# Patient Record
Sex: FEMALE | Race: WHITE | Employment: OTHER | URBAN - METROPOLITAN AREA
[De-identification: names, ages, dates, MRNs, and addresses within clinical notes are randomized per-mention and may not be internally consistent; named-entity substitution may affect disease eponyms.]

---

## 2017-01-06 ENCOUNTER — HOSPITAL ENCOUNTER (OUTPATIENT)
Dept: CT IMAGING | Age: 67
Discharge: HOME | End: 2017-01-06
Attending: SURGERY
Payer: MEDICARE

## 2017-01-06 VITALS — HEIGHT: 65 IN | BODY MASS INDEX: 32.15 KG/M2 | WEIGHT: 193 LBS

## 2017-01-06 DIAGNOSIS — R10.84 GENERALIZED ABDOMINAL PAIN: ICD-10-CM

## 2017-01-06 PROCEDURE — 74011000258 HC RX REV CODE- 258: Performed by: SURGERY

## 2017-01-06 PROCEDURE — 74011636320 HC RX REV CODE- 636/320: Performed by: SURGERY

## 2017-01-06 PROCEDURE — 74177 CT ABD & PELVIS W/CONTRAST: CPT

## 2017-01-06 RX ORDER — SODIUM CHLORIDE 0.9 % (FLUSH) 0.9 %
10 SYRINGE (ML) INJECTION
Status: COMPLETED | OUTPATIENT
Start: 2017-01-06 | End: 2017-01-06

## 2017-01-06 RX ADMIN — DIATRIZOATE MEGLUMINE AND DIATRIZOATE SODIUM 15 ML: 660; 100 LIQUID ORAL; RECTAL at 15:19

## 2017-01-06 RX ADMIN — Medication 10 ML: at 15:19

## 2017-01-06 RX ADMIN — IOPAMIDOL 100 ML: 755 INJECTION, SOLUTION INTRAVENOUS at 15:19

## 2017-01-06 RX ADMIN — SODIUM CHLORIDE 100 ML: 900 INJECTION, SOLUTION INTRAVENOUS at 15:19

## 2017-01-25 ENCOUNTER — HOSPITAL ENCOUNTER (OUTPATIENT)
Dept: SURGERY | Age: 67
Discharge: HOME | End: 2017-01-25
Attending: SURGERY
Payer: MEDICARE

## 2017-01-25 VITALS
RESPIRATION RATE: 16 BRPM | SYSTOLIC BLOOD PRESSURE: 133 MMHG | OXYGEN SATURATION: 98 % | BODY MASS INDEX: 32.62 KG/M2 | WEIGHT: 195.8 LBS | HEART RATE: 65 BPM | TEMPERATURE: 97.1 F | HEIGHT: 65 IN | DIASTOLIC BLOOD PRESSURE: 72 MMHG

## 2017-01-25 LAB
CREAT SERPL-MCNC: 0.94 MG/DL (ref 0.6–1)
HGB BLD-MCNC: 13.7 G/DL (ref 11.7–15.4)
POTASSIUM SERPL-SCNC: 3.7 MMOL/L (ref 3.5–5.1)

## 2017-01-25 PROCEDURE — 82565 ASSAY OF CREATININE: CPT | Performed by: ANESTHESIOLOGY

## 2017-01-25 PROCEDURE — 85018 HEMOGLOBIN: CPT | Performed by: ANESTHESIOLOGY

## 2017-01-25 PROCEDURE — 84132 ASSAY OF SERUM POTASSIUM: CPT | Performed by: ANESTHESIOLOGY

## 2017-01-25 NOTE — PERIOP NOTES
Patient verified name, , and surgery as listed in [facility]. Type 2 surgery, PAT assessment complete. Labs per surgeon: none. Labs per anesthesia protocol: hgb,potassium,creatinine; results pending. EKG: dated 17 -within limits-in EMR. Cardiologist note dated 17 in EMR, states \"fine for surgery,avg risk, stay on aspirin\". Hibiclens and instructions given per hospital policy. Patient provided with handouts including Guide to Surgery, Pain Management, Hand Hygiene, Blood Transfusion Education, and [place] [facility] Brochure. Patient answered medical/surgical history questions at their best of ability. All prior to admission medications documented in [facility]. Original medication prescription bottle not visualized during patient appointment. Patient instructed to hold all vitamins 7 days prior to surgery and NSAIDS 5 days prior to surgery, patient verbalized understanding. Medications to be held : meloxicam-5 days. Patient instructed to continue previous medications as prescribed prior to surgery and to take the following medications the day of surgery according to anesthesia guidelines with a small sip of water: ativan and norco if needed. Patient taught back and verbalized understanding.

## 2017-01-29 ENCOUNTER — ANESTHESIA EVENT (OUTPATIENT)
Dept: SURGERY | Age: 67
End: 2017-01-29
Payer: MEDICARE

## 2017-01-29 RX ORDER — MIDAZOLAM HYDROCHLORIDE 1 MG/ML
2 INJECTION, SOLUTION INTRAMUSCULAR; INTRAVENOUS ONCE
Status: CANCELLED | OUTPATIENT
Start: 2017-01-29 | End: 2017-01-29

## 2017-01-30 ENCOUNTER — SURGERY (OUTPATIENT)
Age: 67
End: 2017-01-30

## 2017-01-30 ENCOUNTER — ANESTHESIA (OUTPATIENT)
Dept: SURGERY | Age: 67
End: 2017-01-30
Payer: MEDICARE

## 2017-01-30 ENCOUNTER — HOSPITAL ENCOUNTER (OUTPATIENT)
Age: 67
Setting detail: OUTPATIENT SURGERY
Discharge: HOME | End: 2017-01-30
Attending: SURGERY | Admitting: SURGERY
Payer: MEDICARE

## 2017-01-30 VITALS
RESPIRATION RATE: 14 BRPM | TEMPERATURE: 100.2 F | BODY MASS INDEX: 32.28 KG/M2 | WEIGHT: 194 LBS | HEART RATE: 73 BPM | OXYGEN SATURATION: 93 % | DIASTOLIC BLOOD PRESSURE: 59 MMHG | SYSTOLIC BLOOD PRESSURE: 126 MMHG

## 2017-01-30 PROCEDURE — 77030010507 HC ADH SKN DERMBND J&J -B: Performed by: SURGERY

## 2017-01-30 PROCEDURE — 74011000250 HC RX REV CODE- 250

## 2017-01-30 PROCEDURE — 77030008703 HC TU ET UNCUF COVD -A: Performed by: ANESTHESIOLOGY

## 2017-01-30 PROCEDURE — 74011000250 HC RX REV CODE- 250: Performed by: SURGERY

## 2017-01-30 PROCEDURE — 74011250637 HC RX REV CODE- 250/637: Performed by: ANESTHESIOLOGY

## 2017-01-30 PROCEDURE — 76210000016 HC OR PH I REC 1 TO 1.5 HR: Performed by: SURGERY

## 2017-01-30 PROCEDURE — 74011250636 HC RX REV CODE- 250/636

## 2017-01-30 PROCEDURE — 77030002933 HC SUT MCRYL J&J -A: Performed by: SURGERY

## 2017-01-30 PROCEDURE — 77030020782 HC GWN BAIR PAWS FLX 3M -B: Performed by: ANESTHESIOLOGY

## 2017-01-30 PROCEDURE — 77030011640 HC PAD GRND REM COVD -A: Performed by: SURGERY

## 2017-01-30 PROCEDURE — 74011250636 HC RX REV CODE- 250/636: Performed by: SURGERY

## 2017-01-30 PROCEDURE — 74011250636 HC RX REV CODE- 250/636: Performed by: ANESTHESIOLOGY

## 2017-01-30 PROCEDURE — 77030002916 HC SUT ETHLN J&J -A: Performed by: SURGERY

## 2017-01-30 PROCEDURE — 77030035051: Performed by: SURGERY

## 2017-01-30 PROCEDURE — 77030034154 HC SHR COAG HARM ACE J&J -F: Performed by: SURGERY

## 2017-01-30 PROCEDURE — 76210000020 HC REC RM PH II FIRST 0.5 HR: Performed by: SURGERY

## 2017-01-30 PROCEDURE — 77030035048 HC TRCR ENDOSC OPTCL COVD -B: Performed by: SURGERY

## 2017-01-30 PROCEDURE — 74011000250 HC RX REV CODE- 250: Performed by: ANESTHESIOLOGY

## 2017-01-30 PROCEDURE — 77030008518 HC TBNG INSUF ENDO STRY -B: Performed by: SURGERY

## 2017-01-30 PROCEDURE — 76060000032 HC ANESTHESIA 0.5 TO 1 HR: Performed by: SURGERY

## 2017-01-30 PROCEDURE — 77030032490 HC SLV COMPR SCD KNE COVD -B: Performed by: SURGERY

## 2017-01-30 PROCEDURE — 76010000160 HC OR TIME 0.5 TO 1 HR INTENSV-TIER 1: Performed by: SURGERY

## 2017-01-30 PROCEDURE — 77030035045 HC TRCR ENDOSC VRSPRT BLDLSS COVD -B: Performed by: SURGERY

## 2017-01-30 PROCEDURE — 77030008477 HC STYL SATN SLP COVD -A: Performed by: ANESTHESIOLOGY

## 2017-01-30 RX ORDER — DEXAMETHASONE SODIUM PHOSPHATE 100 MG/10ML
INJECTION INTRAMUSCULAR; INTRAVENOUS AS NEEDED
Status: DISCONTINUED | OUTPATIENT
Start: 2017-01-30 | End: 2017-01-30 | Stop reason: HOSPADM

## 2017-01-30 RX ORDER — LIDOCAINE HYDROCHLORIDE 20 MG/ML
INJECTION, SOLUTION EPIDURAL; INFILTRATION; INTRACAUDAL; PERINEURAL AS NEEDED
Status: DISCONTINUED | OUTPATIENT
Start: 2017-01-30 | End: 2017-01-30 | Stop reason: HOSPADM

## 2017-01-30 RX ORDER — KETOROLAC TROMETHAMINE 30 MG/ML
INJECTION, SOLUTION INTRAMUSCULAR; INTRAVENOUS AS NEEDED
Status: DISCONTINUED | OUTPATIENT
Start: 2017-01-30 | End: 2017-01-30 | Stop reason: HOSPADM

## 2017-01-30 RX ORDER — LIDOCAINE HYDROCHLORIDE 10 MG/ML
0.1 INJECTION INFILTRATION; PERINEURAL AS NEEDED
Status: DISCONTINUED | OUTPATIENT
Start: 2017-01-30 | End: 2017-01-30 | Stop reason: HOSPADM

## 2017-01-30 RX ORDER — ROCURONIUM BROMIDE 10 MG/ML
INJECTION, SOLUTION INTRAVENOUS AS NEEDED
Status: DISCONTINUED | OUTPATIENT
Start: 2017-01-30 | End: 2017-01-30 | Stop reason: HOSPADM

## 2017-01-30 RX ORDER — PROPOFOL 10 MG/ML
INJECTION, EMULSION INTRAVENOUS AS NEEDED
Status: DISCONTINUED | OUTPATIENT
Start: 2017-01-30 | End: 2017-01-30 | Stop reason: HOSPADM

## 2017-01-30 RX ORDER — GLYCOPYRROLATE 0.2 MG/ML
INJECTION INTRAMUSCULAR; INTRAVENOUS AS NEEDED
Status: DISCONTINUED | OUTPATIENT
Start: 2017-01-30 | End: 2017-01-30 | Stop reason: HOSPADM

## 2017-01-30 RX ORDER — NALOXONE HYDROCHLORIDE 0.4 MG/ML
0.2 INJECTION, SOLUTION INTRAMUSCULAR; INTRAVENOUS; SUBCUTANEOUS AS NEEDED
Status: DISCONTINUED | OUTPATIENT
Start: 2017-01-30 | End: 2017-01-30 | Stop reason: HOSPADM

## 2017-01-30 RX ORDER — ONDANSETRON 2 MG/ML
INJECTION INTRAMUSCULAR; INTRAVENOUS AS NEEDED
Status: DISCONTINUED | OUTPATIENT
Start: 2017-01-30 | End: 2017-01-30 | Stop reason: HOSPADM

## 2017-01-30 RX ORDER — SUCCINYLCHOLINE CHLORIDE 20 MG/ML
INJECTION INTRAMUSCULAR; INTRAVENOUS AS NEEDED
Status: DISCONTINUED | OUTPATIENT
Start: 2017-01-30 | End: 2017-01-30 | Stop reason: HOSPADM

## 2017-01-30 RX ORDER — OXYCODONE HYDROCHLORIDE 5 MG/1
5 TABLET ORAL
Status: DISCONTINUED | OUTPATIENT
Start: 2017-01-30 | End: 2017-01-30 | Stop reason: HOSPADM

## 2017-01-30 RX ORDER — FENTANYL CITRATE 50 UG/ML
100 INJECTION, SOLUTION INTRAMUSCULAR; INTRAVENOUS ONCE
Status: DISCONTINUED | OUTPATIENT
Start: 2017-01-30 | End: 2017-01-30 | Stop reason: HOSPADM

## 2017-01-30 RX ORDER — MIDAZOLAM HYDROCHLORIDE 1 MG/ML
2 INJECTION, SOLUTION INTRAMUSCULAR; INTRAVENOUS
Status: DISCONTINUED | OUTPATIENT
Start: 2017-01-30 | End: 2017-01-30 | Stop reason: HOSPADM

## 2017-01-30 RX ORDER — CEFAZOLIN SODIUM IN 0.9 % NACL 2 G/50 ML
2 INTRAVENOUS SOLUTION, PIGGYBACK (ML) INTRAVENOUS ONCE
Status: COMPLETED | OUTPATIENT
Start: 2017-01-30 | End: 2017-01-30

## 2017-01-30 RX ORDER — SODIUM CHLORIDE, SODIUM LACTATE, POTASSIUM CHLORIDE, CALCIUM CHLORIDE 600; 310; 30; 20 MG/100ML; MG/100ML; MG/100ML; MG/100ML
75 INJECTION, SOLUTION INTRAVENOUS CONTINUOUS
Status: DISCONTINUED | OUTPATIENT
Start: 2017-01-30 | End: 2017-01-30 | Stop reason: HOSPADM

## 2017-01-30 RX ORDER — BUPIVACAINE HYDROCHLORIDE 5 MG/ML
INJECTION, SOLUTION EPIDURAL; INTRACAUDAL AS NEEDED
Status: DISCONTINUED | OUTPATIENT
Start: 2017-01-30 | End: 2017-01-30 | Stop reason: HOSPADM

## 2017-01-30 RX ORDER — FENTANYL CITRATE 50 UG/ML
INJECTION, SOLUTION INTRAMUSCULAR; INTRAVENOUS AS NEEDED
Status: DISCONTINUED | OUTPATIENT
Start: 2017-01-30 | End: 2017-01-30 | Stop reason: HOSPADM

## 2017-01-30 RX ORDER — OXYCODONE AND ACETAMINOPHEN 5; 325 MG/1; MG/1
1-2 TABLET ORAL
Qty: 40 TAB | Refills: 0 | Status: SHIPPED | OUTPATIENT
Start: 2017-01-30 | End: 2017-03-20

## 2017-01-30 RX ORDER — HYDROMORPHONE HYDROCHLORIDE 2 MG/ML
0.5 INJECTION, SOLUTION INTRAMUSCULAR; INTRAVENOUS; SUBCUTANEOUS
Status: DISCONTINUED | OUTPATIENT
Start: 2017-01-30 | End: 2017-01-30 | Stop reason: HOSPADM

## 2017-01-30 RX ORDER — NEOSTIGMINE METHYLSULFATE 1 MG/ML
INJECTION INTRAVENOUS AS NEEDED
Status: DISCONTINUED | OUTPATIENT
Start: 2017-01-30 | End: 2017-01-30 | Stop reason: HOSPADM

## 2017-01-30 RX ADMIN — LIDOCAINE HYDROCHLORIDE 80 MG: 20 INJECTION, SOLUTION EPIDURAL; INFILTRATION; INTRACAUDAL; PERINEURAL at 10:21

## 2017-01-30 RX ADMIN — NEOSTIGMINE METHYLSULFATE 3 MG: 1 INJECTION INTRAVENOUS at 11:04

## 2017-01-30 RX ADMIN — LIDOCAINE HYDROCHLORIDE 0.1 ML: 10 INJECTION, SOLUTION INFILTRATION; PERINEURAL at 10:14

## 2017-01-30 RX ADMIN — FENTANYL CITRATE 50 MCG: 50 INJECTION, SOLUTION INTRAMUSCULAR; INTRAVENOUS at 11:09

## 2017-01-30 RX ADMIN — SODIUM CHLORIDE, SODIUM LACTATE, POTASSIUM CHLORIDE, AND CALCIUM CHLORIDE 75 ML/HR: 600; 310; 30; 20 INJECTION, SOLUTION INTRAVENOUS at 10:15

## 2017-01-30 RX ADMIN — OXYCODONE HYDROCHLORIDE 5 MG: 5 TABLET ORAL at 12:06

## 2017-01-30 RX ADMIN — FENTANYL CITRATE 50 MCG: 50 INJECTION, SOLUTION INTRAMUSCULAR; INTRAVENOUS at 10:39

## 2017-01-30 RX ADMIN — SUCCINYLCHOLINE CHLORIDE 140 MG: 20 INJECTION INTRAMUSCULAR; INTRAVENOUS at 10:21

## 2017-01-30 RX ADMIN — HYDROMORPHONE HYDROCHLORIDE 0.5 MG: 2 INJECTION, SOLUTION INTRAMUSCULAR; INTRAVENOUS; SUBCUTANEOUS at 11:33

## 2017-01-30 RX ADMIN — FENTANYL CITRATE 100 MCG: 50 INJECTION, SOLUTION INTRAMUSCULAR; INTRAVENOUS at 10:21

## 2017-01-30 RX ADMIN — HYDROMORPHONE HYDROCHLORIDE 0.5 MG: 2 INJECTION, SOLUTION INTRAMUSCULAR; INTRAVENOUS; SUBCUTANEOUS at 11:25

## 2017-01-30 RX ADMIN — CEFAZOLIN 2 G: 1 INJECTION, POWDER, FOR SOLUTION INTRAMUSCULAR; INTRAVENOUS; PARENTERAL at 10:17

## 2017-01-30 RX ADMIN — BUPIVACAINE HYDROCHLORIDE 30 ML: 5 INJECTION, SOLUTION EPIDURAL; INTRACAUDAL; PERINEURAL at 10:41

## 2017-01-30 RX ADMIN — PROPOFOL 170 MG: 10 INJECTION, EMULSION INTRAVENOUS at 10:21

## 2017-01-30 RX ADMIN — DEXAMETHASONE SODIUM PHOSPHATE 10 MG: 100 INJECTION INTRAMUSCULAR; INTRAVENOUS at 10:35

## 2017-01-30 RX ADMIN — ROCURONIUM BROMIDE 30 MG: 10 INJECTION, SOLUTION INTRAVENOUS at 10:27

## 2017-01-30 RX ADMIN — ROCURONIUM BROMIDE 5 MG: 10 INJECTION, SOLUTION INTRAVENOUS at 10:21

## 2017-01-30 RX ADMIN — GLYCOPYRROLATE 0.4 MG: 0.2 INJECTION INTRAMUSCULAR; INTRAVENOUS at 11:04

## 2017-01-30 RX ADMIN — ONDANSETRON 4 MG: 2 INJECTION INTRAMUSCULAR; INTRAVENOUS at 10:57

## 2017-01-30 RX ADMIN — KETOROLAC TROMETHAMINE 30 MG: 30 INJECTION, SOLUTION INTRAMUSCULAR; INTRAVENOUS at 10:57

## 2017-01-30 RX ADMIN — MIDAZOLAM HYDROCHLORIDE 2 MG: 1 INJECTION, SOLUTION INTRAMUSCULAR; INTRAVENOUS at 10:14

## 2017-01-30 NOTE — ANESTHESIA POSTPROCEDURE EVALUATION
Post-Anesthesia Evaluation and Assessment    Patient: Justin Cast MRN: [id number]  SSN: [id number]    YOB: 1950  Age: 74 y.o. Sex: female       Cardiovascular Function/Vital Signs  Visit Vitals    /59    Pulse 73    Temp 37.9 °C (100.2 °F)    Resp 14    Wt 88 kg (194 lb)    SpO2 93%    BMI 32.28 kg/m2       Patient is status post general anesthesia for Procedure(s):  GENERAL DIAGNOSTIC LAPAROSCOPY, LYSIS OF ADHESIONS. Nausea/Vomiting: None    Postoperative hydration reviewed and adequate. Pain:  Pain Scale 1: Visual (01/30/17 1213)  Pain Intensity 1: 0 (01/30/17 1213)   Managed    Neurological Status:   Neuro (WDL): Exceptions to WDL (01/30/17 1115)  Neuro  Neurologic State: Alert (01/30/17 1200)  Orientation Level: Oriented X4 (01/30/17 1200)  Cognition: Follows commands (01/30/17 1200)  Speech: Clear (01/30/17 1200)  LUE Motor Response: Purposeful (01/30/17 1200)  LLE Motor Response: Purposeful (01/30/17 1200)  RUE Motor Response: Purposeful (01/30/17 1200)  RLE Motor Response: Purposeful (01/30/17 1200)   At baseline    Mental Status and Level of Consciousness: Arousable    Pulmonary Status:   O2 Device: Room air (01/30/17 1213)   Adequate oxygenation and airway patent    Complications related to anesthesia: None    Post-anesthesia assessment completed.  No concerns    Signed By: Jose Valentin MD     January 30, 2017

## 2017-01-30 NOTE — IP AVS SNAPSHOT
Glory Almendarez 
 
 
 [address] 
[phone] Patient: Dejah Espino MRN: [id number] WND:[birth year] You are allergic to the following Allergen Reactions Adhesive Tape Rash  
    
 Sulfa (Sulfonamide Antibiotics) Nausea and Vomiting Recent Documentation Weight BMI OB Status Smoking Status 88 kg 32.28 kg/m2 Hysterectomy Never Smoker Emergency Contacts Name Discharge Info Relation Home Work Mobile Meggan Lerner DISCHARGE CAREGIVER [3] Daughter [21] [phone] [facility] [3] Spouse [3] [phone] About your hospitalization You were admitted on:  January 30, 2017 You last received care in the:  [facility] PACU You were discharged on:  January 30, 2017 Unit phone number:  [phone] Why you were hospitalized Your primary diagnosis was:  Not on File Providers Seen During Your Hospitalizations Provider Role Specialty Primary office phone Isabel Velasquez MD Attending Provider General Surgery [phone] Your Primary Care Physician (PCP) Primary Care Physician Office Phone Office Fax Tanesha Mckee [phone] Follow-up Information Follow up With Details Comments Contact Info Jennifer Clark MD   [address] [place] [id number] [phone] Isabel Velasquez MD Schedule an appointment as soon as possible for a visit in 10 day(s)  [address] [address] 
[phone] Your Appointments Monday February 06, 2017  1:45 PM EST Global Post Op with Isabel Velasquze MD  
[facility] ([address] [place] [address]) [address] [place] [id number] [phone] Thursday February 09, 2017  2:45 PM EST ANNUAL with Napoleon Ceja MD  
 Formerly [facility] ([facility]) [address] [place] [id number] [phone] Current Discharge Medication List  
  
START taking these medications Dose & Instructions Dispensing Information Comments Morning Noon Evening Bedtime  
 oxyCODONE-acetaminophen 5-325 mg per tablet Commonly known as:  PERCOCET Your next dose is: Today, Tomorrow Other:  _________ Dose:  1-2 Tab Take 1-2 Tabs by mouth every four (4) hours as needed for Pain. Max Daily Amount: 12 Tabs. Quantity:  40 Tab Refills:  0 CONTINUE these medications which have CHANGED Dose & Instructions Dispensing Information Comments Morning Noon Evening Bedtime  
 amLODIPine 5 mg tablet Commonly known as:  Mirna Dutton What changed:   
- when to take this 
- additional instructions Your next dose is: Today, Tomorrow Other:  _________  
   
   
 1 every day for BP  Indications: Hypertension Quantity:  90 Tab Refills:  12  
     
   
   
   
  
 atorvastatin 40 mg tablet Commonly known as:  LIPITOR What changed:   
- when to take this 
- additional instructions Your next dose is: Today, Tomorrow Other:  _________  
   
   
 1 qd  Indications: mixed hyperlipidemia Quantity:  90 Tab Refills:  12  
     
   
   
   
  
 levothyroxine 75 mcg tablet Commonly known as:  SYNTHROID What changed:   
- when to take this 
- additional instructions Your next dose is: Today, Tomorrow Other:  _________  
   
   
 1 every day for thyroid Quantity:  90 Tab Refills:  12  
     
   
   
   
  
 losartan-hydroCHLOROthiazide 100-25 mg per tablet Commonly known as:  HYZAAR What changed:   
- when to take this 
- additional instructions Your next dose is: Today, Tomorrow Other:  _________  
   
   
 1 every day for BP  Indications: Hypertension Quantity:  30 Tab Refills:  12 CONTINUE these medications which have NOT CHANGED Dose & Instructions Dispensing Information Comments Morning Noon Evening Bedtime  
 alendronate 70 mg tablet Commonly known as:  FOSAMAX Your next dose is: Today, Tomorrow Other:  _________  
   
   
 1 q week for osteoporosis Quantity:  12 Tab Refills:  12  
     
   
   
   
  
 aspirin 81 mg chewable tablet Your next dose is: Today, Tomorrow Other:  _________ Dose:  81 mg Take 81 mg by mouth nightly. Refills:  0 HYDROcodone-acetaminophen 5-325 mg per tablet Commonly known as:  [place] Minor Your next dose is: Today, Tomorrow Other:  _________  
   
   
 1 q6h prn severe pain   Fill after 30 days  Indications: PAIN Quantity:  120 Tab Refills:  0 LORazepam 0.5 mg tablet Commonly known as:  ATIVAN Your next dose is: Today, Tomorrow Other:  _________ Dose:  0.5 mg Take 1 Tab by mouth every eight (8) hours as needed for Anxiety. Max Daily Amount: 1.5 mg.  
 Quantity:  30 Tab Refills:  5  
     
   
   
   
  
 meloxicam 15 mg tablet Commonly known as:  MOBIC Your next dose is: Today, Tomorrow Other:  _________  
   
   
 1 every day for arthritis  Indications: OSTEOARTHRITIS Quantity:  90 Tab Refills:  12 sertraline 100 mg tablet Commonly known as:  ZOLOFT Your next dose is: Today, Tomorrow Other:  _________ Dose:  100 mg Take 1 Tab by mouth nightly. Indications: major depressive disorder Quantity:  90 Tab Refills:  5  
     
   
   
   
  
 temazepam 30 mg capsule Commonly known as:  RESTORIL Your next dose is: Today, Tomorrow Other:  _________ Dose:  30 mg Take 1 Cap by mouth nightly as needed. Max Daily Amount: 30 mg.  
 Quantity:  30 Cap Refills:  5 traZODone 150 mg tablet Commonly known as:  Derek Madrigal Your next dose is: Today, Tomorrow Other:  _________  
   
   
 1 to 2 qhs for sleep Quantity:  180 Tab Refills:  12 Where to Get Your Medications Information on where to get these meds will be given to you by the nurse or doctor. ! Ask your nurse or doctor about these medications  
  oxyCODONE-acetaminophen 5-325 mg per tablet Discharge Instructions 1. Diet as tolerated except for a  low fat diet after laparoscopic cholecystectomy. 2. Showering is allowed, but no tub baths, hot tubs or swimming. 3. Drainage is common from the wounds. Change the dressings as needed. Call our office if the wounds become reddened, tender, feel warm to the touch or pus starts to drain from the wound. 4. Take prescribed pain medication as directed, usually Percocet, Norco, Ultram or Dilaudid. Take over the counter medication for minor pain. 5. Ice may be applied intermittently to the surgical site or sites. 6. Call or office, [phone], if problems arise. 7. Follow up in the office at the assigned time. 8. Resume all medications as taken per surgery, unless specifically instructed not to take certain ones. 9. No lifting more than 25 pounds until told otherwise. 10. Driving is allowed 3 days after surgery as long as you feel comfortable enough to drive and have not taken any prescription pain medication prior to driving. Discharge Orders None ACO Transitions of Care Introducing [facility] [employer] offers a voluntary care coordination program to provide high quality service and care to [facility] fee-for-service beneficiaries. Dominga Kirk was designed to help you enhance your health and well-being through the following services: ? Transitions of Care  support for individuals who are transitioning from one care setting to another (example: Hospital to home). ? Chronic and Complex Care Coordination  support for individuals and caregivers of those with serious or chronic illnesses or with more than one chronic (ongoing) condition and those who take a number of different medications. If you meet specific medical criteria, a [facility] may call you directly to coordinate your care with your primary care physician and your other care providers. For questions about the [facility] programs, please, contact your physicians office. For general questions or additional information about Accountable Care Organizations: 
Please visit www.medicare.gov/acos. html or call 1-800-MEDICARE (4-665.122.8412) TTY users should call 8-422.760.3052. Introducing [facility] SERVICES! Natalie Bergman introduces [employer] patient portal. Now you can access parts of your medical record, email your doctor's office, and request medication refills online. 1. In your internet browser, go to [email or web address] 2. Click on the First Time User? Click Here link in the Sign In box. You will see the New Member Sign Up page. 3. Enter your [employer] Access Code exactly as it appears below. You will not need to use this code after youve completed the sign-up process. If you do not sign up before the expiration date, you must request a new code. · [employer] Access Code: [email or web address] Expires: 3/15/2017 11:56 AM 
 
4. Enter the last four digits of your Social Security Number (xxxx) and Date of Birth (mm/dd/yyyy) as indicated and click Submit. You will be taken to the next sign-up page. 5. Create a Easy [employer]ric[employer] ID. This will be your [employer] login ID and cannot be changed, so think of one that is secure and easy to remember. 6. Create a [employer] password. You can change your password at any time. 7. Enter your Password Reset Question and Answer. This can be used at a later time if you forget your password. 8. Enter your e-mail address. You will receive e-mail notification when new information is available in [address] 9. Click Sign Up. You can now view and download portions of your medical record. 10. Click the Download Summary menu link to download a portable copy of your medical information. If you have questions, please visit the Frequently Asked Questions section of the [employer] website. Remember, [employer] is NOT to be used for urgent needs. For medical emergencies, dial 911. Now available from your iPhone and Android! General Information Please provide this summary of care documentation to your next provider. Patient Signature:  ____________________________________________________________ Date:  ____________________________________________________________  
  
[facility] Provider Signature:  ____________________________________________________________ Date:  ____________________________________________________________

## 2017-01-30 NOTE — H&P (VIEW-ONLY)
aDate: 2017      Name: Elvie Mazariegos      MRN: [id number]       : 1950       Age: 74 y.o. Sex: female        Adalberto Galeana MD       CC:    Chief Complaint   Patient presents with    Follow-up       HPI:     Elvie Mazariegos is a 74 y.o. female who presents for follow up after a CT scan was done. The CT scan showed: FINDINGS:  Abdomen CT: The patient is post gastric bypass surgery. There are also  cholecystectomy changes. Surgical changes in the anterior abdominal wall  suggest prior ventral hernia repair. There is no significant recurrent hernia. There are no inflammatory changes or fluid collections in the abdomen. There is  no bowel distention.     The lung bases are clear. There are no lesions in the liver or spleen. The  adrenal glands and pancreas appear normal. There is normal enhancement of the  kidneys. There is no hydronephrosis. There is no adenopathy.      Pelvis CT: The patient is post hysterectomy. There are no inflammatory changes  or fluid collections in the pelvis. There is no significant adenopathy or mass. There are postsurgical changes in the lower lumbar spine. There are no bony  lesions.     IMPRESSION  IMPRESSION: Post operative changes as described. No acute findings in the  abdomen or pelvis    PMH:    Past Medical History   Diagnosis Date    Anxiety     Arthritis      general-back, neck, hips,knees/ managed with medication     Autoimmune disease ([address]      lichens sclerosis    CAD (coronary artery disease) 2010     stent x 1, no MI    Chest pain      right    Chronic pain      back and neck    Depression      managed with medication     Family history of ischemic heart disease     H/O acute pancreatitis 2013     states gallbladder stone lodged creating a blockage.      H/O gastric bypass     High cholesterol      managed w/ meds    Hypertension      managed with medication     Hypothyroidism      managed with medication     Lichen sclerosus     Obesity (BMI 30-39. 9)      BMI 32.4 3/16    Osteopenia     Systolic murmur      ECHO: +/-PI, TR    Ventral hernia        PSH:    Past Surgical History   Procedure Laterality Date    Hx bladder suspension      Hx carpal tunnel release Right     Hx lysis of adhesions       abdominal    Hx heart catheterization  2010     stenting of LAD    Hx lap cholecystectomy  7/2013    Hx hysterectomy  1978     cervix removed    Hx oophorectomy  1984    Hx lumbar fusion  1996    Hx gastric bypass  2009    Hx hernia repair  8/14/14     ventral    Hx urological       bladder suspension    Hx shoulder arthroscopy Right 2011    Hx orthopaedic  1997     carpal tunnel release    Hx appendectomy  1954    Hx colonoscopy      Hx lipectomy         MEDS:    Current Outpatient Prescriptions   Medication Sig    ibandronate (BONIVA) 150 mg tablet Take 150 mg by mouth every thirty (30) days.  temazepam (RESTORIL) 30 mg capsule Take 1 Cap by mouth nightly as needed. Max Daily Amount: 30 mg.    alendronate (FOSAMAX) 70 mg tablet 1 q week for osteoporosis    HYDROcodone-acetaminophen (NORCO) 5-325 mg per tablet 1 q6h prn severe pain   Fill after 30 days  Indications: PAIN    levothyroxine (SYNTHROID) 75 mcg tablet 1 every day for thyroid    LORazepam (ATIVAN) 0.5 mg tablet Take 1 Tab by mouth every eight (8) hours as needed for Anxiety. Max Daily Amount: 1.5 mg.    losartan-hydroCHLOROthiazide (HYZAAR) 100-25 mg per tablet 1 every day for BP  Indications: Hypertension    meloxicam (MOBIC) 15 mg tablet 1 every day for arthritis  Indications: OSTEOARTHRITIS    amLODIPine (NORVASC) 5 mg tablet 1 every day for BP  Indications: Hypertension    traZODone (DESYREL) 150 mg tablet 1 to 2 qhs for sleep    atorvastatin (LIPITOR) 40 mg tablet 1 qd  Indications: mixed hyperlipidemia    sertraline (ZOLOFT) 100 mg tablet Take 1 Tab by mouth nightly.  Indications: major depressive disorder    aspirin 81 mg chewable tablet Take 81 mg by mouth nightly. No current facility-administered medications for this visit. ALLERGIES:      Allergies   Allergen Reactions    Adhesive Tape Rash    Sulfa (Sulfonamide Antibiotics) Nausea and Vomiting       SH:    Social History   Substance Use Topics    Smoking status: Never Smoker    Smokeless tobacco: Never Used    Alcohol use No       FH:    Family History   Problem Relation Age of Onset    Heart Attack Mother     Heart Disease Mother     Hypertension Mother     Cancer Mother      melanoma    Cancer Father      leukemia    Coronary Artery Disease Father     Cancer Sister      breast    Heart Attack Brother     Heart Disease Brother     Cancer Brother      lung    Lung Disease Brother        ROS: The patient has no difficulty with chest pain or shortness of breath. No fever or chills. Comprehensive 13 point review of systems was otherwise unremarkable except as noted above. Physical Exam:     Visit Vitals    /84    Pulse 60    Ht 5' 5\" (1.651 m)    Wt 196 lb (88.9 kg)    BMI 32.62 kg/m2       General: Alert, oriented, cooperative white female in no acute distress. Eyes: Sclera are clear. Conjunctiva and lids within normal limits. No icterus. Ears and Nose: no gross deformities to visual inspection, gross hearing intact  Neck: Supple, trachea midline, no appreciable thyromegaly  Resp: Breathing is  non-labored. Lungs clear to auscultation without wheezing or rhonchi   CV: RRR. No murmurs, rubs or gallops appreciated. Abd: soft, mild RUQ and epigastric tenderness to palpation, active BS'S. Psych:  Mood and affect appropriate. Short-term memory and understanding intact      Assessment/Plan:  Irish Lee is a 74 y.o. female who has signs and symptoms consistent with upper abdominal pain of unknown etiology. 1. Diagnostic laparoscopy with lysis of adhesions, possible open exploratory laparotomy.      I went through the risks of bleeding, infection and anesthesia. I went through other risks of injury to the liver, biliary tree structures, stomach, small bowel, large bowel , pancreas and the potential need to convert to an open procedure.     Merlin Mcalpine, MD      FACS   1/19/2017  7:28 PM

## 2017-01-30 NOTE — INTERVAL H&P NOTE
H&P Update:  Jakob Méndez was seen and examined. History and physical has been reviewed. The patient has been examined.  There have been no significant clinical changes since the completion of the originally dated History and Physical.    Signed By: Aniceto uBsch MD     January 30, 2017 9:57 AM

## 2017-01-30 NOTE — OP NOTES
[address]   OPERATIVE REPORT       Name:  Adriana Dennis   MR#:  [id number]   :  1950   Account #:  [de-identified]   Date of Adm:  2017       DATE OF PROCEDURE: 2017     PREOPERATIVE DIAGNOSIS: Upper abdominal pain. POSTOPERATIVE DIAGNOSES: Upper abdominal pain secondary to   intraabdominal adhesions. PROCEDURE: Diagnostic laparoscopy, laparoscopic lysis of   adhesions. SURGEON: Tj Catalan MD    ESTIMATED BLOOD LOSS: 5 mL. SPECIMENS: None. HISTORY: This is a 59-year-old female who has a long past   surgical history. She had a gastric bypass done at [facility]. She has   had a cholecystectomy. I did a very large ventral hernia on   2014. The patient had dense intraabdominal adhesions when   I did that prior procedure. She came back to my office reporting   upper abdominal pain which was getting progressively more severe   and more frequent. I did a CT scan of her abdomen and pelvis, it   was negative. I recommended a diagnostic laparoscopy in light of   her previous history of adhesions. The patient was agreeable,   signed a consent form, and was scheduled for today. I did go   through the risks of bleeding, infection, anesthesia, injury to   the small bowel, large bowel, any of the intra-abdominal organs,   potential need to convert to an open procedure. The patient was   agreeable, signed a consent form. DESCRIPTION OF PROCEDURE: The patient was brought to OR room #7   at [address] [place] [address] where general endotracheal   anesthesia was administered without complications. The abdomen   was prepped and draped in the usual sterile manner. I did a   timeout identifying the patient, surgeon, procedure, and her   birthday of 1950. Once everyone in the room agreed with   the time-out, the procedure. I made an incision at her   panniculectomy incision site in the left lower quadrant area.    Through this, an Optiview trocar was placed in the peritoneal   cavity. After going through all the appropriate layers of the   anterior abdominal wall, the abdomen was insufflated to 15 mmHg   using carbon dioxide gas after which a 10 mm 30 degree scope was   inserted. She had adhesions all along the edge of the mesh,   particularly in the upper abdomen, tenting up her loops of small   bowel, as well as her transverse colon. I placed two 5 mm   trocars in the left side of the abdomen, one at the level of the   umbilicus, one in the left lower quadrant area. Using a   combination of the Harmonic scalpel and blunt dissection, I was   able to take down all the adhesions to the ventral hernia mesh,   which I had placed in 08/2014. There were no other   abnormalities. We took down adhesions all along this, took about   20 minutes to take down all the adhesions with the 5 mm Harmonic   scalpel and blunt dissection. No other abnormalities were noted,   the liver had some steatosis, but otherwise appeared normal.   There was no evidence of cirrhosis. At this point, I could not   see anything else that was necessary. We removed all the trocars   under direct vision without evidence of bleeding from the trocar   sites. The incisions were closed with simple sutures of 3-0   nylon, the reason being is that when I attempted to close them   with the 4-0 Monocryl, there was such tension on from her   previous abdominoplasty that the 4-0 Monocryl would not hold   adequately, so I had to use 3-0 nylon interrupted sutures. The   patient received 30 mL of 0.5% Marcaine in divided doses at the   incision sites. She tolerated the procedure well, was extubated,   brought to recovery room in stable condition.         MD ODESSA De La Cruz / Jace Avila   D:  01/30/2017   11:13   T:  01/30/2017   14:14   Job #:  [id number]

## 2017-01-30 NOTE — ANESTHESIA PREPROCEDURE EVALUATION
Anesthetic History   No history of anesthetic complications            Review of Systems / Medical History  Patient summary reviewed and pertinent labs reviewed    Pulmonary  Within defined limits                 Neuro/Psych         Psychiatric history (Anxiety/Depression)     Cardiovascular    Hypertension: well controlled          CAD and cardiac stents (STEFF 2010 to LAD)    Exercise tolerance: >4 METS  Comments: Normal stress test 2014  Denies recent CP, SOB or changes in functional status  Appears optimized from a CV standpoint. GI/Hepatic/Renal           Liver disease (h/o of pancreatitis with increased lfts about a year ago, better now)    Comments: Gastric bypass Endo/Other      Hypothyroidism: well controlled  Obesity and arthritis     Other Findings   Comments: Lichen sclerosis         Physical Exam    Airway  Mallampati: II  TM Distance: 4 - 6 cm    Mouth opening: Normal     Cardiovascular    Rhythm: regular  Rate: normal         Dental  No notable dental hx       Pulmonary  Breath sounds clear to auscultation               Abdominal  GI exam deferred       Other Findings            Anesthetic Plan    ASA: 3  Anesthesia type: general          Induction: Intravenous  Anesthetic plan and risks discussed with: Patient and Spouse      ASA last night.

## 2017-01-30 NOTE — PROGRESS NOTES
's visit with Ms. Bhakta to offer prayer in recovery. Prior to this visit I conveyed care and concern to patient's family in waiting room.       Paul Calhoun 66  Board Certified

## 2017-01-30 NOTE — DISCHARGE INSTRUCTIONS
1. Diet as tolerated except for a  low fat diet after laparoscopic cholecystectomy. 2. Showering is allowed, but no tub baths, hot tubs or swimming. 3. Drainage is common from the wounds. Change the dressings as needed. Call our office if the wounds become reddened, tender, feel warm to the touch or pus starts to drain from the wound. 4. Take prescribed pain medication as directed, usually Percocet, Norco, Ultram or Dilaudid. Take over the counter medication for minor pain. 5. Ice may be applied intermittently to the surgical site or sites. 6. Call or office, [phone], if problems arise. 7. Follow up in the office at the assigned time. 8. Resume all medications as taken per surgery, unless specifically instructed not to take certain ones. 9. No lifting more than 25 pounds until told otherwise. 10. Driving is allowed 3 days after surgery as long as you feel comfortable enough to drive and have not taken any prescription pain medication prior to driving.

## 2017-01-30 NOTE — BRIEF OP NOTE
BRIEF OPERATIVE NOTE    Date of Procedure: 1/30/2017   Preoperative Diagnosis: UPPER ABDOMINAL PAIN  Postoperative Diagnosis: SAME WITH INTRAABDOMINAL ADHESIONS FOUND  Procedure(s):  GENERAL DIAGNOSTIC LAPAROSCOPY WITH LAPAROSCOPIC LYSIS OF ADHESIONS  Surgeon(s) and Role:     * Aleksandr Thompson MD - Primary            Surgical Staff:  Circ-1: Laurie Cheng RN  Scrub Tech-1: Marilyn Jurado Cauisaac  Scrub Tech-2: Castilloius Expose  Event Time In   Incision Start 1038   Incision Close 1103     Anesthesia: General plus localc  Estimated Blood Loss: 5 cc's  Specimens: * No specimens in log *   Findings: See dictated note   Complications: None.   Implants: * No implants in log *

## 2017-08-07 ENCOUNTER — HOSPITAL ENCOUNTER (OUTPATIENT)
Dept: SURGERY | Age: 67
Discharge: HOME | End: 2017-08-07
Payer: MEDICARE

## 2017-08-07 ENCOUNTER — HOSPITAL ENCOUNTER (OUTPATIENT)
Dept: PHYSICAL THERAPY | Age: 67
Discharge: HOME | End: 2017-08-07
Payer: MEDICARE

## 2017-08-07 VITALS
HEART RATE: 59 BPM | DIASTOLIC BLOOD PRESSURE: 84 MMHG | HEIGHT: 65 IN | SYSTOLIC BLOOD PRESSURE: 153 MMHG | OXYGEN SATURATION: 100 % | WEIGHT: 197 LBS | TEMPERATURE: 98.2 F | BODY MASS INDEX: 32.82 KG/M2 | RESPIRATION RATE: 16 BRPM

## 2017-08-07 LAB
ANION GAP BLD CALC-SCNC: 6 MMOL/L (ref 7–16)
APPEARANCE UR: CLEAR
APTT PPP: 25.4 SEC (ref 23.5–31.7)
BACTERIA SPEC CULT: ABNORMAL
BACTERIA URNS QL MICRO: ABNORMAL /HPF
BASOPHILS # BLD AUTO: 0 K/UL (ref 0–0.2)
BASOPHILS # BLD: 1 % (ref 0–2)
BILIRUB UR QL: NEGATIVE
BUN SERPL-MCNC: 18 MG/DL (ref 8–23)
CALCIUM SERPL-MCNC: 9.1 MG/DL (ref 8.3–10.4)
CASTS URNS QL MICRO: 0 /LPF
CHLORIDE SERPL-SCNC: 104 MMOL/L (ref 98–107)
CO2 SERPL-SCNC: 31 MMOL/L (ref 21–32)
COLOR UR: YELLOW
CREAT SERPL-MCNC: 0.85 MG/DL (ref 0.6–1)
DIFFERENTIAL METHOD BLD: NORMAL
EOSINOPHIL # BLD: 0.4 K/UL (ref 0–0.8)
EOSINOPHIL NFR BLD: 6 % (ref 0.5–7.8)
EPI CELLS #/AREA URNS HPF: ABNORMAL /HPF
ERYTHROCYTE [DISTWIDTH] IN BLOOD BY AUTOMATED COUNT: 14.5 % (ref 11.9–14.6)
GLUCOSE SERPL-MCNC: 84 MG/DL (ref 65–100)
GLUCOSE UR STRIP.AUTO-MCNC: NEGATIVE MG/DL
HCT VFR BLD AUTO: 42.1 % (ref 35.8–46.3)
HGB BLD-MCNC: 13.5 G/DL (ref 11.7–15.4)
HGB UR QL STRIP: NEGATIVE
IMM GRANULOCYTES # BLD: 0 K/UL (ref 0–0.5)
IMM GRANULOCYTES NFR BLD AUTO: 0.2 % (ref 0–5)
INR PPP: 1 (ref 0.9–1.2)
KETONES UR QL STRIP.AUTO: NEGATIVE MG/DL
LEUKOCYTE ESTERASE UR QL STRIP.AUTO: ABNORMAL
LYMPHOCYTES # BLD AUTO: 26 % (ref 13–44)
LYMPHOCYTES # BLD: 1.5 K/UL (ref 0.5–4.6)
MCH RBC QN AUTO: 28.2 PG (ref 26.1–32.9)
MCHC RBC AUTO-ENTMCNC: 32.1 G/DL (ref 31.4–35)
MCV RBC AUTO: 87.9 FL (ref 79.6–97.8)
MONOCYTES # BLD: 0.4 K/UL (ref 0.1–1.3)
MONOCYTES NFR BLD AUTO: 7 % (ref 4–12)
NEUTS SEG # BLD: 3.6 K/UL (ref 1.7–8.2)
NEUTS SEG NFR BLD AUTO: 60 % (ref 43–78)
NITRITE UR QL STRIP.AUTO: NEGATIVE
PH UR STRIP: 6 [PH] (ref 5–9)
PLATELET # BLD AUTO: 190 K/UL (ref 150–450)
PMV BLD AUTO: 11 FL (ref 10.8–14.1)
POTASSIUM SERPL-SCNC: 4 MMOL/L (ref 3.5–5.1)
PROT UR STRIP-MCNC: NEGATIVE MG/DL
PROTHROMBIN TIME: 10.4 SEC (ref 9.6–12)
RBC # BLD AUTO: 4.79 M/UL (ref 4.05–5.25)
RBC #/AREA URNS HPF: 0 /HPF
SERVICE CMNT-IMP: ABNORMAL
SODIUM SERPL-SCNC: 141 MMOL/L (ref 136–145)
SP GR UR REFRACTOMETRY: 1.02 (ref 1–1.02)
UROBILINOGEN UR QL STRIP.AUTO: 0.2 EU/DL (ref 0.2–1)
WBC # BLD AUTO: 6 K/UL (ref 4.3–11.1)
WBC URNS QL MICRO: ABNORMAL /HPF

## 2017-08-07 PROCEDURE — 87641 MR-STAPH DNA AMP PROBE: CPT | Performed by: ORTHOPAEDIC SURGERY

## 2017-08-07 PROCEDURE — G8978 MOBILITY CURRENT STATUS: HCPCS

## 2017-08-07 PROCEDURE — 80048 BASIC METABOLIC PNL TOTAL CA: CPT | Performed by: ORTHOPAEDIC SURGERY

## 2017-08-07 PROCEDURE — 77030027138 HC INCENT SPIROMETER -A

## 2017-08-07 PROCEDURE — 85025 COMPLETE CBC W/AUTO DIFF WBC: CPT | Performed by: ORTHOPAEDIC SURGERY

## 2017-08-07 PROCEDURE — G8979 MOBILITY GOAL STATUS: HCPCS

## 2017-08-07 PROCEDURE — 81001 URINALYSIS AUTO W/SCOPE: CPT | Performed by: ORTHOPAEDIC SURGERY

## 2017-08-07 PROCEDURE — 36415 COLL VENOUS BLD VENIPUNCTURE: CPT | Performed by: ORTHOPAEDIC SURGERY

## 2017-08-07 PROCEDURE — 97161 PT EVAL LOW COMPLEX 20 MIN: CPT

## 2017-08-07 PROCEDURE — 85730 THROMBOPLASTIN TIME PARTIAL: CPT | Performed by: ORTHOPAEDIC SURGERY

## 2017-08-07 PROCEDURE — 85610 PROTHROMBIN TIME: CPT | Performed by: ORTHOPAEDIC SURGERY

## 2017-08-07 PROCEDURE — G8980 MOBILITY D/C STATUS: HCPCS

## 2017-08-07 RX ORDER — ASPIRIN 81 MG/1
81 TABLET ORAL DAILY
COMMUNITY
End: 2017-08-26

## 2017-08-07 RX ORDER — HYDROCODONE BITARTRATE AND ACETAMINOPHEN 5; 325 MG/1; MG/1
TABLET ORAL
COMMUNITY
Start: 2017-06-09 | End: 2017-08-07

## 2017-08-07 RX ORDER — GLUCOSAMINE/CHONDR SU A SOD 750-600 MG
TABLET ORAL DAILY
COMMUNITY
End: 2017-08-28

## 2017-08-07 NOTE — PERIOP NOTES
Recent Results (from the past 8 hour(s))   CBC WITH AUTOMATED DIFF    Collection Time: 08/07/17 12:59 PM   Result Value Ref Range    WBC 6.0 4.3 - 11.1 K/uL    RBC 4.79 4.05 - 5.25 M/uL    HGB 13.5 11.7 - 15.4 g/dL    HCT 42.1 35.8 - 46.3 %    MCV 87.9 79.6 - 97.8 FL    MCH 28.2 26.1 - 32.9 PG    MCHC 32.1 31.4 - 35.0 g/dL    RDW 14.5 11.9 - 14.6 %    PLATELET 195 831 - 971 K/uL    MPV 11.0 10.8 - 14.1 FL    DF AUTOMATED      NEUTROPHILS 60 43 - 78 %    LYMPHOCYTES 26 13 - 44 %    MONOCYTES 7 4.0 - 12.0 %    EOSINOPHILS 6 0.5 - 7.8 %    BASOPHILS 1 0.0 - 2.0 %    IMMATURE GRANULOCYTES 0.2 0.0 - 5.0 %    ABS. NEUTROPHILS 3.6 1.7 - 8.2 K/UL    ABS. LYMPHOCYTES 1.5 0.5 - 4.6 K/UL    ABS. MONOCYTES 0.4 0.1 - 1.3 K/UL    ABS. EOSINOPHILS 0.4 0.0 - 0.8 K/UL    ABS. BASOPHILS 0.0 0.0 - 0.2 K/UL    ABS. IMM.  GRANS. 0.0 0.0 - 0.5 K/UL   METABOLIC PANEL, BASIC    Collection Time: 08/07/17 12:59 PM   Result Value Ref Range    Sodium 141 136 - 145 mmol/L    Potassium 4.0 3.5 - 5.1 mmol/L    Chloride 104 98 - 107 mmol/L    CO2 31 21 - 32 mmol/L    Anion gap 6 (L) 7 - 16 mmol/L    Glucose 84 65 - 100 mg/dL    BUN 18 8 - 23 MG/DL    Creatinine 0.85 0.6 - 1.0 MG/DL    GFR est AA >60 >60 ml/min/1.73m2    GFR est non-AA >60 >60 ml/min/1.73m2    Calcium 9.1 8.3 - 10.4 MG/DL   PROTHROMBIN TIME + INR    Collection Time: 08/07/17 12:59 PM   Result Value Ref Range    Prothrombin time 10.4 9.6 - 12.0 sec    INR 1.0 0.9 - 1.2     PTT    Collection Time: 08/07/17 12:59 PM   Result Value Ref Range    aPTT 25.4 23.5 - 31.7 SEC   URINALYSIS W/ RFLX MICROSCOPIC    Collection Time: 08/07/17 12:59 PM   Result Value Ref Range    Color YELLOW      Appearance CLEAR      Specific gravity 1.017 1.001 - 1.023      pH (UA) 6.0 5.0 - 9.0      Protein NEGATIVE  NEG mg/dL    Glucose NEGATIVE  mg/dL    Ketone NEGATIVE  NEG mg/dL    Bilirubin NEGATIVE  NEG      Blood NEGATIVE  NEG      Urobilinogen 0.2 0.2 - 1.0 EU/dL    Nitrites NEGATIVE  NEG      Leukocyte Esterase TRACE (A) NEG      WBC 0-3 0 /hpf    RBC 0 0 /hpf    Epithelial cells 0-3 0 /hpf    Bacteria TRACE 0 /hpf    Casts 0 0 /lpf

## 2017-08-07 NOTE — PERIOP NOTES
Patient verified name, , and surgery as listed in [facility]. Type 3 surgery, joint camp assessment complete. Labs per surgeon: cbc, bmp,pt, ptt,ua ; results within anesthesia guidelines; placed on chart for reference; routed to PCP, Dr. Henrique Reilly and to surgeon, Dr. Owen Solis for further review. Labs per anesthesia protocol: none  EKG:on chart from 17; most recent cardiologist note~Dr. Eve Mayers 17~placed on chart for anesthesia reference. Hibiclens and instructions to return bottle on DOS given per hospital policy. Nasal Swab collected per MD order and instructions for Mupirocin nasal ointment if required. Patient provided with handouts including Guide to Surgery, Pain Management, Hand Hygiene, Blood Transfusion Education, and [facility] Brochure. Patient answered medical/surgical history questions at their best of ability. All prior to admission medications documented in [facility]. Original medication prescription bottle NOT visualized during patient appointment. Patient instructed to hold all vitamins 7 days prior to surgery and NSAIDS 5 days prior to surgery. Medications to be held on the day of surgery Meloxicam hold for 5 days prior to surgery. Patient instructed to continue previous medications as prescribed prior to surgery and to take the following medications the day of surgery according to anesthesia guidelines with a small sip of water: Ativan and Norco, both if needed. .      Patient teach back successful and patient demonstrates knowledge of instruction.

## 2017-08-07 NOTE — PROGRESS NOTES
08/07/17 1200   Oxygen Therapy   O2 Sat (%) 97 %   Pulse via Oximetry 66 beats per minute   O2 Device Room air   Pre-Treatment   Breath Sounds Bilateral Clear   Pre FEV1 (liters) 2.3 liters   % Predicted 94   Incentive Spirometry Treatment   Actual Volume (ml) 2250 ml     Initial respiratory Assessment completed with pt. Pt was interviewed and evaluated in Joint camp prior to surgery. Patient ID:  Rios Pedraza  [id number]  89 y.o.  1950  Surgeon: Dr. Jermaine Davis  Date of Surgery: 8/24/2017  Procedure: Total Left Hip Arthroplasty  Primary Care Physician: Daina Taylor [phone]  Specialists:                                  Pt instructed in the use of Incentive Spirometry. Pt instructed to bring Incentive Spirometer back on date of surgery & to start using Is upon return to pt room. Pt taught proper cough technique      History of smoking:   NONE      Quit date:    Secondhand smoke:      Past procedures with Oxygen desaturation:NONE    Past Medical History:   Diagnosis Date    Anxiety     Arthritis     general-back, neck, hips,knees/ managed with medication     Autoimmune disease ([address]     lichens sclerosis; pt states is dormant now    CAD (coronary artery disease) 2010    stent x 1, no MI    Chest pain     right    Chronic pain     back and neck    Depression     managed with medication     Family history of ischemic heart disease     H/O acute pancreatitis 08/2013    states gallbladder stone lodged creating a blockage.  H/O gastric bypass 2009    High cholesterol     managed w/ meds    Hypertension     managed with medication     Hypothyroidism     managed with medication     Lichen sclerosus     Obesity (BMI 30-39. 9)     BMI 32.4 3/16    Osteopenia     Systolic murmur     ECHO: +/-PI, TR ; Per cardiologist note 1-5-17 no murmur ; pt states told by cardiologist that he was removeing murmur from her record    Ventral hernia Respiratory history:                                   DENIES SOB                                  Respiratory meds:                                                         FAMILY PRESENT:                 NO                                        PAST SLEEP STUDY:              NO  HX OF SYLVIA:                              NO                                     SYLVIA assessment:                                               SLEEPS ON SIDE                                                  PHYSICAL EXAM   Body mass index is 32.78 kg/(m^2).    Visit Vitals    /84 (BP 1 Location: Right arm, BP Patient Position: Sitting)    Pulse (!) 59    Temp 98.2 °F (36.8 °C)    Resp 16    Ht 5' 5\" (1.651 m)    Wt 89.4 kg (197 lb)    SpO2 100%    BMI 32.78 kg/m2     Neck circumference:  36    cm    Loud snoring; MINIMAL SNORING SOMETIMES  Witnessed apnea or wakening gasping or choking:,DENIES   Awakens with headaches:DENIES    Morning or daytime tiredness/ sleepiness:SOME  TIREDNESS     Dry mouth or sore throat in morning: YES    Freidman stage:4    SACS score:6    STOP/BAN                              CPAP:NA                  Referrals:    Pt. Phone Number:

## 2017-08-07 NOTE — PROGRESS NOTES
Pickett Blair  : (85 y.o.) [facility] at [place] [address].  [phone]       Physical Therapy Prehab Plan of Treatment and Evaluation Summary:2017    ICD-10: Treatment Diagnosis:   · Pain in left hip (M25.552)  · Stiffness of Left Hip, Not elsewhere classified (M25.652)  · Difficulty in walking, Not elsewhere classified (R26.2)  Precautions/Allergies:   Adhesive tape and Sulfa (sulfonamide antibiotics)  MEDICAL/REFERRING DIAGNOSIS:  Unilateral primary osteoarthritis, left hip [M16.12]  REFERRING PHYSICIAN: Cayla Yoder MD  DATE OF SURGERY: 17   Assessment:   Comments:  Pt. Plans to go home with . Pt. Used w/c to get to the nurse. PROBLEM LIST (Impacting functional limitations):  Ms. Michelle Graff presents with the following left lower extremity(s) problems:  1. Strength  2. Pain  3. rom   INTERVENTIONS PLANNED:  1. Home Exercise Program  2. Educational Discussion     TREATMENT PLAN: Effective Dates: 2017 TO 2017. Frequency/Duration: Patient to continue to perform home exercise program at least twice per day up until her surgery. GOALS: (Goals have been discussed and agreed upon with patient.)  Discharge Goals: Time Frame: 1 Day  1. Patient will demonstrate independence with a home exercise program designed to increase strength, range of motion and pain control to minimize functional deficits and optimize patient for total joint replacement. Rehabilitation Potential For Stated Goals: Good  Regarding Nieves Juniorrah therapy, I certify that the treatment plan above will be carried out by a therapist or under their direction.   Thank you for this referral,  Nick Meyer, PT               HISTORY:   Present Symptoms:  Pain Intensity 1:  (10 at worst)  Pain Location 1: Hip   History of Present Injury/Illness (Reason for Referral):  Medical/Referring Diagnosis: Unilateral primary osteoarthritis, left hip [M16.12]   Past Medical History/Comorbidities:   Ms. Jaxon Yepez  has a past medical history of Anxiety; Arthritis; Autoimmune disease ([address] CAD (coronary artery disease) (2010); Chest pain; Chronic pain; Depression; Family history of ischemic heart disease; H/O acute pancreatitis (08/2013); H/O gastric bypass (2009); High cholesterol; Hypertension; Hypothyroidism; Lichen sclerosus; Obesity (BMI 30-39.9); Osteopenia; Systolic murmur; and Ventral hernia. She also has no past medical history of Adverse effect of anesthesia; Aneurysm ([address] Arrhythmia; Asthma; Cancer ([address] Chronic kidney disease; Chronic obstructive pulmonary disease ([address] Coagulation disorder ([address] Diabetes ([address] Difficult intubation; Endocarditis; GERD (gastroesophageal reflux disease); Heart failure ([address] Liver disease; Malignant hyperthermia due to anesthesia; Nausea & vomiting; Nicotine vapor product user; Non-nicotine vapor product user; Pseudocholinesterase deficiency; PUD (peptic ulcer disease); Rheumatic fever; Seizures ([address] Sleep apnea; Stroke [facility]); Thromboembolus ([address] or Unspecified adverse effect of anesthesia. Ms. Jaxon Yepez  has a past surgical history that includes bladder suspension; carpal tunnel release (Right); lysis of adhesions; heart catheterization (2010); hysterectomy (1978); oophorectomy (1984); lumbar fusion (1996); urological; shoulder arthroscopy (Right, 2011); orthopaedic (Right, 1997); appendectomy (3481); colonoscopy (2017); lap cholecystectomy (7/2013); gastric bypass (2009); hernia repair (8/14/14); and cataract removal (Bilateral, 2015).   Social History/Living Environment:   Home Environment: Private residence  # Steps to Enter: 8 (not consecutive)  Rails to Pit My Pet: No  One/Two Story Residence: Two story  # of Interior Steps: 9  Interior Rails: Left  Lift Chair Available: No  Living Alone: No  Support Systems: Spouse/Significant Other/Partner  Patient Expects to be Discharged to[de-identified] Private residence  Current DME Used/Available at Home: Cane, straight, Shower chair  Tub or Shower Type: Shower  Work/Activity:  retired  Dominant Side:  RIGHT  Current Medications:  See Pre-assessment nursing note   Number of Personal Factors/Comorbidities that affect the Plan of Care: 3+: HIGH COMPLEXITY   EXAMINATION:   ADLs (Current Functional Status):   Ambulation:  [x] Independent  [] Walk Indoors Only  [] Walk Outdoors  [] Use Assistive Device  [] Use Wheelchair Only Dressing:  [x] [address] from Someone for:  [] Sock/Shoes  [] Pants  [] Everything   Bathing/Showering:   [x] Independent  [] Requires Assistance from Someone  [] [address] Only Household Activities:  [] Routine house and yard work  [x] Light Housework Only  [] None   Observation/Orthostatic Postural Assessment:   Exceptions to WDLTrunk flexion, Rounded shoulders  ROM/Flexibility:   Gross Assessment: Yes  AROM: Generally decreased, functional (right LE, right hip little limited)                LLE Assessment  LLE Assessment (WDL): Exception to WDL (left hip <3/5)  LLE AROM  L Hip Flexion: 90  L Hip ABduction: 5  L Knee Extension: 0          Strength:   Gross Assessment: Yes  Strength: Generally decreased, functional (right LE)                  Functional Mobility:    Gross Assessment: Yes    Gait Description (WDL): Exceptions to WDL  Stand to Sit: Modified independent, Additional time  Sit to Stand: Modified independent, Additional time  Distance (ft): 50 Feet (ft)  Ambulation - Level of Assistance: Modified independent  Assistive Device: Other (comment) (used umbrella as cane)  Speed/Princess: Delayed  Step Length: Left shortened;Right shortened  Stance: Left decreased  Gait Abnormalities: Antalgic;Trunk sway increased          Balance:    Sitting: Intact  Standing: With support   Body Structures Involved:  1. Bones  2. Joints  3. Muscles  4. Ligaments Body Functions Affected:  1.  Movement Related Activities and Participation Affected:  1. Mobility   Number of elements that affect the Plan of Care: 3: MODERATE COMPLEXITY   CLINICAL PRESENTATION:   Presentation: Stable and uncomplicated: LOW COMPLEXITY   CLINICAL DECISION MAKING:   Outcome Measure: Tool Used: Lower Extremity Functional Scale (LEFS)  Score:  Initial: 16/80 Most Recent: X/80 (Date: -- )   Interpretation of Score: 20 questions each scored on a 5 point scale with 0 representing \"extreme difficulty or unable to perform\" and 4 representing \"no difficulty\". The lower the score, the greater the functional disability. 80/80 represents no disability. Minimal detectable change is 9 points. Score 80 79-65 64-49 48-33 32-17 16-1 0   Modifier CH CI CJ CK CL CM CN     ? Mobility - Walking and Moving Around:     - CURRENT STATUS: CM - 80%-99% impaired, limited or restricted    - GOAL STATUS: CM - 80%-99% impaired, limited or restricted    - D/C STATUS:  CM - 80%-99% impaired, limited or restricted    Medical Necessity:   · Ms. Wilkinson is expected to optimize her lower extremity strength and ROM in preparation for joint replacement surgery. Reason for Services/Other Comments:  · Achieve baseline assesment of musculoskeletal system, functional mobility and home environment. , educate in PT HEP in preparation for surgery, educate in hospital plan of care. Use of outcome tool(s) and clinical judgement create a POC that gives a: Clear prediction of patient's progress: LOW COMPLEXITY   TREATMENT:   Treatment/Session Assessment:  Patient was instructed in PT- HEP to increase strength and ROM in LEs. Answered all questions. · Post session pain:  No complaints  · Compliance with Program/Exercises: Will assess as treatment progresses.   Total Treatment Duration:PT Patient Time In/Time Out  Time In: 1560  Time Out: [address], PT

## 2017-08-08 PROBLEM — E66.9 OBESITY (BMI 30.0-34.9): Status: ACTIVE | Noted: 2017-08-08

## 2017-08-08 NOTE — PERIOP NOTES
Prescription for Mupirocin ointment 22g tube, apply intranasally to both nostrils BID x5 days pre-operatively or for a total of 10 doses; called to [facility] at [phone]. Patient notified of positive MRSA nasal swab, verbalizes understanding of usage starting on 8/19/17.    8/7/2017  8:45 PM - [place], Lab In [facility]   Component Results   Component Value Flag Ref Range Units Status   Special Requests: NO SPECIAL REQUESTS     Final   Culture result:  (A)    Final   MRSA target DNA detected, SA target DNA detected.       A positive test result does not necessarily indicate the presence of viable organisms.  It is however, presumptive for the presence of MRSA or SA.

## 2017-08-08 NOTE — ADVANCED PRACTICE NURSE
Total Joint Surgery Preoperative Chart Review      Patient ID:  Ash Shetty  [id number]  53 y.o.  1950  Surgeon: Dr. Abby Melo  Date of Surgery: 8/24/2017  Procedure: Total Left Hip Arthroplasty  Primary Care Physician: Rissa Styles [phone]  Specialty Physician(s):      Subjective: Ash Shetty is a 69 y.o. WHITE OR  female who presents for preoperative evaluation for Total Left Hip arthroplasty. This is a preoperative chart review note based on data collected by the nurse at the surgical Pre-Assessment visit. Past Medical History:   Diagnosis Date    Anxiety     Arthritis     general-back, neck, hips,knees/ managed with medication     Autoimmune disease ([facility] [address]     lichens sclerosis; pt states is dormant now    CAD (coronary artery disease) 2010    stent x 1, no MI    Chest pain     right    Chronic pain     back and neck    Depression     managed with medication     Family history of ischemic heart disease     H/O acute pancreatitis 08/2013    states gallbladder stone lodged creating a blockage.  H/O gastric bypass 2009    High cholesterol     managed w/ meds    Hypertension     managed with medication     Hypothyroidism     managed with medication     Lichen sclerosus     Obesity (BMI 30-39. 9)     BMI 32.4 3/16    Osteopenia     Systolic murmur     ECHO: +/-PI, TR ; Per cardiologist note 1-5-17 no murmur ; pt states told by cardiologist that he was removeing murmur from her record    Ventral hernia       Past Surgical History:   Procedure Laterality Date    HX APPENDECTOMY  1954    HX BLADDER SUSPENSION      HX CARPAL TUNNEL RELEASE Right     HX CATARACT REMOVAL Bilateral 2015    HX COLONOSCOPY  2017    normal, repeat after 2027    [address]  2009    HX HEART CATHETERIZATION  2010    stenting of LAD    HX HERNIA REPAIR  8/14/14    ventral    HX HYSTERECTOMY  1978    cervix removed    HX LAP CHOLECYSTECTOMY  7/2013  HX LUMBAR FUSION  1996    with 1 steel adeline with screws~lower lumbar    HX LYSIS OF ADHESIONS      abdominal    HX OOPHORECTOMY  1984    right    HX ORTHOPAEDIC Right 1997    carpal tunnel release    HX SHOULDER ARTHROSCOPY Right 2011    HX UROLOGICAL      bladder suspension     Family History   Problem Relation Age of Onset    Heart Attack Mother     Heart Disease Mother     Hypertension Mother     Cancer Mother      melanoma    Cancer Father      leukemia    Coronary Artery Disease Father     Cancer Sister      breast    Heart Attack Brother     Heart Disease Brother     Cancer Brother      lung    Lung Disease Brother       Social History   Substance Use Topics    Smoking status: Never Smoker    Smokeless tobacco: Never Used    Alcohol use 0.6 oz/week     1 Glasses of wine per week      Comment: Occasional       Prior to Admission medications    Medication Sig Start Date End Date Taking? Authorizing Provider   aspirin delayed-release 81 mg tablet Take 81 mg by mouth daily. Indications: Myocardial Reinfarction Prevention   Yes Historical Provider   MULTIVITAMIN PO Take  by mouth daily. Takes 1-2 melt-aways daily   Yes Historical Provider   Biotin 2,500 mcg cap Take  by mouth daily. Yes Historical Provider   HYDROcodone-acetaminophen (NORCO) 7.5-325 mg per tablet 1 q6h prn severe pain  Indications: Pain  Patient taking differently: Take 1 Tab by mouth as needed. 1 q6h prn severe pain; Take / use AM day of surgery  per anesthesia protocols. Indications: Pain 7/26/17  Yes Jen Hurt MD   temazepam (RESTORIL) 30 mg capsule Take 1 Cap by mouth nightly as needed. Max Daily Amount: 30 mg. Patient taking differently: Take 30 mg by mouth nightly as needed. Indications: INSOMNIA 4/17/17  Yes Jen Hurt MD   LORazepam (ATIVAN) 0.5 mg tablet Take 1 Tab by mouth every eight (8) hours as needed for Anxiety. Max Daily Amount: 1.5 mg.   Patient taking differently: Take 0.5 mg by mouth every eight (8) hours as needed for Anxiety. Take / use AM day of surgery  per anesthesia protocols. Indications: anxiety 4/17/17  Yes Caroline Loznao MD   alendronate (FOSAMAX) 70 mg tablet 1 q week for osteoporosis  Patient taking differently: Take 70 mg by mouth every seven (7) days. 1 q week for osteoporosis ; takes on Saturdays  Indications: POST-MENOPAUSAL OSTEOPOROSIS 12/30/16  Yes Caroline Lozano MD   levothyroxine (SYNTHROID) 75 mcg tablet 1 every day for thyroid  Patient taking differently: Take 75 mcg by mouth nightly. 1 every day for thyroid  Indications: hypothyroidism 12/30/16  Yes Caroline Lozano MD   losartan-hydroCHLOROthiazide [facility]) 100-25 mg per tablet 1 every day for BP  Indications: Hypertension  Patient taking differently: 1 Tab nightly. 1 every day for BP  Indications: hypertension 12/30/16  Yes Caroline Lozano MD   meloxicam [facility] OLIVA [facility]) 15 mg tablet 1 every day for arthritis  Indications: OSTEOARTHRITIS 12/30/16  Yes Caroline Lozano MD   amLODIPine (NORVASC) 5 mg tablet 1 every day for BP  Indications: Hypertension  Patient taking differently: Take 5 mg by mouth nightly. 1 every day for BP  Indications: hypertension 12/30/16  Yes Caroline Lozano MD   traZODone (DESYREL) 150 mg tablet 1 to 2 qhs for sleep  Patient taking differently: Take 150 mg by mouth nightly. 1 to 2 qhs for sleep  Indications: insomnia associated with depression 12/30/16  Yes Caroline Lozano MD   atorvastatin (LIPITOR) 40 mg tablet 1 qd  Indications: mixed hyperlipidemia  Patient taking differently: Take 40 mg by mouth nightly. 1 qd  Indications: mixed hyperlipidemia 12/30/16  Yes Caroline Lozano MD   sertraline (ZOLOFT) 100 mg tablet Take 1 Tab by mouth nightly.  Indications: major depressive disorder 12/30/16  Yes Caroline Lozano MD     Allergies   Allergen Reactions    Adhesive Tape Rash    Sulfa (Sulfonamide Antibiotics) Nausea and Vomiting Objective:     Physical Exam:   Patient Vitals for the past 24 hrs:   Temp Pulse Resp BP SpO2   08/07/17 1339 98.2 °F (36.8 °C) (!) 59 16 153/84 100 %       ECG:    EKG Results     None          Data Review:   Labs:   Recent Results (from the past 24 hour(s))   CBC WITH AUTOMATED DIFF    Collection Time: 08/07/17 12:59 PM   Result Value Ref Range    WBC 6.0 4.3 - 11.1 K/uL    RBC 4.79 4.05 - 5.25 M/uL    HGB 13.5 11.7 - 15.4 g/dL    HCT 42.1 35.8 - 46.3 %    MCV 87.9 79.6 - 97.8 FL    MCH 28.2 26.1 - 32.9 PG    MCHC 32.1 31.4 - 35.0 g/dL    RDW 14.5 11.9 - 14.6 %    PLATELET 448 952 - 808 K/uL    MPV 11.0 10.8 - 14.1 FL    DF AUTOMATED      NEUTROPHILS 60 43 - 78 %    LYMPHOCYTES 26 13 - 44 %    MONOCYTES 7 4.0 - 12.0 %    EOSINOPHILS 6 0.5 - 7.8 %    BASOPHILS 1 0.0 - 2.0 %    IMMATURE GRANULOCYTES 0.2 0.0 - 5.0 %    ABS. NEUTROPHILS 3.6 1.7 - 8.2 K/UL    ABS. LYMPHOCYTES 1.5 0.5 - 4.6 K/UL    ABS. MONOCYTES 0.4 0.1 - 1.3 K/UL    ABS. EOSINOPHILS 0.4 0.0 - 0.8 K/UL    ABS. BASOPHILS 0.0 0.0 - 0.2 K/UL    ABS. IMM.  GRANS. 0.0 0.0 - 0.5 K/UL   METABOLIC PANEL, BASIC    Collection Time: 08/07/17 12:59 PM   Result Value Ref Range    Sodium 141 136 - 145 mmol/L    Potassium 4.0 3.5 - 5.1 mmol/L    Chloride 104 98 - 107 mmol/L    CO2 31 21 - 32 mmol/L    Anion gap 6 (L) 7 - 16 mmol/L    Glucose 84 65 - 100 mg/dL    BUN 18 8 - 23 MG/DL    Creatinine 0.85 0.6 - 1.0 MG/DL    GFR est AA >60 >60 ml/min/1.73m2    GFR est non-AA >60 >60 ml/min/1.73m2    Calcium 9.1 8.3 - 10.4 MG/DL   PROTHROMBIN TIME + INR    Collection Time: 08/07/17 12:59 PM   Result Value Ref Range    Prothrombin time 10.4 9.6 - 12.0 sec    INR 1.0 0.9 - 1.2     PTT    Collection Time: 08/07/17 12:59 PM   Result Value Ref Range    aPTT 25.4 23.5 - 31.7 SEC   URINALYSIS W/ RFLX MICROSCOPIC    Collection Time: 08/07/17 12:59 PM   Result Value Ref Range    Color YELLOW      Appearance CLEAR      Specific gravity 1.017 1.001 - 1.023      pH (UA) 6.0 5.0 - 9.0      Protein NEGATIVE  NEG mg/dL    Glucose NEGATIVE  mg/dL    Ketone NEGATIVE  NEG mg/dL    Bilirubin NEGATIVE  NEG      Blood NEGATIVE  NEG      Urobilinogen 0.2 0.2 - 1.0 EU/dL    Nitrites NEGATIVE  NEG      Leukocyte Esterase TRACE (A) NEG      WBC 0-3 0 /hpf    RBC 0 0 /hpf    Epithelial cells 0-3 0 /hpf    Bacteria TRACE 0 /hpf    Casts 0 0 /lpf   MSSA/MRSA SC BY PCR, NASAL SWAB    Collection Time: 08/07/17  2:00 PM   Result Value Ref Range    Special Requests: NO SPECIAL REQUESTS      Culture result: (A)       MRSA target DNA detected, SA target DNA detected. A positive test result does not necessarily indicate the presence of viable organisms. It is however, presumptive for the presence of MRSA or SA. Problem List:  )  Patient Active Problem List   Diagnosis Code    Essential hypertension, benign I10    Dyslipidemia E78.5    S/P coronary artery stent placement (2.75 x 18 Cypher to mLAD on 9-) Z95.5    Ventral hernia K43.9    S/P laparoscopic cholecystectomy Z90.49    Depression F32.9    Osteopenia M85.80    Primary hypothyroidism E03.9    Chronic back pain M54.9, G89.29    Cervicalgia M54.2    Coronary atherosclerosis of native coronary vessel I25.10    Arthritis M19.90    Obesity (BMI 30.0-34. 9) E66.9       Total Joint Surgery Pre-Assessment Recommendations:     Recommend continuous saturation monitoring hours of sleep, during hospitalization.               Signed By: SARAH Burkett    August 8, 2017

## 2017-08-23 ENCOUNTER — ANESTHESIA EVENT (OUTPATIENT)
Dept: SURGERY | Age: 67
DRG: 470 | End: 2017-08-23
Payer: MEDICARE

## 2017-08-23 RX ORDER — VANCOMYCIN/0.9 % SOD CHLORIDE 1.5G/250ML
1500 PLASTIC BAG, INJECTION (ML) INTRAVENOUS
Status: COMPLETED | OUTPATIENT
Start: 2017-08-24 | End: 2017-08-24

## 2017-08-23 NOTE — H&P
H&P    Patient ID:  Mamta Perry  [id number]  32 y.o.  1950  Surgeon:  Herman Combs MD  Date of Surgery: * No surgery date entered *  Procedure: Left Hip Total Arthroplasty  Primary Care Physician: Jaki Kowalski MD        Subjective: Mamta Perry is a 69 y.o. WHITE OR  female who presents with Left Hip pain. She has history of Left Hip pain for several months ago. Symptoms worse with walking and relieved with rest. Conservative treatment consisting of  activity modification has not helped. The patient  lives with their spouse. The patients goal after surgery is improved pain and function. Past Medical History:   Diagnosis Date    Anxiety     Arthritis     general-back, neck, hips,knees/ managed with medication     Autoimmune disease ([address]     lichens sclerosis; pt states is dormant now    CAD (coronary artery disease) 2010    stent x 1, no MI    Chest pain     right    Chronic pain     back and neck    Depression     managed with medication     Family history of ischemic heart disease     H/O acute pancreatitis 08/2013    states gallbladder stone lodged creating a blockage.  H/O gastric bypass 2009    High cholesterol     managed w/ meds    Hypertension     managed with medication     Hypothyroidism     managed with medication     Lichen sclerosus     Obesity (BMI 30-39. 9)     BMI 32.4 3/16    Osteopenia     Systolic murmur     ECHO: +/-PI, TR ; Per cardiologist note 1-5-17 no murmur ; pt states told by cardiologist that he was removeing murmur from her record    Ventral hernia       Past Surgical History:   Procedure Laterality Date    HX APPENDECTOMY  1954    HX BLADDER SUSPENSION      HX CARPAL TUNNEL RELEASE Right     HX CATARACT REMOVAL Bilateral 2015    HX COLONOSCOPY  2017    normal, repeat after 2027    HX GASTRIC BYPASS  2009    HX HEART CATHETERIZATION  2010    stenting of LAD    HX HERNIA REPAIR  8/14/14    ventral  HX HYSTERECTOMY  1978    cervix removed    HX LAP CHOLECYSTECTOMY  7/2013    HX LUMBAR FUSION  1996    with 1 steel adeline with screws~lower lumbar    HX LYSIS OF ADHESIONS      abdominal    HX OOPHORECTOMY  1984    right    HX ORTHOPAEDIC Right 1997    carpal tunnel release    HX SHOULDER ARTHROSCOPY Right 2011    HX UROLOGICAL      bladder suspension     Family History   Problem Relation Age of Onset    Heart Attack Mother     Heart Disease Mother     Hypertension Mother     Cancer Mother      melanoma    Cancer Father      leukemia    Coronary Artery Disease Father     Cancer Sister      breast    Heart Attack Brother     Heart Disease Brother     Cancer Brother      lung    Lung Disease Brother       Social History   Substance Use Topics    Smoking status: Never Smoker    Smokeless tobacco: Never Used    Alcohol use 0.6 oz/week     1 Glasses of wine per week      Comment: Occasional       Prior to Admission medications    Medication Sig Start Date End Date Taking? Authorizing Provider   mupirocin calcium (BACTROBAN) 2 % nasal ointment by Both Nostrils route two (2) times a day. Historical Provider   aspirin delayed-release 81 mg tablet Take 81 mg by mouth daily. Indications: Myocardial Reinfarction Prevention    Historical Provider   MULTIVITAMIN PO Take  by mouth daily. Takes 1-2 melt-aways daily    Historical Provider   Biotin 2,500 mcg cap Take  by mouth daily. Historical Provider   HYDROcodone-acetaminophen (NORCO) 7.5-325 mg per tablet 1 q6h prn severe pain  Indications: Pain  Patient taking differently: Take 1 Tab by mouth as needed. 1 q6h prn severe pain; Take / use AM day of surgery  per anesthesia protocols. Indications: Pain 7/26/17   Piper Hernandez MD   temazepam (RESTORIL) 30 mg capsule Take 1 Cap by mouth nightly as needed. Max Daily Amount: 30 mg. Patient taking differently: Take 30 mg by mouth nightly as needed.  Indications: INSOMNIA 4/17/17   Joie Can Priti Pena MD   LORazepam (ATIVAN) 0.5 mg tablet Take 1 Tab by mouth every eight (8) hours as needed for Anxiety. Max Daily Amount: 1.5 mg. Patient taking differently: Take 0.5 mg by mouth every eight (8) hours as needed for Anxiety. Take / use AM day of surgery  per anesthesia protocols. Indications: anxiety 4/17/17   Daina Taylor MD   alendronate (FOSAMAX) 70 mg tablet 1 q week for osteoporosis  Patient taking differently: Take 70 mg by mouth every seven (7) days. 1 q week for osteoporosis ; takes on Saturdays  Indications: POST-MENOPAUSAL OSTEOPOROSIS 12/30/16   Daina Taylor MD   levothyroxine (SYNTHROID) 75 mcg tablet 1 every day for thyroid  Patient taking differently: Take 75 mcg by mouth nightly. 1 every day for thyroid  Indications: hypothyroidism 12/30/16   Daina Taylor MD   losartan-hydroCHLOROthiazide [facility]) 100-25 mg per tablet 1 every day for BP  Indications: Hypertension  Patient taking differently: 1 Tab nightly. 1 every day for BP  Indications: hypertension 12/30/16   Daina Taylor MD   meloxicam [facility]) 15 mg tablet 1 every day for arthritis  Indications: OSTEOARTHRITIS 12/30/16   Daina Taylor MD   amLODIPine (NORVASC) 5 mg tablet 1 every day for BP  Indications: Hypertension  Patient taking differently: Take 5 mg by mouth nightly. 1 every day for BP  Indications: hypertension 12/30/16   Daina Taylor MD   traZODone (DESYREL) 150 mg tablet 1 to 2 qhs for sleep  Patient taking differently: Take 150 mg by mouth nightly. 1 to 2 qhs for sleep  Indications: insomnia associated with depression 12/30/16   Daina Taylor MD   atorvastatin (LIPITOR) 40 mg tablet 1 qd  Indications: mixed hyperlipidemia  Patient taking differently: Take 40 mg by mouth nightly. 1 qd  Indications: mixed hyperlipidemia 12/30/16   Daina Taylor MD   sertraline (ZOLOFT) 100 mg tablet Take 1 Tab by mouth nightly.  Indications: major depressive disorder 12/30/16   Lily Simental MD     Allergies   Allergen Reactions    Adhesive Tape Rash    Sulfa (Sulfonamide Antibiotics) Nausea and Vomiting        REVIEW OF SYSTEMS:  CONSTITUTIONAL: Denies fever, decreased appetite, weight loss/gain, night sweats or fatigue. HEENT: Denies vision or hearing changes. denies glasses. denies hearing aids. CARDIAC: Denies CP, palpitations, rheumatic fever, murmur, peripheral edema, carotid artery disease or syncopal episodes. RESPIRATORY: Denies dyspnea on exertion, asthma, COPD or orthopnea. GI: Denies GERD, history of GI bleed or melena, PUD, hepatitis or cirrhosis. : Denies dysuria, hematuria. denies BPH symptoms. HEMATOLOGIC: Denies anemia or blood disorders. ENDOCRINE: Denies thyroid disease. MUSCULOSKELETAL: See HPI. NEUROLOGIC: Denies seizure, peripheral neuropathy or memory loss. PSYCH: Denies depression, anxiety or insomnia. SKIN: Denies rash or open sores. Objective:    PHYSICAL EXAM  GENERAL: Patient Vitals for the past 8 hrs:   Height Weight   08/23/17 0725 5' 5\" (1.651 m) 89.4 kg (197 lb)    EYES: PERRL. EOM intact. MOUTH:Teeth and Gums normal. NECK: Full ROM. Trachea midline. No thyromegaly or JVD. CARDIOVASCULAR: Regular rate and rhythm. No murmur or gallops. No carotid bruits. Peripheral pulses: radial 2 +, PT 2+, DP 2+ bilaterally. LUNGS: CTA bilaterally. No wheezes, rhonchi or rales. GI: positive BS. Abdomen nontender. NEUROLOGIC: Alert and oriented x 3. Bilateral equal strong had grasp and bilateral equal strong plantar flexion and dorsiflexion. GAIT: abnormal  MUSCULOSKELETAL: ROM: full range of motion. Tenderness: None. Crepitus: not present. SKIN: No rash, bruising, swelling, redness or warmth. Labs:  No results found for this or any previous visit (from the past 24 hour(s)). Xray Left Hip: Joint space narrowing    Assessment:  Advanced Left Hip Osteoarthritis. Total Left Hip Arthroplasty Indicated.   Patient Active Problem List   Diagnosis Code    Essential hypertension, benign I10    Dyslipidemia E78.5    S/P coronary artery stent placement (2.75 x 18 Cypher to mLAD on 9-) Z95.5    Ventral hernia K43.9    S/P laparoscopic cholecystectomy Z90.49    Depression F32.9    Osteopenia M85.80    Primary hypothyroidism E03.9    Chronic back pain M54.9, G89.29    Cervicalgia M54.2    Coronary atherosclerosis of native coronary vessel I25.10    Arthritis M19.90    Obesity (BMI 30.0-34. 9) E66.9       Plan:  I have advised the patient of the risks and consequences, including possible complications of performing total joint replacement, as well as not doing this operation. The patient had the opportunity to ask questions and have them answered to their satisfaction.      Signed:  PANCHITO Esparza 8/23/2017

## 2017-08-24 ENCOUNTER — ANESTHESIA (OUTPATIENT)
Dept: SURGERY | Age: 67
DRG: 470 | End: 2017-08-24
Payer: MEDICARE

## 2017-08-24 ENCOUNTER — APPOINTMENT (OUTPATIENT)
Dept: GENERAL RADIOLOGY | Age: 67
DRG: 470 | End: 2017-08-24
Attending: ORTHOPAEDIC SURGERY
Payer: MEDICARE

## 2017-08-24 ENCOUNTER — HOSPITAL ENCOUNTER (INPATIENT)
Age: 67
LOS: 2 days | Discharge: HOME HEALTH | DRG: 470 | End: 2017-08-26
Attending: ORTHOPAEDIC SURGERY | Admitting: ORTHOPAEDIC SURGERY
Payer: MEDICARE

## 2017-08-24 PROBLEM — M19.90 OSTEOARTHRITIS: Status: ACTIVE | Noted: 2017-08-24

## 2017-08-24 LAB
ABO + RH BLD: NORMAL
APPEARANCE UR: CLEAR
BILIRUB UR QL: NEGATIVE
BLOOD GROUP ANTIBODIES SERPL: NORMAL
COLOR UR: YELLOW
GLUCOSE BLD STRIP.AUTO-MCNC: 89 MG/DL (ref 65–100)
GLUCOSE UR STRIP.AUTO-MCNC: NEGATIVE MG/DL
HGB BLD-MCNC: 10.8 G/DL (ref 11.7–15.4)
HGB UR QL STRIP: NEGATIVE
KETONES UR QL STRIP.AUTO: NEGATIVE MG/DL
LEUKOCYTE ESTERASE UR QL STRIP.AUTO: NEGATIVE
NITRITE UR QL STRIP.AUTO: NEGATIVE
PH UR STRIP: 6 [PH] (ref 5–9)
PROT UR STRIP-MCNC: NEGATIVE MG/DL
SP GR UR REFRACTOMETRY: 1.01 (ref 1–1.02)
SPECIMEN EXP DATE BLD: NORMAL
UROBILINOGEN UR QL STRIP.AUTO: 0.2 EU/DL (ref 0.2–1)

## 2017-08-24 PROCEDURE — 0SRB01A REPLACEMENT OF LEFT HIP JOINT WITH METAL SYNTHETIC SUBSTITUTE, UNCEMENTED, OPEN APPROACH: ICD-10-PCS | Performed by: ORTHOPAEDIC SURGERY

## 2017-08-24 PROCEDURE — 74011250637 HC RX REV CODE- 250/637: Performed by: ORTHOPAEDIC SURGERY

## 2017-08-24 PROCEDURE — 77030018547 HC SUT ETHBND1 J&J -B: Performed by: ORTHOPAEDIC SURGERY

## 2017-08-24 PROCEDURE — 74011000258 HC RX REV CODE- 258: Performed by: ORTHOPAEDIC SURGERY

## 2017-08-24 PROCEDURE — 77030002966 HC SUT PDS J&J -A: Performed by: ORTHOPAEDIC SURGERY

## 2017-08-24 PROCEDURE — 77030011640 HC PAD GRND REM COVD -A: Performed by: ORTHOPAEDIC SURGERY

## 2017-08-24 PROCEDURE — 77030003544 HC NDL EPDRL BD -A: Performed by: ANESTHESIOLOGY

## 2017-08-24 PROCEDURE — 77030031139 HC SUT VCRL2 J&J -A: Performed by: ORTHOPAEDIC SURGERY

## 2017-08-24 PROCEDURE — 77030003666 HC NDL SPINAL BD -A: Performed by: ORTHOPAEDIC SURGERY

## 2017-08-24 PROCEDURE — 77030012935 HC DRSG AQUACEL BMS -B: Performed by: ORTHOPAEDIC SURGERY

## 2017-08-24 PROCEDURE — 82962 GLUCOSE BLOOD TEST: CPT

## 2017-08-24 PROCEDURE — 81003 URINALYSIS AUTO W/O SCOPE: CPT | Performed by: ORTHOPAEDIC SURGERY

## 2017-08-24 PROCEDURE — 77030003665 HC NDL SPN BBMI -A: Performed by: ANESTHESIOLOGY

## 2017-08-24 PROCEDURE — 74011250636 HC RX REV CODE- 250/636

## 2017-08-24 PROCEDURE — 77030003602 HC NDL NRV BLK BBMI -B: Performed by: ANESTHESIOLOGY

## 2017-08-24 PROCEDURE — 74011250637 HC RX REV CODE- 250/637: Performed by: INTERNAL MEDICINE

## 2017-08-24 PROCEDURE — 77030013727 HC IRR FAN PULSVC ZIMM -B: Performed by: ORTHOPAEDIC SURGERY

## 2017-08-24 PROCEDURE — 77030020788: Performed by: ORTHOPAEDIC SURGERY

## 2017-08-24 PROCEDURE — 77030018074 HC RTVR SUT ARTH4 S&N -B: Performed by: ORTHOPAEDIC SURGERY

## 2017-08-24 PROCEDURE — 74011250636 HC RX REV CODE- 250/636: Performed by: ORTHOPAEDIC SURGERY

## 2017-08-24 PROCEDURE — 77030006777 HC BLD SAW OSC CNMD -B: Performed by: ORTHOPAEDIC SURGERY

## 2017-08-24 PROCEDURE — 74011000302 HC RX REV CODE- 302: Performed by: ORTHOPAEDIC SURGERY

## 2017-08-24 PROCEDURE — 74011000250 HC RX REV CODE- 250: Performed by: ANESTHESIOLOGY

## 2017-08-24 PROCEDURE — 36415 COLL VENOUS BLD VENIPUNCTURE: CPT | Performed by: ORTHOPAEDIC SURGERY

## 2017-08-24 PROCEDURE — 77030020782 HC GWN BAIR PAWS FLX 3M -B: Performed by: ANESTHESIOLOGY

## 2017-08-24 PROCEDURE — 74011250636 HC RX REV CODE- 250/636: Performed by: ANESTHESIOLOGY

## 2017-08-24 PROCEDURE — C1776 JOINT DEVICE (IMPLANTABLE): HCPCS | Performed by: ORTHOPAEDIC SURGERY

## 2017-08-24 PROCEDURE — 77030035236 HC SUT PDS STRATFX BARB J&J -B: Performed by: ORTHOPAEDIC SURGERY

## 2017-08-24 PROCEDURE — 87086 URINE CULTURE/COLONY COUNT: CPT | Performed by: ORTHOPAEDIC SURGERY

## 2017-08-24 PROCEDURE — 74011000250 HC RX REV CODE- 250

## 2017-08-24 PROCEDURE — 77030032490 HC SLV COMPR SCD KNE COVD -B

## 2017-08-24 PROCEDURE — 74011250637 HC RX REV CODE- 250/637: Performed by: ANESTHESIOLOGY

## 2017-08-24 PROCEDURE — 77030034849: Performed by: ORTHOPAEDIC SURGERY

## 2017-08-24 PROCEDURE — 77030027138 HC INCENT SPIROMETER -A

## 2017-08-24 PROCEDURE — 65270000029 HC RM PRIVATE

## 2017-08-24 PROCEDURE — 85018 HEMOGLOBIN: CPT | Performed by: ORTHOPAEDIC SURGERY

## 2017-08-24 PROCEDURE — 86900 BLOOD TYPING SEROLOGIC ABO: CPT | Performed by: ORTHOPAEDIC SURGERY

## 2017-08-24 PROCEDURE — 76010000162 HC OR TIME 1.5 TO 2 HR INTENSV-TIER 1: Performed by: ORTHOPAEDIC SURGERY

## 2017-08-24 PROCEDURE — 77010033678 HC OXYGEN DAILY

## 2017-08-24 PROCEDURE — 94760 N-INVAS EAR/PLS OXIMETRY 1: CPT

## 2017-08-24 PROCEDURE — 77030018846 HC SOL IRR STRL H20 ICUM -A: Performed by: ORTHOPAEDIC SURGERY

## 2017-08-24 PROCEDURE — 77030012890

## 2017-08-24 PROCEDURE — 77030008467 HC STPLR SKN COVD -B: Performed by: ORTHOPAEDIC SURGERY

## 2017-08-24 PROCEDURE — 76210000016 HC OR PH I REC 1 TO 1.5 HR: Performed by: ORTHOPAEDIC SURGERY

## 2017-08-24 PROCEDURE — 94762 N-INVAS EAR/PLS OXIMTRY CONT: CPT

## 2017-08-24 PROCEDURE — 72170 X-RAY EXAM OF PELVIS: CPT

## 2017-08-24 PROCEDURE — 77030018836 HC SOL IRR NACL ICUM -A: Performed by: ORTHOPAEDIC SURGERY

## 2017-08-24 PROCEDURE — 76060000034 HC ANESTHESIA 1.5 TO 2 HR: Performed by: ORTHOPAEDIC SURGERY

## 2017-08-24 PROCEDURE — 77030007880 HC KT SPN EPDRL BBMI -B: Performed by: ANESTHESIOLOGY

## 2017-08-24 PROCEDURE — 86580 TB INTRADERMAL TEST: CPT | Performed by: ORTHOPAEDIC SURGERY

## 2017-08-24 PROCEDURE — 74011000250 HC RX REV CODE- 250: Performed by: ORTHOPAEDIC SURGERY

## 2017-08-24 DEVICE — STEM FEM SZ 12 L130MM NK L38.5MM 135DEG 41MM OFFSET STD HIP: Type: IMPLANTABLE DEVICE | Site: HIP | Status: FUNCTIONAL

## 2017-08-24 DEVICE — SCREW BNE L30MM DIA6.5MM CANC HIP S STL GRIPTION FULL THRD: Type: IMPLANTABLE DEVICE | Site: HIP | Status: FUNCTIONAL

## 2017-08-24 DEVICE — LINER ACET OD54MM ID36MM HIP ALTRX PINN: Type: IMPLANTABLE DEVICE | Site: HIP | Status: FUNCTIONAL

## 2017-08-24 DEVICE — SCREW BNE L20MM DIA6.5MM CANC HIP S STL GRIPTION FULL THRD: Type: IMPLANTABLE DEVICE | Site: HIP | Status: FUNCTIONAL

## 2017-08-24 DEVICE — HEAD FEM DIA36MM +5MM OFFSET 12/14 TAPR HIP CERAMIC BIOLOX: Type: IMPLANTABLE DEVICE | Site: HIP | Status: FUNCTIONAL

## 2017-08-24 DEVICE — CUP ACET DIA54MM 12 H HIP TI GRIPTION VIP TAPR DOME REV: Type: IMPLANTABLE DEVICE | Site: HIP | Status: FUNCTIONAL

## 2017-08-24 RX ORDER — CELECOXIB 200 MG/1
200 CAPSULE ORAL EVERY 12 HOURS
Status: DISCONTINUED | OUTPATIENT
Start: 2017-08-24 | End: 2017-08-26 | Stop reason: HOSPADM

## 2017-08-24 RX ORDER — ALBUTEROL SULFATE 0.83 MG/ML
2.5 SOLUTION RESPIRATORY (INHALATION) AS NEEDED
Status: DISCONTINUED | OUTPATIENT
Start: 2017-08-24 | End: 2017-08-24 | Stop reason: HOSPADM

## 2017-08-24 RX ORDER — SERTRALINE HYDROCHLORIDE 100 MG/1
100 TABLET, FILM COATED ORAL
Status: DISCONTINUED | OUTPATIENT
Start: 2017-08-24 | End: 2017-08-26 | Stop reason: HOSPADM

## 2017-08-24 RX ORDER — MIDAZOLAM HYDROCHLORIDE 1 MG/ML
2 INJECTION, SOLUTION INTRAMUSCULAR; INTRAVENOUS ONCE
Status: COMPLETED | OUTPATIENT
Start: 2017-08-24 | End: 2017-08-24

## 2017-08-24 RX ORDER — MIDAZOLAM HYDROCHLORIDE 1 MG/ML
INJECTION, SOLUTION INTRAMUSCULAR; INTRAVENOUS AS NEEDED
Status: DISCONTINUED | OUTPATIENT
Start: 2017-08-24 | End: 2017-08-24 | Stop reason: HOSPADM

## 2017-08-24 RX ORDER — ATORVASTATIN CALCIUM 40 MG/1
40 TABLET, FILM COATED ORAL
Status: DISCONTINUED | OUTPATIENT
Start: 2017-08-24 | End: 2017-08-26 | Stop reason: HOSPADM

## 2017-08-24 RX ORDER — KETOROLAC TROMETHAMINE 30 MG/ML
INJECTION, SOLUTION INTRAMUSCULAR; INTRAVENOUS AS NEEDED
Status: DISCONTINUED | OUTPATIENT
Start: 2017-08-24 | End: 2017-08-24 | Stop reason: HOSPADM

## 2017-08-24 RX ORDER — AMOXICILLIN 250 MG
2 CAPSULE ORAL DAILY
Status: DISCONTINUED | OUTPATIENT
Start: 2017-08-25 | End: 2017-08-26 | Stop reason: HOSPADM

## 2017-08-24 RX ORDER — CEFAZOLIN SODIUM IN 0.9 % NACL 2 G/50 ML
2 INTRAVENOUS SOLUTION, PIGGYBACK (ML) INTRAVENOUS EVERY 8 HOURS
Status: COMPLETED | OUTPATIENT
Start: 2017-08-24 | End: 2017-08-24

## 2017-08-24 RX ORDER — ASPIRIN 81 MG/1
81 TABLET ORAL EVERY 12 HOURS
Status: DISCONTINUED | OUTPATIENT
Start: 2017-08-24 | End: 2017-08-26 | Stop reason: HOSPADM

## 2017-08-24 RX ORDER — ROPIVACAINE HYDROCHLORIDE 2 MG/ML
INJECTION, SOLUTION EPIDURAL; INFILTRATION; PERINEURAL AS NEEDED
Status: DISCONTINUED | OUTPATIENT
Start: 2017-08-24 | End: 2017-08-24 | Stop reason: HOSPADM

## 2017-08-24 RX ORDER — TRAZODONE HYDROCHLORIDE 50 MG/1
150 TABLET ORAL
Status: DISCONTINUED | OUTPATIENT
Start: 2017-08-24 | End: 2017-08-26 | Stop reason: HOSPADM

## 2017-08-24 RX ORDER — ACETAMINOPHEN 500 MG
1000 TABLET ORAL EVERY 6 HOURS
Status: DISCONTINUED | OUTPATIENT
Start: 2017-08-25 | End: 2017-08-26 | Stop reason: HOSPADM

## 2017-08-24 RX ORDER — HYDRALAZINE HYDROCHLORIDE 20 MG/ML
10 INJECTION INTRAMUSCULAR; INTRAVENOUS
Status: DISCONTINUED | OUTPATIENT
Start: 2017-08-24 | End: 2017-08-26 | Stop reason: HOSPADM

## 2017-08-24 RX ORDER — ONDANSETRON 2 MG/ML
INJECTION INTRAMUSCULAR; INTRAVENOUS AS NEEDED
Status: DISCONTINUED | OUTPATIENT
Start: 2017-08-24 | End: 2017-08-24 | Stop reason: HOSPADM

## 2017-08-24 RX ORDER — LIDOCAINE HYDROCHLORIDE 10 MG/ML
0.1 INJECTION INFILTRATION; PERINEURAL AS NEEDED
Status: DISCONTINUED | OUTPATIENT
Start: 2017-08-24 | End: 2017-08-24 | Stop reason: HOSPADM

## 2017-08-24 RX ORDER — FENTANYL CITRATE 50 UG/ML
100 INJECTION, SOLUTION INTRAMUSCULAR; INTRAVENOUS ONCE
Status: DISCONTINUED | OUTPATIENT
Start: 2017-08-24 | End: 2017-08-24 | Stop reason: HOSPADM

## 2017-08-24 RX ORDER — PROPOFOL 10 MG/ML
INJECTION, EMULSION INTRAVENOUS AS NEEDED
Status: DISCONTINUED | OUTPATIENT
Start: 2017-08-24 | End: 2017-08-24 | Stop reason: HOSPADM

## 2017-08-24 RX ORDER — TRISODIUM CITRATE DIHYDRATE AND CITRIC ACID MONOHYDRATE 500; 334 MG/5ML; MG/5ML
30 SOLUTION ORAL
Status: ACTIVE | OUTPATIENT
Start: 2017-08-24 | End: 2017-08-24

## 2017-08-24 RX ORDER — CEFAZOLIN SODIUM IN 0.9 % NACL 2 G/50 ML
2 INTRAVENOUS SOLUTION, PIGGYBACK (ML) INTRAVENOUS ONCE
Status: COMPLETED | OUTPATIENT
Start: 2017-08-24 | End: 2017-08-24

## 2017-08-24 RX ORDER — BUPIVACAINE HYDROCHLORIDE 7.5 MG/ML
INJECTION, SOLUTION INTRASPINAL AS NEEDED
Status: DISCONTINUED | OUTPATIENT
Start: 2017-08-24 | End: 2017-08-24 | Stop reason: HOSPADM

## 2017-08-24 RX ORDER — SODIUM CHLORIDE, SODIUM LACTATE, POTASSIUM CHLORIDE, CALCIUM CHLORIDE 600; 310; 30; 20 MG/100ML; MG/100ML; MG/100ML; MG/100ML
50 INJECTION, SOLUTION INTRAVENOUS CONTINUOUS
Status: DISCONTINUED | OUTPATIENT
Start: 2017-08-24 | End: 2017-08-24 | Stop reason: HOSPADM

## 2017-08-24 RX ORDER — DIPHENHYDRAMINE HCL 25 MG
25 CAPSULE ORAL
Status: DISCONTINUED | OUTPATIENT
Start: 2017-08-24 | End: 2017-08-26 | Stop reason: HOSPADM

## 2017-08-24 RX ORDER — HYDROMORPHONE HYDROCHLORIDE 2 MG/ML
0.5 INJECTION, SOLUTION INTRAMUSCULAR; INTRAVENOUS; SUBCUTANEOUS
Status: DISCONTINUED | OUTPATIENT
Start: 2017-08-24 | End: 2017-08-24 | Stop reason: HOSPADM

## 2017-08-24 RX ORDER — OXYCODONE HYDROCHLORIDE 5 MG/1
5 TABLET ORAL
Status: DISCONTINUED | OUTPATIENT
Start: 2017-08-24 | End: 2017-08-24 | Stop reason: HOSPADM

## 2017-08-24 RX ORDER — SODIUM CHLORIDE, SODIUM LACTATE, POTASSIUM CHLORIDE, CALCIUM CHLORIDE 600; 310; 30; 20 MG/100ML; MG/100ML; MG/100ML; MG/100ML
75 INJECTION, SOLUTION INTRAVENOUS CONTINUOUS
Status: DISCONTINUED | OUTPATIENT
Start: 2017-08-24 | End: 2017-08-24 | Stop reason: HOSPADM

## 2017-08-24 RX ORDER — ONDANSETRON 8 MG/1
8 TABLET, ORALLY DISINTEGRATING ORAL
Status: DISCONTINUED | OUTPATIENT
Start: 2017-08-24 | End: 2017-08-26 | Stop reason: HOSPADM

## 2017-08-24 RX ORDER — PANTOPRAZOLE SODIUM 40 MG/1
40 TABLET, DELAYED RELEASE ORAL
Status: DISCONTINUED | OUTPATIENT
Start: 2017-08-24 | End: 2017-08-26 | Stop reason: HOSPADM

## 2017-08-24 RX ORDER — SODIUM CHLORIDE 0.9 % (FLUSH) 0.9 %
5-10 SYRINGE (ML) INJECTION AS NEEDED
Status: DISCONTINUED | OUTPATIENT
Start: 2017-08-24 | End: 2017-08-24 | Stop reason: HOSPADM

## 2017-08-24 RX ORDER — PROMETHAZINE HYDROCHLORIDE 25 MG/1
25 TABLET ORAL
Status: DISCONTINUED | OUTPATIENT
Start: 2017-08-24 | End: 2017-08-26 | Stop reason: HOSPADM

## 2017-08-24 RX ORDER — LEVOTHYROXINE SODIUM 75 UG/1
75 TABLET ORAL
Status: DISCONTINUED | OUTPATIENT
Start: 2017-08-24 | End: 2017-08-26 | Stop reason: HOSPADM

## 2017-08-24 RX ORDER — MORPHINE SULFATE 10 MG/ML
INJECTION, SOLUTION INTRAMUSCULAR; INTRAVENOUS AS NEEDED
Status: DISCONTINUED | OUTPATIENT
Start: 2017-08-24 | End: 2017-08-24 | Stop reason: HOSPADM

## 2017-08-24 RX ORDER — HYDROMORPHONE HYDROCHLORIDE 1 MG/ML
1 INJECTION, SOLUTION INTRAMUSCULAR; INTRAVENOUS; SUBCUTANEOUS
Status: DISCONTINUED | OUTPATIENT
Start: 2017-08-24 | End: 2017-08-26 | Stop reason: HOSPADM

## 2017-08-24 RX ORDER — OXYCODONE HYDROCHLORIDE 5 MG/1
5 TABLET ORAL
Status: DISCONTINUED | OUTPATIENT
Start: 2017-08-24 | End: 2017-08-26 | Stop reason: HOSPADM

## 2017-08-24 RX ORDER — SODIUM CHLORIDE 9 MG/ML
INJECTION INTRAMUSCULAR; INTRAVENOUS; SUBCUTANEOUS AS NEEDED
Status: DISCONTINUED | OUTPATIENT
Start: 2017-08-24 | End: 2017-08-24 | Stop reason: HOSPADM

## 2017-08-24 RX ORDER — ACETAMINOPHEN 10 MG/ML
1000 INJECTION, SOLUTION INTRAVENOUS ONCE
Status: DISCONTINUED | OUTPATIENT
Start: 2017-08-24 | End: 2017-08-24

## 2017-08-24 RX ORDER — SODIUM CHLORIDE 9 MG/ML
100 INJECTION, SOLUTION INTRAVENOUS CONTINUOUS
Status: DISPENSED | OUTPATIENT
Start: 2017-08-24 | End: 2017-08-25

## 2017-08-24 RX ORDER — ACETAMINOPHEN 10 MG/ML
1000 INJECTION, SOLUTION INTRAVENOUS ONCE
Status: COMPLETED | OUTPATIENT
Start: 2017-08-24 | End: 2017-08-24

## 2017-08-24 RX ORDER — TEMAZEPAM 15 MG/1
30 CAPSULE ORAL
Status: DISCONTINUED | OUTPATIENT
Start: 2017-08-24 | End: 2017-08-26 | Stop reason: HOSPADM

## 2017-08-24 RX ORDER — CELECOXIB 200 MG/1
200 CAPSULE ORAL
Status: COMPLETED | OUTPATIENT
Start: 2017-08-24 | End: 2017-08-24

## 2017-08-24 RX ORDER — PROPOFOL 10 MG/ML
INJECTION, EMULSION INTRAVENOUS
Status: DISCONTINUED | OUTPATIENT
Start: 2017-08-24 | End: 2017-08-24 | Stop reason: HOSPADM

## 2017-08-24 RX ORDER — DEXAMETHASONE SODIUM PHOSPHATE 100 MG/10ML
10 INJECTION INTRAMUSCULAR; INTRAVENOUS ONCE
Status: ACTIVE | OUTPATIENT
Start: 2017-08-25 | End: 2017-08-26

## 2017-08-24 RX ORDER — MIDAZOLAM HYDROCHLORIDE 1 MG/ML
2 INJECTION, SOLUTION INTRAMUSCULAR; INTRAVENOUS
Status: DISCONTINUED | OUTPATIENT
Start: 2017-08-24 | End: 2017-08-24 | Stop reason: HOSPADM

## 2017-08-24 RX ORDER — ZOLPIDEM TARTRATE 5 MG/1
5 TABLET ORAL
Status: DISCONTINUED | OUTPATIENT
Start: 2017-08-24 | End: 2017-08-24 | Stop reason: ALTCHOICE

## 2017-08-24 RX ORDER — OXYCODONE HYDROCHLORIDE 5 MG/1
10 TABLET ORAL
Status: DISCONTINUED | OUTPATIENT
Start: 2017-08-24 | End: 2017-08-26 | Stop reason: HOSPADM

## 2017-08-24 RX ORDER — SODIUM CHLORIDE 0.9 % (FLUSH) 0.9 %
5-10 SYRINGE (ML) INJECTION EVERY 8 HOURS
Status: DISCONTINUED | OUTPATIENT
Start: 2017-08-24 | End: 2017-08-26 | Stop reason: HOSPADM

## 2017-08-24 RX ORDER — NALOXONE HYDROCHLORIDE 0.4 MG/ML
.2-.4 INJECTION, SOLUTION INTRAMUSCULAR; INTRAVENOUS; SUBCUTANEOUS
Status: DISCONTINUED | OUTPATIENT
Start: 2017-08-24 | End: 2017-08-26 | Stop reason: HOSPADM

## 2017-08-24 RX ORDER — DEXAMETHASONE SODIUM PHOSPHATE 4 MG/ML
INJECTION, SOLUTION INTRA-ARTICULAR; INTRALESIONAL; INTRAMUSCULAR; INTRAVENOUS; SOFT TISSUE AS NEEDED
Status: DISCONTINUED | OUTPATIENT
Start: 2017-08-24 | End: 2017-08-24 | Stop reason: HOSPADM

## 2017-08-24 RX ORDER — LORAZEPAM 0.5 MG/1
0.5 TABLET ORAL
Status: DISCONTINUED | OUTPATIENT
Start: 2017-08-24 | End: 2017-08-26 | Stop reason: HOSPADM

## 2017-08-24 RX ORDER — SODIUM CHLORIDE 0.9 % (FLUSH) 0.9 %
5-10 SYRINGE (ML) INJECTION EVERY 8 HOURS
Status: DISCONTINUED | OUTPATIENT
Start: 2017-08-24 | End: 2017-08-24 | Stop reason: HOSPADM

## 2017-08-24 RX ORDER — SODIUM CHLORIDE 0.9 % (FLUSH) 0.9 %
5-10 SYRINGE (ML) INJECTION AS NEEDED
Status: DISCONTINUED | OUTPATIENT
Start: 2017-08-24 | End: 2017-08-26 | Stop reason: HOSPADM

## 2017-08-24 RX ORDER — AMLODIPINE BESYLATE 5 MG/1
5 TABLET ORAL
Status: DISCONTINUED | OUTPATIENT
Start: 2017-08-24 | End: 2017-08-26 | Stop reason: HOSPADM

## 2017-08-24 RX ADMIN — MIDAZOLAM HYDROCHLORIDE 2 MG: 1 INJECTION, SOLUTION INTRAMUSCULAR; INTRAVENOUS at 07:49

## 2017-08-24 RX ADMIN — ONDANSETRON 4 MG: 2 INJECTION INTRAMUSCULAR; INTRAVENOUS at 08:10

## 2017-08-24 RX ADMIN — ACETAMINOPHEN 1000 MG: 500 TABLET, FILM COATED ORAL at 23:52

## 2017-08-24 RX ADMIN — CEFAZOLIN 2 G: 1 INJECTION, POWDER, FOR SOLUTION INTRAMUSCULAR; INTRAVENOUS; PARENTERAL at 08:07

## 2017-08-24 RX ADMIN — SODIUM CHLORIDE 100 ML/HR: 900 INJECTION, SOLUTION INTRAVENOUS at 21:11

## 2017-08-24 RX ADMIN — SERTRALINE HYDROCHLORIDE 100 MG: 100 TABLET, FILM COATED ORAL at 20:51

## 2017-08-24 RX ADMIN — VANCOMYCIN HYDROCHLORIDE 1500 MG: 10 INJECTION, POWDER, LYOPHILIZED, FOR SOLUTION INTRAVENOUS at 07:00

## 2017-08-24 RX ADMIN — OXYCODONE HYDROCHLORIDE 10 MG: 5 TABLET ORAL at 20:53

## 2017-08-24 RX ADMIN — OXYCODONE HYDROCHLORIDE 10 MG: 5 TABLET ORAL at 17:13

## 2017-08-24 RX ADMIN — HYDROMORPHONE HYDROCHLORIDE 0.5 MG: 2 INJECTION, SOLUTION INTRAMUSCULAR; INTRAVENOUS; SUBCUTANEOUS at 10:28

## 2017-08-24 RX ADMIN — CELECOXIB 200 MG: 200 CAPSULE ORAL at 20:51

## 2017-08-24 RX ADMIN — SODIUM CHLORIDE, SODIUM LACTATE, POTASSIUM CHLORIDE, AND CALCIUM CHLORIDE 75 ML/HR: 600; 310; 30; 20 INJECTION, SOLUTION INTRAVENOUS at 06:52

## 2017-08-24 RX ADMIN — MIDAZOLAM HYDROCHLORIDE 1 MG: 1 INJECTION, SOLUTION INTRAMUSCULAR; INTRAVENOUS at 08:12

## 2017-08-24 RX ADMIN — LEVOTHYROXINE SODIUM 75 MCG: 75 TABLET ORAL at 20:51

## 2017-08-24 RX ADMIN — SODIUM CHLORIDE, SODIUM LACTATE, POTASSIUM CHLORIDE, AND CALCIUM CHLORIDE: 600; 310; 30; 20 INJECTION, SOLUTION INTRAVENOUS at 09:15

## 2017-08-24 RX ADMIN — VANCOMYCIN HYDROCHLORIDE 1.5 G: 10 INJECTION, POWDER, LYOPHILIZED, FOR SOLUTION INTRAVENOUS at 08:06

## 2017-08-24 RX ADMIN — LIDOCAINE HYDROCHLORIDE 0.1 ML: 10 INJECTION, SOLUTION INFILTRATION; PERINEURAL at 06:51

## 2017-08-24 RX ADMIN — CEFAZOLIN 2 G: 1 INJECTION, POWDER, FOR SOLUTION INTRAMUSCULAR; INTRAVENOUS; PARENTERAL at 21:00

## 2017-08-24 RX ADMIN — SODIUM CHLORIDE, SODIUM LACTATE, POTASSIUM CHLORIDE, AND CALCIUM CHLORIDE: 600; 310; 30; 20 INJECTION, SOLUTION INTRAVENOUS at 07:58

## 2017-08-24 RX ADMIN — OXYCODONE HYDROCHLORIDE 10 MG: 5 TABLET ORAL at 14:27

## 2017-08-24 RX ADMIN — CEFAZOLIN 2 G: 1 INJECTION, POWDER, FOR SOLUTION INTRAMUSCULAR; INTRAVENOUS; PARENTERAL at 14:27

## 2017-08-24 RX ADMIN — ATORVASTATIN CALCIUM 40 MG: 40 TABLET, FILM COATED ORAL at 20:50

## 2017-08-24 RX ADMIN — ASPIRIN 81 MG: 81 TABLET, COATED ORAL at 20:50

## 2017-08-24 RX ADMIN — TRAZODONE HYDROCHLORIDE 150 MG: 50 TABLET ORAL at 20:52

## 2017-08-24 RX ADMIN — ACETAMINOPHEN 1000 MG: 10 INJECTION, SOLUTION INTRAVENOUS at 17:13

## 2017-08-24 RX ADMIN — HYDROMORPHONE HYDROCHLORIDE 0.5 MG: 2 INJECTION, SOLUTION INTRAMUSCULAR; INTRAVENOUS; SUBCUTANEOUS at 10:18

## 2017-08-24 RX ADMIN — SODIUM CHLORIDE 100 ML/HR: 900 INJECTION, SOLUTION INTRAVENOUS at 12:15

## 2017-08-24 RX ADMIN — PROPOFOL 20 MG: 10 INJECTION, EMULSION INTRAVENOUS at 08:38

## 2017-08-24 RX ADMIN — PANTOPRAZOLE SODIUM 40 MG: 40 TABLET, DELAYED RELEASE ORAL at 12:15

## 2017-08-24 RX ADMIN — CELECOXIB 200 MG: 200 CAPSULE ORAL at 06:51

## 2017-08-24 RX ADMIN — MIDAZOLAM HYDROCHLORIDE 0.5 MG: 1 INJECTION, SOLUTION INTRAMUSCULAR; INTRAVENOUS at 08:44

## 2017-08-24 RX ADMIN — PROPOFOL 100 MCG/KG/MIN: 10 INJECTION, EMULSION INTRAVENOUS at 08:38

## 2017-08-24 RX ADMIN — AMLODIPINE BESYLATE 5 MG: 5 TABLET ORAL at 20:49

## 2017-08-24 RX ADMIN — OXYCODONE HYDROCHLORIDE 10 MG: 5 TABLET ORAL at 23:53

## 2017-08-24 RX ADMIN — BUPIVACAINE HYDROCHLORIDE 2 ML: 7.5 INJECTION, SOLUTION INTRASPINAL at 08:28

## 2017-08-24 RX ADMIN — MIDAZOLAM HYDROCHLORIDE 1 MG: 1 INJECTION, SOLUTION INTRAMUSCULAR; INTRAVENOUS at 08:14

## 2017-08-24 RX ADMIN — TUBERCULIN PURIFIED PROTEIN DERIVATIVE 5 UNITS: 5 INJECTION, SOLUTION INTRADERMAL at 06:51

## 2017-08-24 RX ADMIN — DEXAMETHASONE SODIUM PHOSPHATE 10 MG: 4 INJECTION, SOLUTION INTRA-ARTICULAR; INTRALESIONAL; INTRAMUSCULAR; INTRAVENOUS; SOFT TISSUE at 08:35

## 2017-08-24 NOTE — ANESTHESIA PROCEDURE NOTES
Spinal Block    Start time: 8/24/2017 8:15 AM  End time: 8/24/2017 8:28 AM  Performed by: Phillip Low  Authorized by: Phillip Low     Pre-procedure: Indications: primary anesthetic  Preanesthetic Checklist: patient identified, risks and benefits discussed, anesthesia consent, patient being monitored and timeout performed    Timeout Time: 08:15          Spinal Block:   Patient Position:  Seated  Prep Region:  Lumbar  Prep: chlorhexidine and patient draped      Location:  L3-4  Technique:  Single shot  Local:  Lidocaine 1%  Local Dose (mL):  3    Needle:   Needle Type:  Pencan  Needle Gauge:  25 G  Attempts:  3      Events: CSF confirmed, no blood with aspiration and no paresthesia        Assessment:  Insertion:  Complicated  Patient tolerance:  Patient tolerated the procedure well with no immediate complications  Attempt x 2 at L3-4 once with introducer and once with 17 g Tuohy as introducer, neither with success.   Moved down to L4-5 with standard introducer and regular length 25g Pencan and had success

## 2017-08-24 NOTE — PROGRESS NOTES
08/24/17 1425   Oxygen Therapy   O2 Sat (%) 98 %   Pulse via Oximetry 76 beats per minute   O2 Device Nasal cannula   O2 Flow Rate (L/min) 2 l/min  (Decreased to room air )   FIO2 (%) 28 %   Pt instructed in the use of Incentive Spirometry. Joint camp notes reviewed; C/S # 14 at bedside.  No distress noted

## 2017-08-24 NOTE — PERIOP NOTES
TRANSFER - IN REPORT:    Verbal report received from Con Puente RN on [employer]  being received from [address] [place] [address] for routine progression of care      Report consisted of patients Situation, Background, Assessment and   Recommendations(SBAR). Information from the following report(s) SBAR and MAR was reviewed with the receiving nurse. Opportunity for questions and clarification was provided. Assessment completed upon patients arrival to unit and care assumed.

## 2017-08-24 NOTE — IP AVS SNAPSHOT
[address] 
[phone] Patient: Mamta Perry MRN: [id number] You are allergic to the following Allergen Reactions Adhesive Tape Rash  
    
 Sulfa (Sulfonamide Antibiotics) Nausea and Vomiting Immunizations Administered for This Admission Name Date  
 TB Skin Test (PPD) Intradermal 8/24/2017 Recent Documentation Height Weight BMI OB Status Smoking Status 1.651 m 89.4 kg 32.78 kg/m2 Hysterectomy Never Smoker Unresulted Labs Order Current Status PLEASE READ & DOCUMENT PPD TEST IN 48 HRS Preliminary result Emergency Contacts Name Discharge Info Relation Home Work Mobile Meggan Lerner DISCHARGE CAREGIVER [3] Daughter [21] [phone] [facility] DISCHARGE CAREGIVER [3] Spouse [3] [phone] About your hospitalization You were admitted on:  August 24, 2017 You last received care in the:  [facility] You were discharged on:  August 26, 2017 Unit phone number:  [phone] Why you were hospitalized Your primary diagnosis was:  S/P Total Hip Arthroplasty Your diagnoses also included:  Osteoarthritis, Essential Hypertension, Benign, Depression, Coronary Atherosclerosis Of Native Coronary Vessel, Primary Hypothyroidism, Dyslipidemia Providers Seen During Your Hospitalizations Provider Role Specialty Primary office phone Herman Combs MD Attending Provider Orthopedic Surgery [phone] Your Primary Care Physician (PCP) Primary Care Physician Office Phone Office Fax Emily Linda [phone] Follow-up Information Follow up With Details Comments Contact Info Jaki Kowalski MD   [address] [place] [address] 
[phone] Herman Combs MD  [facility] Appointment [place] [address] [address] [place] [address] 
[phone] [address]  will contact you within 48 hours to schedule home visits. [address] [facility] 230 [facility] [address] 
[phone] Current Discharge Medication List  
  
START taking these medications Dose & Instructions Dispensing Information Comments Morning Noon Evening Bedtime  
 oxyCODONE IR 5 mg immediate release tablet Commonly known as:  Fabian Suzanne Dose:  5 mg Take 1 Tab by mouth every four (4) hours as needed. Max Daily Amount: 30 mg.  
 Quantity:  60 Tab Refills:  0 CONTINUE these medications which have CHANGED Dose & Instructions Dispensing Information Comments Morning Noon Evening Bedtime  
 alendronate 70 mg tablet Commonly known as:  FOSAMAX What changed:   
- how much to take 
- how to take this - when to take this 
- additional instructions 1 q week for osteoporosis Quantity:  12 Tab Refills:  12 amLODIPine 5 mg tablet Commonly known as:  Henrry Rings What changed:   
- how much to take 
- how to take this - when to take this 
- additional instructions 1 every day for BP  Indications: Hypertension Quantity:  90 Tab Refills:  12  
     
  
   
   
   
  
 aspirin delayed-release 81 mg tablet What changed:  when to take this Dose:  81 mg Take 1 Tab by mouth every twelve (12) hours every twelve (12) hours for 30 days. Quantity:  60 Tab Refills:  0  
     
  
   
   
   
  
 atorvastatin 40 mg tablet Commonly known as:  LIPITOR What changed:   
- how much to take 
- how to take this - when to take this 
- additional instructions 1 qd  Indications: mixed hyperlipidemia Quantity:  90 Tab Refills:  12  
     
   
   
   
  
 levothyroxine 75 mcg tablet Commonly known as:  SYNTHROID What changed:   
- how much to take 
- how to take this - when to take this - additional instructions 1 every day for thyroid Quantity:  90 Tab Refills:  12 LORazepam 0.5 mg tablet Commonly known as:  ATIVAN What changed:  additional instructions Dose:  0.5 mg Take 1 Tab by mouth every eight (8) hours as needed for Anxiety. Max Daily Amount: 1.5 mg.  
 Quantity:  30 Tab Refills:  5  
     
   
   
   
  
 losartan-hydroCHLOROthiazide 100-25 mg per tablet Commonly known as:  HYZAAR What changed:   
- how much to take - when to take this 
- additional instructions 1 every day for BP  Indications: Hypertension Quantity:  30 Tab Refills:  12  
     
   
   
   
  
 traZODone 150 mg tablet Commonly known as:  Tito Macadamia What changed:   
- how much to take 
- how to take this - when to take this 
- additional instructions 1 to 2 qhs for sleep Quantity:  180 Tab Refills:  12 CONTINUE these medications which have NOT CHANGED Dose & Instructions Dispensing Information Comments Morning Noon Evening Bedtime Biotin 2,500 mcg Cap Take  by mouth daily. Refills:  0 MULTIVITAMIN PO Take  by mouth daily. Takes 1-2 melt-aways daily Refills:  0  
     
   
   
   
  
 sertraline 100 mg tablet Commonly known as:  ZOLOFT Dose:  100 mg Take 1 Tab by mouth nightly. Indications: major depressive disorder Quantity:  90 Tab Refills:  5  
     
   
   
   
  
 temazepam 30 mg capsule Commonly known as:  RESTORIL Dose:  30 mg Take 1 Cap by mouth nightly as needed. Max Daily Amount: 30 mg.  
 Quantity:  30 Cap Refills:  5 STOP taking these medications HYDROcodone-acetaminophen 7.5-325 mg per tablet Commonly known as:  NORCO  
   
  
 meloxicam 15 mg tablet Commonly known as:  MOBIC  
   
  
 mupirocin calcium 2 % nasal ointment Commonly known as:  [facility]  
   
  
  
  
 Where to Get Your Medications These medications were sent to [address] [place] [address] Phone:  [phone]  
  aspirin delayed-release 81 mg tablet Information on where to get these meds will be given to you by the nurse or doctor. ! Ask your nurse or doctor about these medications  
  oxyCODONE IR 5 mg immediate release tablet Discharge Instructions DISCHARGE SUMMARY from Nurse The following personal items are in your possession at time of discharge: 
 
Dental Appliances: None Visual Aid: None Clothing: Orest Jock Other Valuables: None PATIENT INSTRUCTIONS: 
 
 
F-face looks uneven A-arms unable to move or move unevenly S-speech slurred or non-existent T-time-call 911 as soon as signs and symptoms begin-DO NOT go Back to bed or wait to see if you get better-TIME IS BRAIN. Warning Signs of HEART ATTACK Call 911 if you have these symptoms: 
? Chest discomfort. Most heart attacks involve discomfort in the center of the chest that lasts more than a few minutes, or that goes away and comes back. It can feel like uncomfortable pressure, squeezing, fullness, or pain. ? Discomfort in other areas of the upper body. Symptoms can include pain or discomfort in one or both arms, the back, neck, jaw, or stomach. ? Shortness of breath with or without chest discomfort. ? Other signs may include breaking out in a cold sweat, nausea, or lightheadedness. Don't wait more than five minutes to call [address]! Fast action can save your life.  Calling 911 is almost always the fastest way to get lifesaving treatment. Emergency Medical Services staff can begin treatment when they arrive  up to an hour sooner than if someone gets to the hospital by car. The discharge information has been reviewed with the patient. The patient verbalized understanding. Discharge medications reviewed with the patient and appropriate educational materials and side effects teaching were provided. Hip Replacement Surgery: What to Expect at [facility] Your Recovery Hip replacement surgery replaces the worn parts of your hip joint. When you leave the hospital, you will probably be walking with crutches or a walker. You may be able to climb a few stairs and get in and out of bed and chairs. But you will need someone to help you at home for the next few weeks or until you have more energy and can move around better. If there is no one to help you at home, you may go to a rehabilitation center or long-term care center. You will go home with a bandage and stitches or staples. You can remove the bandage when your doctor tells you to. Your doctor will remove your stitches or staples 10 days to 3 weeks after your surgery. You may still have some mild pain, and the area may be swollen for 3 to 4 months after surgery. Your doctor will give you medicine for the pain. You will continue the rehabilitation program (rehab) you started in the hospital. The better you do with your rehab exercises, the sooner you will get your strength and movement back. Most people are able to return to work 4 weeks to 4 months after surgery. This care sheet gives you a general idea about how long it will take for you to recover. But each person recovers at a different pace. Follow the steps below to get better as quickly as possible. How can you care for yourself at home? Activity · Your doctor may not want your affected leg to cross the center of your body toward the other leg. If so, your therapist may suggest these ideas: ¨ Do not cross your legs. ¨ Be very careful as you get in or out of bed or a car, so your leg does not cross that imaginary line in the middle of your body. · Rest when you feel tired. You may take a nap, but do not stay in bed all day. · Work with your physical therapist to learn the best way to exercise. You may be able to take frequent, short walks using crutches or a walker. You will probably have to use crutches or a walker for at least 4 to 6 weeks. · Your doctor may advise you to stay away from activities that put stress on the joint. This includes sports such as tennis, football, and jogging. · Do not sit for longer than 30 to 45 minutes at a time. When you sit, use chairs with arms, and do not sit in low chairs. · Do not bend over more than 90 degrees (like the angle in a letter \"L\"). · Sleep on your back with your legs slightly apart or on your side with a pillow between your knees for about 6 weeks or as your doctor tells you. Do not sleep on your stomach or affected leg. · You may need to take sponge baths until your stitches or staples have been removed. You will probably be able to shower 24 hours after they are removed. · Ask your doctor when you can drive again. · Most people are able to return to work 4 weeks to 4 months after surgery. · Ask your doctor when it is okay for you to have sex. Diet · By the time you leave the hospital, you will probably be eating your normal diet. If your stomach is upset, try bland, low-fat foods like plain rice, broiled chicken, toast, and yogurt. Your doctor may recommend that you take iron and vitamin supplements. · Drink plenty of fluids (unless your doctor tells you not to). · Eat healthy foods, and watch your portion sizes. Try to stay at your ideal weight. Too much weight puts more stress on your new hip joint.  
· You may notice that your bowel movements are not regular right after your surgery. This is common. Try to avoid constipation and straining with bowel movements. You may want to take a fiber supplement every day. If you have not had a bowel movement after a couple of days, ask your doctor about taking a mild laxative. Medicines · Your doctor will tell you if and when you can restart your medicines. He or she will also give you instructions about taking any new medicines. · If you take blood thinners, such as warfarin (Coumadin), clopidogrel (Plavix), or aspirin, be sure to talk to your doctor. He or she will tell you if and when to start taking those medicines again. Make sure that you understand exactly what your doctor wants you to do. · Your doctor may give you a blood-thinning medicine to prevent blood clots. If you take a blood thinner, be sure you get instructions about how to take your medicine safely. Blood thinners can cause serious bleeding problems. This medicine could be in pill form or as a shot (injection). If a shot is necessary, your doctor will tell you how to do this. · Be safe with medicines. Take pain medicines exactly as directed. ¨ If the doctor gave you a prescription medicine for pain, take it as prescribed. ¨ If you are not taking a prescription pain medicine, ask your doctor if you can take an over-the-counter medicine. · If you think your pain medicine is making you sick to your stomach: 
¨ Take your medicine after meals (unless your doctor has told you not to). ¨ Ask your doctor for a different pain medicine. · If your doctor prescribed antibiotics, take them as directed. Do not stop taking them just because you feel better. You need to take the full course of antibiotics. Incision care · You will have a bandage over the cut (incision) and staples or stitches. Follow your doctor's instructions on when to take the bandage off.  Giving the incision air will help it heal. 
· Your doctor will remove the staples or stitches 10 days to 3 weeks after the surgery and replace them with strips of tape. Leave the strips on for a week or until they fall off. Exercise · Your rehab program will include a number of exercises to do. Always do them as your therapist tells you. Ice and elevation · For pain, put ice or a cold pack on the area for 10 to 20 minutes at a time. Put a thin cloth between the ice and your skin. · Your ankle may swell for about 3 months. Prop up your ankle when you ice it or anytime you sit or lie down. Try to keep it above the level of your heart. This will help reduce swelling. Other instructions · Continue to wear your support stockings as your doctor says. These help to prevent blood clots. The length of time that you will have to wear them depends on your activity level and the amount of swelling you have. Most people wear these stockings for 4 to 6 weeks after surgery. · Wear medical alert jewelry that says you may need antibiotics before any procedure, including dental work. You can buy this at most drugstores. Preventing falls is also very important. To prevent falls: · Arrange furniture so that you will not trip on it. · Get rid of throw rugs, and move electrical cords out of the way. · Walk only in areas with plenty of light. · Put grab bars in showers and bathtubs. · Avoid icy or snowy sidewalks. · Wear shoes with sturdy, flat soles. Follow-up care is a key part of your treatment and safety. Be sure to make and go to all appointments, and call your doctor if you are having problems. It's also a good idea to know your test results and keep a list of the medicines you take. When should you call for help? Call 911 anytime you think you may need emergency care. For example, call if: 
· You passed out (lost consciousness). · You have severe trouble breathing. · You have sudden chest pain and shortness of breath, or you cough up blood. Call your doctor now or seek immediate medical care if: · You have signs that your hip may be dislocated, including: ¨ Severe pain and not being able to stand. ¨ A crooked leg that looks like your hip is out of position. ¨ Not being able to bend or straighten your leg. · Your leg or foot is cool or pale or changes color. · You cannot feel or move your leg. · You have signs of a blood clot, such as: 
¨ Pain in your calf, back of the knee, thigh, or groin. ¨ Redness and swelling in your leg or groin. · Your incision comes open and begins to bleed, or the bleeding increases. · You feel like your heart is racing or beating irregularly. · You have signs of infection, such as: 
¨ Increased pain, swelling, warmth, or redness. ¨ Red streaks leading from the incision. ¨ Pus draining from the incision. ¨ A fever. Watch closely for changes in your health, and be sure to contact your doctor if: 
· You do not have a bowel movement after taking a laxative. · You do not get better as expected. Where can you learn more? Go to http://evelio-noris.info/. Enter P153 in the search box to learn more about \"Hip Replacement Surgery: What to Expect at Home. \" Current as of: March 21, 2017 Content Version: 11.3 © 1275-9297 [employer], [employer]. Care instructions adapted under license by [employer] (which disclaims liability or warranty for this information). If you have questions about a medical condition or this instruction, always ask your healthcare professional. [place] [address] any warranty or liability for your use of this information. Discharge Orders None ACO Transitions of Care Introducing [employer] [address] Tania Salinas offers a voluntary care coordination program to provide high quality service and care to [facility] fee-for-service beneficiaries. Ana Chi was designed to help you enhance your health and well-being through the following services: ? Transitions of Care  support for individuals who are transitioning from one care setting to another (example: Hospital to home). ? Chronic and Complex Care Coordination  support for individuals and caregivers of those with serious or chronic illnesses or with more than one chronic (ongoing) condition and those who take a number of different medications. If you meet specific medical criteria, a [facility] may call you directly to coordinate your care with your primary care physician and your other care providers. For questions about the [facility] programs, please, contact your physicians office. For general questions or additional information about Accountable Care Organizations: 
Please visit www.medicare.gov/acos. html or call 1-800-MEDICARE (7-159.905.6561) TTY users should call 9-421.319.7052. [employer] Announcement We are excited to announce that we are making your provider's discharge notes available to you in [employer]. You will see these notes when they are completed and signed by the physician that discharged you from your recent hospital stay. If you have any questions or concerns about any information you see in [employer], please call the Health Information Department where you were seen or reach out to your Primary Care Provider for more information about your plan of care. Introducing [facility]! Khurram Meyer introduces [employer] patient portal. Now you can access parts of your medical record, email your doctor's office, and request medication refills online. 1. In your internet browser, go to [email or web address] [place] [employer] 2. Click on the First Time User? Click Here link in the Sign In box. You will see the New Member Sign Up page. 3. Enter your [employer] Access Code exactly as it appears below. You will not need to use this code after youve completed the sign-up process.  If you do not sign up before the expiration date, you must request a new code. · [employer] Access Code: [email or web address] Expires: 9/17/2017  4:57 PM 
 
4. Enter the last four digits of your Social Security Number (xxxx) and Date of Birth (mm/dd/yyyy) as indicated and click Submit. You will be taken to the next sign-up page. 5. Create a [employer]scovered ID. This will be your [employer] login ID and cannot be changed, so think of one that is secure and easy to remember. 6. Create a [employer]overed password. You can change your password at any time. 7. Enter your Password Reset Question and Answer. This can be used at a later time if you forget your password. 8. Enter your e-mail address. You will receive e-mail notification when new information is available in [address] 9. Click Sign Up. You can now view and download portions of your medical record. 10. Click the Download Summary menu link to download a portable copy of your medical information. If you have questions, please visit the Frequently Asked Questions section of the [employer]overed website. Remember, [employer]overed is NOT to be used for urgent needs. For medical emergencies, dial 911. Now available from your iPhone and Android! General Information Please provide this summary of care documentation to your next provider. Patient Signature:  ____________________________________________________________ Date:  ____________________________________________________________  
  
Charlee Vargas Provider Signature:  ____________________________________________________________ Date:  ____________________________________________________________

## 2017-08-24 NOTE — PERIOP NOTES
Teach back method used with patient concerning hibiclens wash, TB screening, incentive spirometer, pain management goals, and discharge needs list.

## 2017-08-24 NOTE — PERIOP NOTES
TRANSFER - OUT REPORT:    Verbal report given to [facility] RN on Shayna Jerome  being transferred to Pre for routine progression of care       Report consisted of patients Situation, Background, Assessment and   Recommendations(SBAR). Information from the following report(s) SBAR, Kardex, [facility] and Recent Results was reviewed with the receiving nurse. Lines:   Peripheral IV 03/15/16 Left Hand (Active)       Peripheral IV 03/15/16 Right Hand (Active)       Peripheral IV 08/24/17 Left Hand (Active)   Site Assessment Clean, dry, & intact 8/24/2017  7:03 AM   Phlebitis Assessment 0 8/24/2017  7:03 AM   Infiltration Assessment 0 8/24/2017  7:03 AM   Dressing Status Clean, dry, & intact 8/24/2017  7:03 AM   Dressing Type Tape;Transparent 8/24/2017  7:03 AM   Hub Color/Line Status Infusing 8/24/2017  7:03 AM   Action Taken Blood drawn 8/24/2017  7:03 AM        Opportunity for questions and clarification was provided.       Patient transported with:   [employer]

## 2017-08-24 NOTE — PROGRESS NOTES
TRANSFER - IN REPORT:    Verbal report received from [address] on [employer]  being received from PACU for routine progression of care      Report consisted of patients Situation, Background, Assessment and   Recommendations(SBAR). Information from the following report(s) SBAR, Kardex, OR Summary and MAR was reviewed with the receiving nurse. Opportunity for questions and clarification was provided. Assessment completed upon patients arrival to unit and care assumed.

## 2017-08-24 NOTE — PERIOP NOTES
TRANSFER - OUT REPORT:    Verbal report given to CHRISTOPHER Chiu on Fariha  being transferred to [address] [place] [address] for routine progression of care       Report consisted of patients Situation, Background, Assessment and   Recommendations(SBAR). Information from the following report(s) SBAR, Kardex, OR Summary, Intake/Output and MAR was reviewed with the receiving nurse. Lines:   Peripheral IV 03/15/16 Left Hand (Active)       Peripheral IV 03/15/16 Right Hand (Active)       Peripheral IV 08/24/17 Left Hand (Active)   Site Assessment Clean, dry, & intact 8/24/2017 10:53 AM   Phlebitis Assessment 0 8/24/2017 10:53 AM   Infiltration Assessment 0 8/24/2017 10:53 AM   Dressing Status Clean, dry, & intact 8/24/2017 10:53 AM   Dressing Type Tape;Transparent 8/24/2017 10:53 AM   Hub Color/Line Status Infusing;Patent 8/24/2017 10:53 AM   Action Taken Blood drawn 8/24/2017  7:03 AM        Opportunity for questions and clarification was provided.       Patient transported with:   O2 @ 2 liters

## 2017-08-24 NOTE — PERIOP NOTES
BETADINE LAVAGE: 17.5cc of betadine lot # [id number]  , exp 11/18  In 500cc 0.9NS lot # [id number] , exp [id number]

## 2017-08-24 NOTE — CONSULTS
Hospitalist Consult Note     Admit Date:  2017  5:17 AM   Name:  Katy Amezcua   Age:  69 y.o.  :  1950   MRN:  [id number]   PCP:  Ernie Wong MD  Treatment Team: Attending Provider: Yecenia Mckeon MD; Consulting Provider: Collin Bro MD; Consulting Provider: Carlos Bellamy MD    HPI:       Ms. Antionette Mobley is a 70 yo female with PMH of HTN, HLP, hypothyroidism, CAD followed by [facility] cardio postop left LAUREEN. Had some postop burping that resolved after drinking ginger ale. No regular dyspnea or chest pain. ROS as noted in HPI. Past Medical History:   Diagnosis Date    Anxiety     Arthritis     general-back, neck, hips,knees/ managed with medication     Autoimmune disease ([address]     lichens sclerosis; pt states is dormant now    CAD (coronary artery disease) 2010    stent x 1, no MI    Chest pain     right    Chronic pain     back and neck    Depression     managed with medication     Family history of ischemic heart disease     H/O acute pancreatitis 2013    states gallbladder stone lodged creating a blockage.  H/O gastric bypass     High cholesterol     managed w/ meds    Hypertension     managed with medication     Hypothyroidism     managed with medication     Lichen sclerosus     Obesity (BMI 30-39. 9)     BMI 32.4 3/16    Osteopenia     Systolic murmur     ECHO: +/-PI, TR ; Per cardiologist note 17 no murmur ; pt states told by cardiologist that he was removeing murmur from her record    Ventral hernia       Past Surgical History:   Procedure Laterality Date    HX APPENDECTOMY      HX BLADDER SUSPENSION      HX CARPAL TUNNEL RELEASE Right     HX CATARACT REMOVAL Bilateral 2015    HX COLONOSCOPY      normal, repeat after     [address]  2009    HX HEART CATHETERIZATION  2010    stenting of LAD    HX HERNIA REPAIR  14    ventral    HX HYSTERECTOMY      cervix removed  HX LAP CHOLECYSTECTOMY  7/2013    HX LUMBAR FUSION  1996    with 1 steel adeline with screws~lower lumbar    HX LYSIS OF ADHESIONS      abdominal    HX OOPHORECTOMY  1984    right    HX ORTHOPAEDIC Right 1997    carpal tunnel release    HX SHOULDER ARTHROSCOPY Right 2011    HX UROLOGICAL      bladder suspension      Current Facility-Administered Medications   Medication Dose Route Frequency    amLODIPine (NORVASC) tablet 5 mg  5 mg Oral QHS    atorvastatin (LIPITOR) tablet 40 mg  40 mg Oral QHS    levothyroxine (SYNTHROID) tablet 75 mcg  75 mcg Oral QHS    LORazepam (ATIVAN) tablet 0.5 mg  0.5 mg Oral Q8H PRN    sertraline (ZOLOFT) tablet 100 mg  100 mg Oral QHS    temazepam (RESTORIL) capsule 30 mg  30 mg Oral QHS PRN    traZODone (DESYREL) tablet 150 mg  150 mg Oral QHS    0.9% sodium chloride infusion  100 mL/hr IntraVENous CONTINUOUS    sodium chloride (NS) flush 5-10 mL  5-10 mL IntraVENous Q8H    sodium chloride (NS) flush 5-10 mL  5-10 mL IntraVENous PRN    ceFAZolin in 0.9% NS (ANCEF) IVPB soln 2 g  2 g IntraVENous Q8H    [START ON 8/25/2017] acetaminophen (TYLENOL) tablet 1,000 mg  1,000 mg Oral Q6H    celecoxib (CELEBREX) capsule 200 mg  200 mg Oral Q12H    oxyCODONE IR (ROXICODONE) tablet 10 mg  10 mg Oral Q3H PRN    HYDROmorphone (PF) (DILAUDID) injection 1 mg  1 mg IntraVENous Q3H PRN    naloxone (NARCAN) injection 0.2-0.4 mg  0.2-0.4 mg IntraVENous Q10MIN PRN    [START ON 8/25/2017] dexamethasone (DECADRON) injection 10 mg  10 mg IntraVENous ONCE    promethazine (PHENERGAN) tablet 25 mg  25 mg Oral Q6H PRN    diphenhydrAMINE (BENADRYL) capsule 25 mg  25 mg Oral Q4H PRN    [START ON 8/25/2017] senna-docusate (PERICOLACE) 8.6-50 mg per tablet 2 Tab  2 Tab Oral DAILY    aspirin delayed-release tablet 81 mg  81 mg Oral Q12H    ondansetron (ZOFRAN ODT) tablet 8 mg  8 mg Oral Q8H PRN    sodium citrate-citric acid (BICITRA) 500-334 mg/5 mL oral solution 30 mL  30 mL Oral NOW    acetaminophen (OFIRMEV) infusion 1,000 mg  1,000 mg IntraVENous ONCE    [START ON 8/25/2017] PPD read reminder  1 Each Other Q24H    [START ON 8/25/2017] losartan/hydroCHLOROthiazide (HYZAAR) 100/25 mg   Oral DAILY    oxyCODONE IR (ROXICODONE) tablet 5 mg  5 mg Oral Q3H PRN    hydrALAZINE (APRESOLINE) 20 mg/mL injection 10 mg  10 mg IntraVENous Q6H PRN    pantoprazole (PROTONIX) tablet 40 mg  40 mg Oral ACB     Allergies   Allergen Reactions    Adhesive Tape Rash    Sulfa (Sulfonamide Antibiotics) Nausea and Vomiting      Social History   Substance Use Topics    Smoking status: Never Smoker    Smokeless tobacco: Never Used    Alcohol use 0.6 oz/week     1 Glasses of wine per week      Comment: Occasional      Family History   Problem Relation Age of Onset    Heart Attack Mother     Heart Disease Mother     Hypertension Mother     Cancer Mother      melanoma    Cancer Father      leukemia    Coronary Artery Disease Father     Cancer Sister      breast    Heart Attack Brother     Heart Disease Brother     Cancer Brother      lung    Lung Disease Brother       Immunization History   Administered Date(s) Administered    Influenza High Dose Vaccine PF 11/09/2016    Pneumococcal Conjugate (PCV-13) 11/09/2016    Pneumococcal Polysaccharide (PPSV-23) 10/15/2010    TB Skin Test (PPD) Intradermal 08/24/2017       Objective:   Patient Vitals for the past 24 hrs:   Temp Pulse Resp BP SpO2   08/24/17 1124 95.2 °F (35.1 °C) 62 16 112/61 100 %   08/24/17 1102 - 60 15 107/59 100 %   08/24/17 1048 - (!) 55 16 98/54 100 %   08/24/17 1033 - 60 15 115/66 98 %   08/24/17 1028 - - 15 - 98 %   08/24/17 1018 - 61 16 119/68 100 %   08/24/17 1013 - 60 16 116/65 100 %   08/24/17 1008 - 63 16 108/58 98 %   08/24/17 1004 98.2 °F (36.8 °C) 63 16 108/55 95 %   08/24/17 0624 97.8 °F (36.6 °C) (!) 59 16 119/68 97 %     Oxygen Therapy  O2 Sat (%): 100 % (08/24/17 1124)  O2 Device: Nasal cannula (08/24/17 1102)  O2 Flow Rate (L/min): 2 l/min (08/24/17 1102)    Intake/Output Summary (Last 24 hours) at 08/24/17 1250  Last data filed at 08/24/17 1053   Gross per 24 hour   Intake             1230 ml   Output              550 ml   Net              680 ml       Physical Exam:  General:    Well nourished. Alert. Eyes:   Normal sclera. Extraocular movements intact. ENT:  Normocephalic, atraumatic. Moist mucous membranes  CV:   RRR. No murmur, rub, or gallop. Lungs:  CTAB. No wheezing, rhonchi, or rales. Anterior exam   Abdomen: Soft, nontender, nondistended. Bowel sounds normal.   Extremities: Warm and dry. No cyanosis or edema. Neurologic: grossly intact. Skin:     No rashes or jaundice. Psych:  Normal mood and affect. I reviewed the labs, imaging, EKGs, telemetry, and other studies done this admission. Data Review:   Recent Results (from the past 24 hour(s))   TYPE & SCREEN    Collection Time: 08/24/17  6:50 AM   Result Value Ref Range    Crossmatch Expiration 08/27/2017     ABO/Rh(D) B POSITIVE     Antibody screen NEG    GLUCOSE, POC    Collection Time: 08/24/17  7:08 AM   Result Value Ref Range    Glucose (POC) 89 65 - 100 mg/dL   URINALYSIS W/ RFLX MICROSCOPIC    Collection Time: 08/24/17  8:49 AM   Result Value Ref Range    Color YELLOW      Appearance CLEAR      Specific gravity 1.014 1.001 - 1.023      pH (UA) 6.0 5.0 - 9.0      Protein NEGATIVE  NEG mg/dL    Glucose NEGATIVE  mg/dL    Ketone NEGATIVE  NEG mg/dL    Bilirubin NEGATIVE  NEG      Blood NEGATIVE  NEG      Urobilinogen 0.2 0.2 - 1.0 EU/dL    Nitrites NEGATIVE  NEG      Leukocyte Esterase NEGATIVE  NEG         All Micro Results     Procedure Component Value Units Date/Time    CULTURE, URINE [[id number]] Collected:  08/24/17 0849    Order Status:  Completed Specimen:  Urine from [place] Specimen Updated:  08/24/17 0901          Other Studies:  No results found.     Assessment and Plan:     Hospital Problems as of 8/24/2017  Date Reviewed: 8/24/2017 Codes Class Noted - Resolved POA    Osteoarthritis ICD-10-CM: M19.90  ICD-9-CM: 715.90  8/24/2017 - Present Unknown        Coronary atherosclerosis of native coronary vessel ICD-10-CM: I25.10  ICD-9-CM: 414.01  12/5/2016 - Present Yes    Overview Signed 12/5/2016  3:26 PM by Jorge ONTIVEROS 3/2014: normal perfusion, normal EF  [facility] 9/10/10: PCI to mid LAD (cypher 2.75x18), mild CAD in other vessels, EF 55%             Primary hypothyroidism (Chronic) ICD-10-CM: E03.9  ICD-9-CM: 244.9  12/4/2015 - Present Yes        Depression (Chronic) ICD-10-CM: F32.9  ICD-9-CM: 311  8/6/2015 - Present Yes        Essential hypertension, benign (Chronic) ICD-10-CM: I10  ICD-9-CM: 401.1  9/10/2010 - Present Yes        Dyslipidemia (Chronic) ICD-10-CM: E78.5  ICD-9-CM: 272.4  9/10/2010 - Present Yes              PLAN:  Continue antihypertensives, add prn hydralazine   Add protonix for GERD complaint, improved   Will be available as needed, please call thanks     Signed:  Rachel Sim MD

## 2017-08-24 NOTE — PROGRESS NOTES
Admission assessment complete. Pt resting in bed. Call light within reach. Pt oriented to room and menu. Pt is numb and unable to move feet at this time, but has +2 pedal pulses. Pt has no complaints of pain at this time. IS at bedside. Pt verbally understands to call for pain medication.

## 2017-08-24 NOTE — OP NOTES
[id number] [facility]  Total Hip Procedure Note  Elizabeth Goddard   : 1950  Medical Record OEOYFS:[id number]      Pre-operative Diagnosis:  Unilateral primary osteoarthritis, left hip [M16.12]  Post-operative Diagnosis: Unilateral primary osteoarthritis, left hip [M16.12]    Surgeon: Eleanor Weir MD  Assistant:Chauncey Montano PA-C    Anesthesia: Spinal      Procedure: Total Hip Arthroplasty   The complexity of the total joint surgery requires the use of a first assistant for positioning, retraction and assistance in closure. The patient's Body mass index is 32.78 kg/(m^2). , BMI's greater then 40 make surgical exposure and retraction extremely difficult and increase operative time. Jodeane Apley was brought to the operating room and positioned on the operating table. She was anethestized with anesthesia. A seay catheter was placed preoperatively and IV antibiotics per CMS protocol was administered. Prior to the incision being made a timeout was called identifying the patient, procedure ,operative side and surgeon. Jodeane Apley was positioned in the lateral decubitus position with the  left  side up. The limb was prepped and draped in the usual sterile fashion. The direct lateral approach was utilized to expose the hip. The incision was carried through the subcutaneous tissue and underlying fascia with homeostasis obtained using the bovie cauterization. A Charmley retractor was inserted. The abductors were taken down at the junction of the anterior and middle third. The sciatic nerve was palpated and identified. Following comparison of leg lengths, the femoral head was dislocated. The neck was osteotomized in the appropriate position just above the lesser trochanteric region. The acetabular retractor was placed and the appropriate capsulotomy performed. Soft tissue was removed from the acetabulum. The acetabulum was sequentially reamed. Using trial components the acetabulum was sized to a 54 mm acetabular cup. Two post-sup screws were placed to augment fixation. Utilizing the femoral retractor, the canal was prepared with appropriate laterization and reamed with the starter reamer. . The canal was then broached progressively to size 12. The calcar planar was untilized. A trial reduction with a size +5.0 neck length was utilized. The Head size was 36mm. This was found to be the most stable to flexion greater than 90 degrees and an internal and external rotation. Limb lengths were found to be equilibrated and with appropriate stability as mentioned above. All trial components were removed. The cementless permanent stem was impacted into place. A trial reduction was performed and the appropiate neck length was selected. A permanent 36mm femoral head was impacted into place. Ramesh Wilkinson's hip was reduced and the stability was as mentioned as above. The betadine lavage protocol was used. The sciatic nerve was palpated and noted to be intact. The abductors were repaired through drill holes in the greater trochanter. The remainder was closed in layers with staples for the skin. The patient was then rolled to a supine position. The sponge and needle counts were correct. The patient tolerated the procedure without difficulty and left the operating room in satisfactory condition. EBL:300cc  Additional Findings: severe DJD/ osteoporosis    Implants:   Implant Name Type Inv.  Item Serial No.  Lot No. LRB No. Used Action   CUP ACET MH PINN 54MM GRIPTION --  - [id number]  CUP ACET MH PINN 54MM GRIPTION --  [id number] Left 1 Implanted   SCR BNE CANC PINN 6.5X30MM SS --  - [id number]  SCR BNE CANC PINN 6.5X30MM SS --  [id number] [facility] [employer] [id number] Left 1 Implanted   SCR ACET CANC PINN 6.5X20MM SS --  - [id number]  SCR ACET CANC PINN 6.5X20MM SS --  [id number] [facility] [employer] [id number] Left 1 Implanted   LINER ACET PINN NEUT [id number] -- [employer]TRX - [id number]  LINER ACET PINN NEUT [id number] -- [employer] [id number] [facility] [employer] [id number] Left 1 Implanted   STEM FEM CORAIL STD COL SZ 12 --  - [id number]  STEM FEM CORAIL STD COL SZ 12 --  [id number] [facility] [employer] [id number] Left 1 Implanted   HEAD FEM S-ROM 36MM +5MM NK -- Esther Marie - [id number]   HEAD FEM S-ROM 36MM +5MM NK -- Esther Marie [id number] [facility] [employer] [id number] Left 1 Implanted     Signed By: Shimon Escobedo MD

## 2017-08-24 NOTE — ANESTHESIA POSTPROCEDURE EVALUATION
Post-Anesthesia Evaluation and Assessment    Patient: Gerardo Faustin MRN: [id number]  SSN: [id number]    YOB: 1950  Age: 69 y.o. Sex: female       Cardiovascular Function/Vital Signs  Visit Vitals    /66    Pulse 60    Temp 36.8 °C (98.2 °F)    Resp 15    Ht 5' 5\" (1.651 m)    Wt 89.4 kg (197 lb)    SpO2 98%    BMI 32.78 kg/m2       Patient is status post spinal anesthesia for Procedure(s):  LEFT  HIP ARTHROPLASTY TOTAL. Nausea/Vomiting: None    Postoperative hydration reviewed and adequate. Pain:  Pain Scale 1: Visual (08/24/17 1033)  Pain Intensity 1: 1 (08/24/17 1033)   Managed    Neurological Status:   Neuro (WDL): Exceptions to WDL (08/24/17 1003)  Neuro  Neurologic State: Eyes open to stimulus (08/24/17 1003)  Cognition: Impaired decision making (08/24/17 1003)  LUE Motor Response: Purposeful (08/24/17 1008)  LLE Motor Response: Pharmacologically paralyzed (08/24/17 1008)  RUE Motor Response: Purposeful (08/24/17 1008)  RLE Motor Response: Pharmacologically paralyzed (08/24/17 1008)   Block resolving    Mental Status and Level of Consciousness: Alert and oriented     Pulmonary Status:   O2 Device: Nasal cannula (08/24/17 1033)   Adequate oxygenation and airway patent    Complications related to anesthesia: None    Post-anesthesia assessment completed.  No concerns    Signed By: Raciel Holguin MD     August 24, 2017

## 2017-08-24 NOTE — ANESTHESIA PREPROCEDURE EVALUATION
Anesthetic History   No history of anesthetic complications            Review of Systems / Medical History  Patient summary reviewed, nursing notes reviewed and pertinent labs reviewed    Pulmonary  Within defined limits                 Neuro/Psych         Psychiatric history     Cardiovascular    Hypertension: well controlled          CAD, cardiac stents (STEFF 2010) and hyperlipidemia    Exercise tolerance: >4 METS     GI/Hepatic/Renal                Endo/Other      Hypothyroidism: well controlled  Obesity and arthritis     Other Findings              Physical Exam    Airway  Mallampati: II    Neck ROM: normal range of motion   Mouth opening: Normal     Cardiovascular  Regular rate and rhythm,  S1 and S2 normal,  no murmur, click, rub, or gallop             Dental  No notable dental hx       Pulmonary  Breath sounds clear to auscultation               Abdominal         Other Findings            Anesthetic Plan    ASA: 3  Anesthesia type: spinal            Anesthetic plan and risks discussed with: Patient and Spouse      Plan SAB; GA as back-up due to hx of spinal fusion

## 2017-08-24 NOTE — INTERVAL H&P NOTE
H&P Update:  Alta Marques was seen and examined. History and physical has been reviewed. The patient has been examined.  There have been no significant clinical changes since the completion of the originally dated History and Physical.    Signed By: PANCHITO Elizabeth     August 24, 2017 6:50 AM

## 2017-08-25 ENCOUNTER — HOME HEALTH ADMISSION (OUTPATIENT)
Dept: HOME HEALTH SERVICES | Facility: HOME HEALTH | Age: 67
End: 2017-08-25
Payer: MEDICARE

## 2017-08-25 PROBLEM — Z96.649 S/P TOTAL HIP ARTHROPLASTY: Status: ACTIVE | Noted: 2017-08-25

## 2017-08-25 LAB
ANION GAP SERPL CALC-SCNC: 5 MMOL/L (ref 7–16)
BUN SERPL-MCNC: 14 MG/DL (ref 8–23)
CALCIUM SERPL-MCNC: 7.6 MG/DL (ref 8.3–10.4)
CHLORIDE SERPL-SCNC: 99 MMOL/L (ref 98–107)
CO2 SERPL-SCNC: 27 MMOL/L (ref 21–32)
CREAT SERPL-MCNC: 0.87 MG/DL (ref 0.6–1)
GLUCOSE SERPL-MCNC: 125 MG/DL (ref 65–100)
HGB BLD-MCNC: 10.1 G/DL (ref 11.7–15.4)
POTASSIUM SERPL-SCNC: 4.1 MMOL/L (ref 3.5–5.1)
SODIUM SERPL-SCNC: 131 MMOL/L (ref 136–145)

## 2017-08-25 PROCEDURE — 36415 COLL VENOUS BLD VENIPUNCTURE: CPT | Performed by: ORTHOPAEDIC SURGERY

## 2017-08-25 PROCEDURE — 97116 GAIT TRAINING THERAPY: CPT

## 2017-08-25 PROCEDURE — 80048 BASIC METABOLIC PNL TOTAL CA: CPT | Performed by: ORTHOPAEDIC SURGERY

## 2017-08-25 PROCEDURE — 94760 N-INVAS EAR/PLS OXIMETRY 1: CPT

## 2017-08-25 PROCEDURE — 65270000029 HC RM PRIVATE

## 2017-08-25 PROCEDURE — 74011250637 HC RX REV CODE- 250/637: Performed by: ORTHOPAEDIC SURGERY

## 2017-08-25 PROCEDURE — 85018 HEMOGLOBIN: CPT | Performed by: ORTHOPAEDIC SURGERY

## 2017-08-25 PROCEDURE — 97165 OT EVAL LOW COMPLEX 30 MIN: CPT

## 2017-08-25 PROCEDURE — 97161 PT EVAL LOW COMPLEX 20 MIN: CPT

## 2017-08-25 PROCEDURE — 74011250637 HC RX REV CODE- 250/637: Performed by: INTERNAL MEDICINE

## 2017-08-25 PROCEDURE — 97110 THERAPEUTIC EXERCISES: CPT

## 2017-08-25 PROCEDURE — 97150 GROUP THERAPEUTIC PROCEDURES: CPT

## 2017-08-25 PROCEDURE — 97535 SELF CARE MNGMENT TRAINING: CPT

## 2017-08-25 RX ORDER — CALCIUM CARBONATE 200(500)MG
200 TABLET,CHEWABLE ORAL
Status: DISCONTINUED | OUTPATIENT
Start: 2017-08-25 | End: 2017-08-26 | Stop reason: HOSPADM

## 2017-08-25 RX ORDER — ASPIRIN 81 MG/1
81 TABLET ORAL EVERY 12 HOURS
Qty: 60 TAB | Refills: 0 | Status: SHIPPED | OUTPATIENT
Start: 2017-08-25 | End: 2017-09-24

## 2017-08-25 RX ORDER — OXYCODONE HYDROCHLORIDE 5 MG/1
5 TABLET ORAL
Qty: 60 TAB | Refills: 0 | Status: SHIPPED | OUTPATIENT
Start: 2017-08-25 | End: 2018-08-03

## 2017-08-25 RX ADMIN — ACETAMINOPHEN 1000 MG: 500 TABLET, FILM COATED ORAL at 11:53

## 2017-08-25 RX ADMIN — AMLODIPINE BESYLATE 5 MG: 5 TABLET ORAL at 21:21

## 2017-08-25 RX ADMIN — PANTOPRAZOLE SODIUM 40 MG: 40 TABLET, DELAYED RELEASE ORAL at 05:34

## 2017-08-25 RX ADMIN — LOSARTAN POTASSIUM: 50 TABLET ORAL at 08:25

## 2017-08-25 RX ADMIN — LEVOTHYROXINE SODIUM 75 MCG: 75 TABLET ORAL at 21:20

## 2017-08-25 RX ADMIN — ACETAMINOPHEN 1000 MG: 500 TABLET, FILM COATED ORAL at 05:34

## 2017-08-25 RX ADMIN — Medication 5 ML: at 21:21

## 2017-08-25 RX ADMIN — ASPIRIN 81 MG: 81 TABLET, COATED ORAL at 21:19

## 2017-08-25 RX ADMIN — OXYCODONE HYDROCHLORIDE 10 MG: 5 TABLET ORAL at 06:06

## 2017-08-25 RX ADMIN — ASPIRIN 81 MG: 81 TABLET, COATED ORAL at 08:25

## 2017-08-25 RX ADMIN — TRAZODONE HYDROCHLORIDE 150 MG: 50 TABLET ORAL at 21:20

## 2017-08-25 RX ADMIN — SENNOSIDES AND DOCUSATE SODIUM 2 TABLET: 8.6; 5 TABLET ORAL at 08:25

## 2017-08-25 RX ADMIN — OXYCODONE HYDROCHLORIDE 10 MG: 5 TABLET ORAL at 08:58

## 2017-08-25 RX ADMIN — OXYCODONE HYDROCHLORIDE 10 MG: 5 TABLET ORAL at 17:46

## 2017-08-25 RX ADMIN — CALCIUM CARBONATE 200 MG: 500 TABLET, CHEWABLE ORAL at 15:08

## 2017-08-25 RX ADMIN — ATORVASTATIN CALCIUM 40 MG: 40 TABLET, FILM COATED ORAL at 21:21

## 2017-08-25 RX ADMIN — CELECOXIB 200 MG: 200 CAPSULE ORAL at 21:20

## 2017-08-25 RX ADMIN — SERTRALINE HYDROCHLORIDE 100 MG: 100 TABLET, FILM COATED ORAL at 21:21

## 2017-08-25 RX ADMIN — CELECOXIB 200 MG: 200 CAPSULE ORAL at 08:26

## 2017-08-25 RX ADMIN — ACETAMINOPHEN 1000 MG: 500 TABLET, FILM COATED ORAL at 17:46

## 2017-08-25 RX ADMIN — OXYCODONE HYDROCHLORIDE 10 MG: 5 TABLET ORAL at 21:19

## 2017-08-25 RX ADMIN — CALCIUM CARBONATE 200 MG: 500 TABLET, CHEWABLE ORAL at 19:33

## 2017-08-25 RX ADMIN — OXYCODONE HYDROCHLORIDE 10 MG: 5 TABLET ORAL at 03:04

## 2017-08-25 RX ADMIN — OXYCODONE HYDROCHLORIDE 10 MG: 5 TABLET ORAL at 11:53

## 2017-08-25 RX ADMIN — OXYCODONE HYDROCHLORIDE 10 MG: 5 TABLET ORAL at 14:55

## 2017-08-25 NOTE — PROGRESS NOTES
Problem: Self Care Deficits Care Plan (Adult)  Goal: *Acute Goals and Plan of Care (Insert Text)  GOALS: GOAL MET 8/25/2017  DISCHARGE GOALS (in preparation for going home/rehab): 3 days  1. Ms. Sybil Bonilla will perform one lower body dressing activity with minimal assistance with adaptive equipment to demonstrate improved functional mobility and safety. 2. Ms. Sybil Bonilla will perform one lower body bathing activity with minimal assistance with adaptive equipment to demonstrate improved functional mobility and safety. 3. Ms. Sybil Bonilla will perform toileting/toilet transfer with contact guard assistance with adaptive equipment to demonstrate improved functional mobility and safety. 4. Ms. Sybil Bonilla will perform shower transfer with contact guard assistance with adaptive equipment to demonstrate improved functional mobility and safety. 5. Ms. Sybil Bonilla will state LAUREEN precautions with two verbal cues to demonstrate improved functional mobility and safety. JOINT CAMP OCCUPATIONAL THERAPY LAUREEN: Daily Note, Discharge, Treatment Day: 1st and AM 8/25/2017  INPATIENT: Hospital Day: 2  [place] / [place] / Product Type: Medicare /      NAME/AGE/GENDER: Mc Peraza is a 69 y.o. female            PRIMARY DIAGNOSIS:  Unilateral primary osteoarthritis, left hip [M16.12]              Procedure(s) and Anesthesia Type:     * LEFT  HIP ARTHROPLASTY TOTAL - Spinal (Left)  ICD-10: Treatment Diagnosis:        · Pain in left hip (M25.552)  · Stiffness of Left Hip, Not elsewhere classified (C63.895)       ASSESSMENT:   Ms. Sybil Bonilla is s/p left LAUREEN and presents with decreased weight bearing on left LE and decreased independence with functional mobility and activities of daily living.   Patient completed shower and dressing as charter below in ADL grid and is ambulating with rolling walker and stand by assist.  Patient has met 5/5 goals and plans to return home with good family support. Family able to provide patient with appropriate level of assistance at this time. OT reviewed hip precautions throughout session and issued long handled sponge for home use. Patient instructed to call for assistance when needing to get up from recliner and all needs in reach. Patient verbalized understanding of call light. This section established at most recent assessment   PROBLEM LIST (Impairments causing functional limitations):  1. Decreased Strength  2. Decreased ADL/Functional Activities  3. Decreased Transfer Abilities  4. Increased Pain  5. Increased Fatigue  6. Decreased Flexibility/Joint Mobility  7. Decreased Knowledge of Precautions    INTERVENTIONS PLANNED: (Benefits and precautions of occupational therapy have been discussed with the patient.)  1. Activities of daily living training  2. Adaptive equipment training  3. Balance training  4. Clothing management  5. Donning&doffing training  6. Theraputic activity      TREATMENT PLAN: Frequency/Duration: Follow patient 1-2 times to address above goals. Rehabilitation Potential For Stated Goals: GOOD      RECOMMENDED REHABILITATION/EQUIPMENT: (at time of discharge pending progress): Continue Skilled Therapy and Home Health: Physical Therapy. OCCUPATIONAL PROFILE AND HISTORY:   History of Present Injury/Illness (Reason for Referral): Pt presents this date s/p (left) LAUREEN. Past Medical History/Comorbidities:   Ms. Christie Jackson  has a past medical history of Anxiety; Arthritis; Autoimmune disease ([address] CAD (coronary artery disease) (2010); Chest pain; Chronic pain; Depression; Family history of ischemic heart disease; H/O acute pancreatitis (08/2013); H/O gastric bypass (2009); High cholesterol; Hypertension; Hypothyroidism; Lichen sclerosus; Obesity (BMI 30-39.9); Osteopenia; Systolic murmur; and Ventral hernia. She also has no past medical history of Adverse effect of anesthesia;  Aneurysm ([address] Arrhythmia; Asthma; Cancer ([facility] Chronic kidney disease; Chronic obstructive pulmonary disease ([facility] Coagulation disorder ([facility] Diabetes ([facility] Difficult intubation; Endocarditis; GERD (gastroesophageal reflux disease); Heart failure ([facility] Liver disease; Malignant hyperthermia due to anesthesia; Nausea & vomiting; Nicotine vapor product user; Non-nicotine vapor product user; Pseudocholinesterase deficiency; PUD (peptic ulcer disease); Rheumatic fever; Seizures ([facility] Sleep apnea; Stroke [facility]); Thromboembolus ([facility] or Unspecified adverse effect of anesthesia. Ms. Tracee Gardner  has a past surgical history that includes bladder suspension; carpal tunnel release (Right); lysis of adhesions; heart catheterization (2010); hysterectomy (1978); oophorectomy (1984); lumbar fusion (1996); urological; shoulder arthroscopy (Right, 2011); orthopaedic (Right, 1997); appendectomy (7023); colonoscopy (2017); lap cholecystectomy (7/2013); gastric bypass (2009); hernia repair (8/14/14); and cataract removal (Bilateral, 2015). Social History/Living Environment:   Home Environment: Private residence  # Steps to Enter: 8  Rails to Enter: No  One/Two Story Residence: Two story  # of Interior Steps: 9  Interior Rails: Left  Lift Chair Available: No  Living Alone: No  Support Systems: Spouse/Significant Other/Partner  Patient Expects to be Discharged to[de-identified] Private residence  Current DME Used/Available at Home: [place] [employer], straight  Tub or Shower Type: Shower  Prior Level of Function/Work/Activity:  Independent       Number of Personal Factors/Comorbidities that affect the Plan of Care: Brief history (0):  LOW COMPLEXITY   ASSESSMENT OF OCCUPATIONAL PERFORMANCE[de-identified]   Most Recent Physical Functioning:   Balance  Sitting: Intact  Standing: Intact; With support        Patient Vitals for the past 6 hrs:   BP BP Patient Position SpO2 Pulse   08/25/17 0836 - - 98 % -   08/25/17 1131 105/55 Sitting 97 % 63        Gross Assessment: Yes  Gross Assessment  AROM: Within functional limits (except left lower extremity, s/p LAUREEN)  Strength: Within functional limits (except left lower extremity, s/p LAUREEN)              Coordination  Fine Motor Skills-Upper: Left Intact; Right Intact  Gross Motor Skills-Upper: Left Intact; Right Intact           Mental Status  Neurologic State: Alert  Orientation Level: Oriented X4  Cognition: Appropriate decision making  Perception: Appears intact  Perseveration: No perseveration noted  Safety/Judgement: Awareness of environment                    Basic ADLs (From Assessment) Complex ADLs (From Assessment)   Basic ADL  Feeding: Independent  Oral Facial Hygiene/Grooming: Supervision  Bathing: Moderate assistance  Upper Body Dressing: Supervision  Lower Body Dressing: Moderate assistance  Toileting: Minimum assistance     Grooming/Bathing/Dressing Activities of Daily Living   Grooming  Grooming Assistance: Stand-by assistance (standing at sink) Cognitive Retraining  Safety/Judgement: Awareness of environment   Upper Body Bathing  Bathing Assistance: Supervision/set-up (seated on shower chair)     Lower Body Bathing  Bathing Assistance: Stand-by assistance (seated on shower chair) Toileting  Toileting Assistance: Stand-by assistance  Cues: Verbal cues provided  Adaptive Equipment: Elevated seat;Walker;Grab bars   Upper Body Dressing Assistance  Dressing Assistance: Supervision/set-up Functional Transfers  Bathroom Mobility: Stand-by assistance  Toilet Transfer : Stand-by asssistance  Shower Transfer: Stand-by asssistance  Cues: Verbal cues provided  Adaptive Equipment: Shower chair with back; Walker (comment);Grab bars   Lower Body Dressing Assistance  Dressing Assistance: Stand-by assistance (verbal cues for tech) Bed/Mat Mobility  Supine to Sit: Minimum assistance  Sit to Stand: Minimum assistance  Scooting: Additional time           Physical Skills Involved:  1. Range of Motion  2. Balance  3.  Strength Cognitive Skills Affected (resulting in the inability to perform in a timely and safe manner): 1. none Psychosocial Skills Affected:  1. Environmental Adaptation   Number of elements that affect the Plan of Care: 3-5:  MODERATE COMPLEXITY   CLINICAL DECISION MAKIN[address] [place] [address] AM-PAC 6 Clicks   Daily Activity Inpatient Short Form  How much help from another person does the patient currently need. .. Total A Lot A Little None   1. Putting on and taking off regular lower body clothing?   [ ] 1   [X] 2   [ ] 3   [ ] 4   2. Bathing (including washing, rinsing, drying)? [ ] 1   [ ] 2   [X] 3   [ ] 4   3. Toileting, which includes using toilet, bedpan or urinal?   [ ] 1   [ ] 2   [X] 3   [ ] 4   4. Putting on and taking off regular upper body clothing?   [ ] 1   [ ] 2   [ ] 3   [X] 4   5. Taking care of personal grooming such as brushing teeth? [ ] 1   [ ] 2   [ ] 3   [X] 4   6. Eating meals? [ ] 1   [ ] 2   [ ] 3   [X] 4   © , Trustees of [address] [place] [address], under license to [employer]. All rights reserved   Score:  Initial: 20 Most Recent: X (Date: -- )     Interpretation of Tool:  Represents activities that are increasingly more difficult (i.e. Bed mobility, Transfers, Gait). Score 24 23 22-20 19-15 14-10 9-7 6       Modifier CH CI CJ CK CL CM CN         · Self Care:               - CURRENT STATUS:    CJ - 20%-39% impaired, limited or restricted               - GOAL STATUS:           CI - 1%-19% impaired, limited or restricted               - D/C STATUS:                       ---------------To be determined---------------  [place] / Product Type: Medicare /       Medical Necessity:     · Patient is expected to demonstrate progress in range of motion, balance and functional technique to increase independence with self care.   Reason for Services/Other Comments:  · Patient would benefit from skilled Occupational Therapy to maximize independence and safety with self-care task and functional mobility. .   Use of outcome tool(s) and clinical judgement create a POC that gives a: LOW COMPLEXITY                 TREATMENT:   (In addition to Assessment/Re-Assessment sessions the following treatments were rendered)      Pre-treatment Symptoms/Complaints:  No complaint of pain with self care  Pain: Initial: 0     Post Session:  0      Self Care: (45min): Procedure(s) (per grid) utilized to improve and/or restore self-care/home management as related to dressing, bathing, toileting and grooming. Required minimal verbal and   cueing to facilitate activities of daily living skills and compensatory activities. Treatment/Session Assessment:         Response to Treatment:  Pt completed shower tolerated well     Education:  [ ] Home Exercises  [X] Fall Precautions  [X] Hip Precautions [ ] Going Home Video  [ ] Knee/Hip Prosthesis Review  [X] Walker Management/Safety [X] Adaptive Equipment as Needed         Interdisciplinary Collaboration:   · Physical Therapist  · Occupational Therapist  · Registered Nurse     After treatment position/precautions:   · Up in chair  · Bed/Chair-wheels locked  · Call light within reach  · RN notified     Compliance with Program/Exercises: compliant all of the time.      Recommendations/Intent for next treatment session: pt will do well at home with       Total Treatment Duration:  OT Patient Time In/Time Out  Time In: 1000  Time Out: [address] OT

## 2017-08-25 NOTE — PROGRESS NOTES
Orthopedic Joint Progress Note    2017  Admit Date: 2017  Admit Diagnosis: Unilateral primary osteoarthritis, right hip [M16.11]    1 Day Post-Op    Subjective: Britton Wilkinson awake and alert    Review of Systems: Pertinent items are noted in HPI. Objective:     PT/OT:     PATIENT MOBILITY                           Vital Signs:    Blood pressure 115/55, pulse 62, temperature 96.4 °F (35.8 °C), resp. rate 18, height 5' 5\" (1.651 m), weight 89.4 kg (197 lb), SpO2 97 %.   Temp (24hrs), Av.4 °F (36.3 °C), Min:95.2 °F (35.1 °C), Max:98.9 °F (37.2 °C)      Pain Control:   Pain Assessment  Pain Scale 1: Numeric (0 - 10)  Pain Intensity 1: 8  Pain Onset 1: yrs  Pain Location 1: Hip  Pain Orientation 1: Left  Pain Description 1: Aching, Throbbing  Pain Intervention(s) 1: Medication (see MAR)    Meds:  Current Facility-Administered Medications   Medication Dose Route Frequency    amLODIPine (NORVASC) tablet 5 mg  5 mg Oral QHS    atorvastatin (LIPITOR) tablet 40 mg  40 mg Oral QHS    levothyroxine (SYNTHROID) tablet 75 mcg  75 mcg Oral QHS    LORazepam (ATIVAN) tablet 0.5 mg  0.5 mg Oral Q8H PRN    sertraline (ZOLOFT) tablet 100 mg  100 mg Oral QHS    temazepam (RESTORIL) capsule 30 mg  30 mg Oral QHS PRN    traZODone (DESYREL) tablet 150 mg  150 mg Oral QHS    0.9% sodium chloride infusion  100 mL/hr IntraVENous CONTINUOUS    sodium chloride (NS) flush 5-10 mL  5-10 mL IntraVENous Q8H    sodium chloride (NS) flush 5-10 mL  5-10 mL IntraVENous PRN    acetaminophen (TYLENOL) tablet 1,000 mg  1,000 mg Oral Q6H    celecoxib (CELEBREX) capsule 200 mg  200 mg Oral Q12H    oxyCODONE IR (ROXICODONE) tablet 10 mg  10 mg Oral Q3H PRN    HYDROmorphone (PF) (DILAUDID) injection 1 mg  1 mg IntraVENous Q3H PRN    naloxone (NARCAN) injection 0.2-0.4 mg  0.2-0.4 mg IntraVENous Q10MIN PRN    dexamethasone (DECADRON) injection 10 mg  10 mg IntraVENous ONCE    promethazine (PHENERGAN) tablet 25 mg  25 mg Oral Q6H PRN    diphenhydrAMINE (BENADRYL) capsule 25 mg  25 mg Oral Q4H PRN    senna-docusate (PERICOLACE) 8.6-50 mg per tablet 2 Tab  2 Tab Oral DAILY    aspirin delayed-release tablet 81 mg  81 mg Oral Q12H    ondansetron (ZOFRAN ODT) tablet 8 mg  8 mg Oral Q8H PRN    PPD read reminder  1 Each Other Q24H    losartan/hydroCHLOROthiazide (HYZAAR) 100/25 mg   Oral DAILY    oxyCODONE IR (ROXICODONE) tablet 5 mg  5 mg Oral Q3H PRN    hydrALAZINE (APRESOLINE) 20 mg/mL injection 10 mg  10 mg IntraVENous Q6H PRN    pantoprazole (PROTONIX) tablet 40 mg  40 mg Oral ACB        LAB:    Lab Results   Component Value Date/Time    INR 1.0 08/07/2017 12:59 PM    INR 1.1 07/22/2013 07:27 AM    INR 1.0 08/31/2010 12:37 PM     Lab Results   Component Value Date/Time    HGB 10.1 08/25/2017 05:04 AM    HGB 10.8 08/24/2017 08:11 PM    HGB 13.5 08/07/2017 12:59 PM       Wound Abdomen Anterior (Active)   Number of days:528       Wound Hip Left (Active)   DRESSING STATUS Clean, dry, and intact 8/24/2017  8:00 PM   DRESSING TYPE Other (Comment) 8/24/2017  8:00 PM   Number of days:1             Physical Exam:Calves soft/ neuro intact      Assessment:      Principal Problem:    S/P total hip arthroplasty (8/25/2017)    Active Problems:    Essential hypertension, benign (9/10/2010)      Dyslipidemia (9/10/2010)      Depression (8/6/2015)      Primary hypothyroidism (12/4/2015)      Coronary atherosclerosis of native coronary vessel (12/5/2016)      Overview:       NST 3/2014: normal perfusion, normal EF      [facility] 9/10/10: PCI to mid LAD (cypher 2.75x18), mild CAD in other vessels,       EF 55%      Osteoarthritis (8/24/2017)         Plan:     Continue PT/OT/Rehab  Consult: Rehab team including PT, OT, recreational therapy, and    Home soon          Signed By: Vikash Reece MD

## 2017-08-25 NOTE — PROGRESS NOTES
Care Management Interventions  Mode of Transport at Discharge: Self  Transition of Care Consult (CM Consult): [address]: Yes  Discharge Durable Medical Equipment: Yes  Physical Therapy Consult: Yes  Occupational Therapy Consult: Yes  Current Support Network: Lives with Spouse  Confirm Follow Up Transport: Family  Plan discussed with Pt/Family/Caregiver: Yes  Freedom of Choice Offered: Yes  Discharge Location  Discharge Placement: Home with home health    Patient is a 69y.o. year old female admitted for Right LAUREEN . Patient lives with Her spouse and plans to return home on discharge. Order received to arrange home health. Patient without preference towards agency. Referral sent to [facility]. Patient denies any equipment needs as she has a walker. Patient requesting we arrange a bedside commode. Referral sent to [facility] who will deliver to the hospital room prior to discharge. Will follow until discharge.   Young Rocha

## 2017-08-25 NOTE — PROGRESS NOTES
[address]  Face to Face Encounter    Patients Name: Ramírez Shankar    YOB: 1950    Ordering Physician:  Wu Sepulveda    Primary Diagnosis: Unilateral primary osteoarthritis, right hip [M16.11]  S/p right LAUREEN    Date of Face to Face:   8-24-17                                  Face to Face Encounter findings are related to primary reason for home care:   yes. 1. I certify that the patient needs intermittent care as follows: physical therapy: gait/stair training    2. I certify that this patient is homebound, that is: 1) patient requires the use of a walker device, special transportation, or assistance of another to leave the home; or 2) patient's condition makes leaving the home medically contraindicated; and 3) patient has a normal inability to leave the home and leaving the home requires considerable and taxing effort. Patient may leave the home for infrequent and short duration for medical reasons, and occasional absences for non-medical reasons. Homebound status is due to the following functional limitations: Patient's ambulation limited secondary to severe pain and requires the use of an assistive device and the assistance of a caregiver for safe completion. Patient with strength and ROM deficits limiting ambulation endurance requiring the use of an assistive device and the assistance of a caregiver. Patient deemed temporarily homebound secondary to increased risk for infection when leaving home and going out into the community. 3. I certify that this patient is under my care and that I, or a nurse practitioner or  [id number], or clinical nurse specialist, or certified nurse midwife, working with me, had a Face-to-Face Encounter that meets the physician Face-to-Face Encounter requirements.   The following are the clinical findings from the [address] [place] [address] encounter that support the need for skilled services and is a summary of the encounter: see [facility] chart        YANICK Ricks  8/25/2017      THE FOLLOWING TO BE COMPLETED BY THE COMMUNITY PHYSICIAN:    I concur with the findings described above from the F2F encounter that this patient is homebound and in need of a skilled service.     Certifying Physician: _____________________________________      Printed Certifying Physician Name: _____________________________________    Date: _________________

## 2017-08-25 NOTE — PROGRESS NOTES
Problem: Mobility Impaired (Adult and Pediatric)  Goal: *Acute Goals and Plan of Care (Insert Text)  GOALS (1-4 days):  (1.)Ms. Wilkinson will move from supine to sit and sit to supine in bed with STAND BY ASSIST.   (2.)Ms. Wilkinson will transfer from bed to chair and chair to bed with STAND BY ASSIST using the least restrictive device. (3.)Ms. Wilkinson will ambulate with STAND BY ASSIST for 350 feet with the least restrictive device. (4.)Ms. Wilkinson will ambulate up/down 3 steps with bilateral railing with CONTACT GUARD ASSIST with no device. (5.)Ms. Wilkinson will state/observe LAUREEN precautions with 0 verbal cues. ________________________________________________________________________________________________  PHYSICAL THERAPY JOINT CAMP LAUREEN: Daily Note, Treatment Day: Day of Assessment and PM 8/25/2017  INPATIENT: Hospital Day: 2  [place] / [place] / Product Type: Medicare /      NAME/AGE/GENDER: Nieves Pinto is a 69 y.o. female            PRIMARY DIAGNOSIS:  Unilateral primary osteoarthritis, left hip [M16.12]              Procedure(s) and Anesthesia Type:     * LEFT  HIP ARTHROPLASTY TOTAL - Spinal (Left)  ICD-10: Treatment Diagnosis:        · Pain in left hip (M25.552)  · Stiffness of Left Hip, Not elsewhere classified (B96.062)       ASSESSMENT:      Ms. Michelle Graff sitting up in chair on contact and agreeable to therapy. Worked on gait in zhao. When pt got to gym she reports she was feeling a little nauseated but that she would be OK. Worked on her LAUREEN exercises progressing nicely with repetitions. Back in her room she wanted to lay back down in the bed but wanted to be in a hospital gown instead of her clothes. Assisted pt and then she returned supine with minimal assist. Hope to continue to progress tomorrow. RN notified about nausea.        This section established at most recent assessment   PROBLEM LIST (Impairments causing functional limitations):  1. Decreased Strength  2. Decreased ADL/Functional Activities  3. Decreased Transfer Abilities  4. Decreased Ambulation Ability/Technique  5. Decreased Flexibility/Joint Mobility  6. Decreased Peterson with Home Exercise Program  7. Decreased Strength  8. Decreased ADL/Functional Activities  9. Decreased Transfer Abilities  10. Decreased Ambulation Ability/Technique  11. Increased Pain  12. Decreased Flexibility/Joint Mobility  13. Decreased Peterson with Home Exercise Program    INTERVENTIONS PLANNED: (Benefits and precautions of physical therapy have been discussed with the patient.)  1. Bed Mobility  2. Cold  3. Gait Training  4. Home Exercise Program (HEP)  5. Therapeutic Exercise/Strengthening  6. Transfer Training  7. Range of Motion: active/assisted/passive  8. Therapeutic Activities  9. Group Therapy      TREATMENT PLAN: Frequency/Duration: Follow patient BID   to address above goals. Rehabilitation Potential For Stated Goals: GOOD      RECOMMENDED REHABILITATION/EQUIPMENT: (at time of discharge pending progress): Continue Skilled Therapy and Home Health: Physical Therapy. HISTORY:   History of Present Injury/Illness (Reason for Referral):  Patient is s/p left LAUREEN on 8/25/17  Past Medical History/Comorbidities:   Ms. Anay Beavers  has a past medical history of Anxiety; Arthritis; Autoimmune disease ([address] CAD (coronary artery disease) (2010); Chest pain; Chronic pain; Depression; Family history of ischemic heart disease; H/O acute pancreatitis (08/2013); H/O gastric bypass (2009); High cholesterol; Hypertension; Hypothyroidism; Lichen sclerosus; Obesity (BMI 30-39.9); Osteopenia; Systolic murmur; and Ventral hernia. She also has no past medical history of Adverse effect of anesthesia; Aneurysm ([address] Arrhythmia; Asthma; Cancer ([address] Chronic kidney disease; Chronic obstructive pulmonary disease ([address] Coagulation disorder ([address] Diabetes ([address]  Difficult intubation; Endocarditis; GERD (gastroesophageal reflux disease); Heart failure ([facility] Liver disease; Malignant hyperthermia due to anesthesia; Nausea & vomiting; Nicotine vapor product user; Non-nicotine vapor product user; Pseudocholinesterase deficiency; PUD (peptic ulcer disease); Rheumatic fever; Seizures ([facility] Sleep apnea; Stroke [facility]); Thromboembolus ([facility] or Unspecified adverse effect of anesthesia. Ms. Raleigh Runner  has a past surgical history that includes bladder suspension; carpal tunnel release (Right); lysis of adhesions; heart catheterization (2010); hysterectomy (1978); oophorectomy (1984); lumbar fusion (1996); urological; shoulder arthroscopy (Right, 2011); orthopaedic (Right, 1997); appendectomy (9630); colonoscopy (2017); lap cholecystectomy (7/2013); gastric bypass (2009); hernia repair (8/14/14); and cataract removal (Bilateral, 2015). Social History/Living Environment:   Home Environment: Private residence  # Steps to Enter: 8  Rails to Enter: No  One/Two Story Residence: Two story  # of Interior Steps: 9  Interior Rails: Left  Lift Chair Available: No  Living Alone: No  Support Systems: Spouse/Significant Other/Partner  Patient Expects to be Discharged to[de-identified] Private residence  Current DME Used/Available at Home: Cane, straight  Tub or Shower Type: Shower  Prior Level of Function/Work/Activity:  Pt living at home independent with gait and ADLs without assistive devices   Number of Personal Factors/Comorbidities that affect the Plan of Care: 0: LOW COMPLEXITY   EXAMINATION:   Most Recent Physical Functioning:   Gross Assessment: Yes  Gross Assessment  AROM: Within functional limits (except left lower extremity, s/p LAUREEN)  Strength: Within functional limits (except left lower extremity, s/p LAUREEN)                        Bed Mobility  Supine to Sit: Minimum assistance  Sit to Supine: Minimum assistance  Scooting:  Additional time     Transfers  Sit to Stand: Contact guard assistance;Minimum assistance  Stand to Sit: Contact guard assistance;Minimum assistance     Balance  Sitting: Intact  Standing: Intact; With support    Posture  Posture (WDL): Within defined limits     Gait Training: Yes     Weight Bearing Status  Left Side Weight Bearing: As tolerated  Distance (ft): 108 Feet (ft)  Ambulation - Level of Assistance: Stand-by asssistance;Contact guard assistance  Assistive Device: Walker, rolling  Speed/Princess: Pace decreased (<100 feet/min)  Step Length: Right shortened  Stance: Left decreased  Gait Abnormalities: Antalgic;Decreased step clearance  Interventions: Safety awareness training;Verbal cues      Braces/Orthotics: none     Left Hip Cold  Type: Cold/ice pack  Patient Position: Sitting       Body Structures Involved:  1. Bones  2. Joints  3. Muscles Body Functions Affected:  1. Movement Related Activities and Participation Affected:  1. Mobility  2. Self Care   Number of elements that affect the Plan of Care: 4+: HIGH COMPLEXITY   CLINICAL PRESENTATION:   Presentation: Stable and uncomplicated: LOW COMPLEXITY   CLINICAL DECISION MAKIN [facility] [address] [address] AM-PAC 6 Clicks   Basic Mobility Inpatient Short Form  How much difficulty does the patient currently have. .. Unable A Lot A Little None   1. Turning over in bed (including adjusting bedclothes, sheets and blankets)? [ ] 1   [ ] 2   [X] 3   [ ] 4   2. Sitting down on and standing up from a chair with arms ( e.g., wheelchair, bedside commode, etc.)   [ ] 1   [ ] 2   [X] 3   [ ] 4   3. Moving from lying on back to sitting on the side of the bed? [ ] 1   [ ] 2   [X] 3   [ ] 4   How much help from another person does the patient currently need. .. Total A Lot A Little None   4. Moving to and from a bed to a chair (including a wheelchair)? [ ] 1   [ ] 2   [X] 3   [ ] 4   5. Need to walk in hospital room? [ ] 1   [X] 2   [ ] 3   [ ] 4   6. Climbing 3-5 steps with a railing?    [ ] 1   [ ] 2   [X] 3   [ ] 4   © 2007, Trustees of [facility] [address] [address], under license to [employer]. All rights reserved       Score:  Initial: 17 Most Recent: X (Date: -- )     Interpretation of Tool:  Represents activities that are increasingly more difficult (i.e. Bed mobility, Transfers, Gait). Score 24 23 22-20 19-15 14-10 9-7 6       Modifier CH CI CJ CK CL CM CN         · Mobility - Walking and Moving Around:               - CURRENT STATUS:    CK - 40%-59% impaired, limited or restricted               - GOAL STATUS:           CJ - 20%-39% impaired, limited or restricted               - D/C STATUS:                       ---------------To be determined---------------  [place] / [place] / Product Type: Medicare /       Medical Necessity:     · Ms. Wilkinson is expected to optimize her lower extremity strength, functional mobility, and ROM s/p joint replacement surgery. Reason for Services/Other Comments:  · Patient continues to require skilled intervention due to inability to complete functional mobility and LAUREEN exercises independently. Use of outcome tool(s) and clinical judgement create a POC that gives a: Clear prediction of patient's progress: LOW COMPLEXITY                 TREATMENT:   (In addition to Assessment/Re-Assessment sessions the following treatments were rendered)      Pre-treatment Symptoms/Complaints:  none  Pain: Initial: 0/10     Post Session:  0/10      Therapeutic Exercise: (45 Minutes (group)):  Exercises per grid below to improve mobility and strength. Required minimal verbal cues to promote proper body alignment and promote proper body posture. Progressed repetitions as indicated. Gait Training (15 Minutes):  Gait training to improve and/or restore physical functioning as related to mobility and strength.   Ambulated 108 Feet (ft) with Stand-by asssistance;Contact guard assistance using a Walker, rolling and minimal Safety awareness training;Verbal cues related to their stance phase and stride length to promote proper body alignment and promote proper body posture. Instruction in performance of walker use and gait sequencing to correct stance phase and stride length. Date:  8/25/17 Date:    Date:      ACTIVITY/EXERCISE AM PM AM PM AM PM   GROUP THERAPY  [ ]  [ Carlos Adjutant  [ ]  [ ]  [ ]  [ ]   Ankle Pumps 10 15            Quad Sets 10  15           Gluteal Sets 10  15           Hip ABd/ADduction 10  15           Straight Leg Raises               Knee Slides 10  15           Short Arc Quads    15           Long Arc Quads    15           Chair Slides                               B = bilateral; AA = active assistive; A = active; P = passive       Treatment/Session Assessment:         Response to Treatment:  Tolerated well. Education:  [X] Home Exercises  [X] Fall Precautions  [ ] Hip Precautions [ ] Going Home Video  [ ] Hip Prosthesis Review  [ ] [address] Management/Safety [ ] Adaptive Equipment as Needed         Interdisciplinary Collaboration:   · Registered Nurse and Rehabilitation Attendant     After treatment position/precautions:   · Supine in bed, Bed/Chair-wheels locked, Bed in low position and Call light within reach     Compliance with Program/Exercises: compliant all of the time. Recommendations/Intent for next treatment session:  Treatment next visit will focus on increasing Ms. Wilkinson's independence with bed mobility, transfers, gait training, strength/ROM exercises, modalities for pain, and patient education.        Total Treatment Duration:  PT Patient Time In/Time Out  Time In: 1300  Time Out: 1416 Stevenson Rodas PT

## 2017-08-25 NOTE — PROGRESS NOTES
Follow up from initial assessment    SOB evaluated:None noted    Breath Sounds:Clear    IS patient effort:Good  Patient achieved:  2000  Ml/sec    Patient resting comfortably, denies any discomfort at this time.  Continuous sat monitor ordered QHS

## 2017-08-25 NOTE — DISCHARGE SUMMARY
[address] [place] [address]  Total Joint Discharge Summary      Patient ID:  Caitlin Red  [id number]  1 y.o.  1950    Admit date: 8/24/2017  Discharge date and time: 8/26/17  Admitting Physician: Flores Mills MD  Surgeon: Same  Admission Diagnoses: Unilateral primary osteoarthritis, right hip [M16.11]  Discharge Diagnoses: Principal Problem:    S/P total hip arthroplasty (8/25/2017)    Active Problems:    Essential hypertension, benign (9/10/2010)      Dyslipidemia (9/10/2010)      Depression (8/6/2015)      Primary hypothyroidism (12/4/2015)      Coronary atherosclerosis of native coronary vessel (12/5/2016)      Overview:       NST 3/2014: normal perfusion, normal EF      LHC 9/10/10: PCI to mid LAD (cypher 2.75x18), mild CAD in other vessels,       EF 55%      Osteoarthritis (8/24/2017)                                Perioperative Antibiotics: Ancef 1 to 2 mg was given depending on patient's weight. If allergic to Ancef or due to other indications, patient was given Vancomycin. Hospital Medications given:   [unfilled]  [unfilled]  [unfilled]    Discharge Medications given:  Current Discharge Medication List      START taking these medications    Details   oxyCODONE IR (ROXICODONE) 5 mg immediate release tablet Take 1 Tab by mouth every four (4) hours as needed. Max Daily Amount: 30 mg.  Qty: 60 Tab, Refills: 0         CONTINUE these medications which have CHANGED    Details   aspirin delayed-release 81 mg tablet Take 1 Tab by mouth every twelve (12) hours every twelve (12) hours for 30 days. Qty: 60 Tab, Refills: 0         CONTINUE these medications which have NOT CHANGED    Details   MULTIVITAMIN PO Take  by mouth daily. Takes 1-2 melt-aways daily      Biotin 2,500 mcg cap Take  by mouth daily. temazepam (RESTORIL) 30 mg capsule Take 1 Cap by mouth nightly as needed.  Max Daily Amount: 30 mg.  Qty: 30 Cap, Refills: 5      LORazepam (ATIVAN) 0.5 mg tablet Take 1 Tab by mouth every eight (8) hours as needed for Anxiety. Max Daily Amount: 1.5 mg.  Qty: 30 Tab, Refills: 5      alendronate (FOSAMAX) 70 mg tablet 1 q week for osteoporosis  Qty: 12 Tab, Refills: 12      levothyroxine (SYNTHROID) 75 mcg tablet 1 every day for thyroid  Qty: 90 Tab, Refills: 12      losartan-hydroCHLOROthiazide (HYZAAR) 100-25 mg per tablet 1 every day for BP  Indications: Hypertension  Qty: 30 Tab, Refills: 12      amLODIPine (NORVASC) 5 mg tablet 1 every day for BP  Indications: Hypertension  Qty: 90 Tab, Refills: 12      traZODone (DESYREL) 150 mg tablet 1 to 2 qhs for sleep  Qty: 180 Tab, Refills: 12      atorvastatin (LIPITOR) 40 mg tablet 1 qd  Indications: mixed hyperlipidemia  Qty: 90 Tab, Refills: 12      sertraline (ZOLOFT) 100 mg tablet Take 1 Tab by mouth nightly. Indications: major depressive disorder  Qty: 90 Tab, Refills: 5         STOP taking these medications       mupirocin calcium (BACTROBAN) 2 % nasal ointment Comments:   Reason for Stopping:         HYDROcodone-acetaminophen (NORCO) 7.5-325 mg per tablet Comments:   Reason for Stopping:         meloxicam (MOBIC) 15 mg tablet Comments:   Reason for Stopping:                Additional DVT Prophylaxis:  VANDA Hose,Plexi-Pulse    Postoperative transfusions:   none  Post Op complications: none    Hemoglobin at discharge:   Lab Results   Component Value Date/Time    HGB 10.1 08/25/2017 05:04 AM       Wound appears to be healing without any evidence of infection. Physical Therapy started on the day following surgery and progressed to independent ambulation with the aid of a walker. At the time of discharge, able to go up and down stairs and had understanding of precautions needed following surgery.       PT/OT:            Assistive Device: Walker (comment)                Discharged to: home    Discharge instructions:  -Rx pain medication given  - Anticoagulate with: Ecotrin 81 mg PO BID x 4 weeks  -Resume pre hospital diet             -Resume home medications per medical continuation form     -Ambulate with walker, appropriate total joint protocol  -Follow up in office as scheduled       Signed:  Herman Combs MD  8/25/2017  7:34 AM

## 2017-08-25 NOTE — PROGRESS NOTES
Patient resting quietly, alert and oriented, no distress noted. Left hip dressing c/d/i, seay with clear, yellow urine draining. Patient encouraged to use incentive spirometer. IV patent infusing fluids as ordered. Fresh ice placed on hip. Neurovascular and peripheral vascular checks WNL. Bed low and locked position. Call light within reach. Patient instructed to call for assistance, verbalizes understanding. Nursing assessment complete.

## 2017-08-25 NOTE — PROGRESS NOTES
Problem: Mobility Impaired (Adult and Pediatric)  Goal: *Acute Goals and Plan of Care (Insert Text)  GOALS (1-4 days):  (1.)Ms. Wilkinson will move from supine to sit and sit to supine in bed with STAND BY ASSIST.   (2.)Ms. Wilkinson will transfer from bed to chair and chair to bed with STAND BY ASSIST using the least restrictive device. (3.)Ms. Wilkinson will ambulate with STAND BY ASSIST for 350 feet with the least restrictive device. (4.)Ms. Wilkinson will ambulate up/down 3 steps with bilateral railing with CONTACT GUARD ASSIST with no device. (5.)Ms. Wilkinson will state/observe LAUREEN precautions with 0 verbal cues. ________________________________________________________________________________________________  PHYSICAL THERAPY JOINT CAMP LAUREEN: INITIAL ASSESSMENT, TREATMENT DAY: DAY OF ASSESSMENT, AM 8/25/2017  INPATIENT: Hospital Day: 2  [place] / Product Type: Medicare /      NAME/AGE/GENDER: Alta Marques is a 69 y.o. female            PRIMARY DIAGNOSIS:  Unilateral primary osteoarthritis, left hip [M16.12]              Procedure(s) and Anesthesia Type:     * LEFT  HIP ARTHROPLASTY TOTAL - Spinal (Left)  ICD-10: Treatment Diagnosis:        · Pain in left hip (M25.552)  · Stiffness of Left Hip, Not elsewhere classified (I17.685)       ASSESSMENT:      Ms. Tahir Oliveira presents with decreased strength and range of motion left hip and decreased functional mobility s/p LAUREEN. Will benefit from skilled PT interventions to maximize independence with functional mobility and LAUREEN exercises. Did well with evaluation and ambulated in room. Did well with LAUREEN exercises with verbal cues. Hope to further progress PT at next session. This section established at most recent assessment   PROBLEM LIST (Impairments causing functional limitations):  1. Decreased Strength  2. Decreased ADL/Functional Activities  3.  Decreased Transfer Abilities  4. Decreased Ambulation Ability/Technique  5. Decreased Flexibility/Joint Mobility  6. Decreased [place] with Home Exercise Program  7. Decreased Strength  8. Decreased ADL/Functional Activities  9. Decreased Transfer Abilities  10. Decreased Ambulation Ability/Technique  11. Increased Pain  12. Decreased Flexibility/Joint Mobility  13. Decreased [place] with Home Exercise Program    INTERVENTIONS PLANNED: (Benefits and precautions of physical therapy have been discussed with the patient.)  1. Bed Mobility  2. Cold  3. Gait Training  4. Home Exercise Program (HEP)  5. Therapeutic Exercise/Strengthening  6. Transfer Training  7. Range of Motion: active/assisted/passive  8. Therapeutic Activities  9. Group Therapy      TREATMENT PLAN: Frequency/Duration: Follow patient BID   to address above goals. Rehabilitation Potential For Stated Goals: GOOD      RECOMMENDED REHABILITATION/EQUIPMENT: (at time of discharge pending progress): Continue Skilled Therapy and Home Health: Physical Therapy. HISTORY:   History of Present Injury/Illness (Reason for Referral):  Patient is s/p left LAUREEN on 8/25/17  Past Medical History/Comorbidities:   Ms. Dann Yanez  has a past medical history of Anxiety; Arthritis; Autoimmune disease ([address] CAD (coronary artery disease) (2010); Chest pain; Chronic pain; Depression; Family history of ischemic heart disease; H/O acute pancreatitis (08/2013); H/O gastric bypass (2009); High cholesterol; Hypertension; Hypothyroidism; Lichen sclerosus; Obesity (BMI 30-39.9); Osteopenia; Systolic murmur; and Ventral hernia. She also has no past medical history of Adverse effect of anesthesia; Aneurysm ([address] Arrhythmia; Asthma; Cancer ([address] Chronic kidney disease; Chronic obstructive pulmonary disease ([address] Coagulation disorder ([address] Diabetes ([address] Difficult intubation; Endocarditis; GERD (gastroesophageal reflux disease); Heart failure ([address]  Liver disease; Malignant hyperthermia due to anesthesia; Nausea & vomiting; Nicotine vapor product user; Non-nicotine vapor product user; Pseudocholinesterase deficiency; PUD (peptic ulcer disease); Rheumatic fever; Seizures ([facility] Sleep apnea; Stroke [facility]); Thromboembolus ([facility] [address] or Unspecified adverse effect of anesthesia. Ms. Dann Yanez  has a past surgical history that includes bladder suspension; carpal tunnel release (Right); lysis of adhesions; heart catheterization (2010); hysterectomy (1978); oophorectomy (1984); lumbar fusion (1996); urological; shoulder arthroscopy (Right, 2011); orthopaedic (Right, 1997); appendectomy (2823); colonoscopy (2017); lap cholecystectomy (7/2013); gastric bypass (2009); hernia repair (8/14/14); and cataract removal (Bilateral, 2015). Social History/Living Environment:   Home Environment: Private residence  # Steps to Enter: 8  Rails to Enter: No  One/Two Story Residence: Two story  # of Interior Steps: 9  Interior Rails: Left  Lift Chair Available: No  Living Alone: No  Support Systems: Spouse/Significant Other/Partner  Patient Expects to be Discharged to[de-identified] Private residence  Current DME Used/Available at Home: Cane, straight, Shower chair  Tub or Shower Type: Shower  Prior Level of Function/Work/Activity:  Pt living at home independent with gait and ADLs without assistive devices   Number of Personal Factors/Comorbidities that affect the Plan of Care: 0: LOW COMPLEXITY   EXAMINATION:   Most Recent Physical Functioning:   Gross Assessment: Yes  Gross Assessment  AROM: Within functional limits (except left lower extremity, s/p LAUREEN)  Strength: Within functional limits (except left lower extremity, s/p LAUREEN)                        Bed Mobility  Supine to Sit: Minimum assistance  Scooting: Additional time     Transfers  Sit to Stand: Minimum assistance  Stand to Sit: Minimum assistance     Balance  Sitting: Intact  Standing: Pull to stand; With support    Posture  Posture (WDL): Within defined limits           Weight Bearing Status  Left Side Weight Bearing: As tolerated  Distance (ft): 30 Feet (ft)  Ambulation - Level of Assistance: Stand-by asssistance;Contact guard assistance  Assistive Device: Walker, rolling  Speed/Princess: Pace decreased (<100 feet/min)  Step Length: Right shortened  Stance: Left decreased  Gait Abnormalities: Antalgic;Decreased step clearance; Step to gait  Interventions: Safety awareness training;Verbal cues      Braces/Orthotics: none     Left Hip Cold  Type: Cold/ice pack  Patient Position: Sitting       Body Structures Involved:  1. Bones  2. Joints  3. Muscles Body Functions Affected:  1. Movement Related Activities and Participation Affected:  1. Mobility  2. Self Care   Number of elements that affect the Plan of Care: 4+: HIGH COMPLEXITY   CLINICAL PRESENTATION:   Presentation: Stable and uncomplicated: LOW COMPLEXITY   CLINICAL DECISION MAKIN[address] [place] [id number] AM-PAC 6 Clicks   Basic Mobility Inpatient Short Form  How much difficulty does the patient currently have. .. Unable A Lot A Little None   1. Turning over in bed (including adjusting bedclothes, sheets and blankets)? [ ] 1   [ ] 2   [X] 3   [ ] 4   2. Sitting down on and standing up from a chair with arms ( e.g., wheelchair, bedside commode, etc.)   [ ] 1   [ ] 2   [X] 3   [ ] 4   3. Moving from lying on back to sitting on the side of the bed? [ ] 1   [ ] 2   [X] 3   [ ] 4   How much help from another person does the patient currently need. .. Total A Lot A Little None   4. Moving to and from a bed to a chair (including a wheelchair)? [ ] 1   [ ] 2   [X] 3   [ ] 4   5. Need to walk in hospital room? [ ] 1   [X] 2   [ ] 3   [ ] 4   6. Climbing 3-5 steps with a railing? [ ] 1   [ ] 2   [X] 3   [ ] 4   © , Trustees of [address] [place], [place] [id number], under license to [employer].  All rights reserved       Score:  Initial: 17 Most Recent: X (Date: -- )     Interpretation of Tool:  Represents activities that are increasingly more difficult (i.e. Bed mobility, Transfers, Gait). Score 24 23 22-20 19-15 14-10 9-7 6       Modifier CH CI CJ CK CL CM CN         · Mobility - Walking and Moving Around:               - CURRENT STATUS:    CK - 40%-59% impaired, limited or restricted               - GOAL STATUS:           CJ - 20%-39% impaired, limited or restricted               - D/C STATUS:                       ---------------To be determined---------------  [place] / Product Type: Medicare /       Medical Necessity:     · Ms. Wilkinson is expected to optimize her lower extremity strength, functional mobility, and ROM s/p joint replacement surgery. Reason for Services/Other Comments:  · Patient continues to require skilled intervention due to inability to complete functional mobility and LAUREEN exercises independently. Use of outcome tool(s) and clinical judgement create a POC that gives a: Clear prediction of patient's progress: LOW COMPLEXITY                 TREATMENT:   (In addition to Assessment/Re-Assessment sessions the following treatments were rendered)      Pre-treatment Symptoms/Complaints:  none  Pain: Initial: 0/10     Post Session:  0/10      Therapeutic Exercise: (15 Minutes):  Exercises per grid below to improve mobility and strength. Required minimal verbal cues to promote proper body alignment and promote proper body posture. Progressed repetitions as indicated.           Date:  8/25/17 Date:    Date:      ACTIVITY/EXERCISE AM PM AM PM AM PM   GROUP THERAPY  [ ]  [ ]  [ ]  [ ]  [ ]  [ ]   Ankle Pumps 10             Quad Sets 10             Gluteal Sets 10             Hip ABd/ADduction 10             Straight Leg Raises               Knee Slides 10             Short Arc Quads               Long Arc Quads               Chair Slides                               B = bilateral; AA = active assistive; A = active; P = passive Treatment/Session Assessment:         Response to Treatment:  Tolerated well. Education:  [X] Home Exercises  [X] Fall Precautions  [ ] Hip Precautions [ ] Going Home Video  [ ] Hip Prosthesis Review  [ ] Moises Reyes Management/Safety [ ] Adaptive Equipment as Needed         Interdisciplinary Collaboration:   · Occupational Therapist  · Registered Nurse     After treatment position/precautions:   · Up in chair  · Bed/Chair-wheels locked  · Call light within reach     Compliance with Program/Exercises: compliant all of the time. Recommendations/Intent for next treatment session:  Treatment next visit will focus on increasing Ms. Wilkinson's independence with bed mobility, transfers, gait training, strength/ROM exercises, modalities for pain, and patient education.        Total Treatment Duration:  PT Patient Time In/Time Out  Time In: 0830  Time Out: 0900     Greg Antonio PT

## 2017-08-25 NOTE — PROGRESS NOTES
Problem: Self Care Deficits Care Plan (Adult)  Goal: *Acute Goals and Plan of Care (Insert Text)  GOALS:   DISCHARGE GOALS (in preparation for going home/rehab): 3 days  1. Ms. Michael Finn will perform one lower body dressing activity with minimal assistance with adaptive equipment to demonstrate improved functional mobility and safety. 2. Ms. Michael Finn will perform one lower body bathing activity with minimal assistance with adaptive equipment to demonstrate improved functional mobility and safety. 3. Ms. Michael Finn will perform toileting/toilet transfer with contact guard assistance with adaptive equipment to demonstrate improved functional mobility and safety. 4. Ms. Michael Finn will perform shower transfer with contact guard assistance with adaptive equipment to demonstrate improved functional mobility and safety. 5. Ms. Michael Finn will state LAUREEN precautions with two verbal cues to demonstrate improved functional mobility and safety. JOINT CAMP OCCUPATIONAL THERAPY LAUREEN: Initial Assessment 8/25/2017  INPATIENT: Hospital Day: 2  [place] / Product Type: Medicare /      NAME/AGE/GENDER: Phoebe Velasco is a 69 y.o. female            PRIMARY DIAGNOSIS:  Unilateral primary osteoarthritis, left hip [M16.12]              Procedure(s) and Anesthesia Type:     * LEFT  HIP ARTHROPLASTY TOTAL - Spinal (Left)  ICD-10: Treatment Diagnosis:        · Pain in left hip (M25.552)  · Stiffness of Left Hip, Not elsewhere classified (D21.059)       ASSESSMENT:   Ms. Michael Finn is s/p left LAUREEN and presents with decreased weight bearing on left LE and decreased independence with functional mobility and activities of daily living. Patient would benefit from skilled Occupational Therapy to maximize independence and safety with self-care task and functional mobility.   Pt would also benefit from education on lower body adaptive equipment and hip precautions post-surgery in preparation for going home or for recommendations for post-hospital rehab program.  Patient plans for further rehab at home with home health services with good family support. OT reviewed therapy schedule with patient for today to include showering and dressing with verbal understanding from patient. Patient was able to transfer and perform self care skills as charted below. Patient instructed to call for assistance when needing to get up from the recliner and all needs in reach. Patient verbalized understanding of call light. This section established at most recent assessment   PROBLEM LIST (Impairments causing functional limitations):  1. Decreased Strength  2. Decreased ADL/Functional Activities  3. Decreased Transfer Abilities  4. Increased Pain  5. Increased Fatigue  6. Decreased Flexibility/Joint Mobility  7. Decreased Knowledge of Precautions    INTERVENTIONS PLANNED: (Benefits and precautions of occupational therapy have been discussed with the patient.)  1. Activities of daily living training  2. Adaptive equipment training  3. Balance training  4. Clothing management  5. Donning&doffing training  6. Theraputic activity      TREATMENT PLAN: Frequency/Duration: Follow patient 1-2 times to address above goals. Rehabilitation Potential For Stated Goals: GOOD      RECOMMENDED REHABILITATION/EQUIPMENT: (at time of discharge pending progress): Continue Skilled Therapy and Home Health: Physical Therapy. OCCUPATIONAL PROFILE AND HISTORY:   History of Present Injury/Illness (Reason for Referral): Pt presents this date s/p (left) LAUREEN. Past Medical History/Comorbidities:   Ms. Marita Cuellar  has a past medical history of Anxiety; Arthritis; Autoimmune disease ([facility] [address] CAD (coronary artery disease) (2010); Chest pain; Chronic pain; Depression; Family history of ischemic heart disease; H/O acute pancreatitis (08/2013); H/O gastric bypass (2009);  High cholesterol; Hypertension; Hypothyroidism; Lichen sclerosus; Obesity (BMI 30-39.9); Osteopenia; Systolic murmur; and Ventral hernia. She also has no past medical history of Adverse effect of anesthesia; Aneurysm ([facility] [address] [address] Arrhythmia; Asthma; Cancer ([facility] [address] Chronic kidney disease; Chronic obstructive pulmonary disease ([facility] [address] Coagulation disorder ([facility] [address] [facility] Diabetes ([facility] [address] Difficult intubation; Endocarditis; GERD (gastroesophageal reflux disease); Heart failure ([facility] [address] [facility] Liver disease; Malignant hyperthermia due to anesthesia; Nausea & vomiting; Nicotine vapor product user; Non-nicotine vapor product user; Pseudocholinesterase deficiency; PUD (peptic ulcer disease); Rheumatic fever; Seizures ([facility] [address] Sleep apnea; Stroke [facility]); Thromboembolus ([facility] [address] or Unspecified adverse effect of anesthesia. Ms. Christine Henderson  has a past surgical history that includes bladder suspension; carpal tunnel release (Right); lysis of adhesions; heart catheterization (2010); hysterectomy (1978); oophorectomy (1984); lumbar fusion (1996); urological; shoulder arthroscopy (Right, 2011); orthopaedic (Right, 1997); appendectomy (2681); colonoscopy (2017); lap cholecystectomy (7/2013); gastric bypass (2009); hernia repair (8/14/14); and cataract removal (Bilateral, 2015). Social History/Living Environment:      Prior Level of Function/Work/Activity:  Independent       Number of Personal Factors/Comorbidities that affect the Plan of Care: Brief history (0):  LOW COMPLEXITY   ASSESSMENT OF OCCUPATIONAL PERFORMANCE[de-identified]   Most Recent Physical Functioning:   Balance  Sitting: Intact  Standing: Pull to stand; With support        Patient Vitals for the past 6 hrs:       BP BP Patient Position SpO2 Pulse   08/25/17 0508 115/55 At rest 97 % 62   08/25/17 0801 126/69 At rest 99 % 73   08/25/17 0836 - - 98 % -                       Coordination  Fine Motor Skills-Upper: Left Intact; Right Intact  Gross Motor Skills-Upper: Left Intact; Right Intact           Mental Status  Neurologic State: Alert  Orientation Level: Oriented X4  Cognition: Appropriate decision making  Perception: Appears intact  Perseveration: No perseveration noted  Safety/Judgement: Awareness of environment                    Basic ADLs (From Assessment) Complex ADLs (From Assessment)   Basic ADL  Feeding: Independent  Oral Facial Hygiene/Grooming: Supervision  Bathing: Moderate assistance  Upper Body Dressing: Supervision  Lower Body Dressing: Moderate assistance  Toileting: Minimum assistance     Grooming/Bathing/Dressing Activities of Daily Living     Cognitive Retraining  Safety/Judgement: Awareness of environment                 Functional Transfers  Toilet Transfer : Minimum assistance  Shower Transfer: Minimum assistance     Bed/Mat Mobility  Supine to Sit: Minimum assistance  Sit to Stand: Minimum assistance  Scooting: Additional time           Physical Skills Involved:  1. Range of Motion  2. Balance  3. Strength Cognitive Skills Affected (resulting in the inability to perform in a timely and safe manner): 1. none Psychosocial Skills Affected:  1. Environmental Adaptation   Number of elements that affect the Plan of Care: 3-5:  MODERATE COMPLEXITY   CLINICAL DECISION MAKIN[address] [place] [id number] AM-PAC 6 Clicks   Daily Activity Inpatient Short Form  How much help from another person does the patient currently need. .. Total A Lot A Little None   1. Putting on and taking off regular lower body clothing?   [ ] 1   [X] 2   [ ] 3   [ ] 4   2. Bathing (including washing, rinsing, drying)? [ ] 1   [ ] 2   [X] 3   [ ] 4   3. Toileting, which includes using toilet, bedpan or urinal?   [ ] 1   [ ] 2   [X] 3   [ ] 4   4. Putting on and taking off regular upper body clothing?   [ ] 1   [ ] 2   [ ] 3   [X] 4   5. Taking care of personal grooming such as brushing teeth? [ ] 1   [ ] 2   [ ] 3   [X] 4   6. Eating meals?    [ ] 1   [ ] 2   [ ] 3   [X] 4   © , Trustees of [facility] [address] [address], under license to [employer]. All rights reserved   Score:  Initial: 20 Most Recent: X (Date: -- )     Interpretation of Tool:  Represents activities that are increasingly more difficult (i.e. Bed mobility, Transfers, Gait). Score 24 23 22-20 19-15 14-10 9-7 6       Modifier CH CI CJ CK CL CM CN         · Self Care:               - CURRENT STATUS:    CJ - 20%-39% impaired, limited or restricted               - GOAL STATUS:           CI - 1%-19% impaired, limited or restricted               - D/C STATUS:                       ---------------To be determined---------------  [place] / Product Type: Medicare /       Medical Necessity:     · Patient is expected to demonstrate progress in range of motion, balance and functional technique to increase independence with self care. Reason for Services/Other Comments:  · Patient would benefit from skilled Occupational Therapy to maximize independence and safety with self-care task and functional mobility. .   Use of outcome tool(s) and clinical judgement create a POC that gives a: LOW COMPLEXITY                 TREATMENT:   (In addition to Assessment/Re-Assessment sessions the following treatments were rendered)      Pre-treatment Symptoms/Complaints:  Complaint of pain with bed mobility got better when getting up to chair  Pain: Initial: 3     Post Session:  3      Assessment/Reassessment only, no treatment provided today     Treatment/Session Assessment:         Response to Treatment:  Pt up to chair tolerated well.      Education:  [ ] Home Exercises  [X] Fall Precautions  [X] Hip Precautions [ ] Going Home Video  [ ] Knee/Hip Prosthesis Review  [X] Walker Management/Safety [X] Adaptive Equipment as Needed         Interdisciplinary Collaboration:   · Physical Therapist  · Occupational Therapist  · Registered Nurse     After treatment position/precautions:   · Up in chair  · Bed/Chair-wheels locked  · Call light within reach  · RN notified     Compliance with Program/Exercises: compliant all of the time. Recommendations/Intent for next treatment session:  Treatment next visit will focus on increasing Ms. Wilkinson's independence with bed mobility, transfers, self care, functional mobility, modalities for pain, and patient education.        Total Treatment Duration:  OT Patient Time In/Time Out  Time In: 0820  Time Out: [facility],

## 2017-08-26 VITALS
WEIGHT: 197 LBS | TEMPERATURE: 98.2 F | HEART RATE: 67 BPM | SYSTOLIC BLOOD PRESSURE: 152 MMHG | RESPIRATION RATE: 18 BRPM | HEIGHT: 65 IN | BODY MASS INDEX: 32.82 KG/M2 | DIASTOLIC BLOOD PRESSURE: 81 MMHG | OXYGEN SATURATION: 98 %

## 2017-08-26 LAB
BACTERIA SPEC CULT: NORMAL
HGB BLD-MCNC: 10 G/DL (ref 11.7–15.4)
MM INDURATION POC: 0 MM (ref 0–5)
PPD POC: NORMAL NEGATIVE
SERVICE CMNT-IMP: NORMAL

## 2017-08-26 PROCEDURE — 74011250637 HC RX REV CODE- 250/637: Performed by: INTERNAL MEDICINE

## 2017-08-26 PROCEDURE — 97116 GAIT TRAINING THERAPY: CPT

## 2017-08-26 PROCEDURE — 85018 HEMOGLOBIN: CPT | Performed by: ORTHOPAEDIC SURGERY

## 2017-08-26 PROCEDURE — 97150 GROUP THERAPEUTIC PROCEDURES: CPT

## 2017-08-26 PROCEDURE — 74011250637 HC RX REV CODE- 250/637: Performed by: ORTHOPAEDIC SURGERY

## 2017-08-26 PROCEDURE — 36415 COLL VENOUS BLD VENIPUNCTURE: CPT | Performed by: ORTHOPAEDIC SURGERY

## 2017-08-26 RX ADMIN — SENNOSIDES AND DOCUSATE SODIUM 2 TABLET: 8.6; 5 TABLET ORAL at 09:31

## 2017-08-26 RX ADMIN — ASPIRIN 81 MG: 81 TABLET, COATED ORAL at 09:26

## 2017-08-26 RX ADMIN — Medication 5 ML: at 05:44

## 2017-08-26 RX ADMIN — OXYCODONE HYDROCHLORIDE 10 MG: 5 TABLET ORAL at 04:13

## 2017-08-26 RX ADMIN — ACETAMINOPHEN 1000 MG: 500 TABLET, FILM COATED ORAL at 00:21

## 2017-08-26 RX ADMIN — ACETAMINOPHEN 1000 MG: 500 TABLET, FILM COATED ORAL at 05:44

## 2017-08-26 RX ADMIN — PANTOPRAZOLE SODIUM 40 MG: 40 TABLET, DELAYED RELEASE ORAL at 09:31

## 2017-08-26 RX ADMIN — CALCIUM CARBONATE 200 MG: 500 TABLET, CHEWABLE ORAL at 00:26

## 2017-08-26 RX ADMIN — OXYCODONE HYDROCHLORIDE 10 MG: 5 TABLET ORAL at 00:25

## 2017-08-26 RX ADMIN — CELECOXIB 200 MG: 200 CAPSULE ORAL at 09:27

## 2017-08-26 RX ADMIN — OXYCODONE HYDROCHLORIDE 10 MG: 5 TABLET ORAL at 09:28

## 2017-08-26 NOTE — PROGRESS NOTES
2017         Post Op day: 2 Days Post-Op     Admit Date: 2017  Admit Diagnosis: Unilateral primary osteoarthritis, right hip [M16.11]        Subjective: Doing well, No complaints, No SOB, No Chest Pain, No Nausea or Vomiting     Objective:   Vital Signs are Stable, No Acute Distress, Alert and Oriented, Dressing is Dry,  Neurovascular exam is normal.     Assessment / Plan :  Patient Active Problem List   Diagnosis Code    Essential hypertension, benign I10    Dyslipidemia E78.5    S/P coronary artery stent placement (2.75 x 18 Cypher to mLAD on 9-) Z95.5    Ventral hernia K43.9    S/P laparoscopic cholecystectomy Z90.49    Depression F32.9    Osteopenia M85.80    Primary hypothyroidism E03.9    Chronic back pain M54.9, G89.29    Cervicalgia M54.2    Coronary atherosclerosis of native coronary vessel I25.10    Arthritis M19.90    Obesity (BMI 30.0-34. 9) E66.9    Osteoarthritis M19.90    S/P total hip arthroplasty Z96.649    Patient Vitals for the past 8 hrs:   BP Temp Pulse Resp SpO2   17 0754 152/81 98.2 °F (36.8 °C) 67 18 98 %   17 0330 138/60 98 °F (36.7 °C) 68 18 96 %    Temp (24hrs), Av.4 °F (36.3 °C), Min:96 °F (35.6 °C), Max:98.2 °F (36.8 °C)    Body mass index is 32.78 kg/(m^2).     Continue PT  Lab Results   Component Value Date/Time    HGB 10.0 2017 04:33 AM    Monitor HGB   Pt discussed with Supervising Physician   Home today     Signed By: PANCHITO Hussein

## 2017-08-26 NOTE — PROGRESS NOTES
Problem: Mobility Impaired (Adult and Pediatric)  Goal: *Acute Goals and Plan of Care (Insert Text)  GOALS (1-4 days):  (1.)Ms. Wilkinson will move from supine to sit and sit to supine in bed with STAND BY ASSIST.   (2.)Ms. Wilkinson will transfer from bed to chair and chair to bed with STAND BY ASSIST using the least restrictive device. Met 8/26/17  (3.)Ms. Wilkinson will ambulate with STAND BY ASSIST for 350 feet with the least restrictive device. (4.)Ms. Wilkinson will ambulate up/down 3 steps with bilateral railing with CONTACT GUARD ASSIST with no device. Met 8/26/17  (5.)Ms. Wilkinson will state/observe LAUREEN precautions with 0 verbal cues. ________________________________________________________________________________________________  PHYSICAL THERAPY JOINT CAMP [facility]ARUEEN: Daily Note and AM 8/26/2017  INPATIENT: Hospital Day: 3  [place] / Product Type: Medicare /      NAME/AGE/GENDER: Gerardo Faustin is a 69 y.o. female            PRIMARY DIAGNOSIS:  Unilateral primary osteoarthritis, left hip [M16.12]              Procedure(s) and Anesthesia Type:     * LEFT  HIP ARTHROPLASTY TOTAL - Spinal (Left)  ICD-10: Treatment Diagnosis:        · Pain in left hip (M25.552)  · Stiffness of Left Hip, Not elsewhere classified (P08.261)       ASSESSMENT:      Ms. Perry Sandifer participated in group today and tolerated very well. She is going to be discharged today and presented with no questions. She tolerated the stairs well without difficulty and ambulated very well with her RW although slight step to pattern presented. She was able to tolerated 20 reps of exercise today wtihout difficulty. This section established at most recent assessment   PROBLEM LIST (Impairments causing functional limitations):  1. Decreased Strength  2. Decreased ADL/Functional Activities  3. Decreased Transfer Abilities  4.  Decreased Ambulation Ability/Technique  5. Decreased Flexibility/Joint Mobility  6. Decreased [place] with Home Exercise Program  7. Decreased Strength  8. Decreased ADL/Functional Activities  9. Decreased Transfer Abilities  10. Decreased Ambulation Ability/Technique  11. Increased Pain  12. Decreased Flexibility/Joint Mobility  13. Decreased [place] with Home Exercise Program    INTERVENTIONS PLANNED: (Benefits and precautions of physical therapy have been discussed with the patient.)  1. Bed Mobility  2. Cold  3. Gait Training  4. Home Exercise Program (HEP)  5. Therapeutic Exercise/Strengthening  6. Transfer Training  7. Range of Motion: active/assisted/passive  8. Therapeutic Activities  9. Group Therapy      TREATMENT PLAN: Frequency/Duration: Follow patient BID   to address above goals. Rehabilitation Potential For Stated Goals: GOOD      RECOMMENDED REHABILITATION/EQUIPMENT: (at time of discharge pending progress): Continue Skilled Therapy and Home Health: Physical Therapy. HISTORY:   History of Present Injury/Illness (Reason for Referral):  Patient is s/p left LAUREEN on 8/25/17  Past Medical History/Comorbidities:   Ms. Izzy Newman  has a past medical history of Anxiety; Arthritis; Autoimmune disease ([address] CAD (coronary artery disease) (2010); Chest pain; Chronic pain; Depression; Family history of ischemic heart disease; H/O acute pancreatitis (08/2013); H/O gastric bypass (2009); High cholesterol; Hypertension; Hypothyroidism; Lichen sclerosus; Obesity (BMI 30-39.9); Osteopenia; Systolic murmur; and Ventral hernia. She also has no past medical history of Adverse effect of anesthesia; Aneurysm ([address] Arrhythmia; Asthma; Cancer ([address] Chronic kidney disease; Chronic obstructive pulmonary disease ([address] Coagulation disorder ([address] Diabetes ([address] Difficult intubation; Endocarditis; GERD (gastroesophageal reflux disease); Heart failure ([address]  Liver disease; Malignant hyperthermia due to anesthesia; Nausea & vomiting; Nicotine vapor product user; Non-nicotine vapor product user; Pseudocholinesterase deficiency; PUD (peptic ulcer disease); Rheumatic fever; Seizures ([facility] Sleep apnea; Stroke [facility]); Thromboembolus ([facility] or Unspecified adverse effect of anesthesia. Ms. Perry Sandifer  has a past surgical history that includes bladder suspension; carpal tunnel release (Right); lysis of adhesions; heart catheterization (2010); hysterectomy (1978); oophorectomy (1984); lumbar fusion (1996); urological; shoulder arthroscopy (Right, 2011); orthopaedic (Right, 1997); appendectomy (8949); colonoscopy (2017); lap cholecystectomy (7/2013); gastric bypass (2009); hernia repair (8/14/14); and cataract removal (Bilateral, 2015).   Social History/Living Environment:   Home Environment: Private residence  # Steps to Enter: 8  Rails to Enter: No  One/Two Story Residence: Two story  # of Interior Steps: 9  Interior Rails: Left  Lift Chair Available: No  Living Alone: No  Support Systems: Spouse/Significant Other/Partner  Patient Expects to be Discharged to[de-identified] Private residence  Current DME Used/Available at Home: Cane, straight  Tub or Shower Type: Shower  Prior Level of Function/Work/Activity:  Pt living at home independent with gait and ADLs without assistive devices   Number of Personal Factors/Comorbidities that affect the Plan of Care: 0: LOW COMPLEXITY   EXAMINATION:   Most Recent Physical Functioning:   Gross Assessment: Yes  Gross Assessment  AROM: Generally decreased, functional (L hip)  Strength: Generally decreased, functional (L hip)  Coordination: Generally decreased, functional (L hip)  Tone: Normal  Sensation: Intact                  LLE Strength  L Hip ABduction: 2  L Knee Extension: 3  L Ankle Dorsiflexion: 4+           Transfers  Sit to Stand: Modified independent  Stand to Sit: Modified independent  Stand Pivot Transfers: Modified independent     Balance  Sitting: Intact  Standing: With support Weight Bearing Status  Left Side Weight Bearing: As tolerated  Distance (ft): 108 Feet (ft) ( x 2)  Ambulation - Level of Assistance: Supervision  Assistive Device: Walker, rolling  Speed/Princess: Delayed; Other (comment) (step to pattern slightly)  Step Length: Right lengthened;Left shortened  Stance: Left decreased  Gait Abnormalities: Antalgic; Step to gait  Number of Stairs Trained: 3  Stairs - Level of Assistance: Supervision  Rail Use: Both  Interventions: Safety awareness training      Braces/Orthotics: none             Body Structures Involved:  1. Bones  2. Joints  3. Muscles Body Functions Affected:  1. Movement Related Activities and Participation Affected:  1. Mobility  2. Self Care   Number of elements that affect the Plan of Care: 4+: HIGH COMPLEXITY   CLINICAL PRESENTATION:   Presentation: Stable and uncomplicated: LOW COMPLEXITY   CLINICAL DECISION MAKIN[address] [place] [id number] AM-PAC 6 Clicks   Basic Mobility Inpatient Short Form  How much difficulty does the patient currently have. .. Unable A Lot A Little None   1. Turning over in bed (including adjusting bedclothes, sheets and blankets)? [ ] 1   [ ] 2   [X] 3   [ ] 4   2. Sitting down on and standing up from a chair with arms ( e.g., wheelchair, bedside commode, etc.)   [ ] 1   [ ] 2   [X] 3   [ ] 4   3. Moving from lying on back to sitting on the side of the bed? [ ] 1   [ ] 2   [X] 3   [ ] 4   How much help from another person does the patient currently need. .. Total A Lot A Little None   4. Moving to and from a bed to a chair (including a wheelchair)? [ ] 1   [ ] 2   [X] 3   [ ] 4   5. Need to walk in hospital room? [ ] 1   [X] 2   [ ] 3   [ ] 4   6. Climbing 3-5 steps with a railing? [ ] 1   [ ] 2   [X] 3   [ ] 4   © , Trustees of [address] [place], [place] [id number], under license to [employer].  All rights reserved       Score:  Initial: 17 Most Recent: X (Date: -- )     Interpretation of Tool:  Represents activities that are increasingly more difficult (i.e. Bed mobility, Transfers, Gait). Score 24 23 22-20 19-15 14-10 9-7 6       Modifier CH CI CJ CK CL CM CN         · Mobility - Walking and Moving Around:               - CURRENT STATUS:    CK - 40%-59% impaired, limited or restricted               - GOAL STATUS:           CJ - 20%-39% impaired, limited or restricted               - D/C STATUS:                       ---------------To be determined---------------  [place] / Product Type: Medicare /       Medical Necessity:     · Ms. Wilkinson is expected to optimize her lower extremity strength, functional mobility, and ROM s/p joint replacement surgery. Reason for Services/Other Comments:  · Patient continues to require skilled intervention due to inability to complete functional mobility and LAUREEN exercises independently. Use of outcome tool(s) and clinical judgement create a POC that gives a: Clear prediction of patient's progress: LOW COMPLEXITY                 TREATMENT:   (In addition to Assessment/Re-Assessment sessions the following treatments were rendered)      Pre-treatment Symptoms/Complaints:  Patient without complaints  Pain: Initial:   Pain Intensity 1: 4  Pain Location 1: Hip  Pain Orientation 1: Left  Post Session:  4/10      Therapeutic Exercise: (45 Minutes (group)):  Exercises per grid below to improve mobility and strength. Required minimal verbal cues to promote proper body alignment and promote proper body posture. Progressed repetitions as indicated. Gait Training (15 Minutes):  Gait training to improve and/or restore physical functioning as related to mobility and strength. Ambulated 108 Feet (ft) ( x 2) with Supervision using a Walker, rolling and minimal Safety awareness training related to their stance phase and stride length to promote proper body alignment and promote proper body posture.   Instruction in performance of walker use and gait sequencing to correct stance phase and stride length. Date:  8/25/17 Date:  8/26/17  Date:      ACTIVITY/EXERCISE AM PM AM PM AM PM   GROUP THERAPY  [ ]  [ Catrina Dl  [x ]  [ ]  [ ]  [ ]   Ankle Pumps 10 15   20         Quad Sets 10  15 20          Gluteal Sets 10  15 20          Hip ABd/ADduction 10  15 20          Straight Leg Raises              Knee Slides 10  15 20          Short Arc Quads    15  20         Long Arc Quads    15  20         Chair Slides                               B = bilateral; AA = active assistive; A = active; P = passive       Treatment/Session Assessment:         Response to Treatment:  She is discharging home without concerns     Education:  [X] Home Exercises  [X] Fall Precautions  [ ] Hip Precautions [ ] Going Home Video  [ ] Hip Prosthesis Review  [ ] [place] Management/Safety [ ] Adaptive Equipment as Needed         Interdisciplinary Collaboration:   · Registered Nurse and Rehabilitation Attendant     After treatment position/precautions:   · Up in chair, Bed/Chair-wheels locked, Caregiver at bedside, Call light within reach and RN notified     Compliance with Program/Exercises: compliant all of the time. Recommendations/Intent for next treatment session:  Treatment next visit will focus on increasing Ms. Wilkinson's independence with bed mobility, transfers, gait training, strength/ROM exercises, modalities for pain, and patient education.        Total Treatment Duration:  PT Patient Time In/Time Out  Time In: 1030  Time Out: 1130     Lord Ricardo PT

## 2017-08-26 NOTE — PROGRESS NOTES
Per MD pt guillermo for d/c.  OLIVER notified Carine Chavez with [facility] of pt's d/c.

## 2017-08-26 NOTE — PROGRESS NOTES
Problem: Falls - Risk of  Goal: *Absence of Falls  Document Sylwia Fall Risk and appropriate interventions in the flowsheet.    Outcome: Progressing Towards Goal  Fall Risk Interventions:  Mobility Interventions: Patient to call before getting OOB           Medication Interventions: Patient to call before getting OOB     Elimination Interventions: Bed/chair exit alarm, Call light in reach

## 2017-08-26 NOTE — PROGRESS NOTES
Shift assessment complete. Pt resting quietly in bed. No signs of acute distress. Resp even and unlabored. Dressing to left hip is clean, dry, and intact. Pt denies pain at this time. Call light within reach. PT instructed to call for assistance.

## 2017-08-26 NOTE — DISCHARGE INSTRUCTIONS
DISCHARGE SUMMARY from Nurse    The following personal items are in your possession at time of discharge:    Dental Appliances: None  Visual Aid: None           Clothing: Shirt, Pants  Other Valuables: None             PATIENT INSTRUCTIONS:    After general anesthesia or intravenous sedation, for 24 hours or while taking prescription Narcotics:  · Limit your activities  · Do not drive and operate hazardous machinery  · Do not make important personal or business decisions  · Do  not drink alcoholic beverages  · If you have not urinated within 8 hours after discharge, please contact your surgeon on call. Report the following to your surgeon:  · Excessive pain, swelling, redness or odor of or around the surgical area  · Temperature over 100.5  · Nausea and vomiting lasting longer than 4 hours or if unable to take medications  · Any signs of decreased circulation or nerve impairment to extremity: change in color, persistent  numbness, tingling, coldness or increase pain  · Any questions        What to do at Home:  Recommended activity: Activity as tolerated,    If you experience any of the following symptoms, please follow up with Dr. Oseas Zelaya. *  Please give a list of your current medications to your Primary Care Provider. *  Please update this list whenever your medications are discontinued, doses are      changed, or new medications (including over-the-counter products) are added. *  Please carry medication information at all times in case of emergency situations. These are general instructions for a healthy lifestyle:    No smoking/ No tobacco products/ Avoid exposure to second hand smoke    Surgeon General's Warning:  Quitting smoking now greatly reduces serious risk to your health.     Obesity, smoking, and sedentary lifestyle greatly increases your risk for illness    A healthy diet, regular physical exercise & weight monitoring are important for maintaining a healthy lifestyle    You may be retaining fluid if you have a history of heart failure or if you experience any of the following symptoms:  Weight gain of 3 pounds or more overnight or 5 pounds in a week, increased swelling in our hands or feet or shortness of breath while lying flat in bed. Please call your doctor as soon as you notice any of these symptoms; do not wait until your next office visit. Recognize signs and symptoms of STROKE:    F-face looks uneven    A-arms unable to move or move unevenly    S-speech slurred or non-existent    T-time-call 911 as soon as signs and symptoms begin-DO NOT go       Back to bed or wait to see if you get better-TIME IS BRAIN. Warning Signs of HEART ATTACK     Call 911 if you have these symptoms:   Chest discomfort. Most heart attacks involve discomfort in the center of the chest that lasts more than a few minutes, or that goes away and comes back. It can feel like uncomfortable pressure, squeezing, fullness, or pain.  Discomfort in other areas of the upper body. Symptoms can include pain or discomfort in one or both arms, the back, neck, jaw, or stomach.  Shortness of breath with or without chest discomfort.  Other signs may include breaking out in a cold sweat, nausea, or lightheadedness. Don't wait more than five minutes to call 911 - MINUTES MATTER! Fast action can save your life. Calling 911 is almost always the fastest way to get lifesaving treatment. Emergency Medical Services staff can begin treatment when they arrive -- up to an hour sooner than if someone gets to the hospital by car. The discharge information has been reviewed with the patient. The patient verbalized understanding. Discharge medications reviewed with the patient and appropriate educational materials and side effects teaching were provided. Hip Replacement Surgery: What to Expect at Home  Your Recovery  Hip replacement surgery replaces the worn parts of your hip joint.  When you leave the hospital, you will probably be walking with crutches or a walker. You may be able to climb a few stairs and get in and out of bed and chairs. But you will need someone to help you at home for the next few weeks or until you have more energy and can move around better. If there is no one to help you at home, you may go to a rehabilitation center or long-term care center. You will go home with a bandage and stitches or staples. You can remove the bandage when your doctor tells you to. Your doctor will remove your stitches or staples 10 days to 3 weeks after your surgery. You may still have some mild pain, and the area may be swollen for 3 to 4 months after surgery. Your doctor will give you medicine for the pain. You will continue the rehabilitation program (rehab) you started in the hospital. The better you do with your rehab exercises, the sooner you will get your strength and movement back. Most people are able to return to work 4 weeks to 4 months after surgery. This care sheet gives you a general idea about how long it will take for you to recover. But each person recovers at a different pace. Follow the steps below to get better as quickly as possible. How can you care for yourself at home? Activity  · Your doctor may not want your affected leg to cross the center of your body toward the other leg. If so, your therapist may suggest these ideas:  ¨ Do not cross your legs. ¨ Be very careful as you get in or out of bed or a car, so your leg does not cross that imaginary line in the middle of your body. · Rest when you feel tired. You may take a nap, but do not stay in bed all day. · Work with your physical therapist to learn the best way to exercise. You may be able to take frequent, short walks using crutches or a walker. You will probably have to use crutches or a walker for at least 4 to 6 weeks. · Your doctor may advise you to stay away from activities that put stress on the joint.  This includes sports such as tennis, football, and jogging. · Do not sit for longer than 30 to 45 minutes at a time. When you sit, use chairs with arms, and do not sit in low chairs. · Do not bend over more than 90 degrees (like the angle in a letter \"L\"). · Sleep on your back with your legs slightly apart or on your side with a pillow between your knees for about 6 weeks or as your doctor tells you. Do not sleep on your stomach or affected leg. · You may need to take sponge baths until your stitches or staples have been removed. You will probably be able to shower 24 hours after they are removed. · Ask your doctor when you can drive again. · Most people are able to return to work 4 weeks to 4 months after surgery. · Ask your doctor when it is okay for you to have sex. Diet  · By the time you leave the hospital, you will probably be eating your normal diet. If your stomach is upset, try bland, low-fat foods like plain rice, broiled chicken, toast, and yogurt. Your doctor may recommend that you take iron and vitamin supplements. · Drink plenty of fluids (unless your doctor tells you not to). · Eat healthy foods, and watch your portion sizes. Try to stay at your ideal weight. Too much weight puts more stress on your new hip joint. · You may notice that your bowel movements are not regular right after your surgery. This is common. Try to avoid constipation and straining with bowel movements. You may want to take a fiber supplement every day. If you have not had a bowel movement after a couple of days, ask your doctor about taking a mild laxative. Medicines  · Your doctor will tell you if and when you can restart your medicines. He or she will also give you instructions about taking any new medicines. · If you take blood thinners, such as warfarin (Coumadin), clopidogrel (Plavix), or aspirin, be sure to talk to your doctor. He or she will tell you if and when to start taking those medicines again.  Make sure that you understand exactly what your doctor wants you to do. · Your doctor may give you a blood-thinning medicine to prevent blood clots. If you take a blood thinner, be sure you get instructions about how to take your medicine safely. Blood thinners can cause serious bleeding problems. This medicine could be in pill form or as a shot (injection). If a shot is necessary, your doctor will tell you how to do this. · Be safe with medicines. Take pain medicines exactly as directed. ¨ If the doctor gave you a prescription medicine for pain, take it as prescribed. ¨ If you are not taking a prescription pain medicine, ask your doctor if you can take an over-the-counter medicine. · If you think your pain medicine is making you sick to your stomach:  ¨ Take your medicine after meals (unless your doctor has told you not to). ¨ Ask your doctor for a different pain medicine. · If your doctor prescribed antibiotics, take them as directed. Do not stop taking them just because you feel better. You need to take the full course of antibiotics. Incision care  · You will have a bandage over the cut (incision) and staples or stitches. Follow your doctor's instructions on when to take the bandage off. Giving the incision air will help it heal.  · Your doctor will remove the staples or stitches 10 days to 3 weeks after the surgery and replace them with strips of tape. Leave the strips on for a week or until they fall off. Exercise  · Your rehab program will include a number of exercises to do. Always do them as your therapist tells you. Ice and elevation  · For pain, put ice or a cold pack on the area for 10 to 20 minutes at a time. Put a thin cloth between the ice and your skin. · Your ankle may swell for about 3 months. Prop up your ankle when you ice it or anytime you sit or lie down. Try to keep it above the level of your heart. This will help reduce swelling.   Other instructions  · Continue to wear your support stockings as your doctor says. These help to prevent blood clots. The length of time that you will have to wear them depends on your activity level and the amount of swelling you have. Most people wear these stockings for 4 to 6 weeks after surgery. · Wear medical alert jewelry that says you may need antibiotics before any procedure, including dental work. You can buy this at most drugstores. Preventing falls is also very important. To prevent falls:  · Arrange furniture so that you will not trip on it. · Get rid of throw rugs, and move electrical cords out of the way. · Walk only in areas with plenty of light. · Put grab bars in showers and bathtubs. · Avoid icy or snowy sidewalks. · Wear shoes with sturdy, flat soles. Follow-up care is a key part of your treatment and safety. Be sure to make and go to all appointments, and call your doctor if you are having problems. It's also a good idea to know your test results and keep a list of the medicines you take. When should you call for help? Call 911 anytime you think you may need emergency care. For example, call if:  · You passed out (lost consciousness). · You have severe trouble breathing. · You have sudden chest pain and shortness of breath, or you cough up blood. Call your doctor now or seek immediate medical care if:  · You have signs that your hip may be dislocated, including:  ¨ Severe pain and not being able to stand. ¨ A crooked leg that looks like your hip is out of position. ¨ Not being able to bend or straighten your leg. · Your leg or foot is cool or pale or changes color. · You cannot feel or move your leg. · You have signs of a blood clot, such as:  ¨ Pain in your calf, back of the knee, thigh, or groin. ¨ Redness and swelling in your leg or groin. · Your incision comes open and begins to bleed, or the bleeding increases. · You feel like your heart is racing or beating irregularly.   · You have signs of infection, such as:  ¨ Increased pain, swelling, warmth, or redness. ¨ Red streaks leading from the incision. ¨ Pus draining from the incision. ¨ A fever. Watch closely for changes in your health, and be sure to contact your doctor if:  · You do not have a bowel movement after taking a laxative. · You do not get better as expected. Where can you learn more? Go to http://evelio-noris.info/. Enter K829 in the search box to learn more about \"Hip Replacement Surgery: What to Expect at Home. \"  Current as of: March 21, 2017  Content Version: 11.3  © 4427-5636 [employer]. Care instructions adapted under license by [employer] (which disclaims liability or warranty for this information). If you have questions about a medical condition or this instruction, always ask your healthcare professional. [address] any warranty or liability for your use of this information.

## 2017-08-28 ENCOUNTER — HOME CARE VISIT (OUTPATIENT)
Dept: SCHEDULING | Facility: HOME HEALTH | Age: 67
End: 2017-08-28
Payer: MEDICARE

## 2017-08-28 PROCEDURE — 3331090002 HH PPS REVENUE DEBIT

## 2017-08-28 PROCEDURE — 3331090001 HH PPS REVENUE CREDIT

## 2017-08-28 PROCEDURE — 400013 HH SOC

## 2017-08-28 PROCEDURE — G0151 HHCP-SERV OF PT,EA 15 MIN: HCPCS

## 2017-08-29 VITALS
HEART RATE: 78 BPM | RESPIRATION RATE: 20 BRPM | DIASTOLIC BLOOD PRESSURE: 78 MMHG | TEMPERATURE: 98.8 F | WEIGHT: 206 LBS | HEIGHT: 64 IN | BODY MASS INDEX: 35.17 KG/M2 | SYSTOLIC BLOOD PRESSURE: 138 MMHG

## 2017-08-29 PROCEDURE — 3331090002 HH PPS REVENUE DEBIT

## 2017-08-29 PROCEDURE — 3331090001 HH PPS REVENUE CREDIT

## 2017-08-30 ENCOUNTER — HOME CARE VISIT (OUTPATIENT)
Dept: SCHEDULING | Facility: HOME HEALTH | Age: 67
End: 2017-08-30
Payer: MEDICARE

## 2017-08-30 PROCEDURE — 3331090001 HH PPS REVENUE CREDIT

## 2017-08-30 PROCEDURE — 3331090002 HH PPS REVENUE DEBIT

## 2017-08-30 PROCEDURE — G0157 HHC PT ASSISTANT EA 15: HCPCS

## 2017-08-31 VITALS
SYSTOLIC BLOOD PRESSURE: 110 MMHG | HEART RATE: 72 BPM | TEMPERATURE: 97.9 F | DIASTOLIC BLOOD PRESSURE: 60 MMHG | RESPIRATION RATE: 18 BRPM

## 2017-08-31 PROCEDURE — 3331090001 HH PPS REVENUE CREDIT

## 2017-08-31 PROCEDURE — 3331090002 HH PPS REVENUE DEBIT

## 2017-09-01 ENCOUNTER — HOME CARE VISIT (OUTPATIENT)
Dept: SCHEDULING | Facility: HOME HEALTH | Age: 67
End: 2017-09-01
Payer: MEDICARE

## 2017-09-01 PROCEDURE — 3331090002 HH PPS REVENUE DEBIT

## 2017-09-01 PROCEDURE — G0157 HHC PT ASSISTANT EA 15: HCPCS

## 2017-09-01 PROCEDURE — 3331090001 HH PPS REVENUE CREDIT

## 2017-09-02 PROCEDURE — 3331090001 HH PPS REVENUE CREDIT

## 2017-09-02 PROCEDURE — 3331090002 HH PPS REVENUE DEBIT

## 2017-09-03 VITALS
RESPIRATION RATE: 18 BRPM | SYSTOLIC BLOOD PRESSURE: 122 MMHG | TEMPERATURE: 97.3 F | DIASTOLIC BLOOD PRESSURE: 68 MMHG | HEART RATE: 60 BPM

## 2017-09-03 PROCEDURE — 3331090002 HH PPS REVENUE DEBIT

## 2017-09-03 PROCEDURE — 3331090001 HH PPS REVENUE CREDIT

## 2017-09-04 ENCOUNTER — HOME CARE VISIT (OUTPATIENT)
Dept: SCHEDULING | Facility: HOME HEALTH | Age: 67
End: 2017-09-04
Payer: MEDICARE

## 2017-09-04 PROCEDURE — 3331090001 HH PPS REVENUE CREDIT

## 2017-09-04 PROCEDURE — 3331090002 HH PPS REVENUE DEBIT

## 2017-09-04 PROCEDURE — A4649 SURGICAL SUPPLIES: HCPCS

## 2017-09-04 PROCEDURE — G0157 HHC PT ASSISTANT EA 15: HCPCS

## 2017-09-05 VITALS
SYSTOLIC BLOOD PRESSURE: 124 MMHG | TEMPERATURE: 97.7 F | DIASTOLIC BLOOD PRESSURE: 64 MMHG | RESPIRATION RATE: 18 BRPM | HEART RATE: 68 BPM

## 2017-09-05 PROCEDURE — 3331090001 HH PPS REVENUE CREDIT

## 2017-09-05 PROCEDURE — 3331090002 HH PPS REVENUE DEBIT

## 2017-09-06 PROCEDURE — 3331090002 HH PPS REVENUE DEBIT

## 2017-09-06 PROCEDURE — 3331090001 HH PPS REVENUE CREDIT

## 2017-09-07 ENCOUNTER — HOME CARE VISIT (OUTPATIENT)
Dept: SCHEDULING | Facility: HOME HEALTH | Age: 67
End: 2017-09-07
Payer: MEDICARE

## 2017-09-07 PROCEDURE — G0157 HHC PT ASSISTANT EA 15: HCPCS

## 2017-09-07 PROCEDURE — 3331090002 HH PPS REVENUE DEBIT

## 2017-09-07 PROCEDURE — 3331090001 HH PPS REVENUE CREDIT

## 2017-09-08 VITALS
HEART RATE: 66 BPM | DIASTOLIC BLOOD PRESSURE: 80 MMHG | RESPIRATION RATE: 18 BRPM | TEMPERATURE: 97.3 F | SYSTOLIC BLOOD PRESSURE: 140 MMHG

## 2017-09-08 PROCEDURE — 3331090002 HH PPS REVENUE DEBIT

## 2017-09-08 PROCEDURE — 3331090001 HH PPS REVENUE CREDIT

## 2017-09-09 PROCEDURE — 3331090002 HH PPS REVENUE DEBIT

## 2017-09-09 PROCEDURE — 3331090001 HH PPS REVENUE CREDIT

## 2017-09-10 PROCEDURE — 3331090001 HH PPS REVENUE CREDIT

## 2017-09-10 PROCEDURE — 3331090002 HH PPS REVENUE DEBIT

## 2017-09-11 PROCEDURE — 3331090001 HH PPS REVENUE CREDIT

## 2017-09-11 PROCEDURE — 3331090002 HH PPS REVENUE DEBIT

## 2017-09-12 PROCEDURE — 3331090001 HH PPS REVENUE CREDIT

## 2017-09-12 PROCEDURE — 3331090002 HH PPS REVENUE DEBIT

## 2017-09-13 ENCOUNTER — HOME CARE VISIT (OUTPATIENT)
Dept: HOME HEALTH SERVICES | Facility: HOME HEALTH | Age: 67
End: 2017-09-13
Payer: MEDICARE

## 2017-09-13 PROCEDURE — 3331090002 HH PPS REVENUE DEBIT

## 2017-09-13 PROCEDURE — 3331090001 HH PPS REVENUE CREDIT

## 2017-09-14 PROCEDURE — 3331090002 HH PPS REVENUE DEBIT

## 2017-09-14 PROCEDURE — 3331090001 HH PPS REVENUE CREDIT

## 2017-09-15 ENCOUNTER — HOME CARE VISIT (OUTPATIENT)
Dept: SCHEDULING | Facility: HOME HEALTH | Age: 67
End: 2017-09-15
Payer: MEDICARE

## 2017-09-15 PROCEDURE — G0157 HHC PT ASSISTANT EA 15: HCPCS

## 2017-09-15 PROCEDURE — 3331090002 HH PPS REVENUE DEBIT

## 2017-09-15 PROCEDURE — 3331090001 HH PPS REVENUE CREDIT

## 2017-09-16 PROCEDURE — 3331090002 HH PPS REVENUE DEBIT

## 2017-09-16 PROCEDURE — 3331090001 HH PPS REVENUE CREDIT

## 2017-09-17 VITALS
TEMPERATURE: 97.2 F | DIASTOLIC BLOOD PRESSURE: 82 MMHG | HEART RATE: 62 BPM | RESPIRATION RATE: 18 BRPM | SYSTOLIC BLOOD PRESSURE: 158 MMHG

## 2017-09-17 PROCEDURE — 3331090001 HH PPS REVENUE CREDIT

## 2017-09-17 PROCEDURE — 3331090002 HH PPS REVENUE DEBIT

## 2017-09-18 ENCOUNTER — HOME CARE VISIT (OUTPATIENT)
Dept: SCHEDULING | Facility: HOME HEALTH | Age: 67
End: 2017-09-18
Payer: MEDICARE

## 2017-09-18 PROCEDURE — 3331090001 HH PPS REVENUE CREDIT

## 2017-09-18 PROCEDURE — 3331090002 HH PPS REVENUE DEBIT

## 2017-09-18 PROCEDURE — G0157 HHC PT ASSISTANT EA 15: HCPCS

## 2017-09-19 VITALS
SYSTOLIC BLOOD PRESSURE: 130 MMHG | TEMPERATURE: 97.4 F | HEART RATE: 64 BPM | RESPIRATION RATE: 18 BRPM | DIASTOLIC BLOOD PRESSURE: 74 MMHG

## 2017-09-19 PROCEDURE — 3331090002 HH PPS REVENUE DEBIT

## 2017-09-19 PROCEDURE — 3331090001 HH PPS REVENUE CREDIT

## 2017-09-20 PROCEDURE — 3331090002 HH PPS REVENUE DEBIT

## 2017-09-20 PROCEDURE — 3331090001 HH PPS REVENUE CREDIT

## 2017-09-21 PROCEDURE — 3331090001 HH PPS REVENUE CREDIT

## 2017-09-21 PROCEDURE — 3331090002 HH PPS REVENUE DEBIT

## 2017-09-22 ENCOUNTER — HOME CARE VISIT (OUTPATIENT)
Dept: SCHEDULING | Facility: HOME HEALTH | Age: 67
End: 2017-09-22
Payer: MEDICARE

## 2017-09-22 PROCEDURE — 3331090002 HH PPS REVENUE DEBIT

## 2017-09-22 PROCEDURE — 3331090001 HH PPS REVENUE CREDIT

## 2017-09-22 PROCEDURE — G0151 HHCP-SERV OF PT,EA 15 MIN: HCPCS

## 2017-09-23 PROCEDURE — 3331090002 HH PPS REVENUE DEBIT

## 2017-09-23 PROCEDURE — 3331090001 HH PPS REVENUE CREDIT

## 2017-09-24 VITALS
SYSTOLIC BLOOD PRESSURE: 126 MMHG | HEART RATE: 67 BPM | OXYGEN SATURATION: 99 % | DIASTOLIC BLOOD PRESSURE: 72 MMHG | RESPIRATION RATE: 16 BRPM

## 2017-09-24 PROCEDURE — 3331090001 HH PPS REVENUE CREDIT

## 2017-09-24 PROCEDURE — 3331090002 HH PPS REVENUE DEBIT

## 2017-09-25 PROCEDURE — 3331090002 HH PPS REVENUE DEBIT

## 2017-09-25 PROCEDURE — 3331090001 HH PPS REVENUE CREDIT

## 2017-09-28 PROCEDURE — A4649 SURGICAL SUPPLIES: HCPCS

## 2017-10-26 PROBLEM — M81.0 AGE-RELATED OSTEOPOROSIS WITHOUT CURRENT PATHOLOGICAL FRACTURE: Status: ACTIVE | Noted: 2017-10-26

## 2017-10-26 PROBLEM — M81.0 AGE-RELATED OSTEOPOROSIS WITHOUT CURRENT PATHOLOGICAL FRACTURE: Chronic | Status: ACTIVE | Noted: 2017-10-26

## 2017-11-30 ENCOUNTER — PATIENT OUTREACH (OUTPATIENT)
Dept: CASE MANAGEMENT | Age: 67
End: 2017-11-30

## 2017-11-30 NOTE — PROGRESS NOTES
Patient categorized as high risk patient, level 4. Chart reviewed to determine need for CCM services. Chronic conditions controlled and managed; pt has established care with Dr. Terrance Grullon and communicates with office. No ED visits. Admission from 3/15; 1/17 and 8/17 are for planned surgery procedures. At this time there is no evidence to suggest that CCM services are needed. This note will not be viewable in [address]

## 2018-01-24 PROBLEM — K58.2 IRRITABLE BOWEL SYNDROME WITH BOTH CONSTIPATION AND DIARRHEA: Status: ACTIVE | Noted: 2018-01-24

## 2018-04-06 ENCOUNTER — HOSPITAL ENCOUNTER (OUTPATIENT)
Dept: MAMMOGRAPHY | Age: 68
Discharge: HOME | End: 2018-04-06
Attending: OBSTETRICS & GYNECOLOGY
Payer: MEDICARE

## 2018-04-06 DIAGNOSIS — Z12.31 VISIT FOR SCREENING MAMMOGRAM: ICD-10-CM

## 2018-04-06 PROCEDURE — 77067 SCR MAMMO BI INCL CAD: CPT

## 2018-04-18 PROBLEM — Z01.810 PRE-OPERATIVE CARDIOVASCULAR EXAMINATION: Status: ACTIVE | Noted: 2018-04-18

## 2018-07-11 ENCOUNTER — HOSPITAL ENCOUNTER (OUTPATIENT)
Dept: LAB | Age: 68
Discharge: HOME | End: 2018-07-11
Payer: MEDICARE

## 2018-07-11 LAB
ALBUMIN SERPL-MCNC: 3.6 G/DL (ref 3.2–4.6)
ALBUMIN/GLOB SERPL: 1 {RATIO} (ref 1.2–3.5)
ALP SERPL-CCNC: 92 U/L (ref 50–136)
ALT SERPL-CCNC: 46 U/L (ref 12–65)
AST SERPL-CCNC: 35 U/L (ref 15–37)
BILIRUB DIRECT SERPL-MCNC: 0.2 MG/DL
BILIRUB SERPL-MCNC: 0.6 MG/DL (ref 0.2–1.1)
GLOBULIN SER CALC-MCNC: 3.7 G/DL (ref 2.3–3.5)
PROT SERPL-MCNC: 7.3 G/DL (ref 6.3–8.2)

## 2018-07-11 PROCEDURE — 36415 COLL VENOUS BLD VENIPUNCTURE: CPT | Performed by: INTERNAL MEDICINE

## 2018-07-11 PROCEDURE — 80076 HEPATIC FUNCTION PANEL: CPT | Performed by: INTERNAL MEDICINE

## 2018-07-19 ENCOUNTER — HOSPITAL ENCOUNTER (OUTPATIENT)
Dept: CT IMAGING | Age: 68
Discharge: HOME | End: 2018-07-19
Attending: INTERNAL MEDICINE
Payer: MEDICARE

## 2018-07-19 DIAGNOSIS — Z98.84 HISTORY OF GASTRIC BYPASS: ICD-10-CM

## 2018-07-19 DIAGNOSIS — R93.5 ABNORMAL US (ULTRASOUND) OF ABDOMEN: ICD-10-CM

## 2018-07-19 LAB — CREAT BLD-MCNC: 0.8 MG/DL (ref 0.8–1.5)

## 2018-07-19 PROCEDURE — 74160 CT ABDOMEN W/CONTRAST: CPT

## 2018-07-19 PROCEDURE — 74011636320 HC RX REV CODE- 636/320: Performed by: INTERNAL MEDICINE

## 2018-07-19 PROCEDURE — 74011000258 HC RX REV CODE- 258: Performed by: INTERNAL MEDICINE

## 2018-07-19 PROCEDURE — 82565 ASSAY OF CREATININE: CPT

## 2018-07-19 RX ORDER — SODIUM CHLORIDE 0.9 % (FLUSH) 0.9 %
10 SYRINGE (ML) INJECTION
Status: COMPLETED | OUTPATIENT
Start: 2018-07-19 | End: 2018-07-19

## 2018-07-19 RX ADMIN — Medication 10 ML: at 10:55

## 2018-07-19 RX ADMIN — IOPAMIDOL 100 ML: 755 INJECTION, SOLUTION INTRAVENOUS at 10:54

## 2018-07-19 RX ADMIN — SODIUM CHLORIDE 100 ML: 900 INJECTION, SOLUTION INTRAVENOUS at 10:54

## 2018-08-08 ENCOUNTER — ANESTHESIA EVENT (OUTPATIENT)
Dept: SURGERY | Age: 68
End: 2018-08-08
Payer: MEDICARE

## 2018-08-08 RX ORDER — SODIUM CHLORIDE 0.9 % (FLUSH) 0.9 %
5-10 SYRINGE (ML) INJECTION EVERY 8 HOURS
Status: CANCELLED | OUTPATIENT
Start: 2018-08-08

## 2018-08-08 RX ORDER — SODIUM CHLORIDE 0.9 % (FLUSH) 0.9 %
5-10 SYRINGE (ML) INJECTION AS NEEDED
Status: CANCELLED | OUTPATIENT
Start: 2018-08-08

## 2018-08-09 ENCOUNTER — HOSPITAL ENCOUNTER (OUTPATIENT)
Age: 68
Setting detail: OUTPATIENT SURGERY
Discharge: HOME | End: 2018-08-09
Attending: SURGERY | Admitting: SURGERY
Payer: MEDICARE

## 2018-08-09 ENCOUNTER — ANESTHESIA (OUTPATIENT)
Dept: SURGERY | Age: 68
End: 2018-08-09
Payer: MEDICARE

## 2018-08-09 VITALS
SYSTOLIC BLOOD PRESSURE: 118 MMHG | BODY MASS INDEX: 33.56 KG/M2 | HEIGHT: 65 IN | HEART RATE: 63 BPM | WEIGHT: 201.44 LBS | OXYGEN SATURATION: 94 % | DIASTOLIC BLOOD PRESSURE: 59 MMHG | RESPIRATION RATE: 18 BRPM | TEMPERATURE: 98.6 F

## 2018-08-09 PROCEDURE — 77030032490 HC SLV COMPR SCD KNE COVD -B: Performed by: SURGERY

## 2018-08-09 PROCEDURE — 76210000006 HC OR PH I REC 0.5 TO 1 HR: Performed by: SURGERY

## 2018-08-09 PROCEDURE — 76210000020 HC REC RM PH II FIRST 0.5 HR: Performed by: SURGERY

## 2018-08-09 PROCEDURE — 74011250636 HC RX REV CODE- 250/636

## 2018-08-09 PROCEDURE — 74011250637 HC RX REV CODE- 250/637: Performed by: ANESTHESIOLOGY

## 2018-08-09 PROCEDURE — 77030008522 HC TBNG INSUF LAPRO STRY -B: Performed by: SURGERY

## 2018-08-09 PROCEDURE — 77030019908 HC STETH ESOPH SIMS -A: Performed by: ANESTHESIOLOGY

## 2018-08-09 PROCEDURE — 74011250636 HC RX REV CODE- 250/636: Performed by: ANESTHESIOLOGY

## 2018-08-09 PROCEDURE — 77030035045 HC TRCR ENDOSC VRSPRT BLDLSS COVD -B: Performed by: SURGERY

## 2018-08-09 PROCEDURE — 76060000032 HC ANESTHESIA 0.5 TO 1 HR: Performed by: SURGERY

## 2018-08-09 PROCEDURE — 77030031139 HC SUT VCRL2 J&J -A: Performed by: SURGERY

## 2018-08-09 PROCEDURE — 77030035048 HC TRCR ENDOSC OPTCL COVD -B: Performed by: SURGERY

## 2018-08-09 PROCEDURE — 76010000160 HC OR TIME 0.5 TO 1 HR INTENSV-TIER 1: Performed by: SURGERY

## 2018-08-09 PROCEDURE — 74011000250 HC RX REV CODE- 250: Performed by: SURGERY

## 2018-08-09 PROCEDURE — 77030008477 HC STYL SATN SLP COVD -A: Performed by: ANESTHESIOLOGY

## 2018-08-09 PROCEDURE — 77030020782 HC GWN BAIR PAWS FLX 3M -B: Performed by: ANESTHESIOLOGY

## 2018-08-09 PROCEDURE — 77030034154 HC SHR COAG HARM ACE J&J -F: Performed by: SURGERY

## 2018-08-09 PROCEDURE — 77030008756 HC TU IRR SUC STRY -B: Performed by: SURGERY

## 2018-08-09 PROCEDURE — 77030008703 HC TU ET UNCUF COVD -A: Performed by: ANESTHESIOLOGY

## 2018-08-09 PROCEDURE — 77030018836 HC SOL IRR NACL ICUM -A: Performed by: SURGERY

## 2018-08-09 PROCEDURE — 74011250636 HC RX REV CODE- 250/636: Performed by: SURGERY

## 2018-08-09 PROCEDURE — 74011000250 HC RX REV CODE- 250

## 2018-08-09 RX ORDER — ALBUTEROL SULFATE 0.83 MG/ML
2.5 SOLUTION RESPIRATORY (INHALATION) AS NEEDED
Status: DISCONTINUED | OUTPATIENT
Start: 2018-08-09 | End: 2018-08-09 | Stop reason: HOSPADM

## 2018-08-09 RX ORDER — MIDAZOLAM HYDROCHLORIDE 1 MG/ML
2 INJECTION, SOLUTION INTRAMUSCULAR; INTRAVENOUS ONCE
Status: COMPLETED | OUTPATIENT
Start: 2018-08-09 | End: 2018-08-09

## 2018-08-09 RX ORDER — SODIUM CHLORIDE 0.9 % (FLUSH) 0.9 %
5-10 SYRINGE (ML) INJECTION AS NEEDED
Status: DISCONTINUED | OUTPATIENT
Start: 2018-08-09 | End: 2018-08-09 | Stop reason: HOSPADM

## 2018-08-09 RX ORDER — BUPIVACAINE HYDROCHLORIDE 5 MG/ML
INJECTION, SOLUTION EPIDURAL; INTRACAUDAL AS NEEDED
Status: DISCONTINUED | OUTPATIENT
Start: 2018-08-09 | End: 2018-08-09 | Stop reason: HOSPADM

## 2018-08-09 RX ORDER — EPHEDRINE SULFATE 50 MG/ML
INJECTION, SOLUTION INTRAVENOUS AS NEEDED
Status: DISCONTINUED | OUTPATIENT
Start: 2018-08-09 | End: 2018-08-09 | Stop reason: HOSPADM

## 2018-08-09 RX ORDER — SODIUM CHLORIDE, SODIUM LACTATE, POTASSIUM CHLORIDE, CALCIUM CHLORIDE 600; 310; 30; 20 MG/100ML; MG/100ML; MG/100ML; MG/100ML
75 INJECTION, SOLUTION INTRAVENOUS CONTINUOUS
Status: DISCONTINUED | OUTPATIENT
Start: 2018-08-09 | End: 2018-08-09 | Stop reason: HOSPADM

## 2018-08-09 RX ORDER — CEFAZOLIN SODIUM/WATER 2 G/20 ML
2 SYRINGE (ML) INTRAVENOUS ONCE
Status: COMPLETED | OUTPATIENT
Start: 2018-08-09 | End: 2018-08-09

## 2018-08-09 RX ORDER — DEXAMETHASONE SODIUM PHOSPHATE 4 MG/ML
INJECTION, SOLUTION INTRA-ARTICULAR; INTRALESIONAL; INTRAMUSCULAR; INTRAVENOUS; SOFT TISSUE AS NEEDED
Status: DISCONTINUED | OUTPATIENT
Start: 2018-08-09 | End: 2018-08-09 | Stop reason: HOSPADM

## 2018-08-09 RX ORDER — SODIUM CHLORIDE, SODIUM LACTATE, POTASSIUM CHLORIDE, CALCIUM CHLORIDE 600; 310; 30; 20 MG/100ML; MG/100ML; MG/100ML; MG/100ML
50 INJECTION, SOLUTION INTRAVENOUS CONTINUOUS
Status: DISCONTINUED | OUTPATIENT
Start: 2018-08-09 | End: 2018-08-09 | Stop reason: HOSPADM

## 2018-08-09 RX ORDER — LIDOCAINE HYDROCHLORIDE 20 MG/ML
INJECTION, SOLUTION EPIDURAL; INFILTRATION; INTRACAUDAL; PERINEURAL AS NEEDED
Status: DISCONTINUED | OUTPATIENT
Start: 2018-08-09 | End: 2018-08-09 | Stop reason: HOSPADM

## 2018-08-09 RX ORDER — PROPOFOL 10 MG/ML
INJECTION, EMULSION INTRAVENOUS AS NEEDED
Status: DISCONTINUED | OUTPATIENT
Start: 2018-08-09 | End: 2018-08-09 | Stop reason: HOSPADM

## 2018-08-09 RX ORDER — OXYCODONE HYDROCHLORIDE 5 MG/1
5 TABLET ORAL
Status: COMPLETED | OUTPATIENT
Start: 2018-08-09 | End: 2018-08-09

## 2018-08-09 RX ORDER — FENTANYL CITRATE 50 UG/ML
100 INJECTION, SOLUTION INTRAMUSCULAR; INTRAVENOUS ONCE
Status: DISCONTINUED | OUTPATIENT
Start: 2018-08-09 | End: 2018-08-09 | Stop reason: HOSPADM

## 2018-08-09 RX ORDER — LIDOCAINE HYDROCHLORIDE 10 MG/ML
0.1 INJECTION INFILTRATION; PERINEURAL AS NEEDED
Status: DISCONTINUED | OUTPATIENT
Start: 2018-08-09 | End: 2018-08-09 | Stop reason: HOSPADM

## 2018-08-09 RX ORDER — CELECOXIB 200 MG/1
200 CAPSULE ORAL
Status: DISCONTINUED | OUTPATIENT
Start: 2018-08-09 | End: 2018-08-09 | Stop reason: HOSPADM

## 2018-08-09 RX ORDER — SUCCINYLCHOLINE CHLORIDE 20 MG/ML
INJECTION INTRAMUSCULAR; INTRAVENOUS AS NEEDED
Status: DISCONTINUED | OUTPATIENT
Start: 2018-08-09 | End: 2018-08-09 | Stop reason: HOSPADM

## 2018-08-09 RX ORDER — MIDAZOLAM HYDROCHLORIDE 1 MG/ML
2 INJECTION, SOLUTION INTRAMUSCULAR; INTRAVENOUS
Status: DISCONTINUED | OUTPATIENT
Start: 2018-08-09 | End: 2018-08-09 | Stop reason: HOSPADM

## 2018-08-09 RX ORDER — FENTANYL CITRATE 50 UG/ML
INJECTION, SOLUTION INTRAMUSCULAR; INTRAVENOUS AS NEEDED
Status: DISCONTINUED | OUTPATIENT
Start: 2018-08-09 | End: 2018-08-09 | Stop reason: HOSPADM

## 2018-08-09 RX ORDER — GLYCOPYRROLATE 0.2 MG/ML
INJECTION INTRAMUSCULAR; INTRAVENOUS AS NEEDED
Status: DISCONTINUED | OUTPATIENT
Start: 2018-08-09 | End: 2018-08-09 | Stop reason: HOSPADM

## 2018-08-09 RX ORDER — HYDROMORPHONE HYDROCHLORIDE 2 MG/ML
0.5 INJECTION, SOLUTION INTRAMUSCULAR; INTRAVENOUS; SUBCUTANEOUS
Status: DISCONTINUED | OUTPATIENT
Start: 2018-08-09 | End: 2018-08-09 | Stop reason: HOSPADM

## 2018-08-09 RX ORDER — ONDANSETRON 2 MG/ML
INJECTION INTRAMUSCULAR; INTRAVENOUS AS NEEDED
Status: DISCONTINUED | OUTPATIENT
Start: 2018-08-09 | End: 2018-08-09 | Stop reason: HOSPADM

## 2018-08-09 RX ORDER — NEOSTIGMINE METHYLSULFATE 1 MG/ML
INJECTION INTRAVENOUS AS NEEDED
Status: DISCONTINUED | OUTPATIENT
Start: 2018-08-09 | End: 2018-08-09 | Stop reason: HOSPADM

## 2018-08-09 RX ORDER — ROCURONIUM BROMIDE 10 MG/ML
INJECTION, SOLUTION INTRAVENOUS AS NEEDED
Status: DISCONTINUED | OUTPATIENT
Start: 2018-08-09 | End: 2018-08-09 | Stop reason: HOSPADM

## 2018-08-09 RX ADMIN — ROCURONIUM BROMIDE 10 MG: 10 INJECTION, SOLUTION INTRAVENOUS at 07:55

## 2018-08-09 RX ADMIN — EPHEDRINE SULFATE 5 MG: 50 INJECTION, SOLUTION INTRAVENOUS at 08:15

## 2018-08-09 RX ADMIN — MIDAZOLAM HYDROCHLORIDE 2 MG: 1 INJECTION, SOLUTION INTRAMUSCULAR; INTRAVENOUS at 07:02

## 2018-08-09 RX ADMIN — SUCCINYLCHOLINE CHLORIDE 160 MG: 20 INJECTION INTRAMUSCULAR; INTRAVENOUS at 07:49

## 2018-08-09 RX ADMIN — DEXAMETHASONE SODIUM PHOSPHATE 4 MG: 4 INJECTION, SOLUTION INTRA-ARTICULAR; INTRALESIONAL; INTRAMUSCULAR; INTRAVENOUS; SOFT TISSUE at 08:09

## 2018-08-09 RX ADMIN — HYDROMORPHONE HYDROCHLORIDE 0.5 MG: 2 INJECTION, SOLUTION INTRAMUSCULAR; INTRAVENOUS; SUBCUTANEOUS at 08:52

## 2018-08-09 RX ADMIN — Medication 2 G: at 07:54

## 2018-08-09 RX ADMIN — HYDROMORPHONE HYDROCHLORIDE 0.5 MG: 2 INJECTION, SOLUTION INTRAMUSCULAR; INTRAVENOUS; SUBCUTANEOUS at 08:45

## 2018-08-09 RX ADMIN — NEOSTIGMINE METHYLSULFATE 3 MG: 1 INJECTION INTRAVENOUS at 08:21

## 2018-08-09 RX ADMIN — FENTANYL CITRATE 50 MCG: 50 INJECTION, SOLUTION INTRAMUSCULAR; INTRAVENOUS at 07:47

## 2018-08-09 RX ADMIN — EPHEDRINE SULFATE 5 MG: 50 INJECTION, SOLUTION INTRAVENOUS at 08:00

## 2018-08-09 RX ADMIN — SODIUM CHLORIDE, SODIUM LACTATE, POTASSIUM CHLORIDE, AND CALCIUM CHLORIDE 75 ML/HR: 600; 310; 30; 20 INJECTION, SOLUTION INTRAVENOUS at 06:58

## 2018-08-09 RX ADMIN — FENTANYL CITRATE 50 MCG: 50 INJECTION, SOLUTION INTRAMUSCULAR; INTRAVENOUS at 07:45

## 2018-08-09 RX ADMIN — SODIUM CHLORIDE, SODIUM LACTATE, POTASSIUM CHLORIDE, AND CALCIUM CHLORIDE: 600; 310; 30; 20 INJECTION, SOLUTION INTRAVENOUS at 07:41

## 2018-08-09 RX ADMIN — LIDOCAINE HYDROCHLORIDE 70 MG: 20 INJECTION, SOLUTION EPIDURAL; INFILTRATION; INTRACAUDAL; PERINEURAL at 07:48

## 2018-08-09 RX ADMIN — ROCURONIUM BROMIDE 10 MG: 10 INJECTION, SOLUTION INTRAVENOUS at 08:01

## 2018-08-09 RX ADMIN — ONDANSETRON 4 MG: 2 INJECTION INTRAMUSCULAR; INTRAVENOUS at 08:09

## 2018-08-09 RX ADMIN — ROCURONIUM BROMIDE 10 MG: 10 INJECTION, SOLUTION INTRAVENOUS at 07:48

## 2018-08-09 RX ADMIN — PROPOFOL 20 MG: 10 INJECTION, EMULSION INTRAVENOUS at 08:01

## 2018-08-09 RX ADMIN — PROPOFOL 150 MG: 10 INJECTION, EMULSION INTRAVENOUS at 07:48

## 2018-08-09 RX ADMIN — GLYCOPYRROLATE 0.4 MG: 0.2 INJECTION INTRAMUSCULAR; INTRAVENOUS at 08:21

## 2018-08-09 RX ADMIN — ROCURONIUM BROMIDE 5 MG: 10 INJECTION, SOLUTION INTRAVENOUS at 08:10

## 2018-08-09 RX ADMIN — OXYCODONE HYDROCHLORIDE 5 MG: 5 TABLET ORAL at 09:08

## 2018-08-09 NOTE — OP NOTES
[facility] REPORT    Travis Robert  MR#: [id number]  : 1950  ACCOUNT #: [de-identified]   DATE OF SERVICE: 2018    PREOPERATIVE DIAGNOSIS:  Upper abdominal pain, particularly in the right upper quadrant. POSTOPERATIVE DIAGNOSIS:  Upper abdominal pain, particularly in the right upper quadrant, secondary to adhesions. PROCEDURE PERFORMED:  Diagnostic laparoscopy with lysis of adhesions. SURGEON:  Vishal Jimenez MD    ANESTHESIA:  General endotracheal anesthesia plus 30 mL 0.5% Marcaine used for local anesthesia at the end of procedure. ESTIMATED BLOOD LOSS:  10 mL. SPECIMENS REMOVED:  None. COMPLICATIONS:  None. ASSISTANTS:  None. IMPLANTS:  None. HISTORY:  This is a 70-year-old female who has had a long surgical history. She initially underwent a laparoscopic gastric bypass with Dr. Shine Gary at [facility] in . Dr. Eric Houston, a surgeon who used to be in our practice, did a laparoscopic cholecystectomy in . During withdrawal of the gallbladder, stones were spilled into the abdomen as well as into the anterior abdominal wall. I happened to see the patient for a ventral hernia at one of her incision sites. Dr. Randee Murphy was out of town at that time and she came to see me. I did a ventral hernia repair in  with an intraperitoneal onlay mesh. The patient then came back and saw me in 2017 complaining of pain. We did an upper abdomen just as she did recently. She was found to have adhesions, as we did a diagnostic laparoscopy and lysis of adhesions. She came back with the same type of pain and I recommended a repeat. She already had an EGD and colonoscopy as well as a CT scan done by Dr. Cordell Heaton of gastroenterology. Dr. Jeannette Contreras had no other source for her abdominal pain, as she has already had her gallbladder out, there was no evidence of any biliary tree dilatation, no change in her LFTs on Dr. Dipak Blanco workup. He sent her back to me to have a diagnostic laparoscopy and lysis of adhesions as she had such good a result when I did the procedure 19 months ago. I went through the risks of bleeding, infection, anesthesia, injury to the small bowel, large bowel, and any of the intra-abdominal organs. I said that we might not find anything. I also said that diagnostic laparoscopy has a relatively high rate for negative results and not finding any source of her pain, but this was the next step, as the gastroenterology workup has been negative. The patient was agreeable, signed a consent form and was scheduled for today. DESCRIPTION OF PROCEDURE:  Patient was seen in the preop area with her , then transported to room #2 at [address] [place] [address] operating room. She was placed on the operating table in supine position where general endotracheal anesthesia was administered without complications. The abdomen was prepped and draped in usual sterile manner. I did a timeout identifying the patient, surgeon, procedure and her birthdate of 1950. Once everyone in the room agreed with the time-out, I began the procedure. I made an incision in the right lower quadrant overlying the right rectus abdominis muscle and through this placed an Optiview trocar with a 10 mm 0-degree scope. We went through the appropriate layers of the anterior abdominal wall until we were in the peritoneal cavity. The peritoneal cavity was insufflated to 15 mmHg using carbon dioxide gas, after which a 10 mm 30 degree scope was inserted. There were adhesions found in the upper portion of the abdomen as well as in the lower portion of the abdomen, particularly in the right upper quadrant. The mesh as I previously placed was in very good position. There were some adhesions to this, but more adhesions to the previous trocar sites from her previous laparoscopic procedures.   A 5 mm trocar was placed in the left lower quadrant and using a Harmonic scalpel, I took down all the adhesions in the upper and lower abdomen. We got deep into the pelvis, I did not pursue the adhesions as there was the sigmoid colon right amongst these adhesions. The lysis of adhesions took about 30 minutes. After that, there were no other abnormalities noted. Her gastric bypass with antecolic limb was noted. Liver appeared normal.  Everything else appeared normal on limited diagnostic laparoscopy. At this point, the 5 mm trocars were removed under direct vision without bleeding from trocar sites. A 12 mm Optiview trocar was removed and both sites were closed with subcuticular suture of 4-0 Monocryl. I injected 30 mL of 0.5% Marcaine in divided doses to the two incision sites. Patient tolerated the procedure well and was extubated, brought to recovery room in stable condition. She should be able to go home later today. FOLLOWUP:  With me on 08/20/2018.       MD ODESSA Prince / Marry Valdez  D: 08/09/2018 08:35     T: 08/09/2018 12:51  JOB #: [id number]

## 2018-08-09 NOTE — DISCHARGE INSTRUCTIONS
1. Low fat diet as tolerated. 2. Showering is allowed, but no tub baths, hot tubs or swimming. 3. Drainage is common from the wounds. Dress incision as needed. Call our office if the wounds become reddened, tender, feel warm to the touch or pus starts to drain from the wound. 4. Take prescribed pain medication as directed, usually Percocet, Norco, Ultram or Dilaudid. Take over the counter medication for minor pain. 5. Ice may be applied intermittently to the surgical site or sites. 6. Call or office, [phone], if problems arise. 7. Follow up in the office at the assigned time. 8. Resume all medications as taken per surgery, unless specifically instructed not to take certain ones. 9. No lifting more than 25 pounds until told otherwise. 10. Driving is allowed 3 days after surgery as long as you feel comfortable enough to drive and have not taken any prescription pain medication prior to driving. After general anesthesia or intravenous sedation, for 24 hours or while taking prescription Narcotics:  · Limit your activities  · Do not drive and operate hazardous machinery  · Do not make important personal or business decisions  · Do  not drink alcoholic beverages  · If you have not urinated within 8 hours after discharge, please contact your surgeon on call. *  Please give a list of your current medications to your Primary Care Provider. *  Please update this list whenever your medications are discontinued, doses are      changed, or new medications (including over-the-counter products) are added. *  Please carry medication information at all times in case of emergency situations. These are general instructions for a healthy lifestyle:  No smoking/ No tobacco products/ Avoid exposure to second hand smoke  Surgeon General's Warning:  Quitting smoking now greatly reduces serious risk to your health.   Obesity, smoking, and sedentary lifestyle greatly increases your risk for illness  A healthy diet, regular physical exercise & weight monitoring are important for maintaining a healthy lifestyle    You may be retaining fluid if you have a history of heart failure or if you experience any of the following symptoms:  Weight gain of 3 pounds or more overnight or 5 pounds in a week, increased swelling in our hands or feet or shortness of breath while lying flat in bed. Please call your doctor as soon as you notice any of these symptoms; do not wait until your next office visit. Recognize signs and symptoms of STROKE:  F-face looks uneven  A-arms unable to move or move unevenly  S-speech slurred or non-existent  T-time-call 911 as soon as signs and symptoms begin-DO NOT go       Back to bed or wait to see if you get better-TIME IS BRAIN.

## 2018-08-09 NOTE — ANESTHESIA PREPROCEDURE EVALUATION
Anesthetic History   No history of anesthetic complications            Review of Systems / Medical History  Patient summary reviewed, nursing notes reviewed and pertinent labs reviewed    Pulmonary  Within defined limits                 Neuro/Psych         Psychiatric history     Cardiovascular    Hypertension          CAD, cardiac stents and hyperlipidemia    Exercise tolerance: >4 METS     GI/Hepatic/Renal  Within defined limits              Endo/Other      Hypothyroidism: well controlled  Obesity and arthritis     Other Findings              Physical Exam    Airway  Mallampati: II      Mouth opening: Normal     Cardiovascular  Regular rate and rhythm,  S1 and S2 normal,  no murmur, click, rub, or gallop             Dental  No notable dental hx       Pulmonary  Breath sounds clear to auscultation               Abdominal         Other Findings            Anesthetic Plan    ASA: 3  Anesthesia type: general          Induction: Intravenous  Anesthetic plan and risks discussed with: Patient

## 2018-08-09 NOTE — ANESTHESIA POSTPROCEDURE EVALUATION
Post-Anesthesia Evaluation and Assessment    Patient: Shandra Delgadillo MRN: [id number]  SSN: [id number]    YOB: 1950  Age: 77 y.o. Sex: female       Cardiovascular Function/Vital Signs  Visit Vitals    /59    Pulse 63    Temp 37 °C (98.6 °F)    Resp 18    Ht 5' 5\" (1.651 m)    Wt 91.4 kg (201 lb 7 oz)    SpO2 94%    BMI 33.52 kg/m2       Patient is status post general anesthesia for Procedure(s):  LAPAROSCOPY GENERAL DIAGNOSTIC WITH LYSIS OF ADHESIONS. Nausea/Vomiting: None    Postoperative hydration reviewed and adequate. Pain:  Pain Scale 1: Numeric (0 - 10) (08/09/18 0927)  Pain Intensity 1: 2 (08/09/18 0927)   Managed    Neurological Status:   Neuro (WDL): Within Defined Limits (08/09/18 0927)  Neuro  Neurologic State: Drowsy (08/09/18 0840)  Orientation Level: Oriented X4 (08/09/18 0840)  Cognition: Follows commands (08/09/18 0840)  Speech: Clear (08/09/18 0840)  LUE Motor Response: Purposeful (08/09/18 0840)  LLE Motor Response: Purposeful (08/09/18 0840)  RUE Motor Response: Purposeful (08/09/18 0840)  RLE Motor Response: Purposeful (08/09/18 0840)   At baseline    Mental Status and Level of Consciousness: Alert and oriented     Pulmonary Status:   O2 Device: Room air (08/09/18 0927)   Adequate oxygenation and airway patent    Complications related to anesthesia: None    Post-anesthesia assessment completed.  No concerns    Signed By: Humberto Chandler MD     August 9, 2018

## 2018-08-09 NOTE — INTERVAL H&P NOTE
H&P Update:  Chadd Oquendo was seen and examined. History and physical has been reviewed. The patient has been examined.  There have been no significant clinical changes since the completion of the originally dated History and Physical.    Signed By: Aleksandr Thompson MD     August 9, 2018 6:46 AM

## 2018-08-09 NOTE — H&P (VIEW-ONLY)
aDate: 2018      Name: Viridiana Hammer      MRN: [id number]       : 1950       Age: 77 y.o. Sex: female        Tosha Costello MD       CC:    Chief Complaint   Patient presents with    Follow-up     abdominal pain       HPI:     Viridiana Hammer is a 77 y.o. female who presents for evaluation of RUQ pain as a referral from Dr. Sangeeta Alex. The patient had an EGD/colonoscopy which was negative. An abdominal US was likewise negative as was a CT scan of the abdomen/pelvis. The patient originally had a gastric bypass done by Dr. Bobo Peres in . She had a laparoscopic cholecystectomy in  with Dr. Kush Laureano at which time there was \"spillage of stones. \" I performed a laparoscopic ventral hernia repair on 14. She had a diagnostic laparoscopy with lysis of adhesions on 17 after which she felt \"great. \" She has the same symptoms now as she had prior to her diagnostic laparoscopy in . She has occasional nausea, but no vomiting. No change in bowel habits. PMH:    Past Medical History:   Diagnosis Date    Anxiety     Arthritis     general-back, neck, hips,knees/ managed with medication     Autoimmune disease ([address]     lichens sclerosis; pt states is dormant now    CAD (coronary artery disease) 2010    stent x 1, no MI    Chest pain     right    Chronic pain     back and neck    Depression     managed with medication     Family history of ischemic heart disease     H/O acute pancreatitis 2013    states gallbladder stone lodged creating a blockage.  H/O gastric bypass 2009    High cholesterol     managed w/ meds    Hypertension     managed with medication     Hypothyroidism     managed with medication     Lichen sclerosus     Obesity (BMI 30-39. 9)     BMI 32.4 3/16    Osteopenia     Systolic murmur     ECHO: +/-PI, TR ; Per cardiologist note 17 no murmur ; pt states told by cardiologist that he was removeing murmur from her record    Ventral hernia PSH:    Past Surgical History:   Procedure Laterality Date    HX APPENDECTOMY  1954    HX BLADDER SUSPENSION      HX CARPAL TUNNEL RELEASE Right     HX CATARACT REMOVAL Bilateral 2015    HX COLONOSCOPY  2017    normal, repeat after 2027    HX ENDOSCOPY  2018    s/p bypass changes    HX GASTRIC BYPASS  2009    HX HEART CATHETERIZATION  2010    stenting of LAD    HX HERNIA REPAIR  8/14/14    ventral    HX HYSTERECTOMY  1978    cervix removed    HX LAP CHOLECYSTECTOMY  7/2013    [facility]    with 1 steel adeline with screws~lower lumbar    HX LYSIS OF ADHESIONS      abdominal    HX OOPHORECTOMY  1984    right    HX ORTHOPAEDIC Right 1997    carpal tunnel release    HX SHOULDER ARTHROSCOPY Right 2011    HX UROLOGICAL      bladder suspension       MEDS:    Current Outpatient Prescriptions   Medication Sig    HYDROcodone-acetaminophen (NORCO) 7.5-325 mg per tablet 1 q6h prn severe pain  Indications: Pain    HYDROcodone-acetaminophen (NORCO) 7.5-325 mg per tablet 1 q6h prn severe pain   Fill after 30 days  Indications: Pain    [START ON 8/10/2018] HYDROcodone-acetaminophen (NORCO) 7.5-325 mg per tablet 1 q6h prn severe pain   Refill after 60 days  Indications: Pain    temazepam (RESTORIL) 30 mg capsule Take 1 Cap by mouth nightly as needed. Max Daily Amount: 30 mg.  LORazepam (ATIVAN) 0.5 mg tablet Take 1 Tab by mouth every eight (8) hours as needed for Anxiety. Max Daily Amount: 1.5 mg.    omeprazole (PRILOSEC) 40 mg capsule Take 40 mg by mouth daily.  meloxicam (MOBIC) 15 mg tablet 1 every day for pain (Patient taking differently: Take 15 mg by mouth daily.  1 every day for pain)    losartan-hydroCHLOROthiazide (HYZAAR) 100-25 mg per tablet 1 every day for BP  Indications: hypertension    dicyclomine (BENTYL) 10 mg capsule 1 to 2 q6h prn abd cramping    alendronate (FOSAMAX) 70 mg tablet 1 q week for osteoporosis    levothyroxine (SYNTHROID) 75 mcg tablet 1 every day for thyroid    amLODIPine (NORVASC) 5 mg tablet 1 every day for BP  Indications: hypertension    traZODone (DESYREL) 150 mg tablet 1 to 2 qhs for sleep    atorvastatin (LIPITOR) 40 mg tablet 1 qd  Indications: mixed hyperlipidemia    sertraline (ZOLOFT) 100 mg tablet Take 1 Tab by mouth nightly. Indications: major depressive disorder    oxyCODONE IR (ROXICODONE) 5 mg immediate release tablet Take 1 Tab by mouth every four (4) hours as needed. Max Daily Amount: 30 mg.    MULTIVITAMIN PO Take  by mouth daily. Takes 1-2 melt-aways daily     No current facility-administered medications for this visit. ALLERGIES:      Allergies   Allergen Reactions    Adhesive Tape Rash    Sulfa (Sulfonamide Antibiotics) Nausea and Vomiting       SH:    Social History   Substance Use Topics    Smoking status: Never Smoker    Smokeless tobacco: Never Used    Alcohol use 0.6 oz/week     1 Glasses of wine per week      Comment: Occasional       FH:    Family History   Problem Relation Age of Onset    Heart Attack Mother     Heart Disease Mother     Hypertension Mother     Cancer Mother      melanoma    Cancer Father      leukemia    Coronary Artery Disease Father     Cancer Sister      breast    Heart Attack Brother     Heart Disease Brother     Cancer Brother      lung    Lung Disease Brother        ROS: The patient has no difficulty with chest pain or shortness of breath. No fever or chills. Comprehensive 13 point review of systems was otherwise unremarkable except as noted above. Physical Exam:     Visit Vitals    /90    Pulse 70    Ht 5' 4\" (1.626 m)    Wt 200 lb (90.7 kg)    BMI 34.33 kg/m2       General: Alert, oriented, cooperative white female in no acute distress. Eyes: Sclera are clear. Conjunctiva and lids within normal limits. No icterus.   Ears and Nose: no gross deformities to visual inspection, gross hearing intact  Neck: Supple, trachea midline, no appreciable thyromegaly  Resp: Breathing is  non-labored. Lungs clear to auscultation without wheezing or rhonchi   CV: RRR. No murmurs, rubs or gallops appreciated. Abd: soft non-tender and non-distended without peritoneal signs. +bs  Psych:  Mood and affect appropriate. Short-term memory and understanding intact      Assessment/Plan:  Joseph Webb is a 77 y.o. female who has signs and symptoms consistent with RUQ pain with a negative work up including EGD, colonoscopy, abdominal US and CT scan. 1. Diagnostic laparoscopy with lysis of adhesions. I went through the risks of bleeding, infection and anesthesia. I went through other risks of injury to the liver, biliary tree structures, stomach, small bowel, large bowel , pancreas and the potential need to convert to an open procedure. I said nothing may be found, such that her abdominal pain may not be found to have a source. She understands, is willing to take the chance as she had such a good response in 2017.     Cornelius Calvo MD      FACS   7/31/2018  7:32 PM

## 2018-08-09 NOTE — IP AVS SNAPSHOT
[address] [address] 
[phone] Patient: Deshaun Tucker MRN: [id number] GCY:[id number] About your hospitalization You were admitted on:  August 9, 2018 You last received care in the:  [facility] PACU You were discharged on:  August 9, 2018 Why you were hospitalized Your primary diagnosis was:  Not on File Follow-up Information Follow up With Details Comments Contact Info Carlos Stroud MD   [address] [facility] [address] 
[phone] Ismael Mohamud MD Follow up on 8/20/2018 1:30 [address] [facility] [address] 
[phone] Your Scheduled Appointments Monday August 20, 2018  1:30 PM EDT Global Post Op with Ismael Mohamud MD  
[facility] ([address] [place] [address]) [address] [place] [id number] [phone] Thursday September 13, 2018  2:45 PM EDT Office Visit with Carlos Stroud MD  
[facility] MEDICINE ([address]) 96 [facility] 700 [facility]  
[phone] Discharge Orders None A check dewayne indicates which time of day the medication should be taken. My Medications CHANGE how you take these medications Instructions Each Dose to Equal  
 Morning Noon Evening Bedtime  
 atorvastatin 40 mg tablet Commonly known as:  LIPITOR What changed:   
- when to take this 
- additional instructions Your last dose was: Your next dose is:    
   
   
 1 qd  Indications: mixed hyperlipidemia  
     
   
   
   
  
 meloxicam 15 mg tablet Commonly known as:  MOBIC What changed:   
- how much to take 
- how to take this - when to take this 
- additional instructions Your last dose was: Your next dose is:    
   
   
 1 every day for pain traZODone 150 mg tablet Commonly known as:  Aris Dumont What changed:   
- when to take this 
- reasons to take this 
- additional instructions Your last dose was: Your next dose is:    
   
   
 1 to 2 qhs for sleep CONTINUE taking these medications Instructions Each Dose to Equal  
 Morning Noon Evening Bedtime  
 alendronate 70 mg tablet Commonly known as:  FOSAMAX Your last dose was: Your next dose is:    
   
   
 1 q week for osteoporosis  
     
   
   
   
  
 amLODIPine 5 mg tablet Commonly known as:  Halley Gramajodon Your last dose was: Your next dose is:    
   
   
 1 every day for BP  Indications: hypertension  
     
   
   
   
  
 aspirin delayed-release 81 mg tablet Your last dose was: Your next dose is: Take 81 mg by mouth daily. 81 mg  
    
   
   
   
  
 dicyclomine 10 mg capsule Commonly known as:  BENTYL Your last dose was: Your next dose is:    
   
   
 1 to 2 q6h prn abd cramping HYDROcodone-acetaminophen 7.5-325 mg per tablet Commonly known as:  Janette Greer Your last dose was: Your next dose is:    
   
   
 1 q6h prn severe pain  Indications: Pain  
     
   
   
   
  
 levothyroxine 75 mcg tablet Commonly known as:  SYNTHROID Your last dose was: Your next dose is:    
   
   
 1 every day for thyroid LORazepam 0.5 mg tablet Commonly known as:  ATIVAN Your last dose was: Your next dose is: Take 1 Tab by mouth every eight (8) hours as needed for Anxiety. Max Daily Amount: 1.5 mg.  
 0.5 mg  
    
   
   
   
  
 losartan-hydroCHLOROthiazide 100-25 mg per tablet Commonly known as:  HYZAAR Your last dose was: Your next dose is:    
   
   
 1 every day for BP  Indications: hypertension MULTIVITAMIN PO Your last dose was: Your next dose is: Take  by mouth daily. Takes 1-2 melt-aways daily  
     
   
   
   
  
 sertraline 100 mg tablet Commonly known as:  ZOLOFT Your last dose was: Your next dose is: Take 1 Tab by mouth nightly. Indications: major depressive disorder 100 mg  
    
   
   
   
  
 temazepam 30 mg capsule Commonly known as:  RESTORIL Your last dose was: Your next dose is: Take 1 Cap by mouth nightly as needed. Max Daily Amount: 30 mg.  
 30 mg Opioid Education Prescription Opioids: What You Need to Know: 
 
Prescription opioids can be used to help relieve moderate-to-severe pain and are often prescribed following a surgery or injury, or for certain health conditions. These medications can be an important part of treatment but also come with serious risks. Opioids are strong pain medicines. Examples include hydrocodone, oxycodone, fentanyl, and morphine. Heroin is an example of an illegal opioid. It is important to work with your health care provider to make sure you are getting the safest, most effective care. WHAT ARE THE RISKS AND SIDE EFFECTS OF OPIOID USE? Prescription opioids carry serious risks of addiction and overdose, especially with prolonged use. An opioid overdose, often marked by slow breathing, can cause sudden death. The use of prescription opioids can have a number of side effects as well, even when taken as directed. · Tolerance-meaning you might need to take more of a medication for the same pain relief · Physical dependence-meaning you have symptoms of withdrawal when the medication is stopped. Withdrawal symptoms can include nausea, sweating, chills, diarrhea, stomach cramps, and muscle aches. Withdrawal can last up to several weeks, depending on which drug you took and how long you took it. · Increased sensitivity to pain · Constipation · Nausea, vomiting, and dry mouth · Sleepiness and dizziness · Confusion · Depression · Low levels of testosterone that can result in lower sex drive, energy, and strength · Itching and sweating RISKS ARE GREATER WITH:      
· History of drug misuse, substance use disorder, or overdose · Mental health conditions (such as depression or anxiety) · Sleep apnea · Older age (72 years or older) · Pregnancy Avoid alcohol while taking prescription opioids. Also, unless specifically advised by your health care provider, medications to avoid include: · Benzodiazepines (such as Xanax or Valium) · Muscle relaxants (such as Soma or Flexeril) · Hypnotics (such as Ambien or Lunesta) · Other prescription opioids KNOW YOUR OPTIONS Talk to your health care provider about ways to manage your pain that don't involve prescription opioids. Some of these options may actually work better and have fewer risks and side effects. Options may include: 
· Pain relievers such as acetaminophen, ibuprofen, and naproxen · Some medications that are also used for depression or seizures · Physical therapy and exercise · Counseling to help patients learn how to cope better with triggers of pain and stress. · Application of heat or cold compress · Massage therapy · Relaxation techniques Be Informed Make sure you know the name of your medication, how much and how often to take it, and its potential risks & side effects. IF YOU ARE PRESCRIBED OPIOIDS FOR PAIN: 
· Never take opioids in greater amounts or more often than prescribed. Remember the goal is not to be pain-free but to manage your pain at a tolerable level. · Follow up with your primary care provider to: · Work together to create a plan on how to manage your pain. · Talk about ways to help manage your pain that don't involve prescription opioids. · Talk about any and all concerns and side effects. · Help prevent misuse and abuse. · Never sell or share prescription opioids · Help prevent misuse and abuse. · Store prescription opioids in a secure place and out of reach of others (this may include visitors, children, friends, and family). · Safely dispose of unused/unwanted prescription opioids: Find your community drug take-back program or your pharmacy mail-back program, or flush them down the toilet, following guidance from the Food and Drug Administration (www.fda.gov/Drugs/ResourcesForYou). · Visit www.cdc.gov/drugoverdose to learn about the risks of opioid abuse and overdose. · If you believe you may be struggling with addiction, tell your health care provider and ask for guidance or call [address] [place] [address] at [phone]. Discharge Instructions 1. Low fat diet as tolerated. 2. Showering is allowed, but no tub baths, hot tubs or swimming. 3. Drainage is common from the wounds. Dress incision as needed. Call our office if the wounds become reddened, tender, feel warm to the touch or pus starts to drain from the wound. 4. Take prescribed pain medication as directed, usually Percocet, Norco, Ultram or Dilaudid. Take over the counter medication for minor pain. 5. Ice may be applied intermittently to the surgical site or sites. 6. Call or office, [phone], if problems arise. 7. Follow up in the office at the assigned time. 8. Resume all medications as taken per surgery, unless specifically instructed not to take certain ones. 9. No lifting more than 25 pounds until told otherwise. 10. Driving is allowed 3 days after surgery as long as you feel comfortable enough to drive and have not taken any prescription pain medication prior to driving. After general anesthesia or intravenous sedation, for 24 hours or while taking prescription Narcotics: · Limit your activities · Do not drive and operate hazardous machinery · Do not make important personal or business decisions · Do  not drink alcoholic beverages · If you have not urinated within 8 hours after discharge, please contact your surgeon on call. *  Please give a list of your current medications to your Primary Care Provider. *  Please update this list whenever your medications are discontinued, doses are 
    changed, or new medications (including over-the-counter products) are added. *  Please carry medication information at all times in case of emergency situations. These are general instructions for a healthy lifestyle: No smoking/ No tobacco products/ Avoid exposure to second hand smoke Surgeon General's Warning:  Quitting smoking now greatly reduces serious risk to your health. Obesity, smoking, and sedentary lifestyle greatly increases your risk for illness A healthy diet, regular physical exercise & weight monitoring are important for maintaining a healthy lifestyle You may be retaining fluid if you have a history of heart failure or if you experience any of the following symptoms:  Weight gain of 3 pounds or more overnight or 5 pounds in a week, increased swelling in our hands or feet or shortness of breath while lying flat in bed. Please call your doctor as soon as you notice any of these symptoms; do not wait until your next office visit. Recognize signs and symptoms of STROKE: 
F-face looks uneven A-arms unable to move or move unevenly S-speech slurred or non-existent T-time-call 911 as soon as signs and symptoms begin-DO NOT go Back to bed or wait to see if you get better-TIME IS BRAIN. ACO Transitions of Care Introducing [employer] [address] Tania Salinas offers a voluntary care coordination program to provide high quality service and care to [facility] fee-for-service beneficiaries.   
 
[facility] was designed to help you enhance your health and well-being through the following services: ? Transitions of Care  support for individuals who are transitioning from one care setting to another (example: Hospital to home). ? Chronic and Complex Care Coordination  support for individuals and caregivers of those with serious or chronic illnesses or with more than one chronic (ongoing) condition and those who take a number of different medications. If you meet specific medical criteria, a [facility] may call you directly to coordinate your care with your primary care physician and your other care providers. For questions about the [facility] programs, please, contact your physicians office. For general questions or additional information about Accountable Care Organizations: 
Please visit www.medicare.gov/acos. html or call 1-800-MEDICARE (9-540.747.4270) TTY users should call 6-155.453.3849. Introducing [facility]! [facility] introduces [employer] patient portal. Now you can access parts of your medical record, email your doctor's office, and request medication refills online. 1. In your internet browser, go to [email or web address] 2. Click on the First Time User? Click Here link in the Sign In box. You will see the New Member Sign Up page. 3. Enter your [employer] Access Code exactly as it appears below. You will not need to use this code after youve completed the sign-up process. If you do not sign up before the expiration date, you must request a new code. · [employer] Access Code: [email or web address] Expires: 9/6/2018 10:40 AM 
 
4. Enter the last four digits of your Social Security Number (xxxx) and Date of Birth (mm/dd/yyyy) as indicated and click Submit. You will be taken to the next sign-up page. 5. Create a [employer] ID. This will be your [employer] login ID and cannot be changed, so think of one that is secure and easy to remember. 6. Create a [employer] password. You can change your password at any time. 7. Enter your Password Reset Question and Answer. This can be used at a later time if you forget your password. 8. Enter your e-mail address. You will receive e-mail notification when new information is available in [address] 9. Click Sign Up. You can now view and download portions of your medical record. 10. Click the Download Summary menu link to download a portable copy of your medical information. If you have questions, please visit the Frequently Asked Questions section of the [employer] website. Remember, [employer] is NOT to be used for urgent needs. For medical emergencies, dial 911. Now available from your iPhone and Android! Introducing Manohar Avlarez As a [employer] patient, I wanted to make you aware of our electronic visit tool called Manhoar Alvarez. [employer] 24/7 allows you to connect within minutes with a medical provider 24 hours a day, seven days a week via a mobile device or tablet or logging into a secure website from your computer. You can access Manohar Alvarez from anywhere in the [place]. A virtual visit might be right for you when you have a simple condition and feel like you just dont want to get out of bed, or cant get away from work for an appointment, when your regular [employer] provider is not available (evenings, weekends or holidays), or when youre out of town and need minor care. Electronic visits cost only $49 and if the [employer] 24/7 provider determines a prescription is needed to treat your condition, one can be electronically transmitted to a nearby pharmacy*. Please take a moment to enroll today if you have not already done so. The enrollment process is free and takes just a few minutes. To enroll, please download the [employer] 24/7 alberto to your tablet or phone, or visit [email or web address] to enroll on your computer. And, as an [address] [place] [address] patient with a [email or web address] account, the results of your visits will be scanned into your electronic medical record and your primary care provider will be able to view the scanned results. We urge you to continue to see your regular Romayne Duster provider for your ongoing medical care. And while your primary care provider may not be the one available when you seek a Manohar Turnerluis felipefin virtual visit, the peace of mind you get from getting a real diagnosis real time can be priceless. For more information on Manohar Englandfin, view our Frequently Asked Questions (FAQs) at [email or web address]. Sincerely, 
 
Narendra Larsen MD 
Chief Medical Officer Sil Salinas *:  certain medications cannot be prescribed via Manohar Johnnyluis felipefin Providers Seen During Your Hospitalization Provider Specialty Primary office phone Ray Mata, [address] [place] [address] Surgery [phone] Your Primary Care Physician (PCP) Primary Care Physician Office Phone Office Fax Daina Madison [phone] You are allergic to the following Allergen Reactions Adhesive Rash Pulls skin off,can use paper tape Adhesive Tape Rash  
    
 Sulfa (Sulfonamide Antibiotics) Nausea and Vomiting Recent Documentation Height Weight BMI OB Status Smoking Status 1.651 m 91.4 kg 33.52 kg/m2 Hysterectomy Never Smoker Emergency Contacts Name Discharge Info Relation Home Work Mobile Meggan Lerner DISCHARGE CAREGIVER [3] Daughter [21] [phone] [facility] DISCHARGE CAREGIVER [3] Spouse [3] [phone] Patient Belongings The following personal items are in your possession at time of discharge: 
  Dental Appliances: None  Visual Aid: Glasses (readers)      Home Medications: None   Jewelry: None  Clothing:  Footwear, Pants, Shirt, Undergarments    Other Valuables: None  Personal Items Sent to Safe: none Please provide this summary of care documentation to your next provider. Signatures-by signing, you are acknowledging that this After Visit Summary has been reviewed with you and you have received a copy. Patient Signature:  ____________________________________________________________ Date:  ____________________________________________________________  
  
Aloma Snellen Provider Signature:  ____________________________________________________________ Date:  ____________________________________________________________

## 2018-08-09 NOTE — BRIEF OP NOTE
BRIEF OPERATIVE NOTE    Date of Procedure: 8/9/2018   Preoperative Diagnosis: Abdominal pain, unspecified abdominal location [R10.9]  Postoperative Diagnosis: Abdominal pain, unspecified abdominal location [R10.9]    Procedure(s):  LAPAROSCOPY GENERAL DIAGNOSTIC WITH LYSIS OF ADHESIONS  Surgeon(s) and Role:     * Zaid Erickson MD - Primary         Surgical Assistant: None    Surgical Staff:  Circ-1: Demetrio Pineda RN  Scrub Tech-1: Ashley Hobson  Scrub Tech-2: Sarah Beckford  Event Time In   Incision Start 0800   Incision Close      Anesthesia: General   Estimated Blood Loss: 10 cc's  Specimens: None  Findings: See dictated note   Complications: None  Implants: * No implants in log *

## 2018-09-14 PROBLEM — F51.01 PRIMARY INSOMNIA: Chronic | Status: ACTIVE | Noted: 2018-09-14

## 2018-09-14 PROBLEM — Z01.810 PRE-OPERATIVE CARDIOVASCULAR EXAMINATION: Status: RESOLVED | Noted: 2018-04-18 | Resolved: 2018-09-14

## 2018-09-14 PROBLEM — F51.01 PRIMARY INSOMNIA: Status: ACTIVE | Noted: 2018-09-14

## 2018-09-14 PROBLEM — M81.0 AGE-RELATED OSTEOPOROSIS WITHOUT CURRENT PATHOLOGICAL FRACTURE: Status: ACTIVE | Noted: 2018-09-14

## 2018-09-14 PROBLEM — M19.90 OSTEOARTHRITIS: Chronic | Status: ACTIVE | Noted: 2017-08-24

## 2018-09-14 PROBLEM — M81.0 AGE-RELATED OSTEOPOROSIS WITHOUT CURRENT PATHOLOGICAL FRACTURE: Chronic | Status: RESOLVED | Noted: 2017-10-26 | Resolved: 2018-09-14

## 2018-09-14 PROBLEM — K58.2 IRRITABLE BOWEL SYNDROME WITH BOTH CONSTIPATION AND DIARRHEA: Chronic | Status: ACTIVE | Noted: 2018-01-24

## 2018-09-14 PROBLEM — M81.0 AGE-RELATED OSTEOPOROSIS WITHOUT CURRENT PATHOLOGICAL FRACTURE: Chronic | Status: ACTIVE | Noted: 2018-09-14

## 2019-01-11 PROBLEM — I25.119 CORONARY ARTERY DISEASE INVOLVING NATIVE CORONARY ARTERY OF NATIVE HEART WITH ANGINA PECTORIS (HCC): Status: ACTIVE | Noted: 2019-01-11

## 2019-01-31 ENCOUNTER — HOSPITAL ENCOUNTER (OUTPATIENT)
Dept: CT IMAGING | Age: 69
Discharge: HOME | DRG: 439 | End: 2019-01-31
Attending: INTERNAL MEDICINE
Payer: MEDICARE

## 2019-01-31 DIAGNOSIS — R79.89 ABNORMAL LFTS: ICD-10-CM

## 2019-01-31 DIAGNOSIS — R10.11 RIGHT UPPER QUADRANT PAIN: ICD-10-CM

## 2019-01-31 PROCEDURE — 74011000258 HC RX REV CODE- 258: Performed by: INTERNAL MEDICINE

## 2019-01-31 PROCEDURE — 74011636320 HC RX REV CODE- 636/320: Performed by: INTERNAL MEDICINE

## 2019-01-31 PROCEDURE — 74160 CT ABDOMEN W/CONTRAST: CPT

## 2019-01-31 RX ORDER — SODIUM CHLORIDE 0.9 % (FLUSH) 0.9 %
10 SYRINGE (ML) INJECTION
Status: COMPLETED | OUTPATIENT
Start: 2019-01-31 | End: 2019-01-31

## 2019-01-31 RX ADMIN — DIATRIZOATE MEGLUMINE AND DIATRIZOATE SODIUM 15 ML: 660; 100 LIQUID ORAL; RECTAL at 15:29

## 2019-01-31 RX ADMIN — Medication 10 ML: at 15:29

## 2019-01-31 RX ADMIN — SODIUM CHLORIDE 100 ML: 900 INJECTION, SOLUTION INTRAVENOUS at 15:29

## 2019-01-31 RX ADMIN — IOPAMIDOL 100 ML: 755 INJECTION, SOLUTION INTRAVENOUS at 15:29

## 2019-02-01 ENCOUNTER — HOSPITAL ENCOUNTER (INPATIENT)
Age: 69
LOS: 5 days | Discharge: HOME | DRG: 439 | End: 2019-02-06
Attending: EMERGENCY MEDICINE | Admitting: INTERNAL MEDICINE
Payer: MEDICARE

## 2019-02-01 DIAGNOSIS — K80.50 CHOLEDOCHOLITHIASIS: ICD-10-CM

## 2019-02-01 DIAGNOSIS — I86.8: ICD-10-CM

## 2019-02-01 DIAGNOSIS — T81.72XA: ICD-10-CM

## 2019-02-01 DIAGNOSIS — R10.13 ABDOMINAL PAIN, EPIGASTRIC: Primary | ICD-10-CM

## 2019-02-01 LAB
ALBUMIN SERPL-MCNC: 3.5 G/DL (ref 3.2–4.6)
ALBUMIN/GLOB SERPL: 1 {RATIO} (ref 1.2–3.5)
ALP SERPL-CCNC: 530 U/L (ref 50–136)
ALT SERPL-CCNC: 878 U/L (ref 12–65)
AMORPH CRY URNS QL MICRO: ABNORMAL
ANION GAP SERPL CALC-SCNC: 9 MMOL/L (ref 7–16)
AST SERPL-CCNC: 836 U/L (ref 15–37)
BACTERIA URNS QL MICRO: ABNORMAL /HPF
BASOPHILS # BLD: 0 K/UL (ref 0–0.2)
BASOPHILS # BLD: 0 K/UL (ref 0–0.2)
BASOPHILS NFR BLD: 0 % (ref 0–2)
BASOPHILS NFR BLD: 0 % (ref 0–2)
BILIRUB DIRECT SERPL-MCNC: 3.1 MG/DL
BILIRUB SERPL-MCNC: 4.2 MG/DL (ref 0.2–1.1)
BUN SERPL-MCNC: 18 MG/DL (ref 8–23)
CALCIUM SERPL-MCNC: 9.2 MG/DL (ref 8.3–10.4)
CASTS URNS QL MICRO: 0 /LPF
CHLORIDE SERPL-SCNC: 100 MMOL/L (ref 98–107)
CO2 SERPL-SCNC: 25 MMOL/L (ref 21–32)
CREAT SERPL-MCNC: 1.27 MG/DL (ref 0.6–1)
CRYSTALS URNS QL MICRO: 0 /LPF
DIFFERENTIAL METHOD BLD: ABNORMAL
DIFFERENTIAL METHOD BLD: ABNORMAL
EOSINOPHIL # BLD: 0 K/UL (ref 0–0.8)
EOSINOPHIL # BLD: 0.1 K/UL (ref 0–0.8)
EOSINOPHIL NFR BLD: 0 % (ref 0.5–7.8)
EOSINOPHIL NFR BLD: 1 % (ref 0.5–7.8)
EPI CELLS #/AREA URNS HPF: ABNORMAL /HPF
ERYTHROCYTE [DISTWIDTH] IN BLOOD BY AUTOMATED COUNT: 14.9 % (ref 11.9–14.6)
ERYTHROCYTE [DISTWIDTH] IN BLOOD BY AUTOMATED COUNT: 15.1 % (ref 11.9–14.6)
GLOBULIN SER CALC-MCNC: 3.5 G/DL (ref 2.3–3.5)
GLUCOSE SERPL-MCNC: 194 MG/DL (ref 65–100)
HCT VFR BLD AUTO: 35.2 % (ref 35.8–46.3)
HCT VFR BLD AUTO: 38.1 % (ref 35.8–46.3)
HGB BLD-MCNC: 11.3 G/DL (ref 11.7–15.4)
HGB BLD-MCNC: 12.4 G/DL (ref 11.7–15.4)
IMM GRANULOCYTES # BLD AUTO: 0 K/UL (ref 0–0.5)
IMM GRANULOCYTES # BLD AUTO: 0.1 K/UL (ref 0–0.5)
IMM GRANULOCYTES NFR BLD AUTO: 0 % (ref 0–5)
IMM GRANULOCYTES NFR BLD AUTO: 1 % (ref 0–5)
LIPASE SERPL-CCNC: 1981 U/L (ref 73–393)
LYMPHOCYTES # BLD: 0.3 K/UL (ref 0.5–4.6)
LYMPHOCYTES # BLD: 0.7 K/UL (ref 0.5–4.6)
LYMPHOCYTES NFR BLD: 2 % (ref 13–44)
LYMPHOCYTES NFR BLD: 7 % (ref 13–44)
MCH RBC QN AUTO: 27.1 PG (ref 26.1–32.9)
MCH RBC QN AUTO: 27.4 PG (ref 26.1–32.9)
MCHC RBC AUTO-ENTMCNC: 32.1 G/DL (ref 31.4–35)
MCHC RBC AUTO-ENTMCNC: 32.5 G/DL (ref 31.4–35)
MCV RBC AUTO: 84.1 FL (ref 79.6–97.8)
MCV RBC AUTO: 84.4 FL (ref 79.6–97.8)
MONOCYTES # BLD: 0.7 K/UL (ref 0.1–1.3)
MONOCYTES # BLD: 0.8 K/UL (ref 0.1–1.3)
MONOCYTES NFR BLD: 4 % (ref 4–12)
MONOCYTES NFR BLD: 9 % (ref 4–12)
MUCOUS THREADS URNS QL MICRO: 0 /LPF
NEUTS SEG # BLD: 13.7 K/UL (ref 1.7–8.2)
NEUTS SEG # BLD: 7.9 K/UL (ref 1.7–8.2)
NEUTS SEG NFR BLD: 83 % (ref 43–78)
NEUTS SEG NFR BLD: 93 % (ref 43–78)
NRBC # BLD: 0 K/UL (ref 0–0.2)
NRBC # BLD: 0 K/UL (ref 0–0.2)
OTHER OBSERVATIONS,UCOM: ABNORMAL
PLATELET # BLD AUTO: 169 K/UL (ref 150–450)
PLATELET # BLD AUTO: 218 K/UL (ref 150–450)
PMV BLD AUTO: 10.9 FL (ref 9.4–12.3)
PMV BLD AUTO: 11 FL (ref 9.4–12.3)
POTASSIUM SERPL-SCNC: 3.5 MMOL/L (ref 3.5–5.1)
PROT SERPL-MCNC: 7 G/DL (ref 6.3–8.2)
RBC # BLD AUTO: 4.17 M/UL (ref 4.05–5.2)
RBC # BLD AUTO: 4.53 M/UL (ref 4.05–5.2)
RBC #/AREA URNS HPF: ABNORMAL /HPF
SODIUM SERPL-SCNC: 134 MMOL/L (ref 136–145)
WBC # BLD AUTO: 14.7 K/UL (ref 4.3–11.1)
WBC # BLD AUTO: 9.5 K/UL (ref 4.3–11.1)
WBC URNS QL MICRO: ABNORMAL /HPF

## 2019-02-01 PROCEDURE — 81003 URINALYSIS AUTO W/O SCOPE: CPT | Performed by: EMERGENCY MEDICINE

## 2019-02-01 PROCEDURE — 85025 COMPLETE CBC W/AUTO DIFF WBC: CPT

## 2019-02-01 PROCEDURE — 77030020255 HC SOL INJ LR 1000ML BG

## 2019-02-01 PROCEDURE — 74011250636 HC RX REV CODE- 250/636: Performed by: INTERNAL MEDICINE

## 2019-02-01 PROCEDURE — 74011250637 HC RX REV CODE- 250/637: Performed by: INTERNAL MEDICINE

## 2019-02-01 PROCEDURE — 36415 COLL VENOUS BLD VENIPUNCTURE: CPT

## 2019-02-01 PROCEDURE — 99284 EMERGENCY DEPT VISIT MOD MDM: CPT | Performed by: EMERGENCY MEDICINE

## 2019-02-01 PROCEDURE — 65270000029 HC RM PRIVATE

## 2019-02-01 PROCEDURE — 80053 COMPREHEN METABOLIC PANEL: CPT

## 2019-02-01 PROCEDURE — 74011250636 HC RX REV CODE- 250/636: Performed by: NURSE PRACTITIONER

## 2019-02-01 PROCEDURE — 83690 ASSAY OF LIPASE: CPT

## 2019-02-01 PROCEDURE — 74011000258 HC RX REV CODE- 258: Performed by: NURSE PRACTITIONER

## 2019-02-01 PROCEDURE — 81015 MICROSCOPIC EXAM OF URINE: CPT

## 2019-02-01 PROCEDURE — 82248 BILIRUBIN DIRECT: CPT

## 2019-02-01 RX ORDER — SODIUM CHLORIDE 0.9 % (FLUSH) 0.9 %
5-40 SYRINGE (ML) INJECTION EVERY 8 HOURS
Status: DISCONTINUED | OUTPATIENT
Start: 2019-02-01 | End: 2019-02-06 | Stop reason: HOSPADM

## 2019-02-01 RX ORDER — SODIUM CHLORIDE, SODIUM LACTATE, POTASSIUM CHLORIDE, CALCIUM CHLORIDE 600; 310; 30; 20 MG/100ML; MG/100ML; MG/100ML; MG/100ML
200 INJECTION, SOLUTION INTRAVENOUS CONTINUOUS
Status: DISCONTINUED | OUTPATIENT
Start: 2019-02-01 | End: 2019-02-01 | Stop reason: SDUPTHER

## 2019-02-01 RX ORDER — SODIUM CHLORIDE, SODIUM LACTATE, POTASSIUM CHLORIDE, CALCIUM CHLORIDE 600; 310; 30; 20 MG/100ML; MG/100ML; MG/100ML; MG/100ML
100 INJECTION, SOLUTION INTRAVENOUS CONTINUOUS
Status: DISCONTINUED | OUTPATIENT
Start: 2019-02-01 | End: 2019-02-01

## 2019-02-01 RX ORDER — AMLODIPINE BESYLATE 5 MG/1
5 TABLET ORAL DAILY
Status: DISCONTINUED | OUTPATIENT
Start: 2019-02-02 | End: 2019-02-06 | Stop reason: HOSPADM

## 2019-02-01 RX ORDER — TEMAZEPAM 15 MG/1
30 CAPSULE ORAL
Status: DISCONTINUED | OUTPATIENT
Start: 2019-02-01 | End: 2019-02-06 | Stop reason: HOSPADM

## 2019-02-01 RX ORDER — ASPIRIN 81 MG/1
81 TABLET ORAL DAILY
Status: DISCONTINUED | OUTPATIENT
Start: 2019-02-02 | End: 2019-02-06 | Stop reason: HOSPADM

## 2019-02-01 RX ORDER — HEPARIN SODIUM 5000 [USP'U]/ML
5000 INJECTION, SOLUTION INTRAVENOUS; SUBCUTANEOUS EVERY 8 HOURS
Status: DISCONTINUED | OUTPATIENT
Start: 2019-02-01 | End: 2019-02-06 | Stop reason: HOSPADM

## 2019-02-01 RX ORDER — SERTRALINE HYDROCHLORIDE 100 MG/1
100 TABLET, FILM COATED ORAL
Status: DISCONTINUED | OUTPATIENT
Start: 2019-02-01 | End: 2019-02-06 | Stop reason: HOSPADM

## 2019-02-01 RX ORDER — SODIUM CHLORIDE, SODIUM LACTATE, POTASSIUM CHLORIDE, CALCIUM CHLORIDE 600; 310; 30; 20 MG/100ML; MG/100ML; MG/100ML; MG/100ML
125 INJECTION, SOLUTION INTRAVENOUS CONTINUOUS
Status: DISCONTINUED | OUTPATIENT
Start: 2019-02-01 | End: 2019-02-05

## 2019-02-01 RX ORDER — AMOXICILLIN 250 MG
1 CAPSULE ORAL DAILY
Status: DISCONTINUED | OUTPATIENT
Start: 2019-02-02 | End: 2019-02-06 | Stop reason: HOSPADM

## 2019-02-01 RX ORDER — ACETAMINOPHEN 325 MG/1
650 TABLET ORAL
Status: DISCONTINUED | OUTPATIENT
Start: 2019-02-01 | End: 2019-02-06 | Stop reason: HOSPADM

## 2019-02-01 RX ORDER — HYDROCODONE BITARTRATE AND ACETAMINOPHEN 5; 325 MG/1; MG/1
1 TABLET ORAL
Status: DISCONTINUED | OUTPATIENT
Start: 2019-02-01 | End: 2019-02-04

## 2019-02-01 RX ORDER — LEVOTHYROXINE SODIUM 75 UG/1
75 TABLET ORAL
Status: DISCONTINUED | OUTPATIENT
Start: 2019-02-02 | End: 2019-02-06 | Stop reason: HOSPADM

## 2019-02-01 RX ORDER — TRAZODONE HYDROCHLORIDE 50 MG/1
150 TABLET ORAL
Status: DISCONTINUED | OUTPATIENT
Start: 2019-02-01 | End: 2019-02-06 | Stop reason: HOSPADM

## 2019-02-01 RX ORDER — ONDANSETRON 2 MG/ML
4 INJECTION INTRAMUSCULAR; INTRAVENOUS
Status: DISCONTINUED | OUTPATIENT
Start: 2019-02-01 | End: 2019-02-06 | Stop reason: HOSPADM

## 2019-02-01 RX ORDER — SODIUM CHLORIDE 0.9 % (FLUSH) 0.9 %
5-40 SYRINGE (ML) INJECTION AS NEEDED
Status: DISCONTINUED | OUTPATIENT
Start: 2019-02-01 | End: 2019-02-06 | Stop reason: HOSPADM

## 2019-02-01 RX ADMIN — HYDROCODONE BITARTRATE AND ACETAMINOPHEN 1 TABLET: 5; 325 TABLET ORAL at 19:57

## 2019-02-01 RX ADMIN — SERTRALINE 100 MG: 100 TABLET, FILM COATED ORAL at 21:24

## 2019-02-01 RX ADMIN — Medication 10 ML: at 21:25

## 2019-02-01 RX ADMIN — PIPERACILLIN AND TAZOBACTAM 3.38 G: 3; .375 INJECTION, POWDER, FOR SOLUTION INTRAVENOUS at 22:46

## 2019-02-01 RX ADMIN — PIPERACILLIN AND TAZOBACTAM 3.38 G: 3; .375 INJECTION, POWDER, FOR SOLUTION INTRAVENOUS at 14:47

## 2019-02-01 RX ADMIN — HEPARIN SODIUM 5000 UNITS: 5000 INJECTION INTRAVENOUS; SUBCUTANEOUS at 16:50

## 2019-02-01 RX ADMIN — SODIUM CHLORIDE, SODIUM LACTATE, POTASSIUM CHLORIDE, AND CALCIUM CHLORIDE 200 ML/HR: 600; 310; 30; 20 INJECTION, SOLUTION INTRAVENOUS at 17:01

## 2019-02-01 RX ADMIN — Medication 1 AMPULE: at 21:24

## 2019-02-01 RX ADMIN — TEMAZEPAM 30 MG: 15 CAPSULE ORAL at 22:46

## 2019-02-01 RX ADMIN — TRAZODONE HYDROCHLORIDE 150 MG: 50 TABLET ORAL at 22:46

## 2019-02-01 NOTE — PROGRESS NOTES
TRANSFER - IN REPORT: 
 
Verbal report received from CHRISTOPHER Owens(name) on Marielos Lopez  being received from ED(unit) for routine progression of care Report consisted of patients Situation, Background, Assessment and  
Recommendations(SBAR). Information from the following report(s) SBAR was reviewed with the receiving nurse. Opportunity for questions and clarification was provided. Assessment completed upon patients arrival to unit and care assumed.

## 2019-02-01 NOTE — PROGRESS NOTES
CM chart review. Patient's PCP - Dr. Gwyn Dandy (last visit 12-14-18) / and also sees Sumi Brothers.

## 2019-02-01 NOTE — ED NOTES
TRANSFER - OUT REPORT: 
 
Verbal report given to Erma Bro on Marian Sons  being transferred to [facility] for routine progression of care Report consisted of patients Situation, Background, Assessment and  
Recommendations(SBAR). Information from the following report(s) SBAR, ED Summary, [facility] and Recent Results was reviewed with the receiving nurse. Lines:  
Peripheral IV 02/01/19 Left Antecubital (Active) Site Assessment Clean, dry, & intact 2/1/2019 11:12 AM  
Phlebitis Assessment 0 2/1/2019 11:12 AM  
Infiltration Assessment 0 2/1/2019 11:12 AM  
Dressing Status Clean, dry, & intact 2/1/2019 11:12 AM  
   
Peripheral IV 02/01/19 Right Forearm (Active) Site Assessment Clean, dry, & intact 2/1/2019  5:01 PM  
Phlebitis Assessment 0 2/1/2019  5:01 PM  
Infiltration Assessment 0 2/1/2019  5:01 PM  
Dressing Status Clean, dry, & intact 2/1/2019  5:01 PM  
Hub Color/Line Status Pink 2/1/2019  5:01 PM  
  
 
Opportunity for questions and clarification was provided. Patient transported with: 
 [employer]

## 2019-02-01 NOTE — CONSULTS
[facility] Consult Note       Primary GI Physician: Dr. Eduard Lopez    Referring Provider:  Dr. Kiana Pitts, ED    Consult Date:  2/1/2019    Admit Date:  2/1/2019    Chief Complaint:  Jaundice, Pancreatitis    Subjective:     History of Present Illness:  Patient is a 77 y.o. female with PMH of (but not limited to) CAD with stent placement, hypothyroid, HTN, Chronic pain, arthritis, HLD, osteopenia, lichen sclerosis ventral hernia with repair, hx of Joaquim-en-Y gastric bypass who is seen in consultation at the request of Dr. Britton Sheets for Jaundice and pancreatitis. Ms. Jake Briggs has had RUQ abdominal pain for 6 mos that has been gradually worsening. During that time she has undergone surgery to lysis adhesions. She most recently saw Dr. Eduard Lopez on 1/30/19 and Abdominal CT scan with contrast was ordered which revealed small density in the distal common bile duct. She had worsening LFTS on 1/29/19 with T bili 0.6, , , albumin 4.5,  (192 11/20/18). She was referred to IR for evaluation of CBD stone due to inability to have ERCP with altered anatomy and inability to have MRCP due to hardware. Patient presents to the ED with worsening RUQ abdominal pain as well as generalized abdominal pain that worsened after eating last night. She has had associated N&V. She has noticed dark urine. She has lost 5 pounds in the last week. She has chronic diarrhea that is unchanged. On evaluation in the ED 2/1/19, she was found to have leukocytosis with WBC 14.7, worsening LFTS with T bili 4.2, , , . Lipase 1981. She has a history of biliary pancreatitis in 2013 and [facility]. Patient has a history of abnormal LFTS with workup by Dr. Eduard Lopez. She has had negative serology to include ASMA, AMA, ASHLEY, alpha 1 antitrypsin, ceruloplasmin and ferritin. Her statin was held for 3 mos and her LFTS normalized until 11/2018.     EGD 4/10/18 by Dr. Eduard Lopez revealed altered anatomy with small gastric pouch  8/2/17 Colonoscopy by Dr. Marv Larsen which revealed diverticulosis    PMH:  Past Medical History:   Diagnosis Date    Anxiety     Arthritis     general-back, neck, hips,knees/ managed with medication     Autoimmune disease ([address]     lichens sclerosis; pt states is dormant now    CAD (coronary artery disease) 2010    stent x 1, no MI    Chest pain     right    Chronic pain     back and neck    Depression     managed with medication     Family history of ischemic heart disease     H/O acute pancreatitis 08/2013    states gallbladder stone lodged creating a blockage.  H/O gastric bypass 2009    High cholesterol     managed w/ meds    Hypertension     managed with medication     Hypothyroidism     managed with medication     Lichen sclerosus     Obesity (BMI 30-39. 9)     BMI 32.4 3/16    Osteopenia     Systolic murmur     ECHO: +/-PI, TR ; Per cardiologist note 1-5-17 no murmur ; pt states told by cardiologist that he was removeing murmur from her record    Ventral hernia        PSH:  Past Surgical History:   Procedure Laterality Date    HX APPENDECTOMY  1954    HX BLADDER SUSPENSION      HX CARPAL TUNNEL RELEASE Right     HX CATARACT REMOVAL Bilateral 2015    HX COLONOSCOPY  2017    normal, repeat after 2027    HX ENDOSCOPY  2018    s/p bypass changes    HX GASTRIC BYPASS  2009    HX HEART CATHETERIZATION  2010    stenting of LAD    HX HERNIA REPAIR  8/14/14    ventral    HX HYSTERECTOMY  1978    cervix removed    HX LAP CHOLECYSTECTOMY  7/2013    [address]    with 1 steel adeline with screws~lower lumbar    HX LYSIS OF ADHESIONS      abdominal    HX OOPHORECTOMY  1984    right    HX ORTHOPAEDIC Right 1997    carpal tunnel release    HX ORTHOPAEDIC Left 2017    hip replacement    HX OTHER SURGICAL  2017    abdomen surgery lysis of adhesions    HX SHOULDER ARTHROSCOPY Right 2011    HX UROLOGICAL      bladder suspension Allergies: Allergies   Allergen Reactions    Adhesive Rash     Pulls skin off,can use paper tape    Adhesive Tape Rash    Sulfa (Sulfonamide Antibiotics) Nausea and Vomiting       Home Medications:  Prior to Admission medications    Medication Sig Start Date End Date Taking? Authorizing Provider   HYDROcodone-acetaminophen (NORCO) 7.5-325 mg per tablet 1 q6h prn severe pain 12/13/18   Srikanth Messina MD   temazepam (RESTORIL) 30 mg capsule Take 1 Cap by mouth nightly as needed. Max Daily Amount: 30 mg. 12/13/18   Srikanth Messina MD   LORazepam (ATIVAN) 0.5 mg tablet Take 1 Tab by mouth every eight (8) hours as needed for Anxiety. Max Daily Amount: 1.5 mg. 12/13/18   Srikanth Messina MD   meloxicam (MOBIC) 15 mg tablet 1 every day for pain 9/13/18   Srikanth Messina MD   losartan-hydroCHLOROthiazide [facility]) 100-25 mg per tablet 1 every day for BP  Indications: hypertension 9/13/18   Srikanth Messina MD   alendronate (FOSAMAX) 70 mg tablet 1 q week for osteoporosis 9/13/18   Srikanth Messina MD   levothyroxine (SYNTHROID) 75 mcg tablet 1 every day for thyroid 9/13/18   Srikanth Messina MD   amLODIPine (NORVASC) 5 mg tablet 1 every day for BP  Indications: hypertension 9/13/18   Srikanth Messina MD   traZODone (DESYREL) 150 mg tablet 1 to 2 qhs for sleep 9/13/18   Srikanth Messina MD   sertraline (ZOLOFT) 100 mg tablet Take 1 Tab by mouth nightly. Indications: major depressive disorder 9/13/18   Srikanth Messina MD   aspirin delayed-release 81 mg tablet Take 81 mg by mouth daily. Provider, Historical   MULTIVITAMIN PO Take  by mouth daily. Takes 1-2 melt-aways daily    Provider, Historical       Hospital Medications:  No current facility-administered medications for this encounter.       Current Outpatient Medications   Medication Sig    HYDROcodone-acetaminophen (NORCO) 7.5-325 mg per tablet 1 q6h prn severe pain    temazepam (RESTORIL) 30 mg capsule Take 1 Cap by mouth nightly as needed. Max Daily Amount: 30 mg.  LORazepam (ATIVAN) 0.5 mg tablet Take 1 Tab by mouth every eight (8) hours as needed for Anxiety. Max Daily Amount: 1.5 mg.    meloxicam (MOBIC) 15 mg tablet 1 every day for pain    losartan-hydroCHLOROthiazide (HYZAAR) 100-25 mg per tablet 1 every day for BP  Indications: hypertension    alendronate (FOSAMAX) 70 mg tablet 1 q week for osteoporosis    levothyroxine (SYNTHROID) 75 mcg tablet 1 every day for thyroid    amLODIPine (NORVASC) 5 mg tablet 1 every day for BP  Indications: hypertension    traZODone (DESYREL) 150 mg tablet 1 to 2 qhs for sleep    sertraline (ZOLOFT) 100 mg tablet Take 1 Tab by mouth nightly. Indications: major depressive disorder    aspirin delayed-release 81 mg tablet Take 81 mg by mouth daily.  MULTIVITAMIN PO Take  by mouth daily. Takes 1-2 melt-aways daily       Social History:  Social History     Tobacco Use    Smoking status: Never Smoker    Smokeless tobacco: Never Used   Substance Use Topics    Alcohol use: Yes     Alcohol/week: 0.6 oz     Types: 1 Glasses of wine per week     Comment: Occasional       Family History:  Family History   Problem Relation Age of Onset    Heart Attack Mother     Heart Disease Mother     Hypertension Mother     Cancer Mother         melanoma    Cancer Father         leukemia    Coronary Artery Disease Father     Cancer Sister         breast    Heart Attack Brother     Heart Disease Brother     Cancer Brother         lung    Lung Disease Brother        Review of Systems:  A detailed 10 system ROS is obtained, with pertinent positives as listed above. All others are negative.     Diet:  NPO    Objective:     Physical Exam:  Vitals:  Visit Vitals  /71   Pulse 90   Temp 98.1 °F (36.7 °C)   Resp 18   Ht 5' 5\" (1.651 m)   Wt 95.3 kg (210 lb)   SpO2 97%   BMI 34.95 kg/m²     Gen:  Pt is alert, cooperative, no acute distress JAUNDICE; HERE WITH SPOUSE  Skin:  Extremities and face reveal no rashes. HEENT: Sclerae ICTERIC . Extra-occular muscles are intact. No oral ulcers. No abnormal pigmentation of the lips. The neck is supple. Cardiovascular: Regular rate and rhythm. No murmurs, gallops, or rubs. Respiratory:  Comfortable breathing with no accessory muscle use. Clear breath sounds anteriorly with no wheezes, rales, or rhonchi. GI:  Abdomen nondistended, soft, and TENDER IN THE RUQ Normal active bowel sounds. No enlargement of the liver or spleen. No masses palpable. Rectal:  Deferred  Musculoskeletal:  No pitting edema of the lower legs. Neurological:  Gross memory appears intact. Patient is alert and oriented. Psychiatric:  Mood appears appropriate with judgement intact. Lymphatic:  No cervical or supraclavicular adenopathy. Laboratory:    Recent Labs     02/01/19  1113   WBC 14.7*   HGB 12.4   HCT 38.1      MCV 84.1   *   K 3.5      CO2 25   BUN 18   CREA 1.27*   CA 9.2   *   *   SGOT 836*   *   TBILI 4.2*   ALB 3.5   TP 7.0   LPSE 1,981*          Assessment:     Active Problems:    Abnormal LFTs  Acute Pancreatitis  Jaundice    77 y.o. female with PMH of (but not limited to) CAD with stent placement, hypothyroid, HTN, Chronic pain, arthritis, HLD, osteopenia, lichen sclerosis ventral hernia with repair, hx of Joaquim-en-Y gastric bypass who is seen in consultation at the request of Dr. Natasha Fraser for Jaundice and pancreatitis. 1/30/19  Abdominal CT scan with contrast revealed small density in the distal common bile duct. She had worsening LFTS on 1/29/19 with T bili 0.6, , , albumin 4.5,  (192 11/20/18). She is unable to have ERCP with altered anatomy and unable to have MRCP due to hardware in her back. On evaluation in the ED 2/1/19, she was found to have leukocytosis with WBC 14.7, worsening LFTS with T bili 4.2, , , . Lipase 1981. She likely has choledocolithiasis with pancreatitis.  Difficult case due to altered anatomy. Plan:     -Agree with admission by hospital service  -Supportive care to include antiemetics and pain management  -IVF with  cc/hr  -NPO  -Consult IR for choledocolithiasis  -Serial LFTS and CBC  -Zosyn 3.375 IV q Raciel Line HUMERA [place], [address]  [facility]    Patient is seen and examined in collaboration with Dr. Cely Gatica. Assessment and plan as per Dr. Cely Gatica.

## 2019-02-01 NOTE — ED TRIAGE NOTES
Patient states last night she started having generalized abdominal pain that is worse than previous pain. Patient states she had a CT scan done yesterday and started feeling worse overnight. Patient states she has also had nausea, vomiting and diarrhea last night.
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
## 2019-02-01 NOTE — H&P
Hospitalist H&P/Consult Note Admit Date:  2019 11:43 AM  
Name:  Kimmie Parra Age:  77 y.o. 
:  1950 MRN:  [id number] PCP:  Peggy Caldwell MD 
Treatment Team: Attending Provider: Derik Dong MD 
 
HPI:  
77 years [de-identified] F with PMH of CAD, h/o gastric by pass presented to the hospital complaining of worsening abdominal pain since last night. Patient stated pain started after eating supper last night. Patient described the pain as spams on mid abdomen, radiated to the RUQ. Patient reported is being following with GI outpatient Dr. Nick Navarro for abnormal LFTS and abdominal pain. Patient reported having a nuclear abdominal test done yesterday. Patient also reported having associated N,V and diarrhea. Patient denies chest pain, SOB, dysuria. In the ED labs revealed abnormal LFTs and CT AP showing suspected choledocholithiasis. ED physician already contacted GI. Patient reported GI PA was at bedside. 10 systems reviewed and negative except as noted in HPI. Past Medical History:  
Diagnosis Date  Anxiety  Arthritis   
 general-back, neck, hips,knees/ managed with medication  Autoimmune disease ([facility] [address] lichens sclerosis; pt states is dormant now  CAD (coronary artery disease) 2010  
 stent x 1, no MI  
 Chest pain   
 right  Chronic pain   
 back and neck  Depression   
 managed with medication  Family history of ischemic heart disease  H/O acute pancreatitis 2013  
 states gallbladder stone lodged creating a blockage.  H/O gastric bypass   High cholesterol   
 managed w/ meds  Hypertension   
 managed with medication  Hypothyroidism   
 managed with medication  Lichen sclerosus  Obesity (BMI 30-39. 9) BMI 32.4 3/16  Osteopenia  Systolic murmur ECHO: +/-PI, TR ; Per cardiologist note 17 no murmur ; pt states told by cardiologist that he was removeing murmur from her record  Ventral hernia Past Surgical History:  
Procedure Laterality Date [place]  HX BLADDER SUSPENSION    
 HX CARPAL TUNNEL RELEASE Right  HX CATARACT REMOVAL Bilateral 2015  HX COLONOSCOPY  2017  
 normal, repeat after   HX ENDOSCOPY    
 s/p bypass changes  HX GASTRIC BYPASS    HX HEART CATHETERIZATION  2010  
 stenting of LAD  HX HERNIA REPAIR  14  
 ventral  
 HX HYSTERECTOMY  1978  
 cervix removed  HX LAP CHOLECYSTECTOMY  2013 [address]   
 with 1 steel adeline with screws~lower lumbar  HX LYSIS OF ADHESIONS    
 abdominal  
 HX OOPHORECTOMY  1984  
 right  HX ORTHOPAEDIC Right 1997  
 carpal tunnel release  HX ORTHOPAEDIC Left 2017  
 hip replacement  HX OTHER SURGICAL  2017  
 abdomen surgery lysis of adhesions  HX SHOULDER ARTHROSCOPY Right 2011  HX UROLOGICAL    
 bladder suspension Prior to Admission Medications Prescriptions Last Dose Informant Patient Reported? Taking? HYDROcodone-acetaminophen (NORCO) 7.5-325 mg per tablet   No No  
Si q6h prn severe pain LORazepam (ATIVAN) 0.5 mg tablet   No No  
Sig: Take 1 Tab by mouth every eight (8) hours as needed for Anxiety. Max Daily Amount: 1.5 mg. MULTIVITAMIN PO   Yes No  
Sig: Take  by mouth daily. Takes 1-2 melt-aways daily  
alendronate (FOSAMAX) 70 mg tablet   No No  
Si q week for osteoporosis  
amLODIPine (NORVASC) 5 mg tablet   No No  
Si every day for BP  Indications: hypertension  
aspirin delayed-release 81 mg tablet   Yes No  
Sig: Take 81 mg by mouth daily. levothyroxine (SYNTHROID) 75 mcg tablet   No No  
Si every day for thyroid  
losartan-hydroCHLOROthiazide (HYZAAR) 100-25 mg per tablet   No No  
Si every day for BP  Indications: hypertension  
meloxicam (MOBIC) 15 mg tablet   No No  
Si every day for pain  
sertraline (ZOLOFT) 100 mg tablet   No No  
Sig: Take 1 Tab by mouth nightly. Indications: major depressive disorder temazepam (RESTORIL) 30 mg capsule   No No  
Sig: Take 1 Cap by mouth nightly as needed. Max Daily Amount: 30 mg.  
traZODone (DESYREL) 150 mg tablet   No No  
Si to 2 qhs for sleep Facility-Administered Medications: None Allergies Allergen Reactions  Adhesive Rash Pulls skin off,can use paper tape  Adhesive Tape Rash  Sulfa (Sulfonamide Antibiotics) Nausea and Vomiting Social History Tobacco Use  Smoking status: Never Smoker  Smokeless tobacco: Never Used Substance Use Topics  Alcohol use: Yes Alcohol/week: 0.6 oz Types: 1 Glasses of wine per week Comment: Occasional  
  
Family History Problem Relation Age of Onset  Heart Attack Mother  Heart Disease Mother  Hypertension Mother  Cancer Mother   
     melanoma  Cancer Father   
     leukemia  Coronary Artery Disease Father  Cancer Sister   
     breast  
 Heart Attack Brother  Heart Disease Brother  Cancer Brother   
     lung  Lung Disease Brother Immunization History Administered Date(s) Administered  Influenza High Dose Vaccine PF 2016, 10/26/2017, 2018  Pneumococcal Conjugate (PCV-13) 2016  Pneumococcal Polysaccharide (PPSV-23) 10/15/2010  TB Skin Test (PPD) Intradermal 2017 Objective:  
 
Patient Vitals for the past 24 hrs: 
 Temp Pulse Resp BP SpO2  
19 1109 98.1 °F (36.7 °C) 90 18 110/71 97 % Oxygen Therapy O2 Sat (%): 97 % (19 1109) O2 Device: Room air (19 1350) No intake or output data in the 24 hours ending 19 1411 Physical Exam: 
General:    Well nourished. Alert. Eyes:   Mild ictericia. ENT:  Normocephalic, atraumatic. CV:   RRR. No murmur, rub, or gallop. Lungs:  CTAB. No wheezing, rhonchi, or rales. Abdomen: RUQ tenderness with no guarding or rebound (+) BS Extremities: Warm and dry. No cyanosis or edema. Neurologic: No gross focal deificts Skin:     No rashes or jaundice. No wounds. Psych:  Normal mood and affect. I reviewed the labs and other studies done this admission. Data Review:  
Recent Results (from the past 24 hour(s)) CBC WITH AUTOMATED DIFF Collection Time: 02/01/19 11:13 AM  
Result Value Ref Range WBC 14.7 (H) 4.3 - 11.1 K/uL  
 RBC 4.53 4.05 - 5.2 M/uL  
 HGB 12.4 11.7 - 15.4 g/dL HCT 38.1 35.8 - 46.3 % MCV 84.1 79.6 - 97.8 FL  
 MCH 27.4 26.1 - 32.9 PG  
 MCHC 32.5 31.4 - 35.0 g/dL  
 RDW 14.9 (H) 11.9 - 14.6 % PLATELET 153 716 - 787 K/uL MPV 11.0 9.4 - 12.3 FL ABSOLUTE NRBC 0.00 0.0 - 0.2 K/uL  
 DF AUTOMATED NEUTROPHILS 93 (H) 43 - 78 % LYMPHOCYTES 2 (L) 13 - 44 % MONOCYTES 4 4.0 - 12.0 % EOSINOPHILS 0 (L) 0.5 - 7.8 % BASOPHILS 0 0.0 - 2.0 % IMMATURE GRANULOCYTES 1 0.0 - 5.0 %  
 ABS. NEUTROPHILS 13.7 (H) 1.7 - 8.2 K/UL  
 ABS. LYMPHOCYTES 0.3 (L) 0.5 - 4.6 K/UL  
 ABS. MONOCYTES 0.7 0.1 - 1.3 K/UL  
 ABS. EOSINOPHILS 0.0 0.0 - 0.8 K/UL  
 ABS. BASOPHILS 0.0 0.0 - 0.2 K/UL  
 ABS. IMM. GRANS. 0.1 0.0 - 0.5 K/UL METABOLIC PANEL, COMPREHENSIVE Collection Time: 02/01/19 11:13 AM  
Result Value Ref Range Sodium 134 (L) 136 - 145 mmol/L Potassium 3.5 3.5 - 5.1 mmol/L Chloride 100 98 - 107 mmol/L  
 CO2 25 21 - 32 mmol/L Anion gap 9 7 - 16 mmol/L Glucose 194 (H) 65 - 100 mg/dL BUN 18 8 - 23 MG/DL Creatinine 1.27 (H) 0.6 - 1.0 MG/DL  
 GFR est AA 54 (L) >60 ml/min/1.73m2 GFR est non-AA 44 (L) >60 ml/min/1.73m2 Calcium 9.2 8.3 - 10.4 MG/DL Bilirubin, total 4.2 (H) 0.2 - 1.1 MG/DL  
 ALT (SGPT) 878 (H) 12 - 65 U/L  
 AST (SGOT) 836 (H) 15 - 37 U/L Alk. phosphatase 530 (H) 50 - 136 U/L Protein, total 7.0 6.3 - 8.2 g/dL Albumin 3.5 3.2 - 4.6 g/dL Globulin 3.5 2.3 - 3.5 g/dL A-G Ratio 1.0 (L) 1.2 - 3.5 LIPASE Collection Time: 02/01/19 11:13 AM  
Result Value Ref Range Lipase 1,981 (H) 73 - 393 U/L Imaging Studies: CXR Results  (Last 48 hours) None CT Results  (Last 48 hours) 01/31/19 1529  CT ABD W CONT Final result Impression:  IMPRESSION:  
1. No discrete acute abnormality in the abdomen. 2. There is however small density in the distal common bile duct without  
significant delay ductal dilatation. Query choledocholithiasis. Narrative:  CT of the abdomen with contrast   
   
Comparison: 7/19/2018. Indication: Right upper quadrant pain, elevated liver function tests. Technique:  A CT was performed of the abdomen following the uncomplicated  
administration of intravenous contrast (Isovue 370, 100). Imaging was performed  
in the arterial phase, followed by portal venous phase and then delayed phase. Iodinated contrast was used due to the indications for the examination. Intravenous contrast was used for better evaluation of solid organs and vascular  
structures. Radiation dose reduction techniques were used for this study:  Our  
CT scanners use one or all of the following: Automated exposure control,  
adjustment of the mA and/or kVp according to patient's size, iterative  
reconstruction. Findings:   
Limited evaluation lung bases within normal limits. Benign cyst right hepatic  
lobe measures 7 mm. No aggressive liver lesion identified. There is conventional  
hepatic arterial anatomy. Suspect cholecystectomy. No biliary ductal dilatation  
identified. There is minimal density at the distal common bile duct measuring 9  
mm series 2 image 37. The pancreas, adrenal glands, kidneys, spleen within  
normal limits. Sequelae gastric bypass unremarkable. Sequelae ventral hernia  
repair unremarkable. No bowel obstruction within the abdomen. No free fluid or  
free air. No pathologic adenopathy. No acute or aggressive osseous lesion. Assessment and Plan:  
 
Hospital Problems as of 2/1/2019 Date Reviewed: 1/11/2019 Codes Class Noted - Resolved POA * (Principal) Choledocholithiasis ICD-10-CM: K80.50 ICD-9-CM: 574.50  2/1/2019 - Present Unknown Coronary artery disease involving native coronary artery of native heart with angina pectoris ([facility] [address] ICD-10-CM: I25.119 ICD-9-CM: 414.01, 413.9  1/11/2019 - Present Yes S/P laparoscopic cholecystectomy ICD-10-CM: Z90.49 ICD-9-CM: V45.89  8/15/2014 - Present Yes A/P: 
 
-Choledocolithiasis AST/ALT above 800s Elevated bili to 4. Elevated Lipase to 1000 Patient with hx of gastric bypass surgery and recent nuclear study done Plan Keep patient NPO Start hydration with LR Symptomatic management with anti emetics and pain medications Check direct bili Get medical records from GI clinic, recent nuclear test 
GI to evaluate. Patient may need IR Cont zosyn IV 
 
-CAD Restart ASA and statis -HTN Controlled Restart home meds. DVT ppx: heparin Code status:Full Estimated LOS:  2-3 nights Signed: Cathalene Felty, MD

## 2019-02-02 PROBLEM — K85.10 GALLSTONE PANCREATITIS: Status: ACTIVE | Noted: 2019-02-02

## 2019-02-02 PROBLEM — E87.6 HYPOKALEMIA: Status: ACTIVE | Noted: 2019-02-02

## 2019-02-02 LAB
ALBUMIN SERPL-MCNC: 2.7 G/DL (ref 3.2–4.6)
ALBUMIN/GLOB SERPL: 0.8 {RATIO} (ref 1.2–3.5)
ALP SERPL-CCNC: 401 U/L (ref 50–136)
ALT SERPL-CCNC: 561 U/L (ref 12–65)
ANION GAP SERPL CALC-SCNC: 7 MMOL/L (ref 7–16)
AST SERPL-CCNC: 309 U/L (ref 15–37)
BASOPHILS # BLD: 0 K/UL (ref 0–0.2)
BASOPHILS NFR BLD: 1 % (ref 0–2)
BILIRUB DIRECT SERPL-MCNC: 2.9 MG/DL
BILIRUB SERPL-MCNC: 3.9 MG/DL (ref 0.2–1.1)
BUN SERPL-MCNC: 19 MG/DL (ref 8–23)
CALCIUM SERPL-MCNC: 8.5 MG/DL (ref 8.3–10.4)
CHLORIDE SERPL-SCNC: 101 MMOL/L (ref 98–107)
CO2 SERPL-SCNC: 28 MMOL/L (ref 21–32)
CREAT SERPL-MCNC: 0.94 MG/DL (ref 0.6–1)
DIFFERENTIAL METHOD BLD: ABNORMAL
EOSINOPHIL # BLD: 0.2 K/UL (ref 0–0.8)
EOSINOPHIL NFR BLD: 3 % (ref 0.5–7.8)
ERYTHROCYTE [DISTWIDTH] IN BLOOD BY AUTOMATED COUNT: 15.4 % (ref 11.9–14.6)
GLOBULIN SER CALC-MCNC: 3.4 G/DL (ref 2.3–3.5)
GLUCOSE SERPL-MCNC: 86 MG/DL (ref 65–100)
HCT VFR BLD AUTO: 32.7 % (ref 35.8–46.3)
HGB BLD-MCNC: 10.5 G/DL (ref 11.7–15.4)
IMM GRANULOCYTES # BLD AUTO: 0 K/UL (ref 0–0.5)
IMM GRANULOCYTES NFR BLD AUTO: 0 % (ref 0–5)
LYMPHOCYTES # BLD: 0.6 K/UL (ref 0.5–4.6)
LYMPHOCYTES NFR BLD: 9 % (ref 13–44)
MAGNESIUM SERPL-MCNC: 1.8 MG/DL (ref 1.8–2.4)
MCH RBC QN AUTO: 27.1 PG (ref 26.1–32.9)
MCHC RBC AUTO-ENTMCNC: 32.1 G/DL (ref 31.4–35)
MCV RBC AUTO: 84.5 FL (ref 79.6–97.8)
MONOCYTES # BLD: 0.5 K/UL (ref 0.1–1.3)
MONOCYTES NFR BLD: 7 % (ref 4–12)
NEUTS SEG # BLD: 5.2 K/UL (ref 1.7–8.2)
NEUTS SEG NFR BLD: 80 % (ref 43–78)
NRBC # BLD: 0 K/UL (ref 0–0.2)
PLATELET # BLD AUTO: 153 K/UL (ref 150–450)
PMV BLD AUTO: 11.4 FL (ref 9.4–12.3)
POTASSIUM SERPL-SCNC: 2.8 MMOL/L (ref 3.5–5.1)
PROT SERPL-MCNC: 6.1 G/DL (ref 6.3–8.2)
RBC # BLD AUTO: 3.87 M/UL (ref 4.05–5.2)
SODIUM SERPL-SCNC: 136 MMOL/L (ref 136–145)
WBC # BLD AUTO: 6.5 K/UL (ref 4.3–11.1)

## 2019-02-02 PROCEDURE — 85025 COMPLETE CBC W/AUTO DIFF WBC: CPT

## 2019-02-02 PROCEDURE — 74011250636 HC RX REV CODE- 250/636: Performed by: INTERNAL MEDICINE

## 2019-02-02 PROCEDURE — 80053 COMPREHEN METABOLIC PANEL: CPT

## 2019-02-02 PROCEDURE — 74011250636 HC RX REV CODE- 250/636: Performed by: NURSE PRACTITIONER

## 2019-02-02 PROCEDURE — 74011000258 HC RX REV CODE- 258: Performed by: NURSE PRACTITIONER

## 2019-02-02 PROCEDURE — 74011250636 HC RX REV CODE- 250/636: Performed by: FAMILY MEDICINE

## 2019-02-02 PROCEDURE — 74011250637 HC RX REV CODE- 250/637: Performed by: INTERNAL MEDICINE

## 2019-02-02 PROCEDURE — 82248 BILIRUBIN DIRECT: CPT

## 2019-02-02 PROCEDURE — 77030020255 HC SOL INJ LR 1000ML BG

## 2019-02-02 PROCEDURE — 36415 COLL VENOUS BLD VENIPUNCTURE: CPT

## 2019-02-02 PROCEDURE — 83735 ASSAY OF MAGNESIUM: CPT

## 2019-02-02 PROCEDURE — 65270000029 HC RM PRIVATE

## 2019-02-02 RX ORDER — POTASSIUM CHLORIDE 14.9 MG/ML
20 INJECTION INTRAVENOUS
Status: COMPLETED | OUTPATIENT
Start: 2019-02-02 | End: 2019-02-02

## 2019-02-02 RX ADMIN — HEPARIN SODIUM 5000 UNITS: 5000 INJECTION INTRAVENOUS; SUBCUTANEOUS at 09:50

## 2019-02-02 RX ADMIN — Medication 1 AMPULE: at 09:49

## 2019-02-02 RX ADMIN — Medication 10 ML: at 22:15

## 2019-02-02 RX ADMIN — POTASSIUM CHLORIDE 20 MEQ: 200 INJECTION, SOLUTION INTRAVENOUS at 10:28

## 2019-02-02 RX ADMIN — HYDROCHLOROTHIAZIDE: 25 TABLET ORAL at 09:52

## 2019-02-02 RX ADMIN — HEPARIN SODIUM 5000 UNITS: 5000 INJECTION INTRAVENOUS; SUBCUTANEOUS at 16:59

## 2019-02-02 RX ADMIN — TRAZODONE HYDROCHLORIDE 150 MG: 50 TABLET ORAL at 22:07

## 2019-02-02 RX ADMIN — ASPIRIN 81 MG: 81 TABLET, COATED ORAL at 09:52

## 2019-02-02 RX ADMIN — POTASSIUM CHLORIDE 20 MEQ: 200 INJECTION, SOLUTION INTRAVENOUS at 13:14

## 2019-02-02 RX ADMIN — Medication 10 ML: at 15:56

## 2019-02-02 RX ADMIN — POTASSIUM CHLORIDE 20 MEQ: 200 INJECTION, SOLUTION INTRAVENOUS at 18:34

## 2019-02-02 RX ADMIN — SERTRALINE 100 MG: 100 TABLET, FILM COATED ORAL at 22:07

## 2019-02-02 RX ADMIN — Medication 1 AMPULE: at 22:06

## 2019-02-02 RX ADMIN — SODIUM CHLORIDE, SODIUM LACTATE, POTASSIUM CHLORIDE, AND CALCIUM CHLORIDE 200 ML/HR: 600; 310; 30; 20 INJECTION, SOLUTION INTRAVENOUS at 10:30

## 2019-02-02 RX ADMIN — POTASSIUM CHLORIDE 20 MEQ: 200 INJECTION, SOLUTION INTRAVENOUS at 15:56

## 2019-02-02 RX ADMIN — PIPERACILLIN AND TAZOBACTAM 3.38 G: 3; .375 INJECTION, POWDER, FOR SOLUTION INTRAVENOUS at 16:59

## 2019-02-02 RX ADMIN — TEMAZEPAM 30 MG: 15 CAPSULE ORAL at 22:07

## 2019-02-02 RX ADMIN — LEVOTHYROXINE SODIUM 75 MCG: 75 TABLET ORAL at 05:55

## 2019-02-02 RX ADMIN — PIPERACILLIN AND TAZOBACTAM 3.38 G: 3; .375 INJECTION, POWDER, FOR SOLUTION INTRAVENOUS at 22:07

## 2019-02-02 RX ADMIN — Medication 10 ML: at 05:56

## 2019-02-02 RX ADMIN — AMLODIPINE BESYLATE 5 MG: 5 TABLET ORAL at 09:52

## 2019-02-02 RX ADMIN — PIPERACILLIN AND TAZOBACTAM 3.38 G: 3; .375 INJECTION, POWDER, FOR SOLUTION INTRAVENOUS at 05:55

## 2019-02-02 RX ADMIN — HEPARIN SODIUM 5000 UNITS: 5000 INJECTION INTRAVENOUS; SUBCUTANEOUS at 01:14

## 2019-02-02 NOTE — PROGRESS NOTES
GI DAILY PROGRESS NOTE Admit Date:  2/1/2019 Today's Date:  2/2/2019 CC:  Choledocholithiasis, Pancreatitis Subjective:  
 
Patient's last pain episode last PM.  Denies abd pain currently. +BM today that is lighter in color. Negative for BRBPR and melena. Afebrile. VSS. Medications:  
Current Facility-Administered Medications Medication Dose Route Frequency  potassium chloride 20 mEq in 100 ml IVPB  20 mEq IntraVENous Q2H  
 sodium chloride (NS) flush 5-40 mL  5-40 mL IntraVENous Q8H  
 sodium chloride (NS) flush 5-40 mL  5-40 mL IntraVENous PRN  
 acetaminophen (TYLENOL) tablet 650 mg  650 mg Oral Q4H PRN  
 HYDROcodone-acetaminophen (NORCO) 5-325 mg per tablet 1 Tab  1 Tab Oral Q4H PRN  
 ondansetron (ZOFRAN) injection 4 mg  4 mg IntraVENous Q4H PRN  
 senna-docusate (PERICOLACE) 8.6-50 mg per tablet 1 Tab  1 Tab Oral DAILY  heparin (porcine) injection 5,000 Units  5,000 Units SubCUTAneous Q8H  
 amLODIPine (NORVASC) tablet 5 mg  5 mg Oral DAILY  aspirin delayed-release tablet 81 mg  81 mg Oral DAILY  levothyroxine (SYNTHROID) tablet 75 mcg  75 mcg Oral ACB  sertraline (ZOLOFT) tablet 100 mg  100 mg Oral QHS  piperacillin-tazobactam (ZOSYN) 3.375 g in 0.9% sodium chloride (MBP/ADV) 100 mL  3.375 g IntraVENous Q8H  
 lactated Ringers infusion  200 mL/hr IntraVENous CONTINUOUS  
 losartan/hydroCHLOROthiazide (HYZAAR) 100/25 mg   Oral DAILY  alcohol 62% (NOZIN) nasal  1 Ampule  1 Ampule Topical Q12H  temazepam (RESTORIL) capsule 30 mg  30 mg Oral QHS PRN  
 traZODone (DESYREL) tablet 150 mg  150 mg Oral QHS Review of Systems: ROS was obtained, with pertinent positives as listed above. No chest pain or SOB. Diet:  NPO Objective:  
Vitals: 
Visit Vitals /79 (BP 1 Location: Left arm, BP Patient Position: At rest) Pulse (!) 56 Temp 98 °F (36.7 °C) Resp 20 Ht 5' 5\" (1.651 m) Wt 95.3 kg (210 lb) SpO2 95% BMI 34.95 kg/m² Intake/Output: 
No intake/output data recorded. No intake/output data recorded. Exam: 
General appearance: NAD HEENT: anicteric sclera, mmm Lungs: clear to auscultation bilaterally anteriorly Heart: regular rate and rhythm Abdomen: soft, ND, light BS, mild epigastric tenderness, neg rebound/guarding Neuro:  alert and orientedx3 Data Review (Labs):   
Recent Labs 02/02/19 
0527 02/01/19 
1935 02/01/19 
1113 WBC 6.5 9.5 14.7* HGB 10.5* 11.3* 12.4 HCT 32.7* 35.2* 38.1  169 218 MCV 84.5 84.4 84.1   --  134* K 2.8*  --  3.5   --  100 CO2 28  --  25 BUN 19  --  18  
CREA 0.94  --  1.27* CA 8.5  --  9.2 MG 1.8  --   --   
GLU 86  --  194* *  --  530* SGOT 309*  --  836* CBIL 2.9*  --  3.1* ALB 2.7*  --  3.5 TP 6.1*  --  7.0 LPSE  --   --  4,057* Assessment:  
 
Principal Problem: 
  Choledocholithiasis (2/1/2019) Active Problems: S/P laparoscopic cholecystectomy (8/15/2014) Coronary artery disease involving native coronary artery of native heart with angina pectoris ([address] (1/11/2019) Gallstone pancreatitis (2/2/2019) Hypokalemia (2/2/2019) 77 y.o. female with PMH of CAD with stent placement, hypothyroid, HTN, Chronic pain, arthritis, HLD, osteopenia, lichen sclerosis ventral hernia with repair, hx of Joaquim-en-Y gastric bypass who is her with Jaundice and pancreatitis. 1/30/19  Abdominal CT scan with contrast revealed small density in the distal common bile duct. LFT's consistent with biliary obstruction. Lipase is elevated. She is unable to have ERCP due to surgically altered anatomy. Choledocholithiasis. Pancreatitis. Liver panel slightly improved today. Plan:  
 
  
1. Daily CMP. Check Mag in AM. 
2. Continue PRN anti-emetics and pain meds 3. Continue zosyn. Patient is afebrile and VSS 4. IR consult for possible biliary drain. They plan on seeing patient Monday unless condition changes. 5. IVF. Given improved abd pain today. Will allow ice chips.  
6. Replace potassium 
  
Will follow 
  
Sonia Doss MD

## 2019-02-02 NOTE — PROGRESS NOTES
Progress Note Patient: Kimmie Parra MRN: [id number]  SSN: [id number] YOB: 1950  Age: 77 y.o. Sex: female Admit Date: 2/1/2019 LOS: 1 day Subjective: F/U pancreatitis secondary to Choledocholithiasis Hx of  CAD, anxiety, and gastric by pass with Joaquim-en-Y who presented for abdominal pain. Total bili was 4.2, , , and lipase 1,981. CT showed choledocholithiasis. GI consulted but we are unable to get ERCP due to anatomy. Has had cholecystectomy. Currently on Zosyn. Today, AST, ALT trending down. Still having some abdominal pain but very mild. IR will not perform biliary drain until Monday. K 2.8. Mg normal. No chest pain. Current Facility-Administered Medications Medication Dose Route Frequency  potassium chloride 20 mEq in 100 ml IVPB  20 mEq IntraVENous Q2H  
 sodium chloride (NS) flush 5-40 mL  5-40 mL IntraVENous Q8H  
 sodium chloride (NS) flush 5-40 mL  5-40 mL IntraVENous PRN  
 acetaminophen (TYLENOL) tablet 650 mg  650 mg Oral Q4H PRN  
 HYDROcodone-acetaminophen (NORCO) 5-325 mg per tablet 1 Tab  1 Tab Oral Q4H PRN  
 ondansetron (ZOFRAN) injection 4 mg  4 mg IntraVENous Q4H PRN  
 senna-docusate (PERICOLACE) 8.6-50 mg per tablet 1 Tab  1 Tab Oral DAILY  heparin (porcine) injection 5,000 Units  5,000 Units SubCUTAneous Q8H  
 amLODIPine (NORVASC) tablet 5 mg  5 mg Oral DAILY  aspirin delayed-release tablet 81 mg  81 mg Oral DAILY  levothyroxine (SYNTHROID) tablet 75 mcg  75 mcg Oral ACB  sertraline (ZOLOFT) tablet 100 mg  100 mg Oral QHS  piperacillin-tazobactam (ZOSYN) 3.375 g in 0.9% sodium chloride (MBP/ADV) 100 mL  3.375 g IntraVENous Q8H  
 lactated Ringers infusion  200 mL/hr IntraVENous CONTINUOUS  
 losartan/hydroCHLOROthiazide (HYZAAR) 100/25 mg   Oral DAILY  alcohol 62% (NOZIN) nasal  1 Ampule  1 Ampule Topical Q12H  temazepam (RESTORIL) capsule 30 mg  30 mg Oral QHS PRN  
  traZODone (DESYREL) tablet 150 mg  150 mg Oral QHS Objective:  
 
Vitals:  
 02/01/19 2329 02/02/19 5757 02/02/19 0857 02/02/19 1033 BP: 93/56 142/79 130/77 147/79 Pulse: 61 60 (!) 57 (!) 56 Resp: 18 18 18 20 Temp: 97.7 °F (36.5 °C) 99.9 °F (37.7 °C) 97.8 °F (36.6 °C) 98 °F (36.7 °C) SpO2: 93% 95% 93% 95% Weight:      
Height:      
  
  
Intake and Output: 
Current Shift: No intake/output data recorded. Last three shifts: No intake/output data recorded. Physical Exam:  
General:  Alert, cooperative, no distress, appears stated age. Eyes:  Conjunctivae/corneas clear. PERRL, EOMs intact. Fundi benign Ears:  Normal TMs and external ear canals both ears. Nose: Nares normal. Septum midline. Mouth/Throat: Lips, mucosa, and tongue normal. Teeth and gums normal.  
Neck:  no JVD. Back:   Deferred Lungs:   Clear to auscultation bilaterally. Heart:  Regular rate and rhythm, S1, S2 normal  
Abdomen:   Soft, mild tenderness to epigastric area. Bowel sounds normal. No masses,  No organomegaly. Extremities: Extremities normal, atraumatic, no cyanosis or edema. Pulses: 2+ and symmetric all extremities. Skin: Skin color, texture, turgor normal. No rashes or lesions Lymph nodes: Cervical, supraclavicular, and axillary nodes normal.  
Neurologic: CNII-XII intact. Normal strength, sensation and reflexes throughout. Lab/Data Review: 
 
Recent Results (from the past 24 hour(s)) URINE MICROSCOPIC Collection Time: 02/01/19  1:30 PM  
Result Value Ref Range WBC 20-50 0 /hpf  
 RBC 3-5 0 /hpf Epithelial cells 10-20 0 /hpf Bacteria 1+ (H) 0 /hpf Casts 0 0 /lpf Crystals, urine 0 0 /LPF Amorphous Crystals 1+ (H) 0 Mucus 0 0 /lpf Other observations RESULTS VERIFIED MANUALLY    
CBC WITH AUTOMATED DIFF Collection Time: 02/01/19  7:35 PM  
Result Value Ref Range WBC 9.5 4.3 - 11.1 K/uL  
 RBC 4.17 4.05 - 5.2 M/uL  
 HGB 11.3 (L) 11.7 - 15.4 g/dL HCT 35.2 (L) 35.8 - 46.3 % MCV 84.4 79.6 - 97.8 FL  
 MCH 27.1 26.1 - 32.9 PG  
 MCHC 32.1 31.4 - 35.0 g/dL  
 RDW 15.1 (H) 11.9 - 14.6 % PLATELET 415 080 - 481 K/uL MPV 10.9 9.4 - 12.3 FL ABSOLUTE NRBC 0.00 0.0 - 0.2 K/uL  
 DF AUTOMATED NEUTROPHILS 83 (H) 43 - 78 % LYMPHOCYTES 7 (L) 13 - 44 % MONOCYTES 9 4.0 - 12.0 % EOSINOPHILS 1 0.5 - 7.8 % BASOPHILS 0 0.0 - 2.0 % IMMATURE GRANULOCYTES 0 0.0 - 5.0 %  
 ABS. NEUTROPHILS 7.9 1.7 - 8.2 K/UL  
 ABS. LYMPHOCYTES 0.7 0.5 - 4.6 K/UL  
 ABS. MONOCYTES 0.8 0.1 - 1.3 K/UL  
 ABS. EOSINOPHILS 0.1 0.0 - 0.8 K/UL  
 ABS. BASOPHILS 0.0 0.0 - 0.2 K/UL  
 ABS. IMM. GRANS. 0.0 0.0 - 0.5 K/UL METABOLIC PANEL, COMPREHENSIVE Collection Time: 02/02/19  5:27 AM  
Result Value Ref Range Sodium 136 136 - 145 mmol/L Potassium 2.8 (LL) 3.5 - 5.1 mmol/L Chloride 101 98 - 107 mmol/L  
 CO2 28 21 - 32 mmol/L Anion gap 7 7 - 16 mmol/L Glucose 86 65 - 100 mg/dL BUN 19 8 - 23 MG/DL Creatinine 0.94 0.6 - 1.0 MG/DL  
 GFR est AA >60 >60 ml/min/1.73m2 GFR est non-AA >60 >60 ml/min/1.73m2 Calcium 8.5 8.3 - 10.4 MG/DL Bilirubin, total 3.9 (H) 0.2 - 1.1 MG/DL  
 ALT (SGPT) 561 (H) 12 - 65 U/L  
 AST (SGOT) 309 (H) 15 - 37 U/L Alk. phosphatase 401 (H) 50 - 136 U/L Protein, total 6.1 (L) 6.3 - 8.2 g/dL Albumin 2.7 (L) 3.2 - 4.6 g/dL Globulin 3.4 2.3 - 3.5 g/dL A-G Ratio 0.8 (L) 1.2 - 3.5    
CBC WITH AUTOMATED DIFF Collection Time: 02/02/19  5:27 AM  
Result Value Ref Range WBC 6.5 4.3 - 11.1 K/uL  
 RBC 3.87 (L) 4.05 - 5.2 M/uL  
 HGB 10.5 (L) 11.7 - 15.4 g/dL HCT 32.7 (L) 35.8 - 46.3 % MCV 84.5 79.6 - 97.8 FL  
 MCH 27.1 26.1 - 32.9 PG  
 MCHC 32.1 31.4 - 35.0 g/dL  
 RDW 15.4 (H) 11.9 - 14.6 % PLATELET 738 041 - 884 K/uL MPV 11.4 9.4 - 12.3 FL ABSOLUTE NRBC 0.00 0.0 - 0.2 K/uL  
 DF AUTOMATED NEUTROPHILS 80 (H) 43 - 78 % LYMPHOCYTES 9 (L) 13 - 44 %  MONOCYTES 7 4.0 - 12.0 %  
 EOSINOPHILS 3 0.5 - 7.8 % BASOPHILS 1 0.0 - 2.0 % IMMATURE GRANULOCYTES 0 0.0 - 5.0 %  
 ABS. NEUTROPHILS 5.2 1.7 - 8.2 K/UL  
 ABS. LYMPHOCYTES 0.6 0.5 - 4.6 K/UL  
 ABS. MONOCYTES 0.5 0.1 - 1.3 K/UL  
 ABS. EOSINOPHILS 0.2 0.0 - 0.8 K/UL  
 ABS. BASOPHILS 0.0 0.0 - 0.2 K/UL  
 ABS. IMM. GRANS. 0.0 0.0 - 0.5 K/UL BILIRUBIN, DIRECT Collection Time: 02/02/19  5:27 AM  
Result Value Ref Range Bilirubin, direct 2.9 (H) <0.4 MG/DL MAGNESIUM Collection Time: 02/02/19  5:27 AM  
Result Value Ref Range Magnesium 1.8 1.8 - 2.4 mg/dL Assessment/ Plan:  
 
Principal Problem: 
  Choledocholithiasis (2/1/2019) Active Problems: S/P laparoscopic cholecystectomy (8/15/2014) Coronary artery disease involving native coronary artery of native heart with angina pectoris ([facility] [address] (1/11/2019) Gallstone pancreatitis (2/2/2019) Hypokalemia (2/2/2019) Attempting to get biliary drain by IR. May not be able to get until Monday. Will keep NPO since still having abdominal pain. Continue LR 200ml/hr. Monitor for signs of fluid overload. Continue Zosyn started on 2/1/19. HTN- BP controlled PRN temazepam to use at night. Has not had Ativan filled since December 13th and that was for 10 day supply. Supplement K. DVT prophylaxis - heparin Signed By: Luzmaria Garcia DO February 2, 2019

## 2019-02-02 NOTE — PROGRESS NOTES
Pt c/o agitation r/t [place] for pain relief. Medicated per MAR. Hourly rounds performed through shift, pt denies needs at this time. Bed in low position and call light/ personal items within reach. Will continue to monitor and report to oncoming nurse.

## 2019-02-02 NOTE — PROGRESS NOTES
Problem: Falls - Risk of 
Goal: *Absence of Falls Document Lolis Shows Fall Risk and appropriate interventions in the flowsheet. Outcome: Progressing Towards Goal 
Fall Risk Interventions: 
  
 
  
 
Medication Interventions: Patient to call before getting OOB, Teach patient to arise slowly

## 2019-02-02 NOTE — PROGRESS NOTES
02/01/19 1919 Dual Skin Pressure Injury Assessment Dual Skin Pressure Injury Assessment [facility]DL Second Care Provider (Based on [address] [place] [address]) Alfa Duenas RN  
Primary Nurse Temo Edgar and Alfa Duenas, RN performed a dual skin assessment on this patient No impairment noted Tarun score is 20

## 2019-02-03 LAB
ALBUMIN SERPL-MCNC: 2.7 G/DL (ref 3.2–4.6)
ALBUMIN/GLOB SERPL: 0.8 {RATIO} (ref 1.2–3.5)
ALP SERPL-CCNC: 354 U/L (ref 50–136)
ALT SERPL-CCNC: 403 U/L (ref 12–65)
ANION GAP SERPL CALC-SCNC: 8 MMOL/L (ref 7–16)
AST SERPL-CCNC: 136 U/L (ref 15–37)
BILIRUB SERPL-MCNC: 1.3 MG/DL (ref 0.2–1.1)
BUN SERPL-MCNC: 11 MG/DL (ref 8–23)
CALCIUM SERPL-MCNC: 8.5 MG/DL (ref 8.3–10.4)
CHLORIDE SERPL-SCNC: 104 MMOL/L (ref 98–107)
CO2 SERPL-SCNC: 26 MMOL/L (ref 21–32)
CREAT SERPL-MCNC: 0.74 MG/DL (ref 0.6–1)
GLOBULIN SER CALC-MCNC: 3.5 G/DL (ref 2.3–3.5)
GLUCOSE SERPL-MCNC: 76 MG/DL (ref 65–100)
MAGNESIUM SERPL-MCNC: 1.6 MG/DL (ref 1.8–2.4)
POTASSIUM SERPL-SCNC: 3.5 MMOL/L (ref 3.5–5.1)
PROT SERPL-MCNC: 6.2 G/DL (ref 6.3–8.2)
SODIUM SERPL-SCNC: 138 MMOL/L (ref 136–145)

## 2019-02-03 PROCEDURE — 83735 ASSAY OF MAGNESIUM: CPT

## 2019-02-03 PROCEDURE — 80053 COMPREHEN METABOLIC PANEL: CPT

## 2019-02-03 PROCEDURE — 65270000029 HC RM PRIVATE

## 2019-02-03 PROCEDURE — 74011250636 HC RX REV CODE- 250/636: Performed by: INTERNAL MEDICINE

## 2019-02-03 PROCEDURE — 74011250636 HC RX REV CODE- 250/636: Performed by: NURSE PRACTITIONER

## 2019-02-03 PROCEDURE — 36415 COLL VENOUS BLD VENIPUNCTURE: CPT

## 2019-02-03 PROCEDURE — 77030020255 HC SOL INJ LR 1000ML BG

## 2019-02-03 PROCEDURE — 74011000258 HC RX REV CODE- 258: Performed by: NURSE PRACTITIONER

## 2019-02-03 PROCEDURE — 74011250637 HC RX REV CODE- 250/637: Performed by: INTERNAL MEDICINE

## 2019-02-03 RX ADMIN — SERTRALINE 100 MG: 100 TABLET, FILM COATED ORAL at 21:42

## 2019-02-03 RX ADMIN — Medication 10 ML: at 21:43

## 2019-02-03 RX ADMIN — Medication 10 ML: at 05:25

## 2019-02-03 RX ADMIN — PIPERACILLIN AND TAZOBACTAM 3.38 G: 3; .375 INJECTION, POWDER, FOR SOLUTION INTRAVENOUS at 07:05

## 2019-02-03 RX ADMIN — PIPERACILLIN AND TAZOBACTAM 3.38 G: 3; .375 INJECTION, POWDER, FOR SOLUTION INTRAVENOUS at 15:12

## 2019-02-03 RX ADMIN — HEPARIN SODIUM 5000 UNITS: 5000 INJECTION INTRAVENOUS; SUBCUTANEOUS at 01:07

## 2019-02-03 RX ADMIN — SODIUM CHLORIDE, SODIUM LACTATE, POTASSIUM CHLORIDE, AND CALCIUM CHLORIDE 175 ML/HR: 600; 310; 30; 20 INJECTION, SOLUTION INTRAVENOUS at 17:46

## 2019-02-03 RX ADMIN — TRAZODONE HYDROCHLORIDE 150 MG: 50 TABLET ORAL at 21:42

## 2019-02-03 RX ADMIN — ASPIRIN 81 MG: 81 TABLET, COATED ORAL at 09:52

## 2019-02-03 RX ADMIN — Medication 1 AMPULE: at 09:54

## 2019-02-03 RX ADMIN — Medication 10 ML: at 15:13

## 2019-02-03 RX ADMIN — TEMAZEPAM 30 MG: 15 CAPSULE ORAL at 21:42

## 2019-02-03 RX ADMIN — AMLODIPINE BESYLATE 5 MG: 5 TABLET ORAL at 09:53

## 2019-02-03 RX ADMIN — Medication 1 AMPULE: at 21:42

## 2019-02-03 RX ADMIN — HEPARIN SODIUM 5000 UNITS: 5000 INJECTION INTRAVENOUS; SUBCUTANEOUS at 17:46

## 2019-02-03 RX ADMIN — LEVOTHYROXINE SODIUM 75 MCG: 75 TABLET ORAL at 05:57

## 2019-02-03 RX ADMIN — HYDROCHLOROTHIAZIDE: 25 TABLET ORAL at 09:53

## 2019-02-03 RX ADMIN — HEPARIN SODIUM 5000 UNITS: 5000 INJECTION INTRAVENOUS; SUBCUTANEOUS at 09:53

## 2019-02-03 RX ADMIN — PIPERACILLIN AND TAZOBACTAM 3.38 G: 3; .375 INJECTION, POWDER, FOR SOLUTION INTRAVENOUS at 23:27

## 2019-02-03 NOTE — PROGRESS NOTES
Problem: Falls - Risk of 
Goal: *Absence of Falls Document Tarah Akers Fall Risk and appropriate interventions in the flowsheet. Outcome: Progressing Towards Goal 
Fall Risk Interventions: 
  
 
  
 
Medication Interventions: Patient to call before getting OOB, Teach patient to arise slowly

## 2019-02-03 NOTE — PROGRESS NOTES
GI DAILY PROGRESS NOTE Admit Date:  2/1/2019 Today's Date:  2/3/2019 CC:  Choledocholithiasis, pancreatitis Subjective:  
 
Patient denies abd pain and nausea overnight and today. Few small liquid stools overnight that are negative for melena and BRBPR. Discussed labs. Medications:  
Current Facility-Administered Medications Medication Dose Route Frequency  sodium chloride (NS) flush 5-40 mL  5-40 mL IntraVENous Q8H  
 sodium chloride (NS) flush 5-40 mL  5-40 mL IntraVENous PRN  
 acetaminophen (TYLENOL) tablet 650 mg  650 mg Oral Q4H PRN  
 HYDROcodone-acetaminophen (NORCO) 5-325 mg per tablet 1 Tab  1 Tab Oral Q4H PRN  
 ondansetron (ZOFRAN) injection 4 mg  4 mg IntraVENous Q4H PRN  
 senna-docusate (PERICOLACE) 8.6-50 mg per tablet 1 Tab  1 Tab Oral DAILY  heparin (porcine) injection 5,000 Units  5,000 Units SubCUTAneous Q8H  
 amLODIPine (NORVASC) tablet 5 mg  5 mg Oral DAILY  aspirin delayed-release tablet 81 mg  81 mg Oral DAILY  levothyroxine (SYNTHROID) tablet 75 mcg  75 mcg Oral ACB  sertraline (ZOLOFT) tablet 100 mg  100 mg Oral QHS  piperacillin-tazobactam (ZOSYN) 3.375 g in 0.9% sodium chloride (MBP/ADV) 100 mL  3.375 g IntraVENous Q8H  
 lactated Ringers infusion  175 mL/hr IntraVENous CONTINUOUS  
 losartan/hydroCHLOROthiazide (HYZAAR) 100/25 mg   Oral DAILY  alcohol 62% (NOZIN) nasal  1 Ampule  1 Ampule Topical Q12H  temazepam (RESTORIL) capsule 30 mg  30 mg Oral QHS PRN  
 traZODone (DESYREL) tablet 150 mg  150 mg Oral QHS Review of Systems: ROS was obtained, with pertinent positives as listed above. No chest pain or SOB. Diet:  NPO Objective:  
Vitals: 
Visit Vitals /80 (BP 1 Location: Left arm, BP Patient Position: At rest) Pulse (!) 54 Temp 97.9 °F (36.6 °C) Resp 18 Ht 5' 5\" (1.651 m) Wt 95.3 kg (210 lb) SpO2 97% BMI 34.95 kg/m² Intake/Output: 
No intake/output data recorded. No intake/output data recorded. Exam: 
General appearance:NAD HEENT: anicteric sclera, mmm Lungs: clear to auscultation bilaterally anteriorly Heart: regular rate and rhythm Abdomen: soft, NT, ND, +BS Neuro:  alert and orientedx3 Data Review (Labs):   
Recent Labs 02/03/19 
0520 02/02/19 
0527 02/01/19 
1935 02/01/19 
1113 WBC  --  6.5 9.5 14.7* HGB  --  10.5* 11.3* 12.4 HCT  --  32.7* 35.2* 38.1 PLT  --  153 169 218 MCV  --  84.5 84.4 84.1  136  --  134* K 3.5 2.8*  --  3.5  101  --  100 CO2 26 28  --  25 BUN 11 19  --  18  
CREA 0.74 0.94  --  1.27* CA 8.5 8.5  --  9.2 MG 1.6* 1.8  --   --   
GLU 76 86  --  194* * 401*  --  530* SGOT 136* 309*  --  836* CBIL  --  2.9*  --  3.1* ALB 2.7* 2.7*  --  3.5 TP 6.2* 6.1*  --  7.0 LPSE  --   --   --  6,252* Assessment:  
 
Principal Problem: 
  Choledocholithiasis (2/1/2019) Active Problems: S/P laparoscopic cholecystectomy (8/15/2014) Coronary artery disease involving native coronary artery of native heart with angina pectoris ([facility] [address] (1/11/2019) Gallstone pancreatitis (2/2/2019) Hypokalemia (2/2/2019) 77 y.o. female with PMH of CAD with stent placement, hypothyroid, HTN, Chronic pain, arthritis, HLD, osteopenia, ventral hernia with repair, hx of Joaquim-en-Y gastric bypass who presented with Jaundice and pancreatitis. CT abd/pelvis with conrast on 1/30/19 revealed small density in the distal common bile duct. LFT's consistent with biliary obstruction. Lipase elevated at time of presentation. She is unable to have ERCP due to surgically altered anatomy. Choledocholithiasis. Pancreatitis. Liver panel is improving. Negative pain for >24 hours. Possible spontaneous passage of CBD stone vs. Ball-valving stone in CBD. IR consulted at time of admission for placement of biliary drain. Plan: 1. Trial of clear liquid diet. Hold diet if experiences increased abd pain 2. Continue PRN anti-emetics and pain meds 3. Continue zosyn for now. Patient is afebrile. 4. Follow-up CMP in AM.  If uncertainty remains if CBD stone persists, can consider EUS. 5. IVF. Will follow Yuni Gerber MD

## 2019-02-04 ENCOUNTER — ANESTHESIA (OUTPATIENT)
Dept: SURGERY | Age: 69
DRG: 439 | End: 2019-02-04
Payer: MEDICARE

## 2019-02-04 ENCOUNTER — ANESTHESIA EVENT (OUTPATIENT)
Dept: SURGERY | Age: 69
DRG: 439 | End: 2019-02-04
Payer: MEDICARE

## 2019-02-04 ENCOUNTER — APPOINTMENT (OUTPATIENT)
Dept: INTERVENTIONAL RADIOLOGY/VASCULAR | Age: 69
DRG: 439 | End: 2019-02-04
Attending: RADIOLOGY
Payer: MEDICARE

## 2019-02-04 ENCOUNTER — APPOINTMENT (OUTPATIENT)
Dept: MRI IMAGING | Age: 69
DRG: 439 | End: 2019-02-04
Attending: RADIOLOGY
Payer: MEDICARE

## 2019-02-04 PROBLEM — E83.42 HYPOMAGNESEMIA: Status: ACTIVE | Noted: 2019-02-04

## 2019-02-04 LAB
ALBUMIN SERPL-MCNC: 2.9 G/DL (ref 3.2–4.6)
ALBUMIN/GLOB SERPL: 0.8 {RATIO} (ref 1.2–3.5)
ALP SERPL-CCNC: 333 U/L (ref 50–136)
ALT SERPL-CCNC: 301 U/L (ref 12–65)
ANION GAP SERPL CALC-SCNC: 8 MMOL/L (ref 7–16)
AST SERPL-CCNC: 73 U/L (ref 15–37)
BASOPHILS # BLD: 0.1 K/UL (ref 0–0.2)
BASOPHILS NFR BLD: 1 % (ref 0–2)
BILIRUB SERPL-MCNC: 1 MG/DL (ref 0.2–1.1)
BUN SERPL-MCNC: 5 MG/DL (ref 8–23)
CALCIUM SERPL-MCNC: 9.1 MG/DL (ref 8.3–10.4)
CHLORIDE SERPL-SCNC: 102 MMOL/L (ref 98–107)
CO2 SERPL-SCNC: 28 MMOL/L (ref 21–32)
CREAT SERPL-MCNC: 0.76 MG/DL (ref 0.6–1)
DIFFERENTIAL METHOD BLD: ABNORMAL
EOSINOPHIL # BLD: 0.2 K/UL (ref 0–0.8)
EOSINOPHIL NFR BLD: 5 % (ref 0.5–7.8)
ERYTHROCYTE [DISTWIDTH] IN BLOOD BY AUTOMATED COUNT: 14.8 % (ref 11.9–14.6)
GLOBULIN SER CALC-MCNC: 3.5 G/DL (ref 2.3–3.5)
GLUCOSE SERPL-MCNC: 87 MG/DL (ref 65–100)
HCT VFR BLD AUTO: 34.4 % (ref 35.8–46.3)
HGB BLD-MCNC: 10.9 G/DL (ref 11.7–15.4)
IMM GRANULOCYTES # BLD AUTO: 0 K/UL (ref 0–0.5)
IMM GRANULOCYTES NFR BLD AUTO: 1 % (ref 0–5)
LYMPHOCYTES # BLD: 0.9 K/UL (ref 0.5–4.6)
LYMPHOCYTES NFR BLD: 23 % (ref 13–44)
MAGNESIUM SERPL-MCNC: 1.3 MG/DL (ref 1.8–2.4)
MCH RBC QN AUTO: 27.2 PG (ref 26.1–32.9)
MCHC RBC AUTO-ENTMCNC: 31.7 G/DL (ref 31.4–35)
MCV RBC AUTO: 85.8 FL (ref 79.6–97.8)
MONOCYTES # BLD: 0.4 K/UL (ref 0.1–1.3)
MONOCYTES NFR BLD: 10 % (ref 4–12)
NEUTS SEG # BLD: 2.3 K/UL (ref 1.7–8.2)
NEUTS SEG NFR BLD: 61 % (ref 43–78)
NRBC # BLD: 0 K/UL (ref 0–0.2)
PHOSPHATE SERPL-MCNC: 3.3 MG/DL (ref 2.3–3.7)
PLATELET # BLD AUTO: 163 K/UL (ref 150–450)
PMV BLD AUTO: 11.2 FL (ref 9.4–12.3)
POTASSIUM SERPL-SCNC: 3.4 MMOL/L (ref 3.5–5.1)
PROT SERPL-MCNC: 6.4 G/DL (ref 6.3–8.2)
RBC # BLD AUTO: 4.01 M/UL (ref 4.05–5.2)
SODIUM SERPL-SCNC: 138 MMOL/L (ref 136–145)
WBC # BLD AUTO: 3.7 K/UL (ref 4.3–11.1)

## 2019-02-04 PROCEDURE — 65660000000 HC RM CCU STEPDOWN

## 2019-02-04 PROCEDURE — C1769 GUIDE WIRE: HCPCS

## 2019-02-04 PROCEDURE — 74011250636 HC RX REV CODE- 250/636: Performed by: RADIOLOGY

## 2019-02-04 PROCEDURE — 84100 ASSAY OF PHOSPHORUS: CPT

## 2019-02-04 PROCEDURE — P9047 ALBUMIN (HUMAN), 25%, 50ML: HCPCS | Performed by: FAMILY MEDICINE

## 2019-02-04 PROCEDURE — 74011250637 HC RX REV CODE- 250/637: Performed by: RADIOLOGY

## 2019-02-04 PROCEDURE — 74011250636 HC RX REV CODE- 250/636

## 2019-02-04 PROCEDURE — 76210000006 HC OR PH I REC 0.5 TO 1 HR: Performed by: RADIOLOGY

## 2019-02-04 PROCEDURE — 74011000250 HC RX REV CODE- 250: Performed by: ANESTHESIOLOGY

## 2019-02-04 PROCEDURE — 74011250637 HC RX REV CODE- 250/637: Performed by: FAMILY MEDICINE

## 2019-02-04 PROCEDURE — C1725 CATH, TRANSLUMIN NON-LASER: HCPCS

## 2019-02-04 PROCEDURE — 74011250636 HC RX REV CODE- 250/636: Performed by: ANESTHESIOLOGY

## 2019-02-04 PROCEDURE — C1894 INTRO/SHEATH, NON-LASER: HCPCS

## 2019-02-04 PROCEDURE — 77030025702 HC BG URIN DRN MRTM -A

## 2019-02-04 PROCEDURE — 80053 COMPREHEN METABOLIC PANEL: CPT

## 2019-02-04 PROCEDURE — 0F9930Z DRAINAGE OF COMMON BILE DUCT WITH DRAINAGE DEVICE, PERCUTANEOUS APPROACH: ICD-10-PCS | Performed by: RADIOLOGY

## 2019-02-04 PROCEDURE — 77030004566 HC CATH ANGI DX TORCON COOK -B

## 2019-02-04 PROCEDURE — 77030013516 HC DEV INFL ANGI MRTM -B

## 2019-02-04 PROCEDURE — 85025 COMPLETE CBC W/AUTO DIFF WBC: CPT

## 2019-02-04 PROCEDURE — 74011000250 HC RX REV CODE- 250

## 2019-02-04 PROCEDURE — C1729 CATH, DRAINAGE: HCPCS

## 2019-02-04 PROCEDURE — 74011250637 HC RX REV CODE- 250/637: Performed by: INTERNAL MEDICINE

## 2019-02-04 PROCEDURE — BF101ZZ FLUOROSCOPY OF BILE DUCTS USING LOW OSMOLAR CONTRAST: ICD-10-PCS | Performed by: RADIOLOGY

## 2019-02-04 PROCEDURE — 87205 SMEAR GRAM STAIN: CPT

## 2019-02-04 PROCEDURE — 47534 PLMT BILIARY DRAINAGE CATH: CPT

## 2019-02-04 PROCEDURE — 77030003504 HC NDL BIOP TISS COOK -A

## 2019-02-04 PROCEDURE — 77030002916 HC SUT ETHLN J&J -A

## 2019-02-04 PROCEDURE — 74011636320 HC RX REV CODE- 636/320: Performed by: RADIOLOGY

## 2019-02-04 PROCEDURE — 77030037088 HC TUBE ENDOTRACH ORAL NSL COVD-A: Performed by: ANESTHESIOLOGY

## 2019-02-04 PROCEDURE — 74011000258 HC RX REV CODE- 258

## 2019-02-04 PROCEDURE — 36415 COLL VENOUS BLD VENIPUNCTURE: CPT

## 2019-02-04 PROCEDURE — 74011000258 HC RX REV CODE- 258: Performed by: NURSE PRACTITIONER

## 2019-02-04 PROCEDURE — 74011250636 HC RX REV CODE- 250/636: Performed by: FAMILY MEDICINE

## 2019-02-04 PROCEDURE — 83735 ASSAY OF MAGNESIUM: CPT

## 2019-02-04 PROCEDURE — 74011250636 HC RX REV CODE- 250/636: Performed by: INTERNAL MEDICINE

## 2019-02-04 PROCEDURE — 76060000034 HC ANESTHESIA 1.5 TO 2 HR: Performed by: RADIOLOGY

## 2019-02-04 PROCEDURE — 74011250636 HC RX REV CODE- 250/636: Performed by: NURSE PRACTITIONER

## 2019-02-04 PROCEDURE — 74181 MRI ABDOMEN W/O CONTRAST: CPT

## 2019-02-04 PROCEDURE — 77030039425 HC BLD LARYNG TRULITE DISP TELE -A: Performed by: ANESTHESIOLOGY

## 2019-02-04 PROCEDURE — 77030019908 HC STETH ESOPH SIMS -A: Performed by: ANESTHESIOLOGY

## 2019-02-04 RX ORDER — LIDOCAINE HYDROCHLORIDE 20 MG/ML
1-20 INJECTION, SOLUTION EPIDURAL; INFILTRATION; INTRACAUDAL; PERINEURAL ONCE
Status: DISPENSED | OUTPATIENT
Start: 2019-02-04 | End: 2019-02-04

## 2019-02-04 RX ORDER — MIDAZOLAM HYDROCHLORIDE 1 MG/ML
2 INJECTION, SOLUTION INTRAMUSCULAR; INTRAVENOUS ONCE
Status: CANCELLED | OUTPATIENT
Start: 2019-02-04 | End: 2019-02-04

## 2019-02-04 RX ORDER — HYDROCODONE BITARTRATE AND ACETAMINOPHEN 10; 325 MG/1; MG/1
1 TABLET ORAL
Status: DISCONTINUED | OUTPATIENT
Start: 2019-02-04 | End: 2019-02-06 | Stop reason: HOSPADM

## 2019-02-04 RX ORDER — LIDOCAINE HYDROCHLORIDE 20 MG/ML
INJECTION, SOLUTION EPIDURAL; INFILTRATION; INTRACAUDAL; PERINEURAL AS NEEDED
Status: DISCONTINUED | OUTPATIENT
Start: 2019-02-04 | End: 2019-02-04 | Stop reason: HOSPADM

## 2019-02-04 RX ORDER — LIDOCAINE HYDROCHLORIDE 10 MG/ML
0.1 INJECTION INFILTRATION; PERINEURAL AS NEEDED
Status: CANCELLED | OUTPATIENT
Start: 2019-02-04

## 2019-02-04 RX ORDER — LIDOCAINE HYDROCHLORIDE 20 MG/ML
20-200 INJECTION, SOLUTION INFILTRATION; PERINEURAL ONCE
Status: COMPLETED | OUTPATIENT
Start: 2019-02-04 | End: 2019-02-04

## 2019-02-04 RX ORDER — SODIUM CHLORIDE, SODIUM LACTATE, POTASSIUM CHLORIDE, CALCIUM CHLORIDE 600; 310; 30; 20 MG/100ML; MG/100ML; MG/100ML; MG/100ML
100 INJECTION, SOLUTION INTRAVENOUS CONTINUOUS
Status: DISCONTINUED | OUTPATIENT
Start: 2019-02-04 | End: 2019-02-04 | Stop reason: HOSPADM

## 2019-02-04 RX ORDER — FENTANYL CITRATE 50 UG/ML
INJECTION, SOLUTION INTRAMUSCULAR; INTRAVENOUS AS NEEDED
Status: DISCONTINUED | OUTPATIENT
Start: 2019-02-04 | End: 2019-02-04 | Stop reason: HOSPADM

## 2019-02-04 RX ORDER — NALOXONE HYDROCHLORIDE 0.4 MG/ML
0.04 INJECTION, SOLUTION INTRAMUSCULAR; INTRAVENOUS; SUBCUTANEOUS
Status: DISCONTINUED | OUTPATIENT
Start: 2019-02-04 | End: 2019-02-04 | Stop reason: HOSPADM

## 2019-02-04 RX ORDER — LANOLIN ALCOHOL/MO/W.PET/CERES
400 CREAM (GRAM) TOPICAL 2 TIMES DAILY
Status: DISCONTINUED | OUTPATIENT
Start: 2019-02-04 | End: 2019-02-06 | Stop reason: HOSPADM

## 2019-02-04 RX ORDER — HYDROMORPHONE HYDROCHLORIDE 2 MG/ML
0.2 INJECTION, SOLUTION INTRAMUSCULAR; INTRAVENOUS; SUBCUTANEOUS
Status: DISCONTINUED | OUTPATIENT
Start: 2019-02-04 | End: 2019-02-04 | Stop reason: HOSPADM

## 2019-02-04 RX ORDER — OXYCODONE HYDROCHLORIDE 5 MG/1
5 TABLET ORAL
Status: DISCONTINUED | OUTPATIENT
Start: 2019-02-04 | End: 2019-02-04 | Stop reason: HOSPADM

## 2019-02-04 RX ORDER — MIDAZOLAM HYDROCHLORIDE 1 MG/ML
2 INJECTION, SOLUTION INTRAMUSCULAR; INTRAVENOUS
Status: CANCELLED | OUTPATIENT
Start: 2019-02-04 | End: 2019-02-05

## 2019-02-04 RX ORDER — PROPOFOL 10 MG/ML
INJECTION, EMULSION INTRAVENOUS AS NEEDED
Status: DISCONTINUED | OUTPATIENT
Start: 2019-02-04 | End: 2019-02-04 | Stop reason: HOSPADM

## 2019-02-04 RX ORDER — HYDROMORPHONE HYDROCHLORIDE 2 MG/ML
0.5 INJECTION, SOLUTION INTRAMUSCULAR; INTRAVENOUS; SUBCUTANEOUS
Status: DISCONTINUED | OUTPATIENT
Start: 2019-02-04 | End: 2019-02-04 | Stop reason: HOSPADM

## 2019-02-04 RX ORDER — SODIUM CHLORIDE, SODIUM LACTATE, POTASSIUM CHLORIDE, CALCIUM CHLORIDE 600; 310; 30; 20 MG/100ML; MG/100ML; MG/100ML; MG/100ML
100 INJECTION, SOLUTION INTRAVENOUS CONTINUOUS
Status: CANCELLED | OUTPATIENT
Start: 2019-02-04 | End: 2019-02-05

## 2019-02-04 RX ORDER — MAGNESIUM SULFATE HEPTAHYDRATE 40 MG/ML
2 INJECTION, SOLUTION INTRAVENOUS ONCE
Status: COMPLETED | OUTPATIENT
Start: 2019-02-04 | End: 2019-02-04

## 2019-02-04 RX ORDER — POTASSIUM CHLORIDE 1.5 G/1.77G
40 POWDER, FOR SOLUTION ORAL
Status: COMPLETED | OUTPATIENT
Start: 2019-02-04 | End: 2019-02-04

## 2019-02-04 RX ORDER — ZOLPIDEM TARTRATE 5 MG/1
5 TABLET ORAL ONCE
Status: COMPLETED | OUTPATIENT
Start: 2019-02-04 | End: 2019-02-04

## 2019-02-04 RX ORDER — ONDANSETRON 2 MG/ML
INJECTION INTRAMUSCULAR; INTRAVENOUS AS NEEDED
Status: DISCONTINUED | OUTPATIENT
Start: 2019-02-04 | End: 2019-02-04 | Stop reason: HOSPADM

## 2019-02-04 RX ORDER — ALBUMIN HUMAN 250 G/1000ML
12.5 SOLUTION INTRAVENOUS ONCE
Status: COMPLETED | OUTPATIENT
Start: 2019-02-04 | End: 2019-02-04

## 2019-02-04 RX ORDER — FENTANYL CITRATE 50 UG/ML
100 INJECTION, SOLUTION INTRAMUSCULAR; INTRAVENOUS ONCE
Status: CANCELLED | OUTPATIENT
Start: 2019-02-04 | End: 2019-02-04

## 2019-02-04 RX ORDER — ROCURONIUM BROMIDE 10 MG/ML
INJECTION, SOLUTION INTRAVENOUS AS NEEDED
Status: DISCONTINUED | OUTPATIENT
Start: 2019-02-04 | End: 2019-02-04 | Stop reason: HOSPADM

## 2019-02-04 RX ADMIN — PIPERACILLIN AND TAZOBACTAM 3.38 G: 3; .375 INJECTION, POWDER, FOR SOLUTION INTRAVENOUS at 21:51

## 2019-02-04 RX ADMIN — Medication 10 ML: at 16:08

## 2019-02-04 RX ADMIN — ROCURONIUM BROMIDE 50 MG: 10 INJECTION, SOLUTION INTRAVENOUS at 12:38

## 2019-02-04 RX ADMIN — LIDOCAINE HYDROCHLORIDE 60 MG: 20 INJECTION, SOLUTION EPIDURAL; INFILTRATION; INTRACAUDAL; PERINEURAL at 12:38

## 2019-02-04 RX ADMIN — POTASSIUM CHLORIDE 40 MEQ: 1.5 POWDER, FOR SOLUTION ORAL at 17:53

## 2019-02-04 RX ADMIN — SERTRALINE 100 MG: 100 TABLET, FILM COATED ORAL at 21:50

## 2019-02-04 RX ADMIN — PROMETHAZINE HYDROCHLORIDE 6.25 MG: 25 INJECTION INTRAMUSCULAR; INTRAVENOUS at 14:25

## 2019-02-04 RX ADMIN — TRAZODONE HYDROCHLORIDE 150 MG: 50 TABLET ORAL at 21:50

## 2019-02-04 RX ADMIN — PIPERACILLIN AND TAZOBACTAM 3.38 G: 3; .375 INJECTION, POWDER, FOR SOLUTION INTRAVENOUS at 06:39

## 2019-02-04 RX ADMIN — AMLODIPINE BESYLATE 5 MG: 5 TABLET ORAL at 10:25

## 2019-02-04 RX ADMIN — HEPARIN SODIUM 5000 UNITS: 5000 INJECTION INTRAVENOUS; SUBCUTANEOUS at 17:53

## 2019-02-04 RX ADMIN — ASPIRIN 81 MG: 81 TABLET, COATED ORAL at 10:25

## 2019-02-04 RX ADMIN — FENTANYL CITRATE 50 MCG: 50 INJECTION, SOLUTION INTRAMUSCULAR; INTRAVENOUS at 13:35

## 2019-02-04 RX ADMIN — MAGNESIUM SULFATE HEPTAHYDRATE 2 G: 40 INJECTION, SOLUTION INTRAVENOUS at 16:04

## 2019-02-04 RX ADMIN — LEVOTHYROXINE SODIUM 75 MCG: 75 TABLET ORAL at 06:00

## 2019-02-04 RX ADMIN — ROCURONIUM BROMIDE 10 MG: 10 INJECTION, SOLUTION INTRAVENOUS at 13:20

## 2019-02-04 RX ADMIN — HYDROCHLOROTHIAZIDE: 25 TABLET ORAL at 10:25

## 2019-02-04 RX ADMIN — Medication 400 MG: at 17:52

## 2019-02-04 RX ADMIN — ALBUMIN (HUMAN) 12.5 G: 0.25 INJECTION, SOLUTION INTRAVENOUS at 18:04

## 2019-02-04 RX ADMIN — IOPAMIDOL 40 ML: 612 INJECTION, SOLUTION INTRAVENOUS at 13:51

## 2019-02-04 RX ADMIN — Medication 10 ML: at 21:51

## 2019-02-04 RX ADMIN — SENNOSIDES AND DOCUSATE SODIUM 1 TABLET: 8.6; 5 TABLET ORAL at 10:25

## 2019-02-04 RX ADMIN — ROCURONIUM BROMIDE 20 MG: 10 INJECTION, SOLUTION INTRAVENOUS at 13:33

## 2019-02-04 RX ADMIN — Medication 400 MG: at 21:00

## 2019-02-04 RX ADMIN — PIPERACILLIN AND TAZOBACTAM 3.38 G: 3; .375 INJECTION, POWDER, FOR SOLUTION INTRAVENOUS at 12:44

## 2019-02-04 RX ADMIN — Medication 1 AMPULE: at 10:26

## 2019-02-04 RX ADMIN — PROPOFOL 200 MG: 10 INJECTION, EMULSION INTRAVENOUS at 12:38

## 2019-02-04 RX ADMIN — FENTANYL CITRATE 100 MCG: 50 INJECTION, SOLUTION INTRAMUSCULAR; INTRAVENOUS at 12:32

## 2019-02-04 RX ADMIN — ONDANSETRON 4 MG: 2 INJECTION INTRAMUSCULAR; INTRAVENOUS at 13:48

## 2019-02-04 RX ADMIN — Medication 1 AMPULE: at 21:50

## 2019-02-04 RX ADMIN — HYDROCODONE BITARTRATE AND ACETAMINOPHEN 1 TABLET: 10; 325 TABLET ORAL at 21:50

## 2019-02-04 RX ADMIN — ZOLPIDEM TARTRATE 5 MG: 5 TABLET ORAL at 03:02

## 2019-02-04 RX ADMIN — LIDOCAINE HYDROCHLORIDE 200 MG: 20 INJECTION, SOLUTION INFILTRATION; PERINEURAL at 13:37

## 2019-02-04 RX ADMIN — PROPOFOL 50 MG: 10 INJECTION, EMULSION INTRAVENOUS at 13:33

## 2019-02-04 RX ADMIN — FENTANYL CITRATE 50 MCG: 50 INJECTION, SOLUTION INTRAMUSCULAR; INTRAVENOUS at 13:39

## 2019-02-04 RX ADMIN — HYDROMORPHONE HYDROCHLORIDE 0.5 MG: 2 INJECTION, SOLUTION INTRAMUSCULAR; INTRAVENOUS; SUBCUTANEOUS at 14:25

## 2019-02-04 NOTE — PERIOP NOTES
Pt soiled undergarment. Change all linen and gown and performed steffany care. Pt sated she wanted to keep her underwear- placed in bag and on stretcher for pt to have when transfer back to room.

## 2019-02-04 NOTE — PROGRESS NOTES
TRANSFER - OUT REPORT: 
 
Verbal report given to Gerardo Chen RN(name) on Rian Linares  being transferred to [facility]) for routine progression of care Report consisted of patients Situation, Background, Assessment and  
Recommendations(SBAR). Information from the following report(s) Procedure Summary was reviewed with the receiving nurse. Lines:  
Peripheral IV 02/01/19 Left Antecubital (Active) Site Assessment Clean, dry, & intact 2/4/2019 12:12 PM  
Phlebitis Assessment 0 2/4/2019 12:12 PM  
Infiltration Assessment 0 2/4/2019 12:12 PM  
Dressing Status Clean, dry, & intact 2/4/2019 12:12 PM  
Dressing Type Tape;Other (comments) 2/4/2019 12:12 PM  
Hub Color/Line Status Capped; Patent 2/4/2019 12:12 PM  
Alcohol Cap Used No 2/4/2019 12:12 PM  
   
Peripheral IV 02/01/19 Left;Posterior Forearm (Active) Site Assessment Clean, dry, & intact 2/4/2019 12:12 PM  
Phlebitis Assessment 0 2/4/2019 12:12 PM  
Infiltration Assessment 0 2/4/2019 12:12 PM  
Dressing Status Clean, dry, & intact 2/4/2019 12:12 PM  
Dressing Type Tape;Transparent 2/4/2019 12:12 PM  
Hub Color/Line Status Infusing 2/4/2019 12:12 PM  
Alcohol Cap Used No 2/4/2019 12:12 PM  
  
 
Opportunity for questions and clarification was provided. Patient transported with: 
 Registered Nurse & CRNA Gerardo Chen notified that pt appeared wet from urine when we transferred her from procedure table to stretcher and I offered to help change patient. She stated she would find someone in PACU to help.

## 2019-02-04 NOTE — ANESTHESIA POSTPROCEDURE EVALUATION
Procedure(s): PTC Drain. Anesthesia Post Evaluation Patient location during evaluation: PACU Patient participation: complete - patient participated Level of consciousness: awake Pain management: satisfactory to patient Airway patency: patent Anesthetic complications: no 
Cardiovascular status: hemodynamically stable Respiratory status: spontaneous ventilation Hydration status: euvolemic Post anesthesia nausea and vomiting:  none Visit Vitals /74 Pulse (!) 53 Temp 36.5 °C (97.7 °F) Resp 15 Ht 5' 5\" (1.651 m) Wt 95.3 kg (210 lb) SpO2 92% BMI 34.95 kg/m² Bedside sat 94%.

## 2019-02-04 NOTE — PROGRESS NOTES
MRCP reviewed--single stone in the distal CBD. No intrahepatic biliary dilation. This confirms the findings on CT scan from 1/31/19. Laparoscopic Cholecystectomy 7/22/13 w intraoperative cholangiogram confirming 2-3 stones in the CBD. At least 2 episodes of pancreatitis over the years, likely related to CBD stone passage. Multiple episodes of RUQ pain. 2 Laparoscopic lysis of adhesions. Abdominal wall mesh from hernia repair. Joaquim-en-Y Gastric Bypass in about 2008 prohibits ERCP. I suspect that the stone has functioned as a ball-valve, intermittently obstructing the CBD causing pain and jaundice, and then spontaneously moving and allowing the flow of bile once again. This would explain the lab value elevation and pain with return to normal over the last few days. Discussed treatment options:  ERCP--not possible, Bile Duct exploration--increased difficulty due to multiple previous surgeries, and PTC with drain placement and eventual LASER lithotripsy. After discussing the options, she prefers to proceed with PTC. She understands that this will likely require several procedures over several weeks.   
 
Wilder Constantino MD

## 2019-02-04 NOTE — PERIOP NOTES
TRANSFER - OUT REPORT: 
 
Verbal report given to Shayla Paul RN(name) on Juan Paredes  being transferred to [facility](unit) for routine post - op Report consisted of patients Situation, Background, Assessment and  
Recommendations(SBAR). Information from the following report(s) Kardex, Intake/Output, MAR and Cardiac Rhythm SB was reviewed with the receiving nurse. Lines:  
Peripheral IV 02/01/19 Left Antecubital (Active) Site Assessment Clean, dry, & intact 2/4/2019  3:03 PM  
Phlebitis Assessment 0 2/4/2019  3:03 PM  
Infiltration Assessment 0 2/4/2019  3:03 PM  
Dressing Status Clean, dry, & intact 2/4/2019  3:03 PM  
Dressing Type Tape;Transparent 2/4/2019  3:03 PM  
Hub Color/Line Status Patent 2/4/2019  3:03 PM  
Alcohol Cap Used No 2/4/2019  3:03 PM  
   
Peripheral IV 02/01/19 Left;Posterior Forearm (Active) Site Assessment Clean, dry, & intact 2/4/2019  3:03 PM  
Phlebitis Assessment 0 2/4/2019  3:03 PM  
Infiltration Assessment 0 2/4/2019  3:03 PM  
Dressing Status Clean, dry, & intact 2/4/2019  3:03 PM  
Dressing Type Tape;Transparent 2/4/2019  3:03 PM  
Hub Color/Line Status Patent 2/4/2019  3:03 PM  
Alcohol Cap Used No 2/4/2019  3:03 PM  
  
 
Opportunity for questions and clarification was provided. Patient transported with: 
 O2 @ 1LNC liters VTE prophylaxis orders have not been written for Juan Paredes. Patient and family given floor number and nurses name. Family updated re: pt status after security code verified.

## 2019-02-04 NOTE — PROGRESS NOTES
Problem: Falls - Risk of 
Goal: *Absence of Falls Document Alison Beck Fall Risk and appropriate interventions in the flowsheet. Outcome: Progressing Towards Goal 
Fall Risk Interventions: 
  
 
  
 
Medication Interventions: Evaluate medications/consider consulting pharmacy, Patient to call before getting OOB, Teach patient to arise slowly

## 2019-02-04 NOTE — PROGRESS NOTES
GI DAILY PROGRESS NOTE Admit Date:  2/1/2019 Today's Date:  2/4/2019 CC:  Choledocholithiasis, pancreatitis Subjective:  
 
Abdominal pain improved, resolving, just sore. No N/V. Medications:  
Current Facility-Administered Medications Medication Dose Route Frequency  sodium chloride (NS) flush 5-40 mL  5-40 mL IntraVENous Q8H  
 sodium chloride (NS) flush 5-40 mL  5-40 mL IntraVENous PRN  
 acetaminophen (TYLENOL) tablet 650 mg  650 mg Oral Q4H PRN  
 HYDROcodone-acetaminophen (NORCO) 5-325 mg per tablet 1 Tab  1 Tab Oral Q4H PRN  
 ondansetron (ZOFRAN) injection 4 mg  4 mg IntraVENous Q4H PRN  
 senna-docusate (PERICOLACE) 8.6-50 mg per tablet 1 Tab  1 Tab Oral DAILY  heparin (porcine) injection 5,000 Units  5,000 Units SubCUTAneous Q8H  
 amLODIPine (NORVASC) tablet 5 mg  5 mg Oral DAILY  aspirin delayed-release tablet 81 mg  81 mg Oral DAILY  levothyroxine (SYNTHROID) tablet 75 mcg  75 mcg Oral ACB  sertraline (ZOLOFT) tablet 100 mg  100 mg Oral QHS  piperacillin-tazobactam (ZOSYN) 3.375 g in 0.9% sodium chloride (MBP/ADV) 100 mL  3.375 g IntraVENous Q8H  
 lactated Ringers infusion  125 mL/hr IntraVENous CONTINUOUS  
 losartan/hydroCHLOROthiazide (HYZAAR) 100/25 mg   Oral DAILY  alcohol 62% (NOZIN) nasal  1 Ampule  1 Ampule Topical Q12H  temazepam (RESTORIL) capsule 30 mg  30 mg Oral QHS PRN  
 traZODone (DESYREL) tablet 150 mg  150 mg Oral QHS Review of Systems: ROS was obtained, with pertinent positives as listed above. No chest pain or SOB. Diet:  NPO Objective:  
Vitals: 
Visit Vitals /73 Pulse (!) 46 Temp 98 °F (36.7 °C) Resp 18 Ht 5' 5\" (1.651 m) Wt 95.3 kg (210 lb) SpO2 97% BMI 34.95 kg/m² Intake/Output: 
No intake/output data recorded. 02/02 1901 - 02/04 0700 In: 240 [P.O.:240] Out: - Exam: 
General appearance:NAD HEENT: anicteric sclera, mmm Lungs: CTA ant Heart: regular rate and rhythm Abdomen: soft, ND with NABS. Mildly TTP epigastric without G/R. Neuro:  A&O x3 Data Review (Labs):   
Recent Labs 02/04/19 
1174 02/03/19 
0520 02/02/19 
0527 02/01/19 
1935 02/01/19 
1113 WBC 3.7*  --  6.5 9.5 14.7* HGB 10.9*  --  10.5* 11.3* 12.4 HCT 34.4*  --  32.7* 35.2* 38.1   --  153 169 218 MCV 85.8  --  84.5 84.4 84.1  138 136  --  134* K 3.4* 3.5 2.8*  --  3.5  104 101  --  100 CO2 28 26 28  --  25  
BUN 5* 11 19  --  18  
CREA 0.76 0.74 0.94  --  1.27* CA 9.1 8.5 8.5  --  9.2 MG 1.3* 1.6* 1.8  --   --   
GLU 87 76 86  --  194* * 354* 401*  --  530* SGOT 73* 136* 309*  --  836* CBIL  --   --  2.9*  --  3.1* ALB 2.9* 2.7* 2.7*  --  3.5 TP 6.4 6.2* 6.1*  --  7.0 LPSE  --   --   --   --  5,588* Assessment:  
 
Principal Problem: 
Choledocholithiasis (2/1/2019) Active Problems: S/P laparoscopic cholecystectomy (8/15/2014) Coronary artery disease involving native coronary artery of native heart with angina pectoris ([facility] [address] (1/11/2019) Gallstone pancreatitis (2/2/2019) Hypokalemia (2/2/2019) 77 y.o. female with PMHx of CAD with stent placement, hypothyroid, HTN, Chronic pain, arthritis, HLD, osteopenia, ventral hernia with repair, hx of Joaquim-en-Y gastric bypass who presented with Jaundice and pancreatitis. CT abd/pelvis with conrast on 1/30/19 revealed small density in the distal common bile duct. LFT's consistent with biliary obstruction. Lipase elevated at time of presentation. She is unable to have ERCP due to surgically altered anatomy. Choledocholithiasis. Pancreatitis. Liver panel is improving. Negative pain for >24 hours. Possible spontaneous passage of CBD stone vs. ball-valving stone in CBD. LFTs improved. Plan:  
 
Remains NPO. IR saw patient today. Since patient has improved, possibly passed stone, MRCP ordered to evaluate for residual stones, possible PTC if appropriate. Following. Lashawn Hughes PA-C [facility]

## 2019-02-04 NOTE — PROGRESS NOTES
IR Nurse Pre-Procedure Checklist Part 2 Consent signed: Yes 
 
H&P complete:  Yes Antibiotics: n/a Airway/Mallampati Done: Yes Shaved: Not applicable Pregnancy Form:n/a Patient Position: Yes MD Side: Yes Biopsy Worksheet: Not applicable Specimen Medium: Not applicable

## 2019-02-04 NOTE — ANESTHESIA PREPROCEDURE EVALUATION
Anesthetic History No history of anesthetic complications Review of Systems / Medical History Patient summary reviewed, nursing notes reviewed and pertinent labs reviewed Pulmonary Within defined limits Neuro/Psych Psychiatric history Cardiovascular Hypertension CAD, cardiac stents (2010) and hyperlipidemia Exercise tolerance: >4 METS 
  
GI/Hepatic/Renal 
  
GERD: poorly controlled Comments: Gastric bypass Endo/Other Hypothyroidism: well controlled Obesity and arthritis Other Findings Physical Exam 
 
Airway Mallampati: II 
TM Distance: 4 - 6 cm Neck ROM: normal range of motion Mouth opening: Normal 
 
 Cardiovascular Regular rate and rhythm,  S1 and S2 normal,  no murmur, click, rub, or gallop Dental 
No notable dental hx Pulmonary Breath sounds clear to auscultation Abdominal 
 
 
 
 Other Findings Anesthetic Plan ASA: 3 Anesthesia type: general 
 
 
 
 
Induction: Intravenous Anesthetic plan and risks discussed with: Patient SEJAL.

## 2019-02-04 NOTE — PROGRESS NOTES
Progress Note Patient: Amelia Vera MRN: [id number]  SSN: [id number] YOB: 1950  Age: 77 y.o. Sex: female Admit Date: 2/1/2019 LOS: 2 days Subjective: F/U pancreatitis secondary to Choledocholithiasis Hx of  CAD, anxiety, and gastric by pass with Joaquim-en-Y who presented for abdominal pain. Total bili was 4.2, , , and lipase 1,981. CT showed choledocholithiasis. GI consulted but we are unable to get ERCP due to anatomy. Has had cholecystectomy. Currently on Zosyn. Today, AST, ALT trending down. Still having some abdominal pain but very mild. IR will not perform biliary drain until Monday. Tolerating clear liquid diet Current Facility-Administered Medications Medication Dose Route Frequency  sodium chloride (NS) flush 5-40 mL  5-40 mL IntraVENous Q8H  
 sodium chloride (NS) flush 5-40 mL  5-40 mL IntraVENous PRN  
 acetaminophen (TYLENOL) tablet 650 mg  650 mg Oral Q4H PRN  
 HYDROcodone-acetaminophen (NORCO) 5-325 mg per tablet 1 Tab  1 Tab Oral Q4H PRN  
 ondansetron (ZOFRAN) injection 4 mg  4 mg IntraVENous Q4H PRN  
 senna-docusate (PERICOLACE) 8.6-50 mg per tablet 1 Tab  1 Tab Oral DAILY  heparin (porcine) injection 5,000 Units  5,000 Units SubCUTAneous Q8H  
 amLODIPine (NORVASC) tablet 5 mg  5 mg Oral DAILY  aspirin delayed-release tablet 81 mg  81 mg Oral DAILY  levothyroxine (SYNTHROID) tablet 75 mcg  75 mcg Oral ACB  sertraline (ZOLOFT) tablet 100 mg  100 mg Oral QHS  piperacillin-tazobactam (ZOSYN) 3.375 g in 0.9% sodium chloride (MBP/ADV) 100 mL  3.375 g IntraVENous Q8H  
 lactated Ringers infusion  175 mL/hr IntraVENous CONTINUOUS  
 losartan/hydroCHLOROthiazide (HYZAAR) 100/25 mg   Oral DAILY  alcohol 62% (NOZIN) nasal  1 Ampule  1 Ampule Topical Q12H  temazepam (RESTORIL) capsule 30 mg  30 mg Oral QHS PRN  
 traZODone (DESYREL) tablet 150 mg  150 mg Oral QHS Objective: Vitals:  
 02/03/19 0646 02/03/19 1031 02/03/19 1456 02/03/19 1928 BP: 160/82 165/80 189/75 187/84 Pulse: (!) 55 (!) 54 (!) 52 (!) 54 Resp: 20 18 18 20 Temp: 98 °F (36.7 °C) 97.9 °F (36.6 °C) 98 °F (36.7 °C) 98.4 °F (36.9 °C) SpO2: 94% 97% 97% 92% Weight:      
Height:      
  
  
Intake and Output: 
Current Shift: No intake/output data recorded. Last three shifts: 02/02 0701 - 02/03 1900 In: 240 [P.O.:240] Out: - Physical Exam:  
General:  Alert, cooperative, no distress, appears stated age. Eyes:  Conjunctivae/corneas clear. PERRL, EOMs intact. Fundi benign Ears:  Normal TMs and external ear canals both ears. Nose: Nares normal. Septum midline. Mouth/Throat: Lips, mucosa, and tongue normal. Teeth and gums normal.  
Neck:  no JVD. Back:   Deferred Lungs:   Clear to auscultation bilaterally. Heart:  Regular rate and rhythm, S1, S2 normal  
Abdomen:   Soft, mild tenderness to epigastric area. Bowel sounds normal. No masses,  No organomegaly. Extremities: Extremities normal, atraumatic, no cyanosis or edema. Pulses: 2+ and symmetric all extremities. Skin: Skin color, texture, turgor normal. No rashes or lesions Lymph nodes: Cervical, supraclavicular, and axillary nodes normal.  
Neurologic: CNII-XII intact. Frutoso Goldtelma Lab/Data Review: 
 
Recent Results (from the past 24 hour(s)) METABOLIC PANEL, COMPREHENSIVE Collection Time: 02/03/19  5:20 AM  
Result Value Ref Range Sodium 138 136 - 145 mmol/L Potassium 3.5 3.5 - 5.1 mmol/L Chloride 104 98 - 107 mmol/L  
 CO2 26 21 - 32 mmol/L Anion gap 8 7 - 16 mmol/L Glucose 76 65 - 100 mg/dL BUN 11 8 - 23 MG/DL Creatinine 0.74 0.6 - 1.0 MG/DL  
 GFR est AA >60 >60 ml/min/1.73m2 GFR est non-AA >60 >60 ml/min/1.73m2 Calcium 8.5 8.3 - 10.4 MG/DL Bilirubin, total 1.3 (H) 0.2 - 1.1 MG/DL  
 ALT (SGPT) 403 (H) 12 - 65 U/L  
 AST (SGOT) 136 (H) 15 - 37 U/L Alk.  phosphatase 354 (H) 50 - 136 U/L  
 Protein, total 6.2 (L) 6.3 - 8.2 g/dL Albumin 2.7 (L) 3.2 - 4.6 g/dL Globulin 3.5 2.3 - 3.5 g/dL A-G Ratio 0.8 (L) 1.2 - 3.5 MAGNESIUM Collection Time: 02/03/19  5:20 AM  
Result Value Ref Range Magnesium 1.6 (L) 1.8 - 2.4 mg/dL Assessment/ Plan:  
 
Principal Problem: 
  Choledocholithiasis (2/1/2019) Active Problems: S/P laparoscopic cholecystectomy (8/15/2014) Coronary artery disease involving native coronary artery of [facility] (1/11/2019) Gallstone pancreatitis (2/2/2019) Hypokalemia (2/2/2019) Attempting to get biliary drain by IR. May not be able to get until Monday. Continue clear liquid diet. NPO past midnight in case has drain. Continue LR 125ml/hr. Monitor for signs of fluid overload. Continue Zosyn started on 2/1/19. HTN- BP controlled PRN temazepam to use at night. Has not had Ativan filled since December 13th and that was for 10 day supply. DVT prophylaxis - heparin Signed By: Courtney Skinner DO February 3, 2019

## 2019-02-04 NOTE — PROGRESS NOTES
Progress Note Patient: Jared Vieyra MRN: [id number]  SSN: [id number] YOB: 1950  Age: 77 y.o. Sex: female Admit Date: 2/1/2019 LOS: 3 days Subjective: F/U pancreatitis secondary to Choledocholithiasis Hx of  CAD, anxiety, and gastric by pass with Joaquim-en-Y who presented for abdominal pain. Total bili was 4.2, , , and lipase 1,981. CT showed choledocholithiasis. GI consulted but we are unable to get ERCP due to anatomy. Has had cholecystectomy. Currently on Zosyn. MRCP today that showed gallstone. S/p biliary drain on 2/4/19. Today, AST, ALT trending down. K 3.4. Mg 1.3. Albumin 2.9. Current Facility-Administered Medications Medication Dose Route Frequency  magnesium sulfate 2 g/50 ml IVPB (premix or compounded)  2 g IntraVENous ONCE  
 magnesium oxide (MAG-OX) tablet 400 mg  400 mg Oral BID  lidocaine (PF) (XYLOCAINE) 20 mg/mL (2 %) injection  mg  1-20 mL IntraDERMal ONCE  
 HYDROcodone-acetaminophen (NORCO)  mg tablet 1 Tab  1 Tab Oral Q4H PRN  potassium chloride (KLOR-CON) packet for solution 40 mEq  40 mEq Oral NOW  albumin human 25% (BUMINATE) solution 12.5 g  12.5 g IntraVENous ONCE  
 sodium chloride (NS) flush 5-40 mL  5-40 mL IntraVENous Q8H  
 sodium chloride (NS) flush 5-40 mL  5-40 mL IntraVENous PRN  
 acetaminophen (TYLENOL) tablet 650 mg  650 mg Oral Q4H PRN  
 ondansetron (ZOFRAN) injection 4 mg  4 mg IntraVENous Q4H PRN  
 senna-docusate (PERICOLACE) 8.6-50 mg per tablet 1 Tab  1 Tab Oral DAILY  heparin (porcine) injection 5,000 Units  5,000 Units SubCUTAneous Q8H  
 amLODIPine (NORVASC) tablet 5 mg  5 mg Oral DAILY  aspirin delayed-release tablet 81 mg  81 mg Oral DAILY  levothyroxine (SYNTHROID) tablet 75 mcg  75 mcg Oral ACB  sertraline (ZOLOFT) tablet 100 mg  100 mg Oral QHS  piperacillin-tazobactam (ZOSYN) 3.375 g in 0.9% sodium chloride (MBP/ADV) 100 mL  3.375 g IntraVENous Q8H  
 lactated Ringers infusion  125 mL/hr IntraVENous CONTINUOUS  
 losartan/hydroCHLOROthiazide (HYZAAR) 100/25 mg   Oral DAILY  alcohol 62% (NOZIN) nasal  1 Ampule  1 Ampule Topical Q12H  temazepam (RESTORIL) capsule 30 mg  30 mg Oral QHS PRN  
 traZODone (DESYREL) tablet 150 mg  150 mg Oral QHS Objective:  
 
Vitals:  
 02/04/19 1427 02/04/19 1432 02/04/19 1503 02/04/19 1601 BP: 168/84 180/84 161/74 159/81 Pulse: (!) 59 (!) 57 (!) 53 (!) 52 Resp: 16 15 16 16 Temp:   98 °F (36.7 °C) 98.1 °F (36.7 °C) SpO2: 100% 98% 92% 100% Weight:      
Height:      
  
  
Intake and Output: 
Current Shift: 02/04 0701 - 02/04 1900 In: 700 [I.V.:700] Out: 145 [Drains:140] Last three shifts: 02/02 1901 - 02/04 0700 In: 240 [P.O.:240] Out: - Physical Exam:  
General:  Alert, sleeping from sedation off and on Eyes:  Conjunctivae/corneas clear. Ears:  Normal TMs and external ear canals both ears. Nose: Nares normal. Septum midline. Mouth/Throat: Lips, mucosa, and tongue normal.   
Neck:  no JVD. Back:   Deferred Lungs:   Clear to auscultation bilaterally. Heart:  Regular rate and rhythm, S1, S2 normal  
Abdomen:   Soft, no pain to epigastric area. Bowel sounds normal. No masses,  No organomegaly. Biliary drain with good output Extremities: Extremities normal, atraumatic, no cyanosis or edema. Pulses: 2+ and symmetric all extremities. Skin: Skin color, texture, turgor normal. No rashes or lesions Lymph nodes: Cervical, supraclavicular, and axillary nodes normal.  
Neurologic: CNII-XII intact. Arelis Loose Lab/Data Review: 
 
Recent Results (from the past 24 hour(s)) METABOLIC PANEL, COMPREHENSIVE Collection Time: 02/04/19  7:21 AM  
Result Value Ref Range Sodium 138 136 - 145 mmol/L Potassium 3.4 (L) 3.5 - 5.1 mmol/L Chloride 102 98 - 107 mmol/L  
 CO2 28 21 - 32 mmol/L  Anion gap 8 7 - 16 mmol/L  
 Glucose 87 65 - 100 mg/dL BUN 5 (L) 8 - 23 MG/DL Creatinine 0.76 0.6 - 1.0 MG/DL  
 GFR est AA >60 >60 ml/min/1.73m2 GFR est non-AA >60 >60 ml/min/1.73m2 Calcium 9.1 8.3 - 10.4 MG/DL Bilirubin, total 1.0 0.2 - 1.1 MG/DL  
 ALT (SGPT) 301 (H) 12 - 65 U/L  
 AST (SGOT) 73 (H) 15 - 37 U/L Alk. phosphatase 333 (H) 50 - 136 U/L Protein, total 6.4 6.3 - 8.2 g/dL Albumin 2.9 (L) 3.2 - 4.6 g/dL Globulin 3.5 2.3 - 3.5 g/dL A-G Ratio 0.8 (L) 1.2 - 3.5 MAGNESIUM Collection Time: 02/04/19  7:21 AM  
Result Value Ref Range Magnesium 1.3 (LL) 1.8 - 2.4 mg/dL PHOSPHORUS Collection Time: 02/04/19  7:21 AM  
Result Value Ref Range Phosphorus 3.3 2.3 - 3.7 MG/DL  
CBC WITH AUTOMATED DIFF Collection Time: 02/04/19  7:21 AM  
Result Value Ref Range WBC 3.7 (L) 4.3 - 11.1 K/uL  
 RBC 4.01 (L) 4.05 - 5.2 M/uL  
 HGB 10.9 (L) 11.7 - 15.4 g/dL HCT 34.4 (L) 35.8 - 46.3 % MCV 85.8 79.6 - 97.8 FL  
 MCH 27.2 26.1 - 32.9 PG  
 MCHC 31.7 31.4 - 35.0 g/dL  
 RDW 14.8 (H) 11.9 - 14.6 % PLATELET 165 332 - 504 K/uL MPV 11.2 9.4 - 12.3 FL ABSOLUTE NRBC 0.00 0.0 - 0.2 K/uL  
 DF AUTOMATED NEUTROPHILS 61 43 - 78 % LYMPHOCYTES 23 13 - 44 % MONOCYTES 10 4.0 - 12.0 % EOSINOPHILS 5 0.5 - 7.8 % BASOPHILS 1 0.0 - 2.0 % IMMATURE GRANULOCYTES 1 0.0 - 5.0 %  
 ABS. NEUTROPHILS 2.3 1.7 - 8.2 K/UL  
 ABS. LYMPHOCYTES 0.9 0.5 - 4.6 K/UL  
 ABS. MONOCYTES 0.4 0.1 - 1.3 K/UL  
 ABS. EOSINOPHILS 0.2 0.0 - 0.8 K/UL  
 ABS. BASOPHILS 0.1 0.0 - 0.2 K/UL  
 ABS. IMM. GRANS. 0.0 0.0 - 0.5 K/UL Assessment/ Plan:  
 
Principal Problem: 
  Choledocholithiasis (2/1/2019) Active Problems: S/P laparoscopic cholecystectomy (8/15/2014) Coronary artery disease involving native coronary artery of [facility] heart [facility] (1/11/2019) Gallstone pancreatitis (2/2/2019) Hypokalemia (2/2/2019) Hypomagnesemia (2/4/2019) S/p biliary drain by IR on 2/4/19. Continue clear liquid diet. Continue LR 125ml/hr. Monitor for signs of fluid overload. Continue Zosyn started on 2/1/19. Body culture pending from today. HTN- BP controlled Supplement K and Mg. IV magnesium and start oral mg oxide. PRN temazepam to use at night. Has not had Ativan filled since December 13th and that was for 10 day supply. DVT prophylaxis - heparin Signed By: Gem Graham DO February 4, 2019

## 2019-02-04 NOTE — PROGRESS NOTES
Pt anxious this pm and having trouble sleeping. MD notified. Orders received. Will contnue to monitor. Hourly rounds performed through shift, pt denies needs at this time. Bed in low position and call light/ personal items within reach. Will continue to monitor and report to oncoming nurse.

## 2019-02-04 NOTE — PROGRESS NOTES
Interdisciplinary Rounds completed 02/04/19. Nursing, Case Management, Physician and PT present. Plan of care reviewed and updated. For drain placement today.

## 2019-02-04 NOTE — CONSULTS
Department of Interventional Radiology  [phone]        Consult Note     Patient: Jorge L Shelton MRN: [id number]  SSN: [id number]    YOB: 1950  Age: 77 y.o. Sex: female      Referring Physician: Dr Adriane Weiss Date: 2/4/2019     Subjective:     Chief Complaint: RUQ pain w meals    History of Present Illness: Jorge L Shelton is a 77 y.o. female who is seen in consultation for CT evidence of choledocholithiasis. Laparoscopic cholecystectomy in 2013 with multiple episodes of spontaneous stone extrusion from the midline incision site, multiple episodes of pancreatitis. Admitted a few days ago with jaundice (now improved), T Bili 4.2 (now improved), RUQ pain (now improved w bowel rest). She reports a Nuclear Medicine scan that \"showed blockage\" but there is no record in PACs. RUQ pain was so severe that \"I thought that I was going to die\". Past Medical History:   Diagnosis Date    Anxiety     Arthritis     general-back, neck, hips,knees/ managed with medication     Autoimmune disease ([address]     lichens sclerosis; pt states is dormant now    CAD (coronary artery disease) 2010    stent x 1, no MI    Chest pain     right    Chronic pain     back and neck    Depression     managed with medication     Family history of ischemic heart disease     H/O acute pancreatitis 08/2013    states gallbladder stone lodged creating a blockage.  H/O gastric bypass 2009    High cholesterol     managed w/ meds    Hypertension     managed with medication     Hypothyroidism     managed with medication     Lichen sclerosus     Obesity (BMI 30-39. 9)     BMI 32.4 3/16    Osteopenia     Systolic murmur     ECHO: +/-PI, TR ; Per cardiologist note 1-5-17 no murmur ; pt states told by cardiologist that he was removeing murmur from her record    Ventral hernia      Past Surgical History:   Procedure Laterality Date    HX APPENDECTOMY  1954    HX BLADDER SUSPENSION      HX CARPAL TUNNEL RELEASE Right     HX CATARACT REMOVAL Bilateral 2015    HX COLONOSCOPY  2017    normal, repeat after 2027    HX ENDOSCOPY  2018    s/p bypass changes    HX GASTRIC BYPASS  2009    HX HEART CATHETERIZATION  2010    stenting of LAD    HX HERNIA REPAIR  8/14/14    ventral    HX HYSTERECTOMY  1978    cervix removed    HX LAP CHOLECYSTECTOMY  7/2013    HX LUMBAR FUSION  1996    with 1 steel adeline with screws~lower lumbar    HX LYSIS OF ADHESIONS      abdominal    HX OOPHORECTOMY  1984    right    HX ORTHOPAEDIC Right 1997    carpal tunnel release    HX ORTHOPAEDIC Left 2017    hip replacement    HX OTHER SURGICAL  2017    abdomen surgery lysis of adhesions    HX SHOULDER ARTHROSCOPY Right 2011    HX UROLOGICAL      bladder suspension      Family History   Problem Relation Age of Onset    Heart Attack Mother     Heart Disease Mother     Hypertension Mother     Cancer Mother         melanoma    Cancer Father         leukemia    Coronary Artery Disease Father     Cancer Sister         breast    Heart Attack Brother     Heart Disease Brother     Cancer Brother         lung    Lung Disease Brother      Social History     Tobacco Use    Smoking status: Never Smoker    Smokeless tobacco: Never Used   Substance Use Topics    Alcohol use: Yes     Alcohol/week: 0.6 oz     Types: 1 Glasses of wine per week     Comment: Occasional      Allergies   Allergen Reactions    Adhesive Rash     Pulls skin off,can use paper tape    Adhesive Tape Rash    Sulfa (Sulfonamide Antibiotics) Nausea and Vomiting     Prior to Admission medications    Medication Sig Start Date End Date Taking? Authorizing Provider   HYDROcodone-acetaminophen (NORCO) 7.5-325 mg per tablet 1 q6h prn severe pain 12/13/18  Yes Edith Reynolds MD   temazepam (RESTORIL) 30 mg capsule Take 1 Cap by mouth nightly as needed.  Max Daily Amount: 30 mg. 12/13/18  Yes Edith Reynolds MD   LORazepam (ATIVAN) 0.5 mg tablet Take 1 Tab by mouth every eight (8) hours as needed for Anxiety. Max Daily Amount: 1.5 mg. 12/13/18  Yes Srikanth Messina MD   meloxicam (MOBIC) 15 mg tablet 1 every day for pain 9/13/18  Yes Srikanth Messina MD   losartan-hydroCHLOROthiazide [facility]) 100-25 mg per tablet 1 every day for BP  Indications: hypertension 9/13/18  Yes Srikanth Messina MD   alendronate (FOSAMAX) 70 mg tablet 1 q week for osteoporosis 9/13/18  Yes Srikanth Messina MD   levothyroxine (SYNTHROID) 75 mcg tablet 1 every day for thyroid 9/13/18  Yes Srikanth Messina MD   amLODIPine (NORVASC) 5 mg tablet 1 every day for BP  Indications: hypertension 9/13/18  Yes Srikanth Messina MD   traZODone (DESYREL) 150 mg tablet 1 to 2 qhs for sleep 9/13/18  Yes Srikanth Messina MD   sertraline (ZOLOFT) 100 mg tablet Take 1 Tab by mouth nightly. Indications: major depressive disorder 9/13/18  Yes Srikanth Messina MD   aspirin delayed-release 81 mg tablet Take 81 mg by mouth daily. Yes Provider, Historical   MULTIVITAMIN PO Take  by mouth daily.  Takes 1-2 melt-aways daily   Yes Provider, Historical       Objective:     Physical Exam:  Visit Vitals  /73   Pulse (!) 46   Temp 98 °F (36.7 °C)   Resp 18   Ht 5' 5\" (1.651 m)   Wt 95.3 kg (210 lb)   SpO2 97%   BMI 34.95 kg/m²      Lab/Data Review:  BMP:   Lab Results   Component Value Date/Time     02/04/2019 07:21 AM    K 3.4 (L) 02/04/2019 07:21 AM     02/04/2019 07:21 AM    CO2 28 02/04/2019 07:21 AM    AGAP 8 02/04/2019 07:21 AM    GLU 87 02/04/2019 07:21 AM    BUN 5 (L) 02/04/2019 07:21 AM    CREA 0.76 02/04/2019 07:21 AM    GFRAA >60 02/04/2019 07:21 AM    GFRNA >60 02/04/2019 07:21 AM     CMP:   Lab Results   Component Value Date/Time     02/04/2019 07:21 AM    K 3.4 (L) 02/04/2019 07:21 AM     02/04/2019 07:21 AM    CO2 28 02/04/2019 07:21 AM    AGAP 8 02/04/2019 07:21 AM    GLU 87 02/04/2019 07:21 AM    BUN 5 (L) 02/04/2019 07:21 AM CREA 0.76 02/04/2019 07:21 AM    GFRAA >60 02/04/2019 07:21 AM    GFRNA >60 02/04/2019 07:21 AM    CA 9.1 02/04/2019 07:21 AM    MG 1.3 (LL) 02/04/2019 07:21 AM    PHOS 3.3 02/04/2019 07:21 AM    ALB 2.9 (L) 02/04/2019 07:21 AM    TP 6.4 02/04/2019 07:21 AM    GLOB 3.5 02/04/2019 07:21 AM    AGRAT 0.8 (L) 02/04/2019 07:21 AM    SGOT 73 (H) 02/04/2019 07:21 AM     (H) 02/04/2019 07:21 AM     CBC:   Lab Results   Component Value Date/Time    WBC 3.7 (L) 02/04/2019 07:21 AM    HGB 10.9 (L) 02/04/2019 07:21 AM    HCT 34.4 (L) 02/04/2019 07:21 AM     02/04/2019 07:21 AM     COAGS: No results found for: APTT, PTP, INR  Liver Panel:   Lab Results   Component Value Date/Time    ALB 2.9 (L) 02/04/2019 07:21 AM    TP 6.4 02/04/2019 07:21 AM    GLOB 3.5 02/04/2019 07:21 AM    AGRAT 0.8 (L) 02/04/2019 07:21 AM    SGOT 73 (H) 02/04/2019 07:21 AM     (H) 02/04/2019 07:21 AM     (H) 02/04/2019 07:21 AM       Assessment/Plan:     Principal Problem:    Choledocholithiasis (2/1/2019)    Active Problems:    S/P laparoscopic cholecystectomy (8/15/2014)      Coronary artery disease involving native coronary artery of native heart with angina pectoris ([address] (1/11/2019)      Gallstone pancreatitis (2/2/2019)      Hypokalemia (2/2/2019)       Resolved RUQ pain, jaundice, and hyperbilirubinemia. Normal WBC. Choledocholithiasis found on CT scan 1/31/19 and 7/19/18. HIDA scan w obstruction--by report. Patient requests food to eat. Given the recent improvement, the single stone in the CBD may have passed. I will order an MRCP to evaluate for any residual stone/stones and plan PTC w Anesthesia, as appropriate.      Darling Obrien MD

## 2019-02-05 LAB
ALBUMIN SERPL-MCNC: 3.2 G/DL (ref 3.2–4.6)
ALBUMIN/GLOB SERPL: 0.9 {RATIO} (ref 1.2–3.5)
ALP SERPL-CCNC: 298 U/L (ref 50–136)
ALT SERPL-CCNC: 251 U/L (ref 12–65)
ANION GAP SERPL CALC-SCNC: 7 MMOL/L (ref 7–16)
AST SERPL-CCNC: 84 U/L (ref 15–37)
BILIRUB SERPL-MCNC: 0.8 MG/DL (ref 0.2–1.1)
BUN SERPL-MCNC: 6 MG/DL (ref 8–23)
CALCIUM SERPL-MCNC: 9.1 MG/DL (ref 8.3–10.4)
CHLORIDE SERPL-SCNC: 100 MMOL/L (ref 98–107)
CO2 SERPL-SCNC: 29 MMOL/L (ref 21–32)
CREAT SERPL-MCNC: 0.75 MG/DL (ref 0.6–1)
GLOBULIN SER CALC-MCNC: 3.4 G/DL (ref 2.3–3.5)
GLUCOSE SERPL-MCNC: 74 MG/DL (ref 65–100)
HCT VFR BLD AUTO: 35.4 % (ref 35.8–46.3)
HGB BLD-MCNC: 11 G/DL (ref 11.7–15.4)
MAGNESIUM SERPL-MCNC: 1.7 MG/DL (ref 1.8–2.4)
POTASSIUM SERPL-SCNC: 3.3 MMOL/L (ref 3.5–5.1)
PROT SERPL-MCNC: 6.6 G/DL (ref 6.3–8.2)
SODIUM SERPL-SCNC: 136 MMOL/L (ref 136–145)

## 2019-02-05 PROCEDURE — 74011250636 HC RX REV CODE- 250/636: Performed by: INTERNAL MEDICINE

## 2019-02-05 PROCEDURE — 85018 HEMOGLOBIN: CPT

## 2019-02-05 PROCEDURE — 74011250637 HC RX REV CODE- 250/637: Performed by: RADIOLOGY

## 2019-02-05 PROCEDURE — 77030025702 HC BG URIN DRN MRTM -A

## 2019-02-05 PROCEDURE — 74011250637 HC RX REV CODE- 250/637: Performed by: FAMILY MEDICINE

## 2019-02-05 PROCEDURE — 36415 COLL VENOUS BLD VENIPUNCTURE: CPT

## 2019-02-05 PROCEDURE — 74011250637 HC RX REV CODE- 250/637: Performed by: INTERNAL MEDICINE

## 2019-02-05 PROCEDURE — 74011250637 HC RX REV CODE- 250/637: Performed by: PHYSICIAN ASSISTANT

## 2019-02-05 PROCEDURE — 65660000000 HC RM CCU STEPDOWN

## 2019-02-05 PROCEDURE — 83735 ASSAY OF MAGNESIUM: CPT

## 2019-02-05 PROCEDURE — 80053 COMPREHEN METABOLIC PANEL: CPT

## 2019-02-05 PROCEDURE — 74011000258 HC RX REV CODE- 258: Performed by: NURSE PRACTITIONER

## 2019-02-05 PROCEDURE — 74011250636 HC RX REV CODE- 250/636: Performed by: NURSE PRACTITIONER

## 2019-02-05 RX ORDER — SIMETHICONE 80 MG
80 TABLET,CHEWABLE ORAL
Status: DISCONTINUED | OUTPATIENT
Start: 2019-02-05 | End: 2019-02-06 | Stop reason: HOSPADM

## 2019-02-05 RX ADMIN — Medication 10 ML: at 14:00

## 2019-02-05 RX ADMIN — PIPERACILLIN AND TAZOBACTAM 3.38 G: 3; .375 INJECTION, POWDER, FOR SOLUTION INTRAVENOUS at 13:35

## 2019-02-05 RX ADMIN — PIPERACILLIN AND TAZOBACTAM 3.38 G: 3; .375 INJECTION, POWDER, FOR SOLUTION INTRAVENOUS at 06:11

## 2019-02-05 RX ADMIN — Medication 400 MG: at 18:27

## 2019-02-05 RX ADMIN — Medication 10 ML: at 21:31

## 2019-02-05 RX ADMIN — TRAZODONE HYDROCHLORIDE 150 MG: 50 TABLET ORAL at 21:31

## 2019-02-05 RX ADMIN — HYDROCHLOROTHIAZIDE: 25 TABLET ORAL at 09:01

## 2019-02-05 RX ADMIN — LEVOTHYROXINE SODIUM 75 MCG: 75 TABLET ORAL at 09:03

## 2019-02-05 RX ADMIN — HYDROCODONE BITARTRATE AND ACETAMINOPHEN 1 TABLET: 10; 325 TABLET ORAL at 04:05

## 2019-02-05 RX ADMIN — Medication 1 AMPULE: at 09:01

## 2019-02-05 RX ADMIN — Medication 10 ML: at 05:50

## 2019-02-05 RX ADMIN — SERTRALINE 100 MG: 100 TABLET, FILM COATED ORAL at 21:31

## 2019-02-05 RX ADMIN — Medication 400 MG: at 09:02

## 2019-02-05 RX ADMIN — SENNOSIDES AND DOCUSATE SODIUM 1 TABLET: 8.6; 5 TABLET ORAL at 09:03

## 2019-02-05 RX ADMIN — HEPARIN SODIUM 5000 UNITS: 5000 INJECTION INTRAVENOUS; SUBCUTANEOUS at 09:03

## 2019-02-05 RX ADMIN — PIPERACILLIN AND TAZOBACTAM 3.38 G: 3; .375 INJECTION, POWDER, FOR SOLUTION INTRAVENOUS at 21:31

## 2019-02-05 RX ADMIN — Medication 1 AMPULE: at 21:31

## 2019-02-05 RX ADMIN — HYDROCODONE BITARTRATE AND ACETAMINOPHEN 1 TABLET: 10; 325 TABLET ORAL at 11:21

## 2019-02-05 RX ADMIN — HEPARIN SODIUM 5000 UNITS: 5000 INJECTION INTRAVENOUS; SUBCUTANEOUS at 00:31

## 2019-02-05 RX ADMIN — HYDROCODONE BITARTRATE AND ACETAMINOPHEN 1 TABLET: 10; 325 TABLET ORAL at 15:27

## 2019-02-05 RX ADMIN — SIMETHICONE CHEW TAB 80 MG 80 MG: 80 TABLET ORAL at 21:39

## 2019-02-05 RX ADMIN — AMLODIPINE BESYLATE 5 MG: 5 TABLET ORAL at 09:03

## 2019-02-05 RX ADMIN — ASPIRIN 81 MG: 81 TABLET, COATED ORAL at 09:03

## 2019-02-05 RX ADMIN — HYDROCODONE BITARTRATE AND ACETAMINOPHEN 1 TABLET: 10; 325 TABLET ORAL at 21:31

## 2019-02-05 RX ADMIN — HEPARIN SODIUM 5000 UNITS: 5000 INJECTION INTRAVENOUS; SUBCUTANEOUS at 18:27

## 2019-02-05 RX ADMIN — HEPARIN SODIUM 5000 UNITS: 5000 INJECTION INTRAVENOUS; SUBCUTANEOUS at 23:15

## 2019-02-05 NOTE — PROGRESS NOTES
END OF SHIFT NOTE: Bilary drain rt side of abd remains patent and draining. Jaylen valencia,renuka,i. Favian Camp no c/o pain Reported to night shift nurse of pts request that she could not drink liquid potassium and requested pills, to follow up. Hourly rounds completed, all needs met. Will continue to monitor until night shift nurse takes over. INTAKE/OUTPUT 
02/03 0701 - 02/04 0700 In: 240 [P.O.:240] Out: -  
Voiding: YES Catheter: NO 
Color: clear Drain:  
Biliary Drain 02/04/19 Abdomen;Right (Active) Site Assessment Clean, dry, & intact 2/4/2019  7:15 PM  
Dressing Status Clean, dry, & intact 2/4/2019  7:15 PM  
Drainage Description Krystle Santillan 2/4/2019  7:15 PM  
Status Patent;Draining 2/4/2019  7:15 PM  
Output (ml) 425 ml 2/4/2019  7:15 PM  
 
 
 
 
 
 
DIET 
clear Flatus: Patient does have flatus present. Stool:  1 occurrences. Characteristics: 
Stool Assessment Stool Appearance: Soft Ambulating Yes Emesis: 0 occurrences. Characteristics: VITAL SIGNS Patient Vitals for the past 12 hrs: 
 Temp Pulse Resp BP SpO2  
02/04/19 1932 98.1 °F (36.7 °C) (!) 53 18 178/78 95 % 02/04/19 1601 98.1 °F (36.7 °C) (!) 52 16 159/81 100 % 02/04/19 1503 98 °F (36.7 °C) (!) 53 16 161/74 92 % 02/04/19 1432  (!) 57 15 180/84 98 % 02/04/19 1427  (!) 59 16 168/84 100 % 02/04/19 1422  (!) 55 17 168/83 100 % 02/04/19 1417  (!) 58 16 163/84 97 % 02/04/19 1412  (!) 59 15 169/85 100 % 02/04/19 1409 97.7 °F (36.5 °C) 66 15 161/79 99 % 02/04/19 1408  66  161/79 100 % 02/04/19 1100 98.7 °F (37.1 °C) (!) 50 18 (!) 194/94 99 % 02/04/19 0849 98 °F (36.7 °C) (!) 46 18 161/73 97 % Pain Assessment Pain Intensity 1: 0 (02/04/19 1550) Pain Location 1: Abdomen Pain Intervention(s) 1: Medication (see MAR) Patient Stated Pain Goal: 0 Nichol Marcelino RN

## 2019-02-05 NOTE — PROGRESS NOTES
Pt request O2 to be removed. Pt on 1L via NC. Pt off O2 since 0405am. O2 sat 96% on RA. Instructed pt to call if pt feels SOB or having a hard time breathing. Pt voiced understanding.

## 2019-02-05 NOTE — INTERDISCIPLINARY ROUNDS
Interdisciplinary Rounds completed 02/05/19. Nursing, Case Management, Physician and PT present. 
  
Plan of care reviewed and updated.

## 2019-02-05 NOTE — PROGRESS NOTES
Hourly rounds completed shift. All needs met. Bed low/locked. Pain medication administered throughout then night. Bilary drain patent & draining;dressing clean, dry and intact. . Call light in reach. Will continue to monitor pt and give report to oncoming RN. Peripheral IV 02/01/19 Left Antecubital (Active) Site Assessment Clean, dry, & intact 2/5/2019  2:04 AM  
Phlebitis Assessment 0 2/5/2019  2:04 AM  
Infiltration Assessment 0 2/5/2019  2:04 AM  
Dressing Status Clean, dry, & intact 2/5/2019  2:04 AM  
Dressing Type Transparent;Tape 2/5/2019  2:04 AM  
Hub Color/Line Status Pink;Flushed;Patent 2/5/2019  2:04 AM  
Alcohol Cap Used No 2/4/2019  3:50 PM  
   
Peripheral IV 02/01/19 Left;Posterior Forearm (Active) Site Assessment Clean, dry, & intact 2/5/2019  2:04 AM  
Phlebitis Assessment 0 2/5/2019  2:04 AM  
Infiltration Assessment 0 2/5/2019  2:04 AM  
Dressing Status Clean, dry, & intact 2/5/2019  2:04 AM  
Dressing Type Transparent;Tape 2/5/2019  2:04 AM  
Hub Color/Line Status Blue;Patent; Infusing 2/5/2019  2:04 AM  
Alcohol Cap Used No 2/4/2019  3:50 PM

## 2019-02-05 NOTE — PROGRESS NOTES
GI DAILY PROGRESS NOTE Admit Date:  2/1/2019 Today's Date:  2/5/2019 CC:  Choledocholithiasis, pancreatitis Subjective:  
 
Abdominal pain increased after PTC, but controlled with pain meds. Some mild nausea this morning, resolved. Tolerates clears. Wants to advance to GI soft diet. Medications:  
Current Facility-Administered Medications Medication Dose Route Frequency  magnesium oxide (MAG-OX) tablet 400 mg  400 mg Oral BID  
 HYDROcodone-acetaminophen (NORCO)  mg tablet 1 Tab  1 Tab Oral Q4H PRN  
 sodium chloride (NS) flush 5-40 mL  5-40 mL IntraVENous Q8H  
 sodium chloride (NS) flush 5-40 mL  5-40 mL IntraVENous PRN  
 acetaminophen (TYLENOL) tablet 650 mg  650 mg Oral Q4H PRN  
 ondansetron (ZOFRAN) injection 4 mg  4 mg IntraVENous Q4H PRN  
 senna-docusate (PERICOLACE) 8.6-50 mg per tablet 1 Tab  1 Tab Oral DAILY  heparin (porcine) injection 5,000 Units  5,000 Units SubCUTAneous Q8H  
 amLODIPine (NORVASC) tablet 5 mg  5 mg Oral DAILY  aspirin delayed-release tablet 81 mg  81 mg Oral DAILY  levothyroxine (SYNTHROID) tablet 75 mcg  75 mcg Oral ACB  sertraline (ZOLOFT) tablet 100 mg  100 mg Oral QHS  piperacillin-tazobactam (ZOSYN) 3.375 g in 0.9% sodium chloride (MBP/ADV) 100 mL  3.375 g IntraVENous Q8H  
 lactated Ringers infusion  125 mL/hr IntraVENous CONTINUOUS  
 losartan/hydroCHLOROthiazide (HYZAAR) 100/25 mg   Oral DAILY  alcohol 62% (NOZIN) nasal  1 Ampule  1 Ampule Topical Q12H  temazepam (RESTORIL) capsule 30 mg  30 mg Oral QHS PRN  
 traZODone (DESYREL) tablet 150 mg  150 mg Oral QHS Review of Systems: ROS was obtained, with pertinent positives as listed above. No chest pain or SOB. Diet:  NPO Objective:  
Vitals: 
Visit Vitals /84 (BP 1 Location: Right arm, BP Patient Position: At rest) Pulse (!) 54 Temp 98.6 °F (37 °C) Resp 20 Ht 5' 5\" (1.651 m) Wt 95.3 kg (210 lb) SpO2 92% BMI 34.95 kg/m² Intake/Output: 
No intake/output data recorded. 02/03 1901 - 02/05 0700 In: 700 [I.V.:700] Out: 970 [Drains:965] Exam: 
General appearance:NAD HEENT: anicteric sclera, mmm Lungs: CTA ant Heart: regular rate and rhythm Abdomen: soft, ND with NABS. Mildly TTP epigastric without G/R. PTC intact with bilious drainage. Neuro:  A&O x3 Data Review (Labs):   
Recent Labs 02/05/19 
5043 02/04/19 
1440 02/03/19 
3026 WBC  --  3.7*  --   
HGB 11.0* 10.9*  --   
HCT 35.4* 34.4*  --   
PLT  --  163  -- MCV  --  85.8  --   
 138 138  
K 3.3* 3.4* 3.5  102 104 CO2 29 28 26 BUN 6* 5* 11  
CREA 0.75 0.76 0.74 CA 9.1 9.1 8.5 MG 1.7* 1.3* 1.6*  
GLU 74 87 76 * 333* 354* SGOT 84* 73* 136* ALB 3.2 2.9* 2.7* TP 6.6 6.4 6.2* Assessment:  
 
Principal Problem: 
Choledocholithiasis (2/1/2019) Active Problems: S/P laparoscopic cholecystectomy (8/15/2014) Coronary artery disease involving native coronary artery of native heart with angina pectoris ([facility] (1/11/2019) Gallstone pancreatitis (2/2/2019) Hypokalemia (2/2/2019) 77 y.o. female with PMHx of CAD with stent placement, hypothyroid, HTN, Chronic pain, arthritis, HLD, osteopenia, ventral hernia with repair, hx of Joaquim-en-Y gastric bypass who presented with Jaundice and pancreatitis. CT abd/pelvis with conrast on 1/30/19 revealed small density in the distal common bile duct. LFT's consistent with biliary obstruction. Lipase elevated at time of presentation. She is unable to have ERCP due to surgically altered anatomy. Choledocholithiasis. Pancreatitis. Liver panel is improving. Negative pain for >24 hours. Intermittently obstructing CBD stone. LFTs improved. MRI abd 2/4: IMPRESSION: 
1. 9 mm filling defect in the distal common bile duct concerning for a stone or 
filling abnormality otherwise. This would be best further assessed with ERCP. 2.  No abnormal biliary dilation with the common bile duct measuring 7 mm. 3.  Status post cholecystectomy. No abnormal fluid collection is seen. 2/4 IR: Percutaneous Transhepatic Cholangiogram with Internal/External Drain Placement and Cholangioplasty. Plan: IR plans on cholangiogram in 3-4 weeks. In the process of finding MD for possible LASER lithotripsy of CBD stones - GI vs Urology. Edgardo Silver PA-C [facility]

## 2019-02-05 NOTE — PROGRESS NOTES
Department of Interventional Radiology 
[phone] Progress NotePatient: Zuhair Sesay MRN: [id number]  SSN: [id number] YOB: 1950  Age: 77 y.o. Sex: female Subjective:  
 
Feeling better. Tolerating solid food. Brief episode of nausea this morning resolved spontaneously w rest.   
 
Objective:  
 
Vitals:  
 02/04/19 2314 02/05/19 0404 02/05/19 0801 02/05/19 1134 BP: 162/74 164/79 146/84 177/84 Pulse: (!) 58 (!) 55 (!) 54 (!) 51 Resp: 18 18 20 18 Temp: 98 °F (36.7 °C) 98.8 °F (37.1 °C) 98.6 °F (37 °C) 98.4 °F (36.9 °C) SpO2: 94% 95% 92% 94% Weight:      
Height:      
  
Intake and Output: 
Biliary Drain 02/04/19 Abdomen;Right (Active) Site Assessment Clean, dry, & intact 2/4/2019  7:15 PM  
Dressing Status Clean, dry, & intact 2/4/2019  7:15 PM  
Drainage Description Arias Starch 2/4/2019  7:15 PM  
Status Patent;Draining 2/4/2019  7:15 PM  
Output (ml) 200 ml 2/5/2019  4:08 AM  
 
Physical Exam: Abdomen:  Soft, nondistended. Dressing is C/D/I, clear bile in the bag. Lab/Data Review: 
BMP:  
Lab Results Component Value Date/Time  02/05/2019 05:42 AM  
 K 3.3 (L) 02/05/2019 05:42 AM  
  02/05/2019 05:42 AM  
 CO2 29 02/05/2019 05:42 AM  
 AGAP 7 02/05/2019 05:42 AM  
 GLU 74 02/05/2019 05:42 AM  
 BUN 6 (L) 02/05/2019 05:42 AM  
 CREA 0.75 02/05/2019 05:42 AM  
 GFRAA >60 02/05/2019 05:42 AM  
 GFRNA >60 02/05/2019 05:42 AM  
 
CMP:  
Lab Results Component Value Date/Time   02/05/2019 05:42 AM  
 K 3.3 (L) 02/05/2019 05:42 AM  
  02/05/2019 05:42 AM  
 CO2 29 02/05/2019 05:42 AM  
 AGAP 7 02/05/2019 05:42 AM  
 GLU 74 02/05/2019 05:42 AM  
 BUN 6 (L) 02/05/2019 05:42 AM  
 CREA 0.75 02/05/2019 05:42 AM  
 GFRAA >60 02/05/2019 05:42 AM  
 GFRNA >60 02/05/2019 05:42 AM  
 CA 9.1 02/05/2019 05:42 AM  
 MG 1.7 (L) 02/05/2019 05:42 AM  
 ALB 3.2 02/05/2019 05:42 AM  
 TP 6.6 02/05/2019 05:42 AM  
 GLOB 3.4 02/05/2019 05:42 AM  
 AGRAT 0.9 (L) 02/05/2019 05:42 AM  
 SGOT 84 (H) 02/05/2019 05:42 AM  
  (H) 02/05/2019 05:42 AM  
 
CBC:  
Lab Results Component Value Date/Time HGB 11.0 (L) 02/05/2019 05:42 AM  
 HCT 35.4 (L) 02/05/2019 05:42 AM  
 
COAGS: No results found for: APTT, PTP, INR Liver Panel:  
Lab Results Component Value Date/Time ALB 3.2 02/05/2019 05:42 AM  
 TP 6.6 02/05/2019 05:42 AM  
 GLOB 3.4 02/05/2019 05:42 AM  
 AGRAT 0.9 (L) 02/05/2019 05:42 AM  
 SGOT 84 (H) 02/05/2019 05:42 AM  
  (H) 02/05/2019 05:42 AM  
  (H) 02/05/2019 05:42 AM  
 
Assessment:  
 
Doing well PPD #1 after PTC with internal/external drain placement. T Bili, AST, ALT all improving. Plan:  
 
Cap the drain today. Advance diet-done. Discussed w Dr Nery Clark. He is investigating a combined procedure w Dr Chris Bass. If this is not possible, I will investigate laser lithotripsy options. Signed By: Ralf Kim MD   
 February 5, 2019

## 2019-02-06 ENCOUNTER — HOME HEALTH ADMISSION (OUTPATIENT)
Dept: HOME HEALTH SERVICES | Facility: HOME HEALTH | Age: 69
End: 2019-02-06

## 2019-02-06 VITALS
RESPIRATION RATE: 18 BRPM | OXYGEN SATURATION: 95 % | TEMPERATURE: 98.4 F | BODY MASS INDEX: 34.99 KG/M2 | HEIGHT: 65 IN | HEART RATE: 61 BPM | WEIGHT: 210 LBS | DIASTOLIC BLOOD PRESSURE: 73 MMHG | SYSTOLIC BLOOD PRESSURE: 145 MMHG

## 2019-02-06 PROCEDURE — 97161 PT EVAL LOW COMPLEX 20 MIN: CPT

## 2019-02-06 PROCEDURE — 74011250637 HC RX REV CODE- 250/637: Performed by: FAMILY MEDICINE

## 2019-02-06 PROCEDURE — 74011000258 HC RX REV CODE- 258: Performed by: NURSE PRACTITIONER

## 2019-02-06 PROCEDURE — 74011250636 HC RX REV CODE- 250/636: Performed by: INTERNAL MEDICINE

## 2019-02-06 PROCEDURE — 74011250637 HC RX REV CODE- 250/637: Performed by: INTERNAL MEDICINE

## 2019-02-06 PROCEDURE — 97165 OT EVAL LOW COMPLEX 30 MIN: CPT

## 2019-02-06 PROCEDURE — 74011250636 HC RX REV CODE- 250/636: Performed by: NURSE PRACTITIONER

## 2019-02-06 PROCEDURE — 74011250637 HC RX REV CODE- 250/637: Performed by: RADIOLOGY

## 2019-02-06 PROCEDURE — 74011250637 HC RX REV CODE- 250/637: Performed by: PHYSICIAN ASSISTANT

## 2019-02-06 RX ADMIN — AMLODIPINE BESYLATE 5 MG: 5 TABLET ORAL at 08:35

## 2019-02-06 RX ADMIN — HYDROCODONE BITARTRATE AND ACETAMINOPHEN 1 TABLET: 10; 325 TABLET ORAL at 14:02

## 2019-02-06 RX ADMIN — ASPIRIN 81 MG: 81 TABLET, COATED ORAL at 08:35

## 2019-02-06 RX ADMIN — LEVOTHYROXINE SODIUM 75 MCG: 75 TABLET ORAL at 05:20

## 2019-02-06 RX ADMIN — SIMETHICONE CHEW TAB 80 MG 80 MG: 80 TABLET ORAL at 08:37

## 2019-02-06 RX ADMIN — Medication 400 MG: at 08:35

## 2019-02-06 RX ADMIN — HYDROCHLOROTHIAZIDE: 25 TABLET ORAL at 08:35

## 2019-02-06 RX ADMIN — HEPARIN SODIUM 5000 UNITS: 5000 INJECTION INTRAVENOUS; SUBCUTANEOUS at 08:36

## 2019-02-06 RX ADMIN — PIPERACILLIN AND TAZOBACTAM 3.38 G: 3; .375 INJECTION, POWDER, FOR SOLUTION INTRAVENOUS at 05:19

## 2019-02-06 RX ADMIN — Medication 10 ML: at 05:20

## 2019-02-06 RX ADMIN — Medication 10 ML: at 14:39

## 2019-02-06 RX ADMIN — SENNOSIDES AND DOCUSATE SODIUM 1 TABLET: 8.6; 5 TABLET ORAL at 08:35

## 2019-02-06 RX ADMIN — Medication 1 AMPULE: at 08:36

## 2019-02-06 NOTE — DISCHARGE SUMMARY
Hospitalist Discharge Summary     Patient ID:  Jorge L Shelton  [id number]  37 y.o.  1950  Admit date: 2/1/2019 11:43 AM  Discharge date and time: 2/6/2019  Attending: Zoe Richardson MD  PCP:  Lashawn Xiao MD  Treatment Team: Attending Provider: Zoe Richardson MD; Consulting Provider: Tony Gilford, MD; Care Manager: Jeffrey Helm RN; Charge Nurse: Kat Kim    Principal Diagnosis Choledocholithiasis   Principal Problem:    Choledocholithiasis (2/1/2019)    Active Problems:    S/P laparoscopic cholecystectomy (8/15/2014)      Coronary artery disease involving native coronary artery of native heart with angina pectoris ([facility] [address] (1/11/2019)      Gallstone pancreatitis (2/2/2019)      Hypokalemia (2/2/2019)      Hypomagnesemia (2/4/2019)       77 years [de-identified] F with PMH of CAD, h/o gastric by pass presented to the hospital complaining of worsening abdominal pain since last night. Patient stated pain started after eating supper last night. Patient described the pain as spams on mid abdomen, radiated to the RUQ. Patient reported is being following with GI outpatient Dr. Leonor Leong for abnormal LFTS and abdominal pain. Patient reported having a nuclear abdominal test done yesterday. Patient also reported having associated N,V and diarrhea. Patient denies chest pain, SOB, dysuria.     In the ED labs revealed abnormal LFTs and CT AP showing suspected choledocholithiasis. ED physician already contacted GI. Patient reported GI PA was at bedside. Interval History (2/6): patient examined at bedside. She had one \"spasm\" while eating just a bit ago. No fevers/chills, chest pain, shortness of breath, nausea/vomiting, or diarrhea. She says she feels irritated that she has to wait for follow-up with GI in order to get the gallstone resolved. Hospital Course:  Please refer to the admission H&P for details of presentation.  In summary, the patient is admitted with recurrent gallstone pancreatitis in the setting of previous cholecystectomy. Patient evaluated by GI and unable to complete ERCP. Biliary drain placed by IR and patient started on Zosyn empirically with symptomatic improvement. GI tentatively planning for follow-up outpatient ERCP with pull-up ostomy to reach the ampulla as she still has a 1.5 cm stone in the distal bile duct. Discontinued antibiotics, patient tolerating diet well and GI feels she can go home with follow-up as above. Details of patient's hospitalization explained to patient who understands and is agreeable to hospital discharge. Return precautions provided. All questions answered. Significant Diagnostic Studies:     MRCP, 2/4/2019:     Indication: 77year-old with moderate to severe right upper quadrant pain for  several months. Prior cholecystectomy.     Procedure: Three-dimensional MRCP was performed using maximum intensity  projection reconstruction. The examination consisted of axial T2W/HASTE, axial  fat-saturated T2W, axial T1 and an out-of-phase,and T2 weighted MIP images  rotated about the axis of the biliary tree generated.     Comparison: CT abdomen with contrast 1/31/2019      Findings:   Evaluation of the lung bases is limited by MRI imaging. No obvious pulmonary  abnormalities are seen. No pleural effusion is seen.     No intrahepatic biliary ductal dilatation is seen. The extra hepatic bile ducts  measure up to 7 mm in diameter which is not considered abnormal given the  patient's age. An 9 mm focal filling defect is seen in the distal common bile  duct best appreciated on MRCP image 84. The appearance is concerning for a stone  or filling abnormality otherwise. The gallbladder has been removed no abnormal  fluid collection is suggested in the gallbladder fossa. No abnormal focal  caliber changes are seen of the biliary tree. The pancreatic duct is normal in  caliber measuring 1 to 2 mm.  The course of the pancreatic duct is normal without  evidence for pancreas divisum.     Evaluation of the solid organs is very limited without intravenous contrast.  However, no focal signal abnormalities are definitely seen of the spleen, or  pancreas. A 7 mm T2 hyperintense right lobe hepatic lesion is seen on axial  T2-weighted image 18 and a 14 mm T2 hyperintense lesion is seen in the central  lower pole left kidney on axial T2-weighted image 24. These are suboptimally  assessed given the lack of administered intravenous contrast. However, these  appear very hypodense on the prior CT scan seen on axial images 31 and 43. These  are favored to represent benign cysts given the current MR and prior CT  appearance. No significant drop in signal intensity of the liver is seen to  suggest fatty infiltration. No contour deforming abnormalities are seen of the  pancreas although a small lesion could be missed due to the noncontrast  technique. No evidence of hydronephrosis is seen.     The loops of bowel are normal in caliber. No abnormal intra-abdominal fluid  collections are seen.     IMPRESSION  IMPRESSION:  1. 9 mm filling defect in the distal common bile duct concerning for a stone or  filling abnormality otherwise. This would be best further assessed with ERCP.     2. No abnormal biliary dilation with the common bile duct measuring 7 mm.     3.  Status post cholecystectomy. No abnormal fluid collection is seen.     Title: Percutaneous transhepatic cholangiogram with angioplasty and placement of  an internal/external biliary drain.     Indication: Pleasant 77year-old woman with history of intermittent obstructive  jaundice, transient hyperbilirubinemia, recurrent right upper quadrant pain, and  multiple episodes of pancreatitis. MRCP today confirms a 9 mm stone in the  distal common bile duct. I suspect this stone is mobile resulting in the  intermittent symptomatology.   CT scan 1/31/2019 also suggests a common bile duct  stone.     Intraoperative cholangiogram from laparoscopic cholecystectomy 7/22/2013  demonstrates 2-3 common bile duct stones. The patient is unable to have an ERCP  due to previous Joaquim-en-Y gastric bypass surgery.     Consent: Informed written and oral consent was obtained from the patient after  explanation of benefits and risks of procedure (including, but not limited to: Infection, hemorrhage, liver injury, pneumothorax). All alternatives including  common bile duct exploration surgery and ERCP were discussed. The patient's  questions were answered to her satisfaction. She stated understanding and  requested that we proceed. Her  was present throughout the consent  process.     Procedure: Following the successful induction of general endotracheal  anesthesia, the patient was placed supine on the fluoroscopy table. All contact  points were padded. Maximal sterile barrier technique (including:  cap, mask,  sterile gown, sterile gloves, sterile sheet, hand hygiene, and chlorhexidene for  cutaneous antisepsis) were used. Ultrasound examination of the liver  demonstrates no intrahepatic biliary dilation. Local anesthesia with lidocaine  was achieved on the right chest/abdominal wall.     Using ultrasound guidance, an echogenic tipped 21-gauge needle was advanced  through the right lobe of the liver towards the echogenic wall common hepatic  duct. After several passes, the common hepatic duct was accessed and a  cholangiogram was performed.     Using the cholangiogram as a guide, a 2nd 21-gauge needle was advanced until a  short, tortuous, anterior right bile duct could be accessed. Using fluoroscopy,  a cope wire was advanced into the common bile duct. Using an [place], an  Amplatz wire followed by an 8 [place] sheath was placed into the common bile  duct.   Using a Kumpe catheter and Amplatz wire, the sphincter was traversed  gaining access to the duodenum.       Multiple 7 x 40 mm high-pressure balloon angioplasties were then performed at  the ampulla.     Over an Amplatz wire a 10 English internal/external biliary drain was  appropriately positioned across the ampulla. This new drain was secured with  suture and stat lock device. A drainage bag was applied.     Complications: None.     Radiation Exposure Indices:  Reference Air Kerma (Ka,r) = 208 mGy  Dose Area Product/Kerma Area Product (DAP/KENRICK/PKA) = 2775.61 cGy-cm2  Fluoroscopy Exposure Time = 10 minutes 12 seconds  Fluorographic Images = 28      Contrast:  40 milliliters.     Specimen: Bile to microbiology.     Medications: General endotracheal anesthesia. Continuous pulse oximetry, heart  rate, and blood pressure monitoring was performed with an independent, trained  observer present. Zosyn IV surgical prophylaxis.     Findings: Nondilated intrahepatic biliary tree. Minimally dilated common  hepatic and common bile duct. Relative possibility of right-sided bile ducts. The liver and right hemidiaphragm are elevated into the lower right chest.   Surgical clips from previous cholecystectomy. Cystic duct remnant remains  patent. Nonocclusive clot within the common bile duct at the conclusion of the  procedure. This clot was partially irrigated freely.     Cholangiography confirms the presence of a 1 cm, rounded, mobile filling defect  within the common bile duct, near the ampulla. Occasionally, contrast  spontaneously passes through the ampulla.     Multiple spring-like clips from herniorrhaphy surgery.     IMPRESSION  Impression: Technically successful right-sided percutaneous transhepatic  cholangiogram, cholangioplasty, with internal/external biliary drain placement.         Plan: The patient has been transported to the postanesthesia care unit in good  condition. Gravity bag drainage until the bile clears of blood. Return in 4  weeks for cholangiogram, cholangioplasty, stone sweep, and biliary drain upsize.    I suspect the patient will ultimately require endoscopic laser lithotripsy.          Labs: Results:       Chemistry Recent Labs     02/05/19 0542 02/04/19 0721   GLU 74 87    138   K 3.3* 3.4*    102   CO2 29 28   BUN 6* 5*   CREA 0.75 0.76   CA 9.1 9.1   AGAP 7 8   * 333*   TP 6.6 6.4   ALB 3.2 2.9*   GLOB 3.4 3.5   AGRAT 0.9* 0.8*      CBC w/Diff Recent Labs     02/05/19 0542 02/04/19 0721   WBC  --  3.7*   RBC  --  4.01*   HGB 11.0* 10.9*   HCT 35.4* 34.4*   PLT  --  163   GRANS  --  61   LYMPH  --  23   EOS  --  5      Cardiac Enzymes No results for input(s): CPK, CKND1, ROBERT in the last 72 hours. No lab exists for component: CKRMB, TROIP   Coagulation No results for input(s): PTP, INR, APTT in the last 72 hours. No lab exists for component: INREXT    Lipid Panel Lab Results   Component Value Date/Time    Cholesterol, total 129 09/13/2018 03:23 PM    HDL Cholesterol 74 09/13/2018 03:23 PM    LDL, calculated 34 09/13/2018 03:23 PM    VLDL, calculated 21 09/13/2018 03:23 PM    Triglyceride 106 09/13/2018 03:23 PM      BNP No results for input(s): BNPP in the last 72 hours. Liver Enzymes Recent Labs     02/05/19 0542   TP 6.6   ALB 3.2   *   SGOT 84*      Thyroid Studies Lab Results   Component Value Date/Time    TSH 1.640 09/13/2018 03:23 PM            Discharge Exam:  Visit Vitals  /75 (BP 1 Location: Right arm, BP Patient Position: At rest)   Pulse (!) 52   Temp 98.2 °F (36.8 °C)   Resp 19   Ht 5' 5\" (1.651 m)   Wt 95.3 kg (210 lb)   SpO2 94%   BMI 34.95 kg/m²     General:  Alert, sleeping from sedation off and on   Eyes:  Conjunctivae/corneas clear. Ears:  Normal TMs and external ear canals both ears. Nose: Nares normal. Septum midline. Mouth/Throat: Lips, mucosa, and tongue normal.    Neck:  no JVD. Back:   Deferred   Lungs:   Clear to auscultation bilaterally. Heart:  Regular rate and rhythm, S1, S2 normal   Abdomen:   Soft, no pain to epigastric area.  Bowel sounds normal. No masses,  No organomegaly. Biliary drain clamped, no erythema or drainage around drainage site   Extremities: Extremities normal, atraumatic, no cyanosis or edema. Pulses: 2+ and symmetric all extremities. Skin: Skin color, texture, turgor normal. Stable rash on right arm   Lymph nodes: Cervical, supraclavicular, and axillary nodes normal.   Neurologic: CNII-XII intact. .       Disposition: home  Discharge Condition: stable  Patient Instructions:   Current Discharge Medication List      CONTINUE these medications which have NOT CHANGED    Details   HYDROcodone-acetaminophen (NORCO) 7.5-325 mg per tablet 1 q6h prn severe pain  Qty: 120 Tab, Refills: 0    Associated Diagnoses: Chronic bilateral low back pain without sciatica      temazepam (RESTORIL) 30 mg capsule Take 1 Cap by mouth nightly as needed. Max Daily Amount: 30 mg.  Qty: 30 Cap, Refills: 5    Associated Diagnoses: Primary insomnia      LORazepam (ATIVAN) 0.5 mg tablet Take 1 Tab by mouth every eight (8) hours as needed for Anxiety.  Max Daily Amount: 1.5 mg.  Qty: 30 Tab, Refills: 5    Associated Diagnoses: Depression, unspecified depression type      meloxicam (MOBIC) 15 mg tablet 1 every day for pain  Qty: 90 Tab, Refills: 12    Associated Diagnoses: Chronic bilateral low back pain without sciatica      losartan-hydroCHLOROthiazide (HYZAAR) 100-25 mg per tablet 1 every day for BP  Indications: hypertension  Qty: 90 Tab, Refills: 12    Associated Diagnoses: Essential hypertension, benign      alendronate (FOSAMAX) 70 mg tablet 1 q week for osteoporosis  Qty: 12 Tab, Refills: 12    Associated Diagnoses: Age-related osteoporosis without current pathological fracture      levothyroxine (SYNTHROID) 75 mcg tablet 1 every day for thyroid  Qty: 90 Tab, Refills: 12    Associated Diagnoses: Primary hypothyroidism      amLODIPine (NORVASC) 5 mg tablet 1 every day for BP  Indications: hypertension  Qty: 90 Tab, Refills: 12    Associated Diagnoses: Essential hypertension, benign      traZODone (DESYREL) 150 mg tablet 1 to 2 qhs for sleep  Qty: 180 Tab, Refills: 12    Associated Diagnoses: Depression, unspecified depression type      sertraline (ZOLOFT) 100 mg tablet Take 1 Tab by mouth nightly. Indications: major depressive disorder  Qty: 90 Tab, Refills: 5    Associated Diagnoses: Depression, unspecified depression type      aspirin delayed-release 81 mg tablet Take 81 mg by mouth daily. MULTIVITAMIN PO Take  by mouth daily. Takes 1-2 melt-aways daily             Activity: Activity as tolerated  Diet: Cardiac Diet  Wound Care: As directed    Follow-up  ·   Outpatient follow-up with IR in 3-4 weeks for cholangiogram. Follow-up with PCP within 1-2 weeks. Follow-up with GI outpatient as previously prescribed. Home with Home Health and skilled nursing to assess and manage biliary drain  Time spent to discharge patient 35 minutes  Signed:   Maryann Moreno DO  2/6/2019  2:23 PM

## 2019-02-06 NOTE — PROGRESS NOTES
Problem: Self Care Deficits Care Plan (Adult) Goal: *Acute Goals and Plan of Care (Insert Text) OCCUPATIONAL THERAPY: Initial Assessment, Discharge and PM 2/6/2019INPATIENT:   
Payor: Mauro Gutierrez / Plan[place] A [place] B / Product Type: Medicare /  
  
NAME/AGE/GENDER: Tony Zhou is a 77 y.o. female PRIMARY DIAGNOSIS:  Choledocholithiasis Choledocholithiasis Choledocholithiasis Procedure(s) (LRB): PTC Drain (N/A) 2 Days Post-Op ICD-10: Treatment Diagnosis:  
 · Generalized Muscle Weakness (M62.81) · Other lack of cordination (R27.8) Precautions/Allergies: 
   Adhesive; Adhesive tape; and Sulfa (sulfonamide antibiotics) ASSESSMENT:  
Ms. Kristina Chakraborty presents to the hospital with gallstone pancreatitis. Pt lives with her spouse in a two story home and is independent with ADL/functional mobility at baseline. Pt denies any hx of falls. This session, pt presented supine in bed. Pt is alert, oriented, and appropriate for age. Pt reports an ongoing hx of GI issues. Pt reports 2/10 pain in her R abdomen and states the pain started to flare up after eating today. Pt transferred to the edge of the bed with independence. Pt demonstrates good sitting at the edge of the bed. Pt was able to don socks edge of bed with some mild tenderness reported on the R side. Pt stood with independence and completed functional mobility in the room and bathroom with no loss of balance. Pt reports she showered on her own today. Pt verbalized no concerns regarding her ability to complete her ADLs or daily tasks at home. At the end of the session, pt was left supine in bed with needs in reach. Pt presented at or near her functional baseline and does not require further skilled OT services in this setting. This section established at most recent assessment PROBLEM LIST (Impairments causing functional limitations): 
1. Increased Pain INTERVENTIONS PLANNED: (Benefits and precautions of occupational therapy have been discussed with the patient.) 1. None TREATMENT PLAN: Frequency/Duration: DischargeRehabilitation Potential For Stated Goals: n/a  
 
RECOMMENDED REHABILITATION/EQUIPMENT: (at time of discharge pending progress): Due to the probability of continued deficits (see above) this patient will not likely need continued skilled occupational therapy after discharge. Equipment:  
? None at this time OCCUPATIONAL PROFILE AND HISTORY:  
History of Present Injury/Illness (Reason for Referral): 
See H&P Past Medical History/Comorbidities:  
Ms. Chiquita Romero  has a past medical history of Anxiety, Arthritis, Autoimmune disease ([address] CAD (coronary artery disease) (2010), Chest pain, Chronic pain, Depression, Family history of ischemic heart disease, H/O acute pancreatitis (08/2013), H/O gastric bypass (2009), High cholesterol, Hypertension, Hypothyroidism, Lichen sclerosus, Obesity (BMI 30-39.9), Osteopenia, Systolic murmur, and Ventral hernia. She also has no past medical history of Adverse effect of anesthesia, Aneurysm ([address] Arrhythmia, Asthma, Cancer ([address] Chronic kidney disease, Chronic obstructive pulmonary disease ([address] Coagulation disorder ([address] Diabetes ([address] Difficult intubation, Endocarditis, GERD (gastroesophageal reflux disease), Heart failure ([address] Liver disease, Malignant hyperthermia due to anesthesia, Nausea & vomiting, Nicotine vapor product user, Non-nicotine vapor product user, Pseudocholinesterase deficiency, PUD (peptic ulcer disease), Rheumatic fever, Seizures ([address] Sleep apnea, Stroke ([address] Thromboembolus ([address] or Unspecified adverse effect of anesthesia.   Ms. Chiquita Romero  has a past surgical history that includes hx bladder suspension; hx carpal tunnel release (Right); hx lysis of adhesions; hx appendectomy (1954); hx colonoscopy (2017); hx endoscopy (2018); hx heart catheterization (2010); hx hysterectomy (1978); hx oophorectomy (1984); hx lumbar fusion (1996); hx urological; hx cataract removal (Bilateral, 2015); hx lap cholecystectomy (7/2013); hx gastric bypass (2009); hx hernia repair (8/14/14); hx other surgical (2017); hx shoulder arthroscopy (Right, 2011); hx orthopaedic (Right, 1997); hx orthopaedic (Left, 2017); and ir biliary drn cath plc perc int/ext si (2/4/2019). Social History/Living Environment:  
Home Environment: Private residence One/Two Story Residence: Two story # of Interior Steps: 12 Interior Rails: Right Lift Chair Available: No 
Living Alone: No 
Support Systems: Spouse/Significant Other/Partner Patient Expects to be Discharged to[de-identified] Private residence Current DME Used/Available at Home: Cane, straight, Walker, rolling Prior Level of Function/Work/Activity: 
 Pt lives with her spouse in a two story home and is independent with ADL/functional mobility at baseline. Pt denies any hx of falls. Personal Factors: Other factors that influence how disability is experienced by the patient:  Multiple co-morbidities Number of Personal Factors/Comorbidities that affect the Plan of Care: Expanded review of therapy/medical records (1-2):  MODERATE COMPLEXITY ASSESSMENT OF OCCUPATIONAL PERFORMANCE[de-identified]  
Activities of Daily Living:  
Basic ADLs (From Assessment) Complex ADLs (From Assessment) Feeding: Independent Oral Facial Hygiene/Grooming: Independent Bathing: Modified independent Upper Body Dressing: Independent Lower Body Dressing: Modified independent Toileting: Independent Instrumental ADL Meal Preparation: Stand-by assistance Homemaking: Moderate assistance Grooming/Bathing/Dressing Activities of Daily Living Cognitive Retraining Safety/Judgement: Awareness of environment Functional Transfers Bathroom Mobility: Independent Bed/Mat Mobility Rolling: Independent Supine to Sit: Independent Sit to Supine: Independent Sit to Stand: Independent Bed to Chair: Supervision Scooting: Independent Most Recent Physical Functioning:  
Gross Assessment: 
AROM: Within functional limits Strength: Within functional limits Coordination: Within functional limits Posture: 
Posture (WDL): Within defined limits Balance: 
Sitting: Intact Standing: Impaired Standing - Static: Good Standing - Dynamic : Fair(+\) Bed Mobility: 
Rolling: Independent Supine to Sit: Independent Sit to Supine: Independent Scooting: Independent Wheelchair Mobility: 
  
Transfers: 
Sit to Stand: Independent Stand to Sit: Independent Bed to Chair: Supervision Patient Vitals for the past 6 hrs: 
 BP BP Patient Position SpO2 Pulse 19 1128 157/75 At rest 94 % (!) 52 Mental Status Neurologic State: Alert Orientation Level: Oriented X4 Cognition: Appropriate decision making, Appropriate for age attention/concentration, Follows commands Perception: Appears intact Perseveration: No perseveration noted Safety/Judgement: Awareness of environment Physical Skills Involved: 
1. Balance 2. Activity Tolerance 3. Pain (acute) Cognitive Skills Affected (resulting in the inability to perform in a timely and safe manner): 1. None Psychosocial Skills Affected: 1. None Number of elements that affect the Plan of Care: 1-3:  LOW COMPLEXITY CLINICAL DECISION MAKIN [facility] [address] [address] AM-PAC 6 Clicks Daily Activity Inpatient Short Form How much help from another person does the patient currently need. .. Total A Lot A Little None 1. Putting on and taking off regular lower body clothing? [] 1   [] 2   [] 3   [x] 4  
2. Bathing (including washing, rinsing, drying)? [] 1   [] 2   [] 3   [x] 4  
3. Toileting, which includes using toilet, bedpan or urinal?   [] 1   [] 2   [] 3   [x] 4  
4. Putting on and taking off regular upper body clothing?    [] 1   [] 2 [] 3   [x] 4  
5. Taking care of personal grooming such as brushing teeth? [] 1   [] 2   [] 3   [x] 4  
6. Eating meals? [] 1   [] 2   [] 3   [x] 4  
© 2007, Trustees of [address] [place] [address] [address] [address], under license to [employer]. All rights reserved Score:  Initial: 24 Most Recent: X (Date: -- ) Interpretation of Tool:  Represents activities that are increasingly more difficult (i.e. Bed mobility, Transfers, Gait). Medical Necessity:    
· n/a. Reason for Services/Other Comments: 
· n/a. Use of outcome tool(s) and clinical judgement create a POC that gives a: LOW COMPLEXITY  
 
 
 
TREATMENT:  
(In addition to Assessment/Re-Assessment sessions the following treatments were rendered) Pre-treatment Symptoms/Complaints:   
Pain: Initial:  
Pain Intensity 1: 2 Pain Location 1: Abdomen Pain Orientation 1: Right Pain Intervention(s) 1: Nurse notified  Post Session:  Increasing pain-nurse notified Assessment/Reassessment only, no treatment provided today Braces/Orthotics/Lines/Etc:  
· O2 Device: Room air Treatment/Session Assessment:   
· Response to Treatment:  eval and discharge. · Interdisciplinary Collaboration:  
o Occupational Therapist 
o Registered Nurse · After treatment position/precautions:  
o Supine in bed 
o Bed in low position 
o Call light within reach 
o RN notified · Compliance with Program/Exercises: n/a. · Recommendations/Intent for next treatment session: \"Next visit will focus on advancements to more challenging activities and reduction in assistance provided\". Total Treatment Duration: OT Patient Time In/Time Out Time In: 1336 Time Out: 1355 Chely Cat OT

## 2019-02-06 NOTE — PROGRESS NOTES
Problem: Mobility Impaired (Adult and Pediatric) Goal: *Acute Goals and Plan of Care (Insert Text) LTG: 
(1.)Ms. Robson Manning will move from supine to sit and sit to supine , scoot up and down and roll side to side INDEPENDENTLY with bed flat within 7 treatment day(s). (2.)Ms. Robson Manning will transfer from bed to chair and chair to bed INDEPENDENTLY within 7 treatment day(s). (3.)Ms. Robson Manning will ambulate INDEPENDENTLY for 650+ feet with good balance and safety awareness within 7 treatment day(s). (4.)Ms. Robson Manning will ascend and descend 10 steps with SUPERVISION using handrail within 7 days. ________________________________________________________________________________________________ PHYSICAL THERAPY: Initial Assessment and AM 2/6/2019INPATIENT:   
[place] / [place] / Product Type: Medicare /   
  
NAME/AGE/GENDER: Dianelys Arndt is a 77 y.o. female PRIMARY DIAGNOSIS: Choledocholithiasis Choledocholithiasis Choledocholithiasis Procedure(s) (LRB): PTC Drain (N/A) 2 Days Post-Op ICD-10: Treatment Diagnosis:  
 · Generalized Muscle Weakness (M62.81) · Other abnormalities of gait and mobility (R26.89) Precaution/Allergies: 
Adhesive; Adhesive tape; and Sulfa (sulfonamide antibiotics) ASSESSMENT:  
Ms. Robson Manning is a 77year old female admitted from home for gallstone pancreatitis. Lives with  and is independent at baseline with all mobility and ambulation. She is s/p drain placement. Presents in supine with c/o soreness, agreeable to therapy assessment and activity. Performs bed mobility independently, transfers with supervision. Ambulates total of 450 ft in room and hallway with appropriate gait pace, mild trunk sway, and no loss of balance. Min verbal cues for gait safety and mechanics.  Returned to room and back to supine independently after activity at her request. Amalia Burgos appears to be functioning close to baseline physically, ambulating without assistance. Discussed that therapy may work with her 1-2 more times during hospital stay to ensure safety and independence with mobility and with stairs (not assessed today as pt has IV running). Anticipate return home at discharge with or without [facility] PT pending progress. This section established at most recent assessment PROBLEM LIST (Impairments causing functional limitations): 1. Decreased Strength 2. Decreased Ambulation Ability/Technique 3. Decreased Activity Tolerance INTERVENTIONS PLANNED: (Benefits and precautions of physical therapy have been discussed with the patient.) 1. Balance Exercise 2. Gait Training 3. Therapeutic Activites TREATMENT PLAN: Frequency/Duration: 2 times a week for 1 week Rehabilitation Potential For Stated Goals: Excellent RECOMMENDED REHABILITATION/EQUIPMENT: (at time of discharge pending progress): Due to the probability of continued deficits (see above) this patient will not likely need continued skilled physical therapy after discharge. Equipment:  
? None at this time HISTORY:  
History of Present Injury/Illness (Reason for Referral): 
Per H&P, \"68 years [de-identified] F with PMH of CAD, h/o gastric by pass presented to the hospital complaining of worsening abdominal pain since last night. Patient stated pain started after eating supper last night. Patient described the pain as spams on mid abdomen, radiated to the RUQ. Patient reported is being following with GI outpatient Dr. Willian Jack for abnormal LFTS and abdominal pain. Patient reported having a nuclear abdominal test done yesterday. Patient also reported having associated N,V and diarrhea. Patient denies chest pain, SOB, dysuria. 
  
In the ED labs revealed abnormal LFTs and CT AP showing suspected choledocholithiasis. ED physician already contacted GI. Patient reported GI PA was at bedside\" Past Medical History/Comorbidities:  
Ms. Sadaf Monae  has a past medical history of Anxiety, Arthritis, Autoimmune disease ([address] CAD (coronary artery disease) (2010), Chest pain, Chronic pain, Depression, Family history of ischemic heart disease, H/O acute pancreatitis (08/2013), H/O gastric bypass (2009), High cholesterol, Hypertension, Hypothyroidism, Lichen sclerosus, Obesity (BMI 30-39.9), Osteopenia, Systolic murmur, and Ventral hernia. She also has no past medical history of Adverse effect of anesthesia, Aneurysm ([address] Arrhythmia, Asthma, Cancer ([address] Chronic kidney disease, Chronic obstructive pulmonary disease ([address] Coagulation disorder ([address] Diabetes ([address] Difficult intubation, Endocarditis, GERD (gastroesophageal reflux disease), Heart failure ([address] Liver disease, Malignant hyperthermia due to anesthesia, Nausea & vomiting, Nicotine vapor product user, Non-nicotine vapor product user, Pseudocholinesterase deficiency, PUD (peptic ulcer disease), Rheumatic fever, Seizures ([address] Sleep apnea, Stroke ([address] Thromboembolus ([address] or Unspecified adverse effect of anesthesia. Ms. aSdaf Monae  has a past surgical history that includes hx bladder suspension; hx carpal tunnel release (Right); hx lysis of adhesions; hx appendectomy (1954); hx colonoscopy (2017); hx endoscopy (2018); hx heart catheterization (2010); hx hysterectomy (1978); hx oophorectomy (1984); hx lumbar fusion (1996); hx urological; hx cataract removal (Bilateral, 2015); hx lap cholecystectomy (7/2013); hx gastric bypass (2009); hx hernia repair (8/14/14); hx other surgical (2017); hx shoulder arthroscopy (Right, 2011); hx orthopaedic (Right, 1997); hx orthopaedic (Left, 2017); and ir biliary drn cath plc perc int/ext si (2/4/2019). Social History/Living Environment:  
Home Environment: Private residence One/Two Story Residence: Two story # of Interior Steps: 12 Interior Rails: Right Lift Chair Available: No 
Living Alone: No 
 Support Systems: Spouse/Significant Other/Partner Patient Expects to be Discharged to[de-identified] Private residence Current DME Used/Available at Home: Cane, straight, Walker, rolling Prior Level of Function/Work/Activity: 
Lives with  in two story home. Independent with mobility, ambulation, and ADLs at baseline without use of any DME. Has cane and walker from prior [facility] (last year) but not currently using. Denies falls. Drives occasionally, when needed. Number of Personal Factors/Comorbidities that affect the Plan of Care: 1-2: MODERATE COMPLEXITY EXAMINATION:  
Most Recent Physical Functioning:  
Gross Assessment: 
AROM: Within functional limits Strength: Generally decreased, functional 
Coordination: Within functional limits Posture: 
Posture (WDL): Within defined limits Balance: 
Sitting: Intact Standing: Impaired Standing - Static: Good Standing - Dynamic : Fair Bed Mobility: 
Rolling: Independent Supine to Sit: Independent Sit to Supine: Independent Scooting: Independent Wheelchair Mobility: 
  
Transfers: 
Sit to Stand: Supervision Stand to Sit: Supervision Bed to Chair: Supervision Gait: 
  
Base of Support: Narrowed Speed/Princess: Pace decreased (<100 feet/min) Step Length: Left shortened;Right shortened Gait Abnormalities: Trunk sway increased Distance (ft): 450 Feet (ft) Ambulation - Level of Assistance: Supervision Interventions: Verbal cues Body Structures Involved: 1. Digestive Structures 2. Muscles Body Functions Affected: 1. Movement Related Activities and Participation Affected: 1. Mobility 2. Community, Social and [employer] [place] Number of elements that affect the Plan of Care: 4+: HIGH COMPLEXITY CLINICAL PRESENTATION:  
Presentation: Stable and uncomplicated: LOW COMPLEXITY CLINICAL DECISION MAKING:  
M MIR[facility] -PAC 6 Clicks Basic Mobility Inpatient Short Form How much difficulty does the patient currently have. ..  Unable A Lot A Little None 1. Turning over in bed (including adjusting bedclothes, sheets and blankets)? [] 1   [] 2   [] 3   [x] 4  
2. Sitting down on and standing up from a chair with arms ( e.g., wheelchair, bedside commode, etc.)   [] 1   [] 2   [] 3   [x] 4  
3. Moving from lying on back to sitting on the side of the bed? [] 1   [] 2   [] 3   [x] 4 How much help from another person does the patient currently need. .. Total A Lot A Little None 4. Moving to and from a bed to a chair (including a wheelchair)? [] 1   [] 2   [] 3   [x] 4  
5. Need to walk in hospital room? [] 1   [] 2   [x] 3   [] 4  
6. Climbing 3-5 steps with a railing? [] 1   [] 2   [x] 3   [] 4  
© 2007, Trustees of [address] [place] [address], under license to [employer]. All rights reserved Score:  Initial: 22 Most Recent: X (Date: -- ) Interpretation of Tool:  Represents activities that are increasingly more difficult (i.e. Bed mobility, Transfers, Gait). Medical Necessity:    
· Patient demonstrates excellent rehab potential due to higher previous functional level. Reason for Services/Other Comments: 
· Patient continues to demonstrate capacity to improve strength, mobility, balance, transfers, activity tolerance which will increase independence and increase safety. Use of outcome tool(s) and clinical judgement create a POC that gives a: Clear prediction of patient's progress: LOW COMPLEXITY  
  
 
 
 
TREATMENT:  
(In addition to Assessment/Re-Assessment sessions the following treatments were rendered) Pre-treatment Symptoms/Complaints:  \"thank you\" Pain: Initial:  
Pain Intensity 1: 0  Post Session:  0/10 Assessment/Reassessment only, no treatment provided today Braces/Orthotics/Lines/Etc:  
· IV 
· O2 Device: Room air Treatment/Session Assessment:   
· Response to Treatment:  Pt performs mobility in room and hallway with supervision · Interdisciplinary Collaboration:  
o Physical Therapist 
o Registered Nurse · After treatment position/precautions:  
o Supine in bed 
o Bed/Chair-wheels locked 
o Bed in low position 
o Call light within reach 
o Family at bedside · Compliance with Program/Exercises: Compliant all of the time · Recommendations/Intent for next treatment session: \"Next visit will focus on advancements to more challenging activities and reduction in assistance provided\". Total Treatment Duration: PT Patient Time In/Time Out Time In: 2414 Time Out: 0906 Rosie Bernal DPT

## 2019-02-06 NOTE — PROGRESS NOTES
Progress Note Patient: Amalia Burgos MRN: [id number]  SSN: [id number] YOB: 1950  Age: 77 y.o. Sex: female Admit Date: 2/1/2019 LOS: 4 days Subjective: F/U pancreatitis secondary to Choledocholithiasis Hx of  CAD, anxiety, and gastric by pass with Joaquim-en-Y who presented for abdominal pain. Total bili was 4.2, , , and lipase 1,981. CT showed choledocholithiasis. GI consulted but we are unable to get ERCP due to anatomy. Has had cholecystectomy. Currently on Zosyn. MRCP today that showed gallstone. S/p biliary drain on 2/4/19. Interval History (2/5): patient examined at bedside. No acute events overnight. Had biliary drain inserted yesterday. Having some abdominal pain when up and moving around. No fevers/chills, chest pain, shortness of breath, nausea/vomiting or diarrhea. Patient complaining of rash on the back of her right hand that she thinks is from \"my pain medicine. \"  
 
10 point ROS negative except for HPI above Current Facility-Administered Medications Medication Dose Route Frequency  simethicone (MYLICON) tablet 80 mg  80 mg Oral QID PRN  
 magnesium oxide (MAG-OX) tablet 400 mg  400 mg Oral BID  
 HYDROcodone-acetaminophen (NORCO)  mg tablet 1 Tab  1 Tab Oral Q4H PRN  
 sodium chloride (NS) flush 5-40 mL  5-40 mL IntraVENous Q8H  
 sodium chloride (NS) flush 5-40 mL  5-40 mL IntraVENous PRN  
 acetaminophen (TYLENOL) tablet 650 mg  650 mg Oral Q4H PRN  
 ondansetron (ZOFRAN) injection 4 mg  4 mg IntraVENous Q4H PRN  
 senna-docusate (PERICOLACE) 8.6-50 mg per tablet 1 Tab  1 Tab Oral DAILY  heparin (porcine) injection 5,000 Units  5,000 Units SubCUTAneous Q8H  
 amLODIPine (NORVASC) tablet 5 mg  5 mg Oral DAILY  aspirin delayed-release tablet 81 mg  81 mg Oral DAILY  levothyroxine (SYNTHROID) tablet 75 mcg  75 mcg Oral ACB  sertraline (ZOLOFT) tablet 100 mg  100 mg Oral QHS  piperacillin-tazobactam (ZOSYN) 3.375 g in 0.9% sodium chloride (MBP/ADV) 100 mL  3.375 g IntraVENous Q8H  
 losartan/hydroCHLOROthiazide (HYZAAR) 100/25 mg   Oral DAILY  alcohol 62% (NOZIN) nasal  1 Ampule  1 Ampule Topical Q12H  temazepam (RESTORIL) capsule 30 mg  30 mg Oral QHS PRN  
 traZODone (DESYREL) tablet 150 mg  150 mg Oral QHS Objective:  
 
Vitals:  
 02/05/19 0404 02/05/19 0801 02/05/19 1134 02/05/19 1520 BP: 164/79 146/84 177/84 174/81 Pulse: (!) 55 (!) 54 (!) 51 (!) 51 Resp: 18 20 18 20 Temp: 98.8 °F (37.1 °C) 98.6 °F (37 °C) 98.4 °F (36.9 °C) 98.7 °F (37.1 °C) SpO2: 95% 92% 94% 91% Weight:      
Height:      
  
  
Intake and Output: 
Current Shift: No intake/output data recorded. Last three shifts: 02/04 0701 - 02/05 1900 In: 700 [I.V.:700] Out: 1220 [Drains:1215] Physical Exam:  
General:  Alert, sleeping from sedation off and on Eyes:  Conjunctivae/corneas clear. Ears:  Normal TMs and external ear canals both ears. Nose: Nares normal. Septum midline. Mouth/Throat: Lips, mucosa, and tongue normal.   
Neck:  no JVD. Back:   Deferred Lungs:   Clear to auscultation bilaterally. Heart:  Regular rate and rhythm, S1, S2 normal  
Abdomen:   Soft, no pain to epigastric area. Bowel sounds normal. No masses,  No organomegaly. Biliary drain with good output, no erythema or drainage around drainage site Extremities: Extremities normal, atraumatic, no cyanosis or edema. Pulses: 2+ and symmetric all extremities. Skin: Skin color, texture, turgor normal. No rashes or lesions Lymph nodes: Cervical, supraclavicular, and axillary nodes normal.  
Neurologic: CNII-XII intact. Aurora Bustos Lab/Data Review: 
 
Recent Results (from the past 24 hour(s)) MAGNESIUM Collection Time: 02/05/19  5:42 AM  
Result Value Ref Range Magnesium 1.7 (L) 1.8 - 2.4 mg/dL METABOLIC PANEL, COMPREHENSIVE Collection Time: 02/05/19  5:42 AM  
Result Value Ref Range Sodium 136 136 - 145 mmol/L Potassium 3.3 (L) 3.5 - 5.1 mmol/L Chloride 100 98 - 107 mmol/L  
 CO2 29 21 - 32 mmol/L Anion gap 7 7 - 16 mmol/L Glucose 74 65 - 100 mg/dL BUN 6 (L) 8 - 23 MG/DL Creatinine 0.75 0.6 - 1.0 MG/DL  
 GFR est AA >60 >60 ml/min/1.73m2 GFR est non-AA >60 >60 ml/min/1.73m2 Calcium 9.1 8.3 - 10.4 MG/DL Bilirubin, total 0.8 0.2 - 1.1 MG/DL  
 ALT (SGPT) 251 (H) 12 - 65 U/L  
 AST (SGOT) 84 (H) 15 - 37 U/L Alk. phosphatase 298 (H) 50 - 136 U/L Protein, total 6.6 6.3 - 8.2 g/dL Albumin 3.2 3.2 - 4.6 g/dL Globulin 3.4 2.3 - 3.5 g/dL A-G Ratio 0.9 (L) 1.2 - 3.5 HGB & HCT Collection Time: 02/05/19  5:42 AM  
Result Value Ref Range HGB 11.0 (L) 11.7 - 15.4 g/dL HCT 35.4 (L) 35.8 - 46.3 % Assessment/ Plan:  
 
Principal Problem: 
  Choledocholithiasis (2/1/2019) Active Problems: S/P laparoscopic cholecystectomy (8/15/2014) Coronary artery disease involving native coronary artery of native heart with angina pectoris ([facility] [address] (1/11/2019) Gallstone pancreatitis (2/2/2019) Hypokalemia (2/2/2019) Hypomagnesemia (2/4/2019) # Gallstone pancreatitis s/p cholecystectomy 
- GI following with MRCP from 2/4/2019 showing gallstone in CBD 
- patient underwent biliary drain with IR on 2/4/2019 
- continue with Zosyn started empirically on 2/1/2019 
- continue to trend LFTs 
- ?lithotripsy vs ERCP ostomy as long term options? # Lacy rash of right hand 
- continue to observe 
- may be medication reaction # HTN 
- continue home meds # Hypothyroidism 
- continue levothyroxine F/E/N: no fluids, replete electrolytes as needed,  GI soft diet Ppx: SCDs for VTE Code Status: FULL CODE Disposition: pending workup as above. All questions answered. Signed By: Adrianna Jones DO February 5, 2019

## 2019-02-06 NOTE — DISCHARGE INSTRUCTIONS
Patient Education     DISCHARGE SUMMARY from Nurse    PATIENT INSTRUCTIONS:    After general anesthesia or intravenous sedation, for 24 hours or while taking prescription Narcotics:  · Limit your activities  · Do not drive and operate hazardous machinery  · Do not make important personal or business decisions  · Do  not drink alcoholic beverages  · If you have not urinated within 8 hours after discharge, please contact your surgeon on call. Report the following to your surgeon:  · Excessive pain, swelling, redness or odor of or around the surgical area  · Temperature over 100.5  · Nausea and vomiting lasting longer than 4 hours or if unable to take medications  · Any signs of decreased circulation or nerve impairment to extremity: change in color, persistent  numbness, tingling, coldness or increase pain  · Any questions    What to do at Home:  Recommended activity: Activity as tolerated,     If you experience any of the following symptoms fever, new or unrelieved pain, persistent nausea or vomiting, or any other worrisome symptoms, please follow up with PCP. *  Please give a list of your current medications to your Primary Care Provider. *  Please update this list whenever your medications are discontinued, doses are      changed, or new medications (including over-the-counter products) are added. *  Please carry medication information at all times in case of emergency situations. These are general instructions for a healthy lifestyle:    No smoking/ No tobacco products/ Avoid exposure to second hand smoke  Surgeon General's Warning:  Quitting smoking now greatly reduces serious risk to your health.     Obesity, smoking, and sedentary lifestyle greatly increases your risk for illness    A healthy diet, regular physical exercise & weight monitoring are important for maintaining a healthy lifestyle    You may be retaining fluid if you have a history of heart failure or if you experience any of the following symptoms:  Weight gain of 3 pounds or more overnight or 5 pounds in a week, increased swelling in our hands or feet or shortness of breath while lying flat in bed. Please call your doctor as soon as you notice any of these symptoms; do not wait until your next office visit. Recognize signs and symptoms of STROKE:    F-face looks uneven    A-arms unable to move or move unevenly    S-speech slurred or non-existent    T-time-call 911 as soon as signs and symptoms begin-DO NOT go       Back to bed or wait to see if you get better-TIME IS BRAIN. Warning Signs of HEART ATTACK     Call 911 if you have these symptoms:   Chest discomfort. Most heart attacks involve discomfort in the center of the chest that lasts more than a few minutes, or that goes away and comes back. It can feel like uncomfortable pressure, squeezing, fullness, or pain.  Discomfort in other areas of the upper body. Symptoms can include pain or discomfort in one or both arms, the back, neck, jaw, or stomach.  Shortness of breath with or without chest discomfort.  Other signs may include breaking out in a cold sweat, nausea, or lightheadedness. Don't wait more than five minutes to call 911 - MINUTES MATTER! Fast action can save your life. Calling 911 is almost always the fastest way to get lifesaving treatment. Emergency Medical Services staff can begin treatment when they arrive -- up to an hour sooner than if someone gets to the hospital by car. The discharge information has been reviewed with the patient. The patient verbalized understanding. Discharge medications reviewed with the patient and appropriate educational materials and side effects teaching were provided. ___________________________________________________________________________________________________________________________________     Abdominal Pain: Care Instructions  Your Care Instructions    Abdominal pain has many possible causes.  Some aren't serious and get better on their own in a few days. Others need more testing and treatment. If your pain continues or gets worse, you need to be rechecked and may need more tests to find out what is wrong. You may need surgery to correct the problem. Don't ignore new symptoms, such as fever, nausea and vomiting, urination problems, pain that gets worse, and dizziness. These may be signs of a more serious problem. Your doctor may have recommended a follow-up visit in the next 8 to 12 hours. If you are not getting better, you may need more tests or treatment. The doctor has checked you carefully, but problems can develop later. If you notice any problems or new symptoms, get medical treatment right away. Follow-up care is a key part of your treatment and safety. Be sure to make and go to all appointments, and call your doctor if you are having problems. It's also a good idea to know your test results and keep a list of the medicines you take. How can you care for yourself at home? · Rest until you feel better. · To prevent dehydration, drink plenty of fluids, enough so that your urine is light yellow or clear like water. Choose water and other caffeine-free clear liquids until you feel better. If you have kidney, heart, or liver disease and have to limit fluids, talk with your doctor before you increase the amount of fluids you drink. · If your stomach is upset, eat mild foods, such as rice, dry toast or crackers, bananas, and applesauce. Try eating several small meals instead of two or three large ones. · Wait until 48 hours after all symptoms have gone away before you have spicy foods, alcohol, and drinks that contain caffeine. · Do not eat foods that are high in fat. · Avoid anti-inflammatory medicines such as aspirin, ibuprofen (Advil, Motrin), and naproxen (Aleve). These can cause stomach upset. Talk to your doctor if you take daily aspirin for another health problem. When should you call for help?   Call [phone] anytime you think you may need emergency care. For example, call if:    · You passed out (lost consciousness).     · You pass maroon or very bloody stools.     · You vomit blood or what looks like coffee grounds.     · You have new, severe belly pain.    Call your doctor now or seek immediate medical care if:    · Your pain gets worse, especially if it becomes focused in one area of your belly.     · You have a new or higher fever.     · Your stools are black and look like tar, or they have streaks of blood.     · You have unexpected vaginal bleeding.     · You have symptoms of a urinary tract infection. These may include:  ? Pain when you urinate. ? Urinating more often than usual.  ? Blood in your urine.     · You are dizzy or lightheaded, or you feel like you may faint.    Watch closely for changes in your health, and be sure to contact your doctor if:    · You are not getting better after 1 day (24 hours). Where can you learn more? Go to http://evelio-noris.info/. Enter Q558 in the search box to learn more about \"Abdominal Pain: Care Instructions. \"  Current as of: September 23, 2018  Content Version: 11.9  © 2950-4205 [employer]. Care instructions adapted under license by [employer] (which disclaims liability or warranty for this information). If you have questions about a medical condition or this instruction, always ask your healthcare professional. [place] [address] any warranty or liability for your use of this information.

## 2019-02-06 NOTE — PROGRESS NOTES
Hourly rounds completed shift. All needs met. Bed low/locked. Pt has rash to right hand. Doesn't look like it expanded any. Received pain medication x1 this shift. Call light in reach. Will continue to monitor pt and give report to oncoming RN. Peripheral IV 02/05/19 Anterior; Left Forearm (Active) Site Assessment Clean, dry, & intact 2/6/2019  2:05 AM  
Phlebitis Assessment 0 2/6/2019  2:05 AM  
Infiltration Assessment 0 2/6/2019  2:05 AM  
Dressing Status Clean, dry, & intact 2/6/2019  2:05 AM  
Dressing Type Transparent;Tape 2/6/2019  2:05 AM  
Hub Color/Line Status Blue; Infusing;Patent 2/6/2019  2:05 AM

## 2019-02-06 NOTE — PROGRESS NOTES
GI DAILY PROGRESS NOTE Admit Date:  2/1/2019 Today's Date:  2/6/2019 CC:  Choledocholithiasis, pancreatitis Subjective:  
 
Patient describes severe nausea and increased abdominal pain with evening meal, but was able to tolerate eggs for breakfast. 
 
No BMs. Medications:  
Current Facility-Administered Medications Medication Dose Route Frequency  simethicone (MYLICON) tablet 80 mg  80 mg Oral QID PRN  
 magnesium oxide (MAG-OX) tablet 400 mg  400 mg Oral BID  
 HYDROcodone-acetaminophen (NORCO)  mg tablet 1 Tab  1 Tab Oral Q4H PRN  
 sodium chloride (NS) flush 5-40 mL  5-40 mL IntraVENous Q8H  
 sodium chloride (NS) flush 5-40 mL  5-40 mL IntraVENous PRN  
 acetaminophen (TYLENOL) tablet 650 mg  650 mg Oral Q4H PRN  
 ondansetron (ZOFRAN) injection 4 mg  4 mg IntraVENous Q4H PRN  
 senna-docusate (PERICOLACE) 8.6-50 mg per tablet 1 Tab  1 Tab Oral DAILY  heparin (porcine) injection 5,000 Units  5,000 Units SubCUTAneous Q8H  
 amLODIPine (NORVASC) tablet 5 mg  5 mg Oral DAILY  aspirin delayed-release tablet 81 mg  81 mg Oral DAILY  levothyroxine (SYNTHROID) tablet 75 mcg  75 mcg Oral ACB  sertraline (ZOLOFT) tablet 100 mg  100 mg Oral QHS  piperacillin-tazobactam (ZOSYN) 3.375 g in 0.9% sodium chloride (MBP/ADV) 100 mL  3.375 g IntraVENous Q8H  
 losartan/hydroCHLOROthiazide (HYZAAR) 100/25 mg   Oral DAILY  alcohol 62% (NOZIN) nasal  1 Ampule  1 Ampule Topical Q12H  temazepam (RESTORIL) capsule 30 mg  30 mg Oral QHS PRN  
 traZODone (DESYREL) tablet 150 mg  150 mg Oral QHS Review of Systems: ROS was obtained, with pertinent positives as listed above. No chest pain or SOB. Diet:  GI soft Objective:  
Vitals: 
Visit Vitals /81 (BP 1 Location: Right arm, BP Patient Position: At rest) Pulse (!) 59 Temp 98.2 °F (36.8 °C) Resp 18 Ht 5' 5\" (1.651 m) Wt 95.3 kg (210 lb) SpO2 95% BMI 34.95 kg/m² Intake/Output: No intake/output data recorded. 02/04 1901 - 02/06 0700 In: -  
Out: 1075 [Drains:1075] Exam: 
General appearance:NAD HEENT: anicteric sclera, mmm Lungs: CTA ant Heart: regular rate and rhythm Abdomen: soft, ND with NABS. Mildly TTP epigastric without G/R. PTC - capped. Neuro:  A&O x3 Data Review (Labs):   
Recent Labs 02/05/19 
5883 02/04/19 
1848 WBC  --  3.7* HGB 11.0* 10.9* HCT 35.4* 34.4* PLT  --  163 MCV  --  85.8  138  
K 3.3* 3.4*  
 102 CO2 29 28 BUN 6* 5*  
CREA 0.75 0.76 CA 9.1 9.1 MG 1.7* 1.3*  
GLU 74 87 * 333* SGOT 84* 73* ALB 3.2 2.9*  
TP 6.6 6.4 Assessment:  
 
Principal Problem: 
Choledocholithiasis (2/1/2019) Active Problems: S/P laparoscopic cholecystectomy (8/15/2014) Coronary artery disease involving native coronary artery of native heart with angina pectoris ([facility] [address] (1/11/2019) Gallstone pancreatitis (2/2/2019) Hypokalemia (2/2/2019) 76 y/o f with recurrent gallstone pancreatitis despite past cholecystectomy. MRCP showed bile duct stone. Has PTC drain. IR described stone as 1.5 cm MRI abd 2/4: IMPRESSION: 
1. 9 mm filling defect in the distal common bile duct concerning for a stone or 
filling abnormality otherwise. This would be best further assessed with ERCP. 2.  No abnormal biliary dilation with the common bile duct measuring 7 mm. 3.  Status post cholecystectomy. No abnormal fluid collection is seen. 2/4 IR: Percutaneous Transhepatic Cholangiogram with Internal/External Drain Placement and Cholangioplasty. Plan: IR plans on cholangiogram in 3-4 weeks. Case discussed b/t Dr. Cristina Tello, Dr. Wilfred Zimmerman, and Dr. Devi Espana - future plans for ostomy, ERCP for stone retrieval and stent placement on March 14, 2019. OK to go home from GI standpoint. Brian Larsen PA-C [facility]

## 2019-02-06 NOTE — PROGRESS NOTES
[facility] [place] - INPATIENT Face to Face Encounter Patients Name: Sandra Berry    YOB: 1950 Ordering Physician: Dr. Adam Dietrich 
 
Primary Diagnosis: Choledocholithiasis Date of Face to Face:   2/6/2019 Face to Face Encounter findings are related to primary reason for home care:   yes. 1. I certify that the patient needs intermittent care as follows: skilled nursing care:  skilled observation/assessment, patient education 
physical therapy: strengthening, stretching/ROM, transfer training, gait/stair training, balance training and pt/caregiver education 
occupational therapy:  ADL safety (ie. cooking, bathing, dressing), ROM and pt/caregiver education 2. I certify that this patient is homebound, that is: 1) patient requires the use of a walker device, special transportation, or assistance of another to leave the home; or 2) patient's condition makes leaving the home medically contraindicated; and 3) patient has a normal inability to leave the home and leaving the home requires considerable and taxing effort. Patient may leave the home for infrequent and short duration for medical reasons, and occasional absences for non-medical reasons. Homebound status is due to the following functional limitations: Patient with strength deficits limiting the performance of all ADL's without caregiver assistance or the use of an assistive device. 3. I certify that this patient is under my care and that I, or a nurse practitioner or  [id number], or clinical nurse specialist, or certified nurse midwife, working with me, had a Face-to-Face Encounter that meets the physician Face-to-Face Encounter requirements. The following are the clinical findings from the [address] [place] [address] encounter that support the need for skilled services and is a summary of the encounter: see hospital chart See summary of the patient's illness Iman Austin LMSW 
2/6/2019 THE FOLLOWING TO BE COMPLETED BY THE COMMUNITY PHYSICIAN: 
 
I concur with the findings described above from the F2F encounter that this patient is homebound and in need of a skilled service. Certifying Physician: _____________________________________ Printed Certifying Physician Name: _____________________________________ Date: _________________

## 2019-02-06 NOTE — PROGRESS NOTES
Per [facility] liaison,  pt stated that she is not interested in services post DC. Referral discontinued.

## 2019-02-07 ENCOUNTER — PATIENT OUTREACH (OUTPATIENT)
Dept: CASE MANAGEMENT | Age: 69
End: 2019-02-07

## 2019-02-07 LAB
BACTERIA SPEC CULT: NORMAL
GRAM STN SPEC: NORMAL
GRAM STN SPEC: NORMAL
SERVICE CMNT-IMP: NORMAL

## 2019-02-07 NOTE — PROGRESS NOTES
This note will not be viewable in [address] Date/Time of Call: 
 02/07/19 
 1207pm  
What was the patient hospitalized for? Choledocholithiasis Consent for KALE CASTANEDA Call Does the patient understand his/her diagnosis and/or treatment and what happened during the hospitalization? Patient agrees to call Yes, patient understands hospitalization Did the patient receive discharge instructions? Yes   
CM Assessed Risk for Readmission:  
 
Patient stated Risk for Readmission:  
 Patient is a moderate risk for readmission Patient is scheduled for admission in March Review any discharge instructions (see discharge instructions/AVS in [facility]). Ask patient if they understand these. Do they have any questions? Reviewed DC instructions Were home services ordered (nursing, PT, OT, ST, etc.)? No   
If so, has the first visit occurred? If not, why? (Assist with coordination of services if necessary. ) 
 NA Was any DME ordered? No  
If so, has it been received? If not, why?  (Assist patient in obtaining DME orders &/or equipment if necessary. ) NA Complete a review of all medications (new, continued and discontinued meds per the D/C instructions and medication tab in [facility]). No medication changes; patient declined review Were all new prescriptions filled? If not, why?  (Assist patient in obtaining medications if necessary  escalate for CCM &/or SW if ongoing issues are verbalized by pt or anticipated) NA Does the patient understand the purpose and dosing instructions for all medications? (If patient has questions, provide explanation and education.) 
 patient verbalizes understanding of medications Does the patient have any problems in performing ADLs? (If patient is unable to perform ADLs  what is the limiting factor(s)?   Do they have a support system that can assist? If no support system is present, discuss possible assistance that they may be able to obtain. Escalate for CCM/SW if ongoing issues are verbalized by pt or anticipated) Patient was independent prior to admission; family assists PRN Does the patient have all follow-up appointments scheduled? 7 day f/up with PCP?  
(f/up with PCP may be w/in 14 days if patient has a f/up with their specialist w/in 7 days) 7-14 day f/up with specialist?  
(or per discharge instructions) If f/up has not been made  what actions has the care coordinator made to accomplish this? Has transportation been arranged? yes  
 
 
will call and schedule PCP appointment GI to call with appointment information Advised patient to contact Care Coordinator if she does not hear from GI by the beginning of next week. She agrees Patient usually independent; family will transport Any other questions or concerns expressed by the patient? No questions or concerns are voiced at this time. Care Coordinator contact information provided should anything arise the family needs assistance with Schedule next appointment with KALE Christopher or refer to RN Case Manager/ per the workflow guidelines. When is care coordinators next follow-up call scheduled? If referred for CCM  what RN care manager was the referral assigned? Within 30 days Within 30 days NA  
OTONIEL Call Completed By: Luisa Atkins CMA Care Coordinator

## 2019-02-28 ENCOUNTER — PATIENT OUTREACH (OUTPATIENT)
Dept: CASE MANAGEMENT | Age: 69
End: 2019-02-28

## 2019-02-28 NOTE — PROGRESS NOTES
This note will not be viewable in [address] Transitions of Care  Follow up Outreach Note Outreach type Phone call: spoke with patient Date/Time of Outreach: 02/28/19 350pm  
 
Has patient attended PCP or specialist follow-up appointments since last contact? What was outcome of appointment? When is next follow-up scheduled? Patient has completed appointments with PCP, Cardiology, and surgery. Is scheduled 03/04/19 for appointment with IVR and surgery is scheduled for 03/14/19. She states that she is doing okay just irritated from the tube Review medications. Any medication changes since last outreach? Does patient have any questions or issues related to their medications? Patient has medications. No significant changes, questions or concerns. Home health active? If yes  any issue? Progress? NA Referrals needed? 
(CM, SW, HH, etc.) 
 NA Other issues/Miscellaneous? (Transportation, access to meals, ability to perform ADLs, adequate caregiver support, etc.) No questions or concerns. She is independent with ADLs. has transportation to appointments Next Outreach Scheduled? Graduation from program? 
 N/A Yes Next Steps/Goals (if applicable): 
 NA Outreach completed by: 
 Yamil Guerrero [facility] Community Care Coordinator

## 2019-03-04 ENCOUNTER — HOSPITAL ENCOUNTER (OUTPATIENT)
Dept: INTERVENTIONAL RADIOLOGY/VASCULAR | Age: 69
Discharge: HOME | End: 2019-03-04
Attending: RADIOLOGY

## 2019-03-04 VITALS
BODY MASS INDEX: 33.15 KG/M2 | HEIGHT: 65 IN | DIASTOLIC BLOOD PRESSURE: 84 MMHG | OXYGEN SATURATION: 100 % | HEART RATE: 58 BPM | WEIGHT: 199 LBS | TEMPERATURE: 98.1 F | SYSTOLIC BLOOD PRESSURE: 187 MMHG | RESPIRATION RATE: 18 BRPM

## 2019-03-04 NOTE — DISCHARGE INSTRUCTIONS
Gret[address]  Department of Interventional Radiology  [facility]  [phone] Office  [phone] Fax    GENERAL DRAIN DISCHARGE INSTRUCTIONS    General Information:     A plastic catheter has been inserted through your skin and into area that needs to be drained. Your doctor ordered this procedure to be done in the event of an abscess, or other fluid collection in your body. You will notice a drainage bag attached to the catheter. When the fluid has all been removed, your doctor will send you back to us for the removal of the catheter. If you are going home with the catheter in place, your doctor may order a home health nurse to come to your house and assist with the care of the catheter. Your doctor will most likely order antibiotic tablets for you to take while you have this tube. Home Care Instructions: You can resume your regular diet and medication regimen. Do not drink alcohol, drive, or make any important legal decisions in the next 24 hours. Do not lift anything heavier than a gallon of milk, or do anything strenuous for the next 24 hours. You should not shower for 24 hours. You should cover the tube with plastic wrap and tape to keep it dry when you shower. You can disconnect the drainage bag while showering. The home health nurse or the nurse who discharges from the hospital will teach you how to do this. Do not take a bath, swim or immerse yourself in water as long as you have this drainage tube. You should clean the tube and change the dressing every  48 hours and as needed. Keep the dressing clean and dry. Keep up with how much drainage you get from the tube and report this to your doctor on each visit. Call If:     You should call your Physician and/or the Radiology Nurse if you have any bleeding other than a small spot on your bandage.  Call if you have any signs of infection, fever, or increased pain at the site of the tube. Call if you should have leakage around the tube, an increased yellowing of the skin, increased pain in the abdomen, a change in the amount or appearance of the fluid, or if the tube gets pulled out some or all the way out. Follow-Up Instructions: Please see your ordering doctor as he/she has requested. To Reach Us: If you have any questions about your procedure, please call the Interventional Radiology department at [phone]. After business hours (5pm) and weekends, call the answering service at [phone] and ask for the Radiologist on call to be paged. Interventional Radiology General Nurse Discharge    After general anesthesia or intravenous sedation, for 24 hours or while taking prescription Narcotics:  · Limit your activities  · Do not drive and operate hazardous machinery  · Do not make important personal or business decisions  · Do  not drink alcoholic beverages  · If you have not urinated within 8 hours after discharge, please contact your surgeon on call. * Please give a list of your current medications to your Primary Care Provider. * Please update this list whenever your medications are discontinued, doses are     changed, or new medications (including over-the-counter products) are added. * Please carry medication information at all times in case of emergency situations. These are general instructions for a healthy lifestyle:    No smoking/ No tobacco products/ Avoid exposure to second hand smoke  Surgeon General's Warning:  Quitting smoking now greatly reduces serious risk to your health.     Obesity, smoking, and sedentary lifestyle greatly increases your risk for illness  A healthy diet, regular physical exercise & weight monitoring are important for maintaining a healthy lifestyle    You may be retaining fluid if you have a history of heart failure or if you experience any of the following symptoms:  Weight gain of 3 pounds or more overnight or 5 pounds in a week, increased swelling in our hands or feet or shortness of breath while lying flat in bed. Please call your doctor as soon as you notice any of these symptoms; do not wait until your next office visit. Recognize signs and symptoms of STROKE:  F-face looks uneven    A-arms unable to move or move unevenly    S-speech slurred or non-existent    T-time-call 911 as soon as signs and symptoms begin-DO NOT go       Back to bed or wait to see if you get better-TIME IS BRAIN.     Patient Signature:  Date: 3/4/2019  Discharging Nurse: Vazquez Kumar RN

## 2019-03-07 ENCOUNTER — HOSPITAL ENCOUNTER (OUTPATIENT)
Dept: INTERVENTIONAL RADIOLOGY/VASCULAR | Age: 69
Discharge: HOME | End: 2019-03-07
Attending: RADIOLOGY
Payer: MEDICARE

## 2019-03-07 ENCOUNTER — HOSPITAL ENCOUNTER (OUTPATIENT)
Dept: SURGERY | Age: 69
End: 2019-03-07
Payer: MEDICARE

## 2019-03-07 VITALS
OXYGEN SATURATION: 92 % | TEMPERATURE: 98.4 F | RESPIRATION RATE: 14 BRPM | HEART RATE: 58 BPM | DIASTOLIC BLOOD PRESSURE: 66 MMHG | SYSTOLIC BLOOD PRESSURE: 121 MMHG

## 2019-03-07 LAB
BUN BLD-MCNC: 13 MG/DL (ref 8–26)
CA-I BLD-MCNC: 1.25 MMOL/L (ref 1.12–1.32)
CHLORIDE BLD-SCNC: 102 MMOL/L (ref 98–107)
CO2 BLD-SCNC: 26 MMOL/L (ref 21–32)
CREAT BLD-MCNC: 0.8 MG/DL (ref 0.8–1.5)
GLUCOSE BLD-MCNC: 102 MG/DL (ref 65–100)
POTASSIUM BLD-SCNC: 3.6 MMOL/L (ref 3.5–5.1)
SODIUM BLD-SCNC: 143 MMOL/L (ref 136–145)

## 2019-03-07 PROCEDURE — 77030002916 HC SUT ETHLN J&J -A

## 2019-03-07 PROCEDURE — C1769 GUIDE WIRE: HCPCS

## 2019-03-07 PROCEDURE — 80047 BASIC METABLC PNL IONIZED CA: CPT

## 2019-03-07 PROCEDURE — C1729 CATH, DRAINAGE: HCPCS

## 2019-03-07 PROCEDURE — 74011250636 HC RX REV CODE- 250/636

## 2019-03-07 PROCEDURE — 74011636320 HC RX REV CODE- 636/320: Performed by: RADIOLOGY

## 2019-03-07 PROCEDURE — 47536 EXCHANGE BILIARY DRG CATH: CPT

## 2019-03-07 PROCEDURE — C1894 INTRO/SHEATH, NON-LASER: HCPCS

## 2019-03-07 PROCEDURE — 77030021532 HC CATH ANGI DX IMPRS MRTM -B

## 2019-03-07 PROCEDURE — 77030025702 HC BG URIN DRN MRTM -A

## 2019-03-07 PROCEDURE — C2628 CATHETER, OCCLUSION: HCPCS

## 2019-03-07 PROCEDURE — 74011250636 HC RX REV CODE- 250/636: Performed by: RADIOLOGY

## 2019-03-07 RX ORDER — FENTANYL CITRATE 50 UG/ML
25-50 INJECTION, SOLUTION INTRAMUSCULAR; INTRAVENOUS
Status: DISCONTINUED | OUTPATIENT
Start: 2019-03-07 | End: 2019-03-11 | Stop reason: HOSPADM

## 2019-03-07 RX ORDER — SODIUM CHLORIDE 9 MG/ML
25 INJECTION, SOLUTION INTRAVENOUS ONCE
Status: ACTIVE | OUTPATIENT
Start: 2019-03-07 | End: 2019-03-07

## 2019-03-07 RX ORDER — LIDOCAINE HYDROCHLORIDE 20 MG/ML
1-20 INJECTION, SOLUTION EPIDURAL; INFILTRATION; INTRACAUDAL; PERINEURAL ONCE
Status: COMPLETED | OUTPATIENT
Start: 2019-03-07 | End: 2019-03-07

## 2019-03-07 RX ORDER — MIDAZOLAM HYDROCHLORIDE 1 MG/ML
.5-2 INJECTION, SOLUTION INTRAMUSCULAR; INTRAVENOUS
Status: DISCONTINUED | OUTPATIENT
Start: 2019-03-07 | End: 2019-03-11 | Stop reason: HOSPADM

## 2019-03-07 RX ADMIN — MIDAZOLAM HYDROCHLORIDE 1 MG: 1 INJECTION, SOLUTION INTRAMUSCULAR; INTRAVENOUS at 09:05

## 2019-03-07 RX ADMIN — MIDAZOLAM HYDROCHLORIDE 0.5 MG: 1 INJECTION, SOLUTION INTRAMUSCULAR; INTRAVENOUS at 09:26

## 2019-03-07 RX ADMIN — FENTANYL CITRATE 25 MCG: 50 INJECTION, SOLUTION INTRAMUSCULAR; INTRAVENOUS at 09:18

## 2019-03-07 RX ADMIN — MIDAZOLAM HYDROCHLORIDE 1 MG: 1 INJECTION, SOLUTION INTRAMUSCULAR; INTRAVENOUS at 09:02

## 2019-03-07 RX ADMIN — MIDAZOLAM HYDROCHLORIDE 0.5 MG: 1 INJECTION, SOLUTION INTRAMUSCULAR; INTRAVENOUS at 09:18

## 2019-03-07 RX ADMIN — FENTANYL CITRATE 25 MCG: 50 INJECTION, SOLUTION INTRAMUSCULAR; INTRAVENOUS at 09:26

## 2019-03-07 RX ADMIN — FENTANYL CITRATE 50 MCG: 50 INJECTION, SOLUTION INTRAMUSCULAR; INTRAVENOUS at 09:05

## 2019-03-07 RX ADMIN — FENTANYL CITRATE 25 MCG: 50 INJECTION, SOLUTION INTRAMUSCULAR; INTRAVENOUS at 09:10

## 2019-03-07 RX ADMIN — LIDOCAINE HYDROCHLORIDE 200 MG: 20 INJECTION, SOLUTION EPIDURAL; INFILTRATION; INTRACAUDAL; PERINEURAL at 09:16

## 2019-03-07 RX ADMIN — FENTANYL CITRATE 50 MCG: 50 INJECTION, SOLUTION INTRAMUSCULAR; INTRAVENOUS at 09:02

## 2019-03-07 RX ADMIN — IOPAMIDOL 65 ML: 612 INJECTION, SOLUTION INTRAVENOUS at 09:33

## 2019-03-07 RX ADMIN — MIDAZOLAM HYDROCHLORIDE 0.5 MG: 1 INJECTION, SOLUTION INTRAMUSCULAR; INTRAVENOUS at 09:09

## 2019-03-07 NOTE — PROGRESS NOTES
TRANSFER - OUT REPORT:    Verbal report given to [address] [place] [address] Leonidas RN(name) on Davis Mckeon  being transferred to IR Recovery(unit) for routine post - op       Report consisted of patients Situation, Background, Assessment and   Recommendations(SBAR). Information from the following report(s) SBAR, Kardex, Procedure Summary and MAR was reviewed with the receiving nurse. Lines:   Peripheral IV 03/07/19 Left Antecubital (Active)   Site Assessment Clean, dry, & intact 3/7/2019  8:23 AM   Phlebitis Assessment 0 3/7/2019  8:23 AM   Infiltration Assessment 0 3/7/2019  8:23 AM   Dressing Status Clean, dry, & intact 3/7/2019  8:23 AM   Dressing Type Transparent 3/7/2019  8:23 AM        Opportunity for questions and clarification was provided.

## 2019-03-07 NOTE — PROGRESS NOTES
Recovery period without difficulty. Pt alert and oriented and denies pain. Dressing is clean, dry, and intact. Reviewed discharge instructions with patient and spouse, both verbalized understanding. Pt escorted to lobby discharge area via wheelchair. Vital signs and Alfonso score completed.

## 2019-03-07 NOTE — PROGRESS NOTES
Recovery period without difficulty. Pt alert and oriented and denies pain. Dressing is clean, dry, and intact. Reviewed discharge instructions with patient and spouse, both verbalized understanding. [facility]. Vital signs and Alfonso score completed. Provided patient and spouse with 4 PRN caps. Educated on valve usage for drainage bag, where on/off positions are on valve.

## 2019-03-07 NOTE — PROCEDURES
Department of Interventional Radiology  [phone]        Interventional Radiology Brief Procedure Note    Patient: Amalia Burgos MRN: [id number]  SSN: [id number]    YOB: 1950  Age: 77 y.o. Sex: female      Date of Procedure: 3/7/2019    Pre-Procedure Diagnosis: biliary stones    Post-Procedure Diagnosis: SAME    Procedure(s): Percutaneous Transhepatic Cholangiogram Drain Exchange    Brief Description of Procedure: drain exchange and stone sweep    Performed By: Daniel Galindo MD     Assistants: None    Anesthesia:Moderate Sedation    Estimated Blood Loss: Less than 10ml    Specimens:  None    Implants:  Internal/External Biliary Drain    Findings: stone in downstream bile duct. Likely removed with balloon sweep    Complications: None    Recommendations: external drainage. Await surgical consultation.   Consider repeat cholangiogram or MRI before tube removal     Follow Up: in 2-3 weeks for repeat check unless surgery in meantime    Signed By: Daniel Galindo MD     March 7, 2019

## 2019-03-07 NOTE — DISCHARGE INSTRUCTIONS
Gretel [address]  Department of Interventional Radiology  [facility]  [phone] Office  [phone] Fax    GENERAL DRAIN DISCHARGE INSTRUCTIONS    General Information:     A plastic catheter has been inserted through your skin and into area that needs to be drained. Your doctor ordered this procedure to be done in the event of an abscess, or other fluid collection in your body. You will notice a drainage bag attached to the catheter. When the fluid has all been removed, your doctor will send you back to us for the removal of the catheter. If you are going home with the catheter in place, your doctor may order a home health nurse to come to your house and assist with the care of the catheter. Your doctor will most likely order antibiotic tablets for you to take while you have this tube. Home Care Instructions: You can resume your regular diet and medication regimen. Do not drink alcohol, drive, or make any important legal decisions in the next 24 hours. Do not lift anything heavier than a gallon of milk, or do anything strenuous for the next 24 hours. You should not shower for 24 hours. You should cover the tube with plastic wrap and tape to keep it dry when you shower. You can disconnect the drainage bag while showering. The home health nurse or the nurse who discharges from the hospital will teach you how to do this. Do not take a bath, swim or immerse yourself in water as long as you have this drainage tube. You should clean the tube and change the dressing every  48 hours and as needed. Keep the dressing clean and dry. Keep up with how much drainage you get from the tube and report this to your doctor on each visit. Call If:     You should call your Physician and/or the Radiology Nurse if you have any bleeding other than a small spot on your bandage.  Call if you have any signs of infection, fever, or increased pain at the site of the tube. Call if you should have leakage around the tube, an increased yellowing of the skin, increased pain in the abdomen, a change in the amount or appearance of the fluid, or if the tube gets pulled out some or all the way out. Follow-Up Instructions: Please see your ordering doctor as he/she has requested. To Reach Us: If you have any questions about your procedure, please call the Interventional Radiology department at [phone]. After business hours (5pm) and weekends, call the answering service at [phone] and ask for the Radiologist on call to be paged. Interventional Radiology General Nurse Discharge    After general anesthesia or intravenous sedation, for 24 hours or while taking prescription Narcotics:  · Limit your activities  · Do not drive and operate hazardous machinery  · Do not make important personal or business decisions  · Do  not drink alcoholic beverages  · If you have not urinated within 8 hours after discharge, please contact your surgeon on call. * Please give a list of your current medications to your Primary Care Provider. * Please update this list whenever your medications are discontinued, doses are     changed, or new medications (including over-the-counter products) are added. * Please carry medication information at all times in case of emergency situations. These are general instructions for a healthy lifestyle:    No smoking/ No tobacco products/ Avoid exposure to second hand smoke  Surgeon General's Warning:  Quitting smoking now greatly reduces serious risk to your health.     Obesity, smoking, and sedentary lifestyle greatly increases your risk for illness  A healthy diet, regular physical exercise & weight monitoring are important for maintaining a healthy lifestyle    You may be retaining fluid if you have a history of heart failure or if you experience any of the following symptoms:  Weight gain of 3 pounds or more overnight or 5 pounds in a week, increased swelling in our hands or feet or shortness of breath while lying flat in bed. Please call your doctor as soon as you notice any of these symptoms; do not wait until your next office visit. Recognize signs and symptoms of STROKE:  F-face looks uneven    A-arms unable to move or move unevenly    S-speech slurred or non-existent    T-time-call 911 as soon as signs and symptoms begin-DO NOT go       Back to bed or wait to see if you get better-TIME IS BRAIN.          Patient Signature:  Date: 3/7/2019  Discharging Nurse: Jose Timmons RN

## 2019-04-03 ENCOUNTER — HOSPITAL ENCOUNTER (OUTPATIENT)
Dept: INTERVENTIONAL RADIOLOGY/VASCULAR | Age: 69
Discharge: HOME | End: 2019-04-03
Attending: RADIOLOGY
Payer: MEDICARE

## 2019-04-03 VITALS
SYSTOLIC BLOOD PRESSURE: 160 MMHG | RESPIRATION RATE: 16 BRPM | DIASTOLIC BLOOD PRESSURE: 70 MMHG | BODY MASS INDEX: 32.99 KG/M2 | OXYGEN SATURATION: 95 % | HEART RATE: 52 BPM | WEIGHT: 198 LBS | HEIGHT: 65 IN | TEMPERATURE: 97.9 F

## 2019-04-03 DIAGNOSIS — K83.1 BILIARY TRACT OBSTRUCTION: ICD-10-CM

## 2019-04-03 PROCEDURE — C1769 GUIDE WIRE: HCPCS

## 2019-04-03 PROCEDURE — 47536 EXCHANGE BILIARY DRG CATH: CPT

## 2019-04-03 PROCEDURE — 74011250636 HC RX REV CODE- 250/636: Performed by: RADIOLOGY

## 2019-04-03 PROCEDURE — 74011250636 HC RX REV CODE- 250/636

## 2019-04-03 PROCEDURE — 77030002916 HC SUT ETHLN J&J -A

## 2019-04-03 PROCEDURE — C1729 CATH, DRAINAGE: HCPCS

## 2019-04-03 PROCEDURE — 77030025702 HC BG URIN DRN MRTM -A

## 2019-04-03 PROCEDURE — 74011636320 HC RX REV CODE- 636/320: Performed by: RADIOLOGY

## 2019-04-03 RX ORDER — MIDAZOLAM HYDROCHLORIDE 1 MG/ML
.5-2 INJECTION, SOLUTION INTRAMUSCULAR; INTRAVENOUS
Status: DISCONTINUED | OUTPATIENT
Start: 2019-04-03 | End: 2019-04-07 | Stop reason: HOSPADM

## 2019-04-03 RX ORDER — SODIUM CHLORIDE 9 MG/ML
25 INJECTION, SOLUTION INTRAVENOUS ONCE
Status: COMPLETED | OUTPATIENT
Start: 2019-04-03 | End: 2019-04-03

## 2019-04-03 RX ORDER — LIDOCAINE HYDROCHLORIDE 20 MG/ML
2-20 INJECTION, SOLUTION INFILTRATION; PERINEURAL ONCE
Status: COMPLETED | OUTPATIENT
Start: 2019-04-03 | End: 2019-04-03

## 2019-04-03 RX ORDER — FENTANYL CITRATE 50 UG/ML
25-50 INJECTION, SOLUTION INTRAMUSCULAR; INTRAVENOUS
Status: DISCONTINUED | OUTPATIENT
Start: 2019-04-03 | End: 2019-04-07 | Stop reason: HOSPADM

## 2019-04-03 RX ADMIN — MIDAZOLAM HYDROCHLORIDE 1 MG: 1 INJECTION, SOLUTION INTRAMUSCULAR; INTRAVENOUS at 11:52

## 2019-04-03 RX ADMIN — MIDAZOLAM HYDROCHLORIDE 1 MG: 1 INJECTION, SOLUTION INTRAMUSCULAR; INTRAVENOUS at 11:41

## 2019-04-03 RX ADMIN — SODIUM CHLORIDE 25 ML/HR: 900 INJECTION, SOLUTION INTRAVENOUS at 11:25

## 2019-04-03 RX ADMIN — FENTANYL CITRATE 50 MCG: 50 INJECTION, SOLUTION INTRAMUSCULAR; INTRAVENOUS at 11:52

## 2019-04-03 RX ADMIN — LIDOCAINE HYDROCHLORIDE 10 ML: 20 INJECTION, SOLUTION INFILTRATION; PERINEURAL at 11:45

## 2019-04-03 RX ADMIN — FENTANYL CITRATE 50 MCG: 50 INJECTION, SOLUTION INTRAMUSCULAR; INTRAVENOUS at 11:47

## 2019-04-03 RX ADMIN — MIDAZOLAM HYDROCHLORIDE 1 MG: 1 INJECTION, SOLUTION INTRAMUSCULAR; INTRAVENOUS at 11:46

## 2019-04-03 RX ADMIN — IOPAMIDOL 15 ML: 612 INJECTION, SOLUTION INTRAVENOUS at 12:01

## 2019-04-03 RX ADMIN — FENTANYL CITRATE 50 MCG: 50 INJECTION, SOLUTION INTRAMUSCULAR; INTRAVENOUS at 11:41

## 2019-04-03 NOTE — PROGRESS NOTES
TRANSFER - OUT REPORT:    Verbal report given to Johnny Coronel RN (name) on Leandra Kulkarni  being transferred to IR Recovery (unit) for routine post - op       Report consisted of patients Situation, Background, Assessment and   Recommendations(SBAR). Information from the following report(s) SBAR, Kardex, Procedure Summary and MAR was reviewed with the receiving nurse. Lines:   Peripheral IV 04/03/19 Left Forearm (Active)   Site Assessment Clean, dry, & intact 4/3/2019 11:39 AM   Phlebitis Assessment 0 4/3/2019 11:39 AM   Infiltration Assessment 0 4/3/2019 11:39 AM   Dressing Status Clean, dry, & intact 4/3/2019 11:39 AM   Dressing Type Transparent 4/3/2019 11:39 AM        Opportunity for questions and clarification was provided.

## 2019-04-03 NOTE — H&P
Department of Interventional Radiology  [phone]    History and Physical    Patient:  Darshana Perez MRN:  [id number]  SSN:  [id number]    YOB: 1950  Age:  77 y.o. Sex:  female      Primary Care Provider:  Linda Tapia MD  Referring Physician:  Marbella Rodriguez MD    Subjective:     Chief Complaint: CBD stone    History of the Present Illness: The patient is a 77 y.o. female who presents for cholangiogram, possible biliary drain removal. Hx choledocholithiasis. Stones were advanced into the small bowel last visit. No new complaints, no pain. NPO. Past Medical History:   Diagnosis Date    Anxiety     Arthritis     general-back, neck, hips,knees/ managed with medication     Autoimmune disease ([address]     lichens sclerosis; pt states is dormant now    CAD (coronary artery disease) 2010    stent x 1, no MI    Chest pain     right    Chronic pain     back and neck    Depression     managed with medication     Family history of ischemic heart disease     H/O acute pancreatitis 08/2013    states gallbladder stone lodged creating a blockage.  H/O gastric bypass 2009    High cholesterol     managed w/ meds    Hypertension     managed with medication     Hypothyroidism     managed with medication     Lichen sclerosus     Obesity (BMI 30-39. 9)     BMI 32.4 3/16    Osteopenia     Systolic murmur     ECHO: +/-PI, TR ; Per cardiologist note 1-5-17 no murmur ; pt states told by cardiologist that he was removeing murmur from her record    Ventral hernia      Past Surgical History:   Procedure Laterality Date    HX APPENDECTOMY  1954    HX BLADDER SUSPENSION      HX CARPAL TUNNEL RELEASE Right     HX CATARACT REMOVAL Bilateral 2015    HX COLONOSCOPY  2017    normal, repeat after 2027    HX ENDOSCOPY  2018    s/p bypass changes    HX GASTRIC BYPASS  2009    HX HEART CATHETERIZATION  2010    stenting of LAD    HX HERNIA REPAIR  8/14/14    ventral    HX HYSTERECTOMY  1978    cervix removed    HX LAP CHOLECYSTECTOMY  7/2013    [facility]    with 1 steel adeline with screws~lower lumbar    HX LYSIS OF ADHESIONS      abdominal    HX OOPHORECTOMY  1984    right    HX ORTHOPAEDIC Right 1997    carpal tunnel release    HX ORTHOPAEDIC Left 2017    hip replacement    HX OTHER SURGICAL  2017    abdomen surgery lysis of adhesions    HX SHOULDER ARTHROSCOPY Right 2011    HX UROLOGICAL      bladder suspension    IR BILIARY DRN CATH EXCHANGE PERC ANY TO EXT SI  3/7/2019    IR BILIARY DRN CATH PLC PERC INT/EXT SI  2/4/2019        Review of Systems:    Pertinent items are noted in the History of Present Illness. Current Outpatient Medications   Medication Sig    potassium chloride (KLOR-CON) 10 mEq tablet 1 every day for potassium    HYDROcodone-acetaminophen (NORCO) 7.5-325 mg per tablet 1 q6h prn severe pain    valsartan-hydroCHLOROthiazide (DIOVAN-HCT) 320-25 mg per tablet 1 every day for BP (replaces Hyzaar if not still recalled)    temazepam (RESTORIL) 30 mg capsule Take 1 Cap by mouth nightly as needed. Max Daily Amount: 30 mg.  LORazepam (ATIVAN) 0.5 mg tablet Take 1 Tab by mouth every eight (8) hours as needed for Anxiety. Max Daily Amount: 1.5 mg.    meloxicam (MOBIC) 15 mg tablet 1 every day for pain    alendronate (FOSAMAX) 70 mg tablet 1 q week for osteoporosis    levothyroxine (SYNTHROID) 75 mcg tablet 1 every day for thyroid    amLODIPine (NORVASC) 5 mg tablet 1 every day for BP  Indications: hypertension    traZODone (DESYREL) 150 mg tablet 1 to 2 qhs for sleep    sertraline (ZOLOFT) 100 mg tablet Take 1 Tab by mouth nightly. Indications: major depressive disorder    aspirin delayed-release 81 mg tablet Take 81 mg by mouth daily.  MULTIVITAMIN PO Take  by mouth daily.  Takes 1-2 melt-aways daily     Current Facility-Administered Medications   Medication Dose Route Frequency    0.9% sodium chloride infusion  25 mL/hr IntraVENous ONCE    midazolam (VERSED) injection 0.5-2 mg  0.5-2 mg IntraVENous Multiple    fentaNYL citrate (PF) injection 25-50 mcg  25-50 mcg IntraVENous Multiple    lidocaine (XYLOCAINE) 20 mg/mL (2 %) injection  mg  2-20 mL SubCUTAneous ONCE        Allergies   Allergen Reactions    Adhesive Rash     Pulls skin off,can use paper tape    Adhesive Tape Rash    Sulfa (Sulfonamide Antibiotics) Nausea and Vomiting       Family History   Problem Relation Age of Onset    Heart Attack Mother     Heart Disease Mother     Hypertension Mother     Cancer Mother         melanoma    Cancer Father         leukemia    Coronary Artery Disease Father     Cancer Sister         breast    Heart Attack Brother     Heart Disease Brother     Cancer Brother         lung    Lung Disease Brother      Social History     Tobacco Use    Smoking status: Never Smoker    Smokeless tobacco: Never Used   Substance Use Topics    Alcohol use: Yes     Alcohol/week: 0.6 oz     Types: 1 Glasses of wine per week     Comment: Occasional        Objective:       Physical Examination:    Vitals:    04/03/19 1041   BP: 184/84   Pulse: (!) 55   Resp: 18   Temp: 97.9 °F (36.6 °C)   SpO2: 98%   Weight: 89.8 kg (198 lb)   Height: 5' 5\" (1.651 m)     Blood pressure 184/84, pulse (!) 55, temperature 97.9 °F (36.6 °C), resp. rate 18, height 5' 5\" (1.651 m), weight 89.8 kg (198 lb), SpO2 98 %, not currently breastfeeding.   HEART: regular rate and rhythm  LUNG: clear to auscultation bilaterally  ABDOMEN: normal findings: soft, nontender, obese, drain capped  EXTREMITIES: warm    Laboratory:     Lab Results   Component Value Date/Time    Sodium 136 02/05/2019 05:42 AM    Sodium 138 02/04/2019 07:21 AM    Potassium 3.3 (L) 02/05/2019 05:42 AM    Potassium 3.4 (L) 02/04/2019 07:21 AM    Chloride 100 02/05/2019 05:42 AM    Chloride 102 02/04/2019 07:21 AM    CO2 29 02/05/2019 05:42 AM    CO2 28 02/04/2019 07:21 AM    Anion gap 7 02/05/2019 05:42 AM    Anion gap 8 02/04/2019 07:21 AM    Glucose 74 02/05/2019 05:42 AM    Glucose 87 02/04/2019 07:21 AM    BUN 6 (L) 02/05/2019 05:42 AM    BUN 5 (L) 02/04/2019 07:21 AM    Creatinine 0.75 02/05/2019 05:42 AM    Creatinine 0.76 02/04/2019 07:21 AM    GFR est AA >60 02/05/2019 05:42 AM    GFR est AA >60 02/04/2019 07:21 AM    GFR est non-AA >60 02/05/2019 05:42 AM    GFR est non-AA >60 02/04/2019 07:21 AM    Calcium 9.1 02/05/2019 05:42 AM    Calcium 9.1 02/04/2019 07:21 AM    Magnesium 1.7 (L) 02/05/2019 05:42 AM    Magnesium 1.3 (LL) 02/04/2019 07:21 AM    Phosphorus 3.3 02/04/2019 07:21 AM    Albumin 3.2 02/05/2019 05:42 AM    Albumin 2.9 (L) 02/04/2019 07:21 AM    Protein, total 6.6 02/05/2019 05:42 AM    Protein, total 6.4 02/04/2019 07:21 AM    Globulin 3.4 02/05/2019 05:42 AM    Globulin 3.5 02/04/2019 07:21 AM    A-G Ratio 0.9 (L) 02/05/2019 05:42 AM    A-G Ratio 0.8 (L) 02/04/2019 07:21 AM    AST (SGOT) 84 (H) 02/05/2019 05:42 AM    AST (SGOT) 73 (H) 02/04/2019 07:21 AM    ALT (SGPT) 251 (H) 02/05/2019 05:42 AM    ALT (SGPT) 301 (H) 02/04/2019 07:21 AM     Lab Results   Component Value Date/Time    WBC 3.7 (L) 02/04/2019 07:21 AM    WBC 6.5 02/02/2019 05:27 AM    HGB 11.0 (L) 02/05/2019 05:42 AM    HGB 10.9 (L) 02/04/2019 07:21 AM    HCT 35.4 (L) 02/05/2019 05:42 AM    HCT 34.4 (L) 02/04/2019 07:21 AM    PLATELET 152 93/38/7693 07:21 AM    PLATELET 041 74/45/0446 05:27 AM     Lab Results   Component Value Date/Time    aPTT 25.4 08/07/2017 12:59 PM    aPTT 32.5 (H) 07/22/2013 07:27 AM    Prothrombin time 10.4 08/07/2017 12:59 PM    Prothrombin time 11.6 (H) 07/22/2013 07:27 AM    INR 1.0 08/07/2017 12:59 PM    INR 1.1 07/22/2013 07:27 AM       Assessment:     Choledocholithiasis, biliary obstruction    Plan:     Planned Procedure:  cholangiogram    Risks, benefits, and alternatives reviewed with patient and she agrees to proceed with the procedure.       Signed By: DARIEL Toussaint 3, 2019

## 2019-04-03 NOTE — PROGRESS NOTES
Per Dr. Jm Zavala at end of procedure, Patient to be hooked up to drainage bag, but tube to remain clamped. Patient to open bag PRN for pain, fever, or other issues of obstruction.      Patient to follow-up in IR in 1 week for re-evaluation and possible drain removal.

## 2019-04-03 NOTE — DISCHARGE INSTRUCTIONS
[address]  Department of Interventional Radiology  [facility]  [phone] Office  [phone] Fax    GENERAL DRAIN DISCHARGE INSTRUCTIONS    General Information:     A plastic catheter has been inserted through your skin and into area that needs to be drained. Your doctor ordered this procedure to be done in the event of an abscess, or other fluid collection in your body. You will notice a drainage bag attached to the catheter. When the fluid has all been removed, your doctor will send you back to us for the removal of the catheter. If you are going home with the catheter in place, your doctor may order a home health nurse to come to your house and assist with the care of the catheter. Your doctor will most likely order antibiotic tablets for you to take while you have this tube. UNCLAMP BAG AND CALL IMMEDIATELY IF ANY SIGNS/ SYMPTOMS OF OBSTRUCTION: Fever, pain at site, nausea/vomiting, yellowing of skin and or eyes       Home Care Instructions: You can resume your regular diet and medication regimen. Do not drink alcohol, drive, or make any important legal decisions in the next 24 hours. Do not lift anything heavier than a gallon of milk, or do anything strenuous for the next 24 hours. You should not shower for 24 hours. You should cover the tube with plastic wrap and tape to keep it dry when you shower. You can disconnect the drainage bag while showering. The home health nurse or the nurse who discharges from the hospital will teach you how to do this. Do not take a bath, swim or immerse yourself in water as long as you have this drainage tube. You should clean the tube and change the dressing every  48 hours and as needed. Keep the dressing clean and dry. Keep up with how much drainage you get from the tube and report this to your doctor on each visit.     Call If:     You should call your Physician and/or the Radiology Nurse if you have any bleeding other than a small spot on your bandage. Call if you have any signs of infection, fever, or increased pain at the site of the tube. Call if you should have leakage around the tube, an increased yellowing of the skin, increased pain in the abdomen, a change in the amount or appearance of the fluid, or if the tube gets pulled out some or all the way out. Follow-Up Instructions: Please see your ordering doctor as he/she has requested. To Reach Us: If you have any questions about your procedure, please call the Interventional Radiology department at [phone]. After business hours (5pm) and weekends, call the answering service at [phone] and ask for the Radiologist on call to be paged. Patient Signature:  Date: 4/3/2019  Discharging Nurse:  Lilian Lott RN

## 2019-04-03 NOTE — PROGRESS NOTES
TRANSFER - IN REPORT:    Verbal report received from [address] [place] [address] RN(name) on Sandeep Yao  being received from IR(unit) for routine progression of care      Report consisted of patients Situation, Background, Assessment and   Recommendations(SBAR). Information from the following report(s) Procedure Summary and MAR was reviewed with the receiving nurse. Opportunity for questions and clarification was provided. Assessment completed upon patients arrival to unit and care assumed.

## 2019-04-03 NOTE — PROCEDURES
Department of Interventional Radiology  [phone]        Interventional Radiology Brief Procedure Note    Patient: Nena Gibbons MRN: [id number]  SSN: [id number]    YOB: 1950  Age: 77 y.o. Sex: female      Date of Procedure: 4/3/2019    Pre-Procedure Diagnosis: Biliary stones    Post-Procedure Diagnosis: SAME    Procedure(s): Cholangiogram    Brief Description of Procedure: as above with tube change    Performed By: Romero Mi MD     Assistants: None    Anesthesia:Moderate Sedation    Estimated Blood Loss: Less than 10ml    Specimens:  None    Implants:  External Biliary Drain    Findings: No stone is identified. 10 Maori pigtail left in CBD. Slight narrowing of ampulla    Complications: None    Recommendations: cap tube. Follow Up: recheck in 1 week. Hopefully remove.   Pt instructed to call and hook to bag if problems    Signed By: Romero Mi MD     April 3, 2019

## 2019-04-10 ENCOUNTER — HOSPITAL ENCOUNTER (OUTPATIENT)
Dept: INTERVENTIONAL RADIOLOGY/VASCULAR | Age: 69
Discharge: HOME | End: 2019-04-10
Attending: RADIOLOGY

## 2019-04-10 VITALS
DIASTOLIC BLOOD PRESSURE: 75 MMHG | HEART RATE: 62 BPM | WEIGHT: 197 LBS | HEIGHT: 65 IN | BODY MASS INDEX: 32.82 KG/M2 | RESPIRATION RATE: 16 BRPM | SYSTOLIC BLOOD PRESSURE: 173 MMHG | TEMPERATURE: 98.6 F | OXYGEN SATURATION: 100 %

## 2019-04-10 DIAGNOSIS — K83.1 BILIARY TRACT OBSTRUCTION: ICD-10-CM

## 2019-04-10 NOTE — PROGRESS NOTES
PANCHITO Perez in patient room to remove drain. Patient tolerated well,  Site covered with gauze and secured with tape. Patient instructed to call if there were any questions or concerns.

## 2019-04-10 NOTE — PROCEDURES
Department of Interventional Radiology  [phone]        Interventional Radiology Brief Procedure Note    Patient: Venkat Becerril MRN: [id number]  SSN: [id number]    YOB: 1950  Age: 77 y.o.   Sex: female      Date of Procedure: 4/10/2019    Pre-Procedure Diagnosis: choledocholithiasis-resolved    Post-Procedure Diagnosis: SAME    Procedure(s): External biliary drain removal    Brief Description of Procedure: as above    Performed By: Marlane Hashimoto, PA-C     Assistants: None    Anesthesia:None    Estimated Blood Loss: None    Specimens:  None    Implants:  None    Findings: drain is clamped, dressing is dry, no jaundice or obstructive symptoms    Complications: None    Recommendations: routine wound care     Follow Up: referring MD    Signed By: Marlane Hashimoto, PA-C     April 10, 2019

## 2019-08-15 ENCOUNTER — HOSPITAL ENCOUNTER (OUTPATIENT)
Dept: MAMMOGRAPHY | Age: 69
Discharge: HOME | End: 2019-08-15
Attending: OBSTETRICS & GYNECOLOGY
Payer: MEDICARE

## 2019-08-15 DIAGNOSIS — Z12.31 ENCOUNTER FOR SCREENING MAMMOGRAM FOR MALIGNANT NEOPLASM OF BREAST: ICD-10-CM

## 2019-08-15 PROCEDURE — 77067 SCR MAMMO BI INCL CAD: CPT

## 2019-08-20 ENCOUNTER — HOSPITAL ENCOUNTER (OUTPATIENT)
Dept: LAB | Age: 69
Discharge: HOME | End: 2019-08-20
Payer: MEDICARE

## 2019-08-20 DIAGNOSIS — I25.119 CORONARY ARTERY DISEASE INVOLVING NATIVE CORONARY ARTERY OF NATIVE HEART WITH ANGINA PECTORIS (HCC): ICD-10-CM

## 2019-08-20 LAB
ANION GAP SERPL CALC-SCNC: 9 MMOL/L (ref 7–16)
BASOPHILS # BLD: 0 K/UL (ref 0–0.2)
BASOPHILS NFR BLD: 1 % (ref 0–2)
BUN SERPL-MCNC: 22 MG/DL (ref 8–23)
CALCIUM SERPL-MCNC: 9.4 MG/DL (ref 8.3–10.4)
CHLORIDE SERPL-SCNC: 105 MMOL/L (ref 98–107)
CO2 SERPL-SCNC: 27 MMOL/L (ref 21–32)
CREAT SERPL-MCNC: 1.01 MG/DL (ref 0.6–1)
DIFFERENTIAL METHOD BLD: ABNORMAL
EOSINOPHIL # BLD: 0.3 K/UL (ref 0–0.8)
EOSINOPHIL NFR BLD: 6 % (ref 0.5–7.8)
ERYTHROCYTE [DISTWIDTH] IN BLOOD BY AUTOMATED COUNT: 15.4 % (ref 11.9–14.6)
GLUCOSE SERPL-MCNC: 102 MG/DL (ref 65–100)
HCT VFR BLD AUTO: 37.9 % (ref 35.8–46.3)
HGB BLD-MCNC: 11.6 G/DL (ref 11.7–15.4)
IMM GRANULOCYTES # BLD AUTO: 0 K/UL (ref 0–0.5)
IMM GRANULOCYTES NFR BLD AUTO: 0 % (ref 0–5)
INR PPP: 0.9
LYMPHOCYTES # BLD: 1.6 K/UL (ref 0.5–4.6)
LYMPHOCYTES NFR BLD: 27 % (ref 13–44)
MCH RBC QN AUTO: 27 PG (ref 26.1–32.9)
MCHC RBC AUTO-ENTMCNC: 30.6 G/DL (ref 31.4–35)
MCV RBC AUTO: 88.1 FL (ref 79.6–97.8)
MONOCYTES # BLD: 0.4 K/UL (ref 0.1–1.3)
MONOCYTES NFR BLD: 6 % (ref 4–12)
NEUTS SEG # BLD: 3.5 K/UL (ref 1.7–8.2)
NEUTS SEG NFR BLD: 60 % (ref 43–78)
NRBC # BLD: 0 K/UL (ref 0–0.2)
PLATELET # BLD AUTO: 208 K/UL (ref 150–450)
PMV BLD AUTO: 11.2 FL (ref 9.4–12.3)
POTASSIUM SERPL-SCNC: 3.9 MMOL/L (ref 3.5–5.1)
PROTHROMBIN TIME: 12.4 SEC (ref 11.7–14.5)
RBC # BLD AUTO: 4.3 M/UL (ref 4.05–5.2)
SODIUM SERPL-SCNC: 141 MMOL/L (ref 136–145)
WBC # BLD AUTO: 5.8 K/UL (ref 4.3–11.1)

## 2019-08-20 PROCEDURE — 36415 COLL VENOUS BLD VENIPUNCTURE: CPT

## 2019-08-20 PROCEDURE — 80048 BASIC METABOLIC PNL TOTAL CA: CPT

## 2019-08-20 PROCEDURE — 85025 COMPLETE CBC W/AUTO DIFF WBC: CPT

## 2019-08-20 PROCEDURE — 85610 PROTHROMBIN TIME: CPT

## 2019-08-20 NOTE — PROGRESS NOTES
Patient pre-assessment complete for [facility] poss with Dr Chase Emmanuel scheduled for 19 at 11am, arrival time 9am. Patient verified using . Patient instructed to bring all home medications in labeled bottles on the day of procedure. NPO status reinforced. Patient informed to take a full dose aspirin 325mg  or 81 mg x 4 on the day of procedure. Patient instructed to HOLD HCTZ & HOLD valsartan/HCTZ in am. Instructed they can take all other medications excluding vitamins & supplements. Patient verbalizes understanding of all instructions & denies any questions at this time.

## 2019-08-21 ENCOUNTER — HOSPITAL ENCOUNTER (OUTPATIENT)
Dept: CARDIAC CATH/INVASIVE PROCEDURES | Age: 69
Discharge: HOME | End: 2019-08-21
Attending: INTERNAL MEDICINE | Admitting: INTERNAL MEDICINE
Payer: MEDICARE

## 2019-08-21 VITALS
RESPIRATION RATE: 16 BRPM | TEMPERATURE: 98 F | WEIGHT: 200 LBS | SYSTOLIC BLOOD PRESSURE: 121 MMHG | HEART RATE: 54 BPM | BODY MASS INDEX: 33.32 KG/M2 | OXYGEN SATURATION: 95 % | DIASTOLIC BLOOD PRESSURE: 63 MMHG | HEIGHT: 65 IN

## 2019-08-21 LAB
ANION GAP SERPL CALC-SCNC: 7 MMOL/L (ref 7–16)
ATRIAL RATE: 56 BPM
BUN SERPL-MCNC: 18 MG/DL (ref 8–23)
CALCIUM SERPL-MCNC: 9.2 MG/DL (ref 8.3–10.4)
CALCULATED P AXIS, ECG09: 33 DEGREES
CALCULATED R AXIS, ECG10: 3 DEGREES
CALCULATED T AXIS, ECG11: 31 DEGREES
CHLORIDE SERPL-SCNC: 107 MMOL/L (ref 98–107)
CO2 SERPL-SCNC: 27 MMOL/L (ref 21–32)
CREAT SERPL-MCNC: 0.89 MG/DL (ref 0.6–1)
DIAGNOSIS, 93000: NORMAL
GLUCOSE SERPL-MCNC: 105 MG/DL (ref 65–100)
P-R INTERVAL, ECG05: 188 MS
POTASSIUM SERPL-SCNC: 3.8 MMOL/L (ref 3.5–5.1)
Q-T INTERVAL, ECG07: 468 MS
QRS DURATION, ECG06: 96 MS
QTC CALCULATION (BEZET), ECG08: 451 MS
SODIUM SERPL-SCNC: 141 MMOL/L (ref 136–145)
VENTRICULAR RATE, ECG03: 56 BPM

## 2019-08-21 PROCEDURE — 93005 ELECTROCARDIOGRAM TRACING: CPT | Performed by: INTERNAL MEDICINE

## 2019-08-21 PROCEDURE — 74011250636 HC RX REV CODE- 250/636

## 2019-08-21 PROCEDURE — C1894 INTRO/SHEATH, NON-LASER: HCPCS

## 2019-08-21 PROCEDURE — 77030015766

## 2019-08-21 PROCEDURE — 77030029997 HC DEV COM RDL R BND TELE -B

## 2019-08-21 PROCEDURE — 74011000250 HC RX REV CODE- 250: Performed by: INTERNAL MEDICINE

## 2019-08-21 PROCEDURE — 77030004534 HC CATH ANGI DX INFN CARD -A

## 2019-08-21 PROCEDURE — 74011250636 HC RX REV CODE- 250/636: Performed by: INTERNAL MEDICINE

## 2019-08-21 PROCEDURE — 99152 MOD SED SAME PHYS/QHP 5/>YRS: CPT

## 2019-08-21 PROCEDURE — 74011636320 HC RX REV CODE- 636/320: Performed by: INTERNAL MEDICINE

## 2019-08-21 PROCEDURE — 93458 L HRT ARTERY/VENTRICLE ANGIO: CPT

## 2019-08-21 PROCEDURE — C1769 GUIDE WIRE: HCPCS

## 2019-08-21 PROCEDURE — 80048 BASIC METABOLIC PNL TOTAL CA: CPT

## 2019-08-21 RX ORDER — FENTANYL CITRATE 50 UG/ML
25-100 INJECTION, SOLUTION INTRAMUSCULAR; INTRAVENOUS
Status: DISCONTINUED | OUTPATIENT
Start: 2019-08-21 | End: 2019-08-21 | Stop reason: HOSPADM

## 2019-08-21 RX ORDER — SODIUM CHLORIDE 0.9 % (FLUSH) 0.9 %
5-40 SYRINGE (ML) INJECTION AS NEEDED
Status: CANCELLED | OUTPATIENT
Start: 2019-08-21

## 2019-08-21 RX ORDER — DIAZEPAM 5 MG/1
5 TABLET ORAL ONCE
Status: DISCONTINUED | OUTPATIENT
Start: 2019-08-21 | End: 2019-08-21 | Stop reason: HOSPADM

## 2019-08-21 RX ORDER — SODIUM CHLORIDE 0.9 % (FLUSH) 0.9 %
5-40 SYRINGE (ML) INJECTION EVERY 8 HOURS
Status: CANCELLED | OUTPATIENT
Start: 2019-08-21

## 2019-08-21 RX ORDER — HEPARIN SODIUM 200 [USP'U]/100ML
2 INJECTION, SOLUTION INTRAVENOUS CONTINUOUS
Status: DISCONTINUED | OUTPATIENT
Start: 2019-08-21 | End: 2019-08-21 | Stop reason: HOSPADM

## 2019-08-21 RX ORDER — GUAIFENESIN 100 MG/5ML
81-324 LIQUID (ML) ORAL
Status: DISCONTINUED | OUTPATIENT
Start: 2019-08-21 | End: 2019-08-21 | Stop reason: HOSPADM

## 2019-08-21 RX ORDER — SODIUM CHLORIDE 9 MG/ML
75 INJECTION, SOLUTION INTRAVENOUS CONTINUOUS
Status: CANCELLED | OUTPATIENT
Start: 2019-08-21

## 2019-08-21 RX ORDER — HYDROCODONE BITARTRATE AND ACETAMINOPHEN 5; 325 MG/1; MG/1
1 TABLET ORAL
Status: CANCELLED | OUTPATIENT
Start: 2019-08-21

## 2019-08-21 RX ORDER — LIDOCAINE HYDROCHLORIDE 10 MG/ML
3-10 INJECTION INFILTRATION; PERINEURAL
Status: DISCONTINUED | OUTPATIENT
Start: 2019-08-21 | End: 2019-08-21 | Stop reason: HOSPADM

## 2019-08-21 RX ORDER — SODIUM CHLORIDE 9 MG/ML
75 INJECTION, SOLUTION INTRAVENOUS CONTINUOUS
Status: DISCONTINUED | OUTPATIENT
Start: 2019-08-21 | End: 2019-08-21 | Stop reason: HOSPADM

## 2019-08-21 RX ORDER — ONDANSETRON 2 MG/ML
4 INJECTION INTRAMUSCULAR; INTRAVENOUS
Status: CANCELLED | OUTPATIENT
Start: 2019-08-21 | End: 2019-08-22

## 2019-08-21 RX ORDER — ACETAMINOPHEN 325 MG/1
650 TABLET ORAL
Status: CANCELLED | OUTPATIENT
Start: 2019-08-21

## 2019-08-21 RX ORDER — MIDAZOLAM HYDROCHLORIDE 1 MG/ML
.5-2 INJECTION, SOLUTION INTRAMUSCULAR; INTRAVENOUS
Status: DISCONTINUED | OUTPATIENT
Start: 2019-08-21 | End: 2019-08-21 | Stop reason: HOSPADM

## 2019-08-21 RX ADMIN — FENTANYL CITRATE 50 MCG: 50 INJECTION, SOLUTION INTRAMUSCULAR; INTRAVENOUS at 12:15

## 2019-08-21 RX ADMIN — FENTANYL CITRATE 25 MCG: 50 INJECTION, SOLUTION INTRAMUSCULAR; INTRAVENOUS at 12:00

## 2019-08-21 RX ADMIN — IOPAMIDOL 70 ML: 755 INJECTION, SOLUTION INTRAVENOUS at 12:18

## 2019-08-21 RX ADMIN — LIDOCAINE HYDROCHLORIDE 3 ML: 10 INJECTION, SOLUTION INFILTRATION; PERINEURAL at 12:04

## 2019-08-21 RX ADMIN — MIDAZOLAM HYDROCHLORIDE 2 MG: 1 INJECTION, SOLUTION INTRAMUSCULAR; INTRAVENOUS at 12:00

## 2019-08-21 RX ADMIN — HEPARIN SODIUM 2 UNITS/HR: 5000 INJECTION, SOLUTION INTRAVENOUS; SUBCUTANEOUS at 11:51

## 2019-08-21 RX ADMIN — SODIUM CHLORIDE 75 ML/HR: 900 INJECTION, SOLUTION INTRAVENOUS at 09:25

## 2019-08-21 RX ADMIN — FENTANYL CITRATE 25 MCG: 50 INJECTION, SOLUTION INTRAMUSCULAR; INTRAVENOUS at 12:06

## 2019-08-21 RX ADMIN — HEPARIN SODIUM 2 ML: 10000 INJECTION, SOLUTION INTRAVENOUS; SUBCUTANEOUS at 12:05

## 2019-08-21 RX ADMIN — MIDAZOLAM HYDROCHLORIDE 2 MG: 1 INJECTION, SOLUTION INTRAMUSCULAR; INTRAVENOUS at 12:04

## 2019-08-21 NOTE — DISCHARGE INSTRUCTIONS

## 2019-08-21 NOTE — PROCEDURES
[facility]  CARDIAC CATH    Name:  Taylor Rodriguez  MR#:  [id number]  :  1950  ACCOUNT #:  [de-identified]  DATE OF SERVICE:  2019    PROCEDURES PERFORMED:  Left heart catheterization, selective coronary arteriography, and left ventriculogram via the right radial approach. PREOPERATIVE DIAGNOSES:  Chest pain concerning for typical angina in a patient with prior history of percutaneous coronary intervention and stenting of her left anterior descending. POSTOPERATIVE DIAGNOSIS:  Stable coronary artery disease. SURGEON:  Jean Clark MD    ASSISTANT:  None. ESTIMATED BLOOD LOSS:  Less than 5 mL. SPECIMENS REMOVED:  None. COMPLICATIONS:  None. IMPLANTS:  None. ANESTHESIA:  The patient received 4 mg of Versed and 100 mcg of fentanyl. Sedation start time was 12:00 with a procedure completion time of 12:16. FINDINGS:  As below. TECHNICAL FACTORS:  After informed consent was obtained, the patient was brought to the cardiac catheterization lab. The right radial artery was prepped and draped in the usual sterile fashion. Utilizing modified Seldinger technique and micropuncture needle, the right radial artery was entered. A 6-Malay Terumo Slender sheath was placed without difficulty. A radial cocktail consisting of 2000 units of heparin, 2 mg of verapamil, and 200 mcg of nitroglycerin was administered. A 5-Malay Tiger 4.0 catheter was used to select and engage the ostium of the left main coronary artery and right coronary artery, respectively. Selective injection verification was performed. A pigtail catheter was used to cross the aortic valve and the left ventricle. Hemodynamic measurements and left ventriculogram were obtained. Left ventricular aortic pressure gradient was obtained by pullback technique. At the conclusion of the diagnostic procedure, the radial sheath was removed and a pneumatic band was placed with good hemostasis.   No complications were encountered. CONTRAST:  Isovue 70. HEMODYNAMIC RESULTS:  1. Aortic pressure 121/72. 2.  Left ventricular end-diastolic pressure is 20.  3. There is no significant gradient across the aortic valve. ANGIOGRAPHIC RESULTS:  1. Left main coronary artery:  Large-caliber vessel. Angiographically normal.  2.  LAD:  Medium-caliber vessel. Contains a prior stent in the mid LAD just after the first diagonal artery. The stent is patent with less than 10% in-stent restenosis. 3.  First diagonal artery:  Medium-caliber vessel. Patent. 4.  Left circumflex:  Small-caliber, nondominant vessel. Patent. 5.  Ramus intermediate:  Small-caliber vessel. Patent. 6.  Right coronary artery is a large-caliber, dominant vessel. A 20% to 30% proximal stenosis and 10% to 20% mid stenosis. The remainder of the vessel appears angiographically normal.  7.  Right PDA:  Medium-caliber vessel. Patent. 8.  Right posterolateral branch:  Medium-caliber vessel. A 20% mid stenosis. 9.  Left ventriculogram performed in the CARD projection shows normal left ventricular systolic function. EF 60% to 65%. No focal segmental wall motion abnormalities. CONCLUSIONS:  1. Patent LAD stent. 2.  Mild residual nonobstructive coronary artery disease. 3.  Normal left ventricular systolic function. PLAN:  Medical therapy. Follow up with Dr. Chase Copeland in two weeks.       Shilpa Muir MD      [id number]  D:  08/21/2019 12:30  T:  08/21/2019 12:37  JOB #:  [id number]

## 2019-08-21 NOTE — PROGRESS NOTES
Patient received to [address] [place] [address] room # 10  Ambulatory from [facility]. Patient scheduled for [facility] today with Dr Karen Hopson. Procedure reviewed & questions answered, voiced good understanding consent obtained & placed on chart. All medications and medical history reviewed. Will prep patient per orders. Patient & family updated on plan of care. The patient has a fraility score of 3-MANAGING WELL, based on independent of ADLs/ambulation. Increased symptoms with exertion.

## 2019-08-21 NOTE — PROGRESS NOTES
Brief Cardiac Procedure Note    Patient: Junior Almonte MRN: [id number]  SSN: [id number]    YOB: 1950  Age: 68 y.o. Sex: female      Date of Procedure: 8/21/2019     Pre-procedure Diagnosis: Chest pain    Post-procedure Diagnosis: Non-cardiac chest pain    Procedure: Left Heart Catheterization    Brief Description of Procedure: As above    Performed By: Giovanni Julian MD     Assistants: None    Anesthesia: Moderate Sedation    Estimated Blood Loss: Less than 10 mL      Specimens: None    Implants: None    Findings: Mild non-obstructive CAD. Patent LAD stent. Complications: None    Recommendations: Continue medical therapy.     Signed By: Giovanni Julian MD     August 21, 2019

## 2019-08-21 NOTE — PROGRESS NOTES
TRANSFER - OUT REPORT:    [facility] Dr Fabrice Langford  RRA  Diagnostic clean cath  Versed 4 mg  Fentanyl 100 mcg  Band 12 ml  No bleeding/hematoma      Verbal report given to Beth(name) on Poppy Percival  being transferred to CPRU(unit) for routine progression of care       Report consisted of patients Situation, Background, Assessment and   Recommendations(SBAR). Information from the following report(s) SBAR and Procedure Summary was reviewed with the receiving nurse. Lines:   Peripheral IV 08/21/19 Right Antecubital (Active)       Peripheral IV 08/21/19 Left Antecubital (Active)        Opportunity for questions and clarification was provided.

## 2019-08-21 NOTE — PROGRESS NOTES
Report received from [facility] RN. Procedural findings communicated. Intra procedural  medication administration reviewed. Progression of care discussed.      Patient received into CPRU [place] post sheath removal.     right Radial access site without bleeding or swelling     TR band dry and intact     Patient instructed to limit movement to right upper extremity    Routine post procedural vital signs and site assessment initiated

## 2019-09-10 PROBLEM — I25.10 CORONARY ARTERY DISEASE INVOLVING NATIVE CORONARY ARTERY OF NATIVE HEART WITHOUT ANGINA PECTORIS: Status: ACTIVE | Noted: 2019-01-11

## 2019-10-31 ENCOUNTER — APPOINTMENT (RX ONLY)
Dept: URBAN - METROPOLITAN AREA CLINIC 349 | Facility: CLINIC | Age: 69
Setting detail: DERMATOLOGY
End: 2019-10-31

## 2019-10-31 DIAGNOSIS — Z85.828 PERSONAL HISTORY OF OTHER MALIGNANT NEOPLASM OF SKIN: ICD-10-CM

## 2019-10-31 DIAGNOSIS — Z71.89 OTHER SPECIFIED COUNSELING: ICD-10-CM

## 2019-10-31 DIAGNOSIS — D22 MELANOCYTIC NEVI: ICD-10-CM

## 2019-10-31 DIAGNOSIS — Z80.8 FAMILY HISTORY OF MALIGNANT NEOPLASM OF OTHER ORGANS OR SYSTEMS: ICD-10-CM

## 2019-10-31 PROBLEM — F32.9 MAJOR DEPRESSIVE DISORDER, SINGLE EPISODE, UNSPECIFIED: Status: ACTIVE | Noted: 2019-10-31

## 2019-10-31 PROBLEM — M12.9 ARTHROPATHY, UNSPECIFIED: Status: ACTIVE | Noted: 2019-10-31

## 2019-10-31 PROBLEM — D23.62 OTHER BENIGN NEOPLASM OF SKIN OF LEFT UPPER LIMB, INCLUDING SHOULDER: Status: ACTIVE | Noted: 2019-10-31

## 2019-10-31 PROBLEM — I10 ESSENTIAL (PRIMARY) HYPERTENSION: Status: ACTIVE | Noted: 2019-10-31

## 2019-10-31 PROBLEM — I25.10 ATHEROSCLEROTIC HEART DISEASE OF NATIVE CORONARY ARTERY WITHOUT ANGINA PECTORIS: Status: ACTIVE | Noted: 2019-10-31

## 2019-10-31 PROBLEM — E03.9 HYPOTHYROIDISM, UNSPECIFIED: Status: ACTIVE | Noted: 2019-10-31

## 2019-10-31 PROBLEM — F41.9 ANXIETY DISORDER, UNSPECIFIED: Status: ACTIVE | Noted: 2019-10-31

## 2019-10-31 PROBLEM — D22.5 MELANOCYTIC NEVI OF TRUNK: Status: ACTIVE | Noted: 2019-10-31

## 2019-10-31 PROCEDURE — ? EDUCATIONAL RESOURCES PROVIDED

## 2019-10-31 PROCEDURE — ? COUNSELING

## 2019-10-31 PROCEDURE — ? OTHER

## 2019-10-31 PROCEDURE — 99202 OFFICE O/P NEW SF 15 MIN: CPT

## 2019-10-31 PROCEDURE — ? PHOTO-DOCUMENTATION

## 2019-10-31 ASSESSMENT — LOCATION ZONE DERM
LOCATION ZONE: TRUNK
LOCATION ZONE: NOSE

## 2019-10-31 ASSESSMENT — LOCATION SIMPLE DESCRIPTION DERM
LOCATION SIMPLE: ABDOMEN
LOCATION SIMPLE: NOSE
LOCATION SIMPLE: UPPER BACK
LOCATION SIMPLE: LOWER BACK

## 2019-10-31 ASSESSMENT — LOCATION DETAILED DESCRIPTION DERM
LOCATION DETAILED: SUBXIPHOID
LOCATION DETAILED: SUPERIOR LUMBAR SPINE
LOCATION DETAILED: NASAL SUPRATIP
LOCATION DETAILED: INFERIOR THORACIC SPINE

## 2019-10-31 NOTE — PROCEDURE: OTHER
Other (Free Text): Patient picks this lesion often. Patient stated lesion bleeds but only when she picks it. \\nOffered to biopsy lesion, patient declined.
Detail Level: Detailed
Note Text (......Xxx Chief Complaint.): This diagnosis correlates with the

## 2019-11-12 NOTE — ED NOTES
[facility] Care assumed at this [facility]. Class I (easy) - visualization of the soft palate, fauces, uvula, and both anterior and posterior pillars

## 2020-01-21 ENCOUNTER — HOME HEALTH ADMISSION (OUTPATIENT)
Dept: HOME HEALTH SERVICES | Facility: HOME HEALTH | Age: 70
End: 2020-01-21
Payer: MEDICARE

## 2020-01-21 ENCOUNTER — HOSPITAL ENCOUNTER (OUTPATIENT)
Dept: SURGERY | Age: 70
Discharge: HOME | End: 2020-01-21
Payer: MEDICARE

## 2020-01-21 ENCOUNTER — HOSPITAL ENCOUNTER (OUTPATIENT)
Dept: PHYSICAL THERAPY | Age: 70
Discharge: HOME | End: 2020-01-21
Payer: MEDICARE

## 2020-01-21 VITALS
WEIGHT: 207.5 LBS | TEMPERATURE: 98.6 F | DIASTOLIC BLOOD PRESSURE: 70 MMHG | BODY MASS INDEX: 34.57 KG/M2 | RESPIRATION RATE: 16 BRPM | OXYGEN SATURATION: 96 % | SYSTOLIC BLOOD PRESSURE: 128 MMHG | HEIGHT: 65 IN | HEART RATE: 68 BPM

## 2020-01-21 LAB
ANION GAP SERPL CALC-SCNC: 5 MMOL/L (ref 7–16)
APTT PPP: 29.6 SEC (ref 24.7–39.8)
BACTERIA SPEC CULT: ABNORMAL
BASOPHILS # BLD: 0 K/UL (ref 0–0.2)
BASOPHILS NFR BLD: 1 % (ref 0–2)
BUN SERPL-MCNC: 15 MG/DL (ref 8–23)
CALCIUM SERPL-MCNC: 10 MG/DL (ref 8.3–10.4)
CHLORIDE SERPL-SCNC: 104 MMOL/L (ref 98–107)
CO2 SERPL-SCNC: 30 MMOL/L (ref 21–32)
CREAT SERPL-MCNC: 0.92 MG/DL (ref 0.6–1)
DIFFERENTIAL METHOD BLD: ABNORMAL
EOSINOPHIL # BLD: 0.3 K/UL (ref 0–0.8)
EOSINOPHIL NFR BLD: 4 % (ref 0.5–7.8)
ERYTHROCYTE [DISTWIDTH] IN BLOOD BY AUTOMATED COUNT: 16 % (ref 11.9–14.6)
EST. AVERAGE GLUCOSE BLD GHB EST-MCNC: 105 MG/DL
GLUCOSE SERPL-MCNC: 96 MG/DL (ref 65–100)
HBA1C MFR BLD: 5.3 %
HCT VFR BLD AUTO: 41.7 % (ref 35.8–46.3)
HGB BLD-MCNC: 13.4 G/DL (ref 11.7–15.4)
IMM GRANULOCYTES # BLD AUTO: 0 K/UL (ref 0–0.5)
IMM GRANULOCYTES NFR BLD AUTO: 0 % (ref 0–5)
INR PPP: 0.9
LYMPHOCYTES # BLD: 1.8 K/UL (ref 0.5–4.6)
LYMPHOCYTES NFR BLD: 23 % (ref 13–44)
MCH RBC QN AUTO: 28.3 PG (ref 26.1–32.9)
MCHC RBC AUTO-ENTMCNC: 32.1 G/DL (ref 31.4–35)
MCV RBC AUTO: 88 FL (ref 79.6–97.8)
MONOCYTES # BLD: 0.5 K/UL (ref 0.1–1.3)
MONOCYTES NFR BLD: 6 % (ref 4–12)
NEUTS SEG # BLD: 4.9 K/UL (ref 1.7–8.2)
NEUTS SEG NFR BLD: 66 % (ref 43–78)
NRBC # BLD: 0 K/UL (ref 0–0.2)
PLATELET # BLD AUTO: 188 K/UL (ref 150–450)
PMV BLD AUTO: 10.9 FL (ref 9.4–12.3)
POTASSIUM SERPL-SCNC: 4.1 MMOL/L (ref 3.5–5.1)
PROTHROMBIN TIME: 12.7 SEC (ref 11.7–14.5)
RBC # BLD AUTO: 4.74 M/UL (ref 4.05–5.2)
SERVICE CMNT-IMP: ABNORMAL
SODIUM SERPL-SCNC: 139 MMOL/L (ref 136–145)
WBC # BLD AUTO: 7.5 K/UL (ref 4.3–11.1)

## 2020-01-21 PROCEDURE — 97161 PT EVAL LOW COMPLEX 20 MIN: CPT

## 2020-01-21 PROCEDURE — 83036 HEMOGLOBIN GLYCOSYLATED A1C: CPT

## 2020-01-21 PROCEDURE — 77030027138 HC INCENT SPIROMETER -A

## 2020-01-21 PROCEDURE — 85610 PROTHROMBIN TIME: CPT

## 2020-01-21 PROCEDURE — 36415 COLL VENOUS BLD VENIPUNCTURE: CPT

## 2020-01-21 PROCEDURE — 87641 MR-STAPH DNA AMP PROBE: CPT

## 2020-01-21 PROCEDURE — 85025 COMPLETE CBC W/AUTO DIFF WBC: CPT

## 2020-01-21 PROCEDURE — 80048 BASIC METABOLIC PNL TOTAL CA: CPT

## 2020-01-21 PROCEDURE — 85730 THROMBOPLASTIN TIME PARTIAL: CPT

## 2020-01-21 RX ORDER — VALSARTAN 320 MG/1
320 TABLET ORAL EVERY EVENING
COMMUNITY

## 2020-01-21 NOTE — PERIOP NOTES
Patient verified name and . Order for consent is found in EHR and matches case posting; patient verified. Type 3 surgery, Joint camp assessment complete. Labs per surgeon: CBC,BMP, PT/PTT, mrsa swab, Hgba1c ; results (within [facility] protocols)  Labs per anesthesia protocol: no additional  EKG:in emr dated 19 and is within [facility] protocols. Most recent card clear note, card office note, stress, echo and cath in emr and within [facility] protocols. MRSA/MSSA swab collected; pharmacy to review and dose antibiotic as appropriate. Hospital approved surgical skin cleanser and instructions to return bottle on DOS given per hospital policy. Patient provided with handouts including Guide to Surgery, Pain Management, Hand Hygiene, Blood Transfusion Education, and Bath Anesthesia Brochure. Patient answered medical/surgical history questions at their best of ability. All prior to admission medications documented in [facility]. Original medication prescription bottle were visualized during patient appointment. Patient instructed to hold all vitamins 3 weeks prior to surgery and NSAIDS 5 days prior to surgery. Patient teach back successful and patient demonstrates knowledge of instruction.

## 2020-01-21 NOTE — PROGRESS NOTES
SW met with pt in Prehab to discuss Right LAUREEN scheduled for 2/11/20. Pt sttes she has Left LAUREEN about 2 years ago. Pt plans to return home with spouse and HHPT. Pt resides in [place] and chose [facility]. [facility] referral completed. Pt states she needs a RW and BSC for home use. Pt reports she doesn't have DME from previous surgery but that it was not paid for by her insurance. Pt should be eligible for new DME. OLIVER will meet with pt postop to verify home DME needs. No additional needs or questions identified at this time. Marilia Duvall    The Plan for Transition of Care is related to the following treatment goals: [facility] [place]    The Patient  was provided with a choice of provider and agrees   with the discharge plan. [x] Yes [] No    Freedom of choice list was provided with basic dialogue that supports the patient's individualized plan of care/goals, treatment preferences and shares the quality data associated with the providers.  [x] Yes [] No

## 2020-01-21 NOTE — PERIOP NOTES
PLEASE CONTINUE TAKING ALL PRESCRIPTION MEDICATIONS UP TO THE DAY OF SURGERY UNLESS OTHERWISE DIRECTED BELOW. DISCONTINUE all vitamins and supplements 3 weeks prior to surgery. DISCONTINUE Non-Steriodal Anti-Inflammatory (NSAIDS) such as Advil and Aleve 5 days prior to surgery. Home Medications to take  the day of surgery      Levothyroxine     Tylenol if needed. Home Medications   to Hold           Comments                Please do not bring home medications with you on the day of surgery unless otherwise directed by your nurse. If you are instructed to bring home medications, please give them to your nurse as they will be administered by the nursing staff. If you have any questions, please call Ruth [phone].

## 2020-01-21 NOTE — PROGRESS NOTES
Emily Music  : [birth year](20 y.o.) [facility] at [facility]  [address], [address]  [phone]       Physical Therapy Prehab Plan of Treatment and Evaluation Summary:2020    ICD-10: Treatment Diagnosis:   · Pain in Right Hip (M25.551)  · Stiffness of Right Hip, Not elsewhere classified (M25.651)  · Difficulty in walking, Not elsewhere classified (R26.2)  Precautions/Allergies:   Adhesive; Adhesive tape; and Sulfa (sulfonamide antibiotics)  MEDICAL/REFERRING DIAGNOSIS:  Unilateral primary osteoarthritis, right hip [M16.11]  REFERRING PHYSICIAN: Carol Melgar MD  DATE OF SURGERY: 2020    Assessment:   Comments:  Patient presents prior to R LAUREEN. She had a L LAUREEN in 2017. Patient will return home at d/c with assist from her spouse. PROBLEM LIST (Impacting functional limitations):  Ms. Venkata Sharma presents with the following right lower extremity(s) problems:  1. Gait  2. Strength  3. Range of Motion  4. Home Exercise Program  5. Pain   INTERVENTIONS PLANNED:  1. Home Exercise Program  2. Educational Discussion      TREATMENT PLAN: Effective Dates: 2020 TO 2020. Frequency/Duration: Patient to continue to perform home exercise program at least twice per day up until her surgery. GOALS: (Goals have been discussed and agreed upon with patient.)  Discharge Goals: Time Frame: 1 Day  1. Patient will demonstrate independence with a home exercise program designed to increase strength, range of motion, balance, coordination, functional technique and pain control to minimize functional deficits and optimize patient for total joint replacement. Rehabilitation Potential For Stated Goals:  [address] [facility] therapy, I certify that the treatment plan above will be carried out by a therapist or under their direction.   Thank you for this referral,  Yanna Ramirez PT               HISTORY:   Present Symptoms:  Pain Intensity 1: 0(8/10 worst)   History of Present Injury/Illness (Reason for Referral):  Medical/Referring Diagnosis: Unilateral primary osteoarthritis, right hip [M16.11]   Past Medical History/Comorbidities:   Ms. Awilda Scott  has a past medical history of Anxiety, Arthritis, Autoimmune disease ([address] CAD (coronary artery disease) (2010), Chest pain, Chronic pain, Depression, Family history of ischemic heart disease, H/O acute pancreatitis (08/2013), H/O gastric bypass (2009), High cholesterol, Hypertension, Hypothyroidism, Lichen sclerosus, Obesity (BMI 30-39.9), Osteopenia, Pancreatitis, Systolic murmur, and Ventral hernia. She also has no past medical history of Arrhythmia, Asthma, Cancer ([address] Chronic kidney disease, Chronic obstructive pulmonary disease ([address] Diabetes ([address] Difficult intubation, GERD (gastroesophageal reflux disease), Heart failure ([address] Liver disease, Malignant hyperthermia due to anesthesia, Nausea & vomiting, Pseudocholinesterase deficiency, PUD (peptic ulcer disease), Seizures ([address] Sleep apnea, Stroke ([address] Thromboembolus ([address] or Unspecified adverse effect of anesthesia. Ms. Awilda Scott  has a past surgical history that includes hx bladder suspension; hx carpal tunnel release (Right); hx lysis of adhesions; hx appendectomy (1954); hx colonoscopy (2017); hx endoscopy (2018); hx heart catheterization (2010); hx hysterectomy (1978); hx oophorectomy (1984); hx lumbar fusion (1996); hx urological; hx cataract removal (Bilateral, 2015); hx lap cholecystectomy (7/2013); hx gastric bypass (2009); hx hernia repair (8/14/14); hx other surgical (2017); hx shoulder arthroscopy (Right, 2011); hx orthopaedic (Right, 1997); hx orthopaedic (Left, 2017); ir biliary drn cath plc perc int/ext si (2/4/2019); ir biliary drn cath exchange perc any to ext si (3/7/2019); ir biliary drn cath exchange perc any to ext si (4/3/2019); and hx heart catheterization (08/2019).   Social History/Living Environment:   Home Environment: Private residence  # Steps to Enter: 9  Hand Rails : Bilateral  One/Two Story Residence: Two story(can stay downstairs if needed)  # of Interior Steps: 10  Interior Rails: Right  Living Alone: No  Support Systems: Spouse/Significant Other/Partner  Patient Expects to be Discharged to[de-identified] Private residence  Current DME Used/Available at Home: Dietra Hives, straight  Tub or Shower Type: Shower  Work/Activity:  Patient is retired. Dominant Side:  RIGHT  Current Medications:  See Pre-assessment nursing note   Number of Personal Factors/Comorbidities that affect the Plan of Care: 0: LOW COMPLEXITY   EXAMINATION:   ADLs (Current Functional Status):   Ambulation:  [x] Independent  [x] Walk Indoors Only  [] Walk Outdoors  [] Use Assistive Device  [] Use Wheelchair Only Dressing:  [x] Independent  Requires Assistance from Someone for:  [] Sock/Shoes  [] Pants  [] Everything   Bathing/Showering:   [x] Independent  [] Requires Assistance from Someone  [] Sponge Bath Only Household Activities:  [] Routine house and yard work  [x] Light Housework Only  [] None   Observation/Orthostatic Postural Assessment:       ROM/Flexibility:   AROM: Within functional limits(L LE)                           Strength:   Strength: Within functional limits(L LE 4+/5)              RLE Strength  R Hip Flexion: 3-  R Hip ABduction: 3-  R Knee Flexion: 4  R Knee Extension: 3+   Functional Mobility:    Coordination: Within functional limits    Stand to Sit: Independent  Sit to Stand: Independent  Distance (ft): 100 Feet (ft)  Ambulation - Level of Assistance: Independent  Speed/Princess: Pace decreased (<100 feet/min)  Step Length: Left shortened;Right shortened  Gait Abnormalities: Trunk sway increased          Balance:    Sitting: Intact  Standing: Intact   Body Structures Involved:  1. Joints  2. Muscles Body Functions Affected:  1. Movement Related Activities and Participation Affected:  1.  Mobility   Number of elements that affect the Plan of Care: 4+: HIGH COMPLEXITY   CLINICAL PRESENTATION:   Presentation: Stable and uncomplicated: LOW COMPLEXITY   CLINICAL DECISION MAKING:   Outcome Measure: Tool Used: Lower Extremity Functional Scale (LEFS)  Score:  Initial: 14/80 Most Recent: X/80 (Date: -- )   Interpretation of Score: 20 questions each scored on a 5 point scale with 0 representing \"extreme difficulty or unable to perform\" and 4 representing \"no difficulty\". The lower the score, the greater the functional disability. 80/80 represents no disability. Minimal detectable change is 9 points. Medical Necessity:   · Ms. Davis Romo is expected to optimize her lower extremity strength and ROM in preparation for joint replacement surgery. Reason for Services/Other Comments:  · Achieve baseline assesment of musculoskeletal system, functional mobility and home environment. , educate in PT HEP in preparation for surgery, educate in hospital plan of care. Use of outcome tool(s) and clinical judgement create a POC that gives a: Clear prediction of patient's progress: LOW COMPLEXITY   TREATMENT:   Treatment/Session Assessment:  Patient was instructed in PT- HEP to increase strength and ROM in LEs. Answered all questions. · Post session pain:  0/10  · Compliance with Program/Exercises: Will assess as treatment progresses.   Total Treatment Duration:  PT Patient Time In/Time Out  Time In: 1145  Time Out: 75 Sha Powell PT

## 2020-01-21 NOTE — PROGRESS NOTES
01/21/20 1200   Oxygen Therapy   O2 Sat (%) 98 %   Pulse via Oximetry 61 beats per minute   O2 Device Room air   Pre-Treatment   Breath Sounds Bilateral Diminished   Pre FEV1 (liters) 1.7 liters   % Predicted 71  (GETTING OVER A COLD)   Incentive Spirometry Treatment   Actual Volume (ml) 2700 ml     Initial respiratory Assessment completed with pt. Pt was interviewed and evaluated in [facility] prior to surgery. Patient ID:  Nena Gibbons  [id number]  75 y.o.  1950  Surgeon: Dr. Obed Guerrero  Date of Surgery: 2/11/2020  Procedure: Total HIP Arthroplasty  Primary Care Physician: Shad Pillai., [phone]  Specialists:                                  Pt instructed in the use of Incentive Spirometry. Pt instructed to bring Incentive Spirometer back on date of surgery & to start using Is upon return to pt room. Pt taught proper cough technique    History of smoking:   DENIES                 Quit date:         Secondhand smoke:DENIES    Past procedures with Oxygen desaturation or delayed awakening:DENIES    Past Medical History:   Diagnosis Date    Anxiety     Arthritis     general-back, neck, hips,knees/ managed with medication     Autoimmune disease ([address]     lichens sclerosis; pt states is dormant now    CAD (coronary artery disease) 2010    stent x 1, no MI    Chest pain     right    Chronic pain     back and neck    Depression     managed with medication     Family history of ischemic heart disease     H/O acute pancreatitis 08/2013    states gallbladder stone lodged creating a blockage.  H/O gastric bypass 2009    High cholesterol     managed w/ meds    Hx of echocardiogram 01/18/2019    Normal LV systolic function. Estimated EF 70-75%.  Hypertension     managed with medication     Hypothyroidism     managed with medication     Lichen sclerosus     Obesity (BMI 30-39. 9)     BMI 32.4 3/16    Osteopenia     Pancreatitis     Systolic murmur     ECHO: +/-PI, TR ; Per cardiologist note 1-5-17 no murmur ; pt states told by cardiologist that he was removeing murmur from her record    Ventral hernia           Respiratory history:DENIES SOB                                    Respiratory meds:  DENIES    FAMILY PRESENT:            SPOUSE,                                                     PAST SLEEP STUDY:                      DENIES  HX OF SYLVIA:                                           DENIES  SYLVIA assessment:                                               SLEEPS ON SIDE                                                    PHYSICAL EXAM   Body mass index is 34.53 kg/m².    Visit Vitals  /70 (BP 1 Location: Left arm, BP Patient Position: At rest;Sitting)   Pulse 68   Temp 98.6 °F (37 °C)   Resp 16   Ht 5' 5\" (1.651 m)   Wt 94.1 kg (207 lb 8 oz)   SpO2 96%   BMI 34.53 kg/m²     Neck circumference:  36.5    cm    Loud snoring:        YES                               Witnessed apnea or wakening gasping or choking:,             DENIES,                                                                                               Awakens with headaches:                                                  DENIES    Morning or daytime tiredness/ sleepiness:                                                                                                          TIRED   Dry mouth or sore throat in morning:                YES                                                                         Emanuel stage: 4     SACS score:6  Stop Bang   STOP-BANG  Does the patient snore loudly (louder than talking or loud enough to be heard through closed doors)?: Yes  Does the patient often feel tired, fatigued, or sleepy during the daytime, even after a \"good\" night's sleep?: Yes  Has anyone ever observed the patient stop breathing during their sleep? : No  Does the patient have or are they being treated for high blood pressure?: No  Is the patient's BMI greater than 35?: No  Is your neck circumference greater than 17 inches (Male) or 16 inches (Female)?: No  Is the patient older than 48?: Yes  Is the patient male?: No  SYLVIA Score: 3  Has the patient been referred to Sleep Medicine?: No  Has the patient previously been diagnosed with Obstructive Sleep Apnea?: No                            CPAP:                       NONE                                        CONT SAT HS          Referrals:    Pt. Phone Number:

## 2020-01-21 NOTE — PERIOP NOTES
Your patient recently had labs drawn during a hospital appointment due to an upcoming surgery. The results are attached. If you have any questions or concerns please reach out to your patient for a follow-up in your office. Please do not respond to this message as my mailbox is not monitored. You may call [phone] with questions or concerns. Recent Results (from the past 12 hour(s))   CBC WITH AUTOMATED DIFF    Collection Time: 01/21/20 11:35 AM   Result Value Ref Range    WBC 7.5 4.3 - 11.1 K/uL    RBC 4.74 4.05 - 5.2 M/uL    HGB 13.4 11.7 - 15.4 g/dL    HCT 41.7 35.8 - 46.3 %    MCV 88.0 79.6 - 97.8 FL    MCH 28.3 26.1 - 32.9 PG    MCHC 32.1 31.4 - 35.0 g/dL    RDW 16.0 (H) 11.9 - 14.6 %    PLATELET 667 849 - 336 K/uL    MPV 10.9 9.4 - 12.3 FL    ABSOLUTE NRBC 0.00 0.0 - 0.2 K/uL    DF AUTOMATED      NEUTROPHILS 66 43 - 78 %    LYMPHOCYTES 23 13 - 44 %    MONOCYTES 6 4.0 - 12.0 %    EOSINOPHILS 4 0.5 - 7.8 %    BASOPHILS 1 0.0 - 2.0 %    IMMATURE GRANULOCYTES 0 0.0 - 5.0 %    ABS. NEUTROPHILS 4.9 1.7 - 8.2 K/UL    ABS. LYMPHOCYTES 1.8 0.5 - 4.6 K/UL    ABS. MONOCYTES 0.5 0.1 - 1.3 K/UL    ABS. EOSINOPHILS 0.3 0.0 - 0.8 K/UL    ABS. BASOPHILS 0.0 0.0 - 0.2 K/UL    ABS. IMM.  GRANS. 0.0 0.0 - 0.5 K/UL   HEMOGLOBIN A1C WITH EAG    Collection Time: 01/21/20 11:35 AM   Result Value Ref Range    Hemoglobin A1c 5.3 %    Est. average glucose 667 mg/dL   METABOLIC PANEL, BASIC    Collection Time: 01/21/20 11:35 AM   Result Value Ref Range    Sodium 139 136 - 145 mmol/L    Potassium 4.1 3.5 - 5.1 mmol/L    Chloride 104 98 - 107 mmol/L    CO2 30 21 - 32 mmol/L    Anion gap 5 (L) 7 - 16 mmol/L    Glucose 96 65 - 100 mg/dL    BUN 15 8 - 23 MG/DL    Creatinine 0.92 0.6 - 1.0 MG/DL    GFR est AA >60 >60 ml/min/1.73m2    GFR est non-AA >60 >60 ml/min/1.73m2    Calcium 10.0 8.3 - 10.4 MG/DL   PROTHROMBIN TIME + INR    Collection Time: 01/21/20 11:35 AM   Result Value Ref Range    Prothrombin time 12.7 11.7 - 14.5 sec INR 0.9     PTT    Collection Time: 01/21/20 11:35 AM   Result Value Ref Range    aPTT 29.6 24.7 - 39.8 SEC

## 2020-01-22 NOTE — ADVANCED PRACTICE NURSE
Total Joint Surgery Preoperative Chart Review      Patient ID:  Chuck Beck  [id number]  46 y.o.  1950  Surgeon: Dr. Kelsea Devries  Date of Surgery: 2/11/2020  Procedure: Total Right Hip Arthroplasty  Primary Care Physician: Reuben Farfan [phone]  Specialty Physician(s):      Subjective: Chuck Beck is a 68 y.o. WHITE OR  female who presents for preoperative evaluation for Total Right Hip arthroplasty. This is a preoperative chart review note based on data collected by the nurse at the surgical Pre-Assessment visit. Past Medical History:   Diagnosis Date    Anxiety     Arthritis     general-back, neck, hips,knees/ managed with medication     Autoimmune disease ([address]     lichens sclerosis; pt states is dormant now    CAD (coronary artery disease) 2010    stent x 1, no MI    Chest pain     right    Chronic pain     back and neck    Depression     managed with medication     Family history of ischemic heart disease     H/O acute pancreatitis 08/2013    states gallbladder stone lodged creating a blockage.  H/O gastric bypass 2009    High cholesterol     managed w/ meds    Hx of echocardiogram 01/18/2019    Normal LV systolic function. Estimated EF 70-75%.  Hypertension     managed with medication     Hypothyroidism     managed with medication     Lichen sclerosus     Obesity (BMI 30-39. 9)     BMI 32.4 3/16    Osteopenia     Pancreatitis     Systolic murmur     ECHO: +/-PI, TR ; Per cardiologist note 1-5-17 no murmur ; pt states told by cardiologist that he was removeing murmur from her record    Ventral hernia       Past Surgical History:   Procedure Laterality Date    HX APPENDECTOMY  1954    HX BLADDER SUSPENSION      HX CARPAL TUNNEL RELEASE Right     HX CATARACT REMOVAL Bilateral 2015    HX COLONOSCOPY  2017    normal, repeat after 2027    HX ENDOSCOPY  2018    s/p bypass changes    HX GASTRIC BYPASS  2009    HX HEART CATHETERIZATION  2010    stenting of LAD    HX HEART CATHETERIZATION  08/2019    No stents placed this time    HX HERNIA REPAIR  8/14/14    ventral    HX HYSTERECTOMY  1978    cervix removed-Rt ovary removed. Dr. Lela Fernández  7/2013    [facility]    with 1 steel adeline with screws~lower lumbar    HX LYSIS OF ADHESIONS      abdominal    HX OOPHORECTOMY  1984    right    HX ORTHOPAEDIC Right 1997    carpal tunnel release    HX ORTHOPAEDIC Left 2017    hip replacement    HX OTHER SURGICAL  2017    abdomen surgery lysis of adhesions    HX SHOULDER ARTHROSCOPY Right 2011    HX UROLOGICAL      bladder suspension    IR BILIARY DRN CATH EXCHANGE PERC ANY TO EXT SI  3/7/2019    IR BILIARY DRN CATH EXCHANGE PERC ANY TO EXT SI  4/3/2019    IR BILIARY DRN CATH PLC PERC INT/EXT SI  2/4/2019     Family History   Problem Relation Age of Onset    Heart Attack Mother     Heart Disease Mother     Hypertension Mother     Cancer Mother         melanoma    Cancer Father         leukemia    Coronary Artery Disease Father     Cancer Sister         breast    Breast Cancer Sister     Heart Attack Brother     Heart Disease Brother     Cancer Brother         lung    Lung Disease Brother       Social History     Tobacco Use    Smoking status: Never Smoker    Smokeless tobacco: Never Used   Substance Use Topics    Alcohol use: Yes     Alcohol/week: 1.0 standard drinks     Types: 1 Glasses of wine per week     Comment: Occasional       Prior to Admission medications    Medication Sig Start Date End Date Taking? Authorizing Provider   valsartan (DIOVAN) 320 mg tablet Take 320 mg by mouth every evening. Yes Provider, Historical   hydroCHLOROthiazide (HYDRODIURIL) 25 mg tablet Take 25 mg by mouth nightly. 8/11/19  Yes Provider, Historical   atorvastatin (LIPITOR) 40 mg tablet Take 40 mg by mouth nightly.  4/9/19  Yes Provider, Historical   losartan (COZAAR) 100 mg tablet Take 100 mg by mouth nightly. Yes Provider, Historical   clobetasol (TEMOVATE) 0.05 % topical cream Apply  to affected area two (2) times a day. 7/16/19  Yes Delbert Field MD   potassium chloride (KLOR-CON) 10 mEq tablet 1 every day for potassium  Patient taking differently: Take 10 mEq by mouth every evening. 1 every day for potassium 2/18/19  Yes Shad Daly MD   temazepam (RESTORIL) 30 mg capsule Take 1 Cap by mouth nightly as needed. Max Daily Amount: 30 mg. Patient taking differently: Take 30 mg by mouth nightly. 12/13/18  Yes Shad Daly MD   LORazepam (ATIVAN) 0.5 mg tablet Take 1 Tab by mouth every eight (8) hours as needed for Anxiety. Max Daily Amount: 1.5 mg. Patient taking differently: Take 0.5 mg by mouth every eight (8) hours as needed for Anxiety. Take / use AM day of surgery  per anesthesia protocols prn 12/13/18  Yes Shad Daly MD   meloxicam FLOWER OLIVA [facility]) 15 mg tablet 1 every day for pain  Patient taking differently: Take 15 mg by mouth daily. 1 every day for pain 9/13/18  Yes Shad Daly MD   alendronate (FOSAMAX) 70 mg tablet 1 q week for osteoporosis  Patient taking differently: Take 70 mg by mouth every seven (7) days. 1 q week for osteoporosis    Every wed. 9/13/18  Yes Shad Daly MD   levothyroxine (SYNTHROID) 75 mcg tablet 1 every day for thyroid  Patient taking differently: Take 75 mcg by mouth Daily (before breakfast). Take / use AM day of surgery  per anesthesia protocols. 9/13/18  Yes Shad Daly MD   amLODIPine (NORVASC) 5 mg tablet 1 every day for BP  Indications: hypertension  Patient taking differently: Take 5 mg by mouth nightly. 1 every day for BP  Indications: high blood pressure 9/13/18  Yes Shad Daly MD   traZODone (DESYREL) 150 mg tablet 1 to 2 qhs for sleep  Patient taking differently: Take 150-300 mg by mouth nightly as needed.  1 to 2 qhs for sleep 9/13/18  Yes Shad Daly MD   sertraline (ZOLOFT) 100 mg tablet Take 1 Tab by mouth nightly. Indications: major depressive disorder 9/13/18  Yes Michelle Thapa MD   aspirin delayed-release 81 mg tablet Take 81 mg by mouth nightly. Yes Provider, Historical   MULTIVITAMIN PO Take  by mouth nightly. Takes 1-2 melt-aways daily    Yes Provider, Historical     Allergies   Allergen Reactions    Adhesive Rash     Pulls skin off,can use paper tape    Adhesive Tape Rash    Sulfa (Sulfonamide Antibiotics) Nausea and Vomiting          Objective:     Physical Exam:   Patient Vitals for the past 24 hrs:   Temp Pulse Resp BP SpO2   01/21/20 1323 98.6 °F (37 °C) 68 16 128/70 96 %   01/21/20 1200     98 %       ECG:    EKG Results     None          Data Review:   Labs:   Recent Results (from the past 24 hour(s))   MSSA/MRSA SC BY PCR, NASAL SWAB    Collection Time: 01/21/20  1:36 PM   Result Value Ref Range    Special Requests: NO SPECIAL REQUESTS      Culture result: (A)       MRSA target DNA detected, SA target DNA detected. A positive test result does not necessarily indicate the presence of viable organisms. It is however, presumptive for the presence of MRSA or SA. Results for Errol Ambrosio (MRN [id number]) as of 1/22/2020 11:36   Ref.  Range 1/21/2020 11:35   Sodium Latest Ref Range: 136 - 145 mmol/L 139   Potassium Latest Ref Range: 3.5 - 5.1 mmol/L 4.1   Chloride Latest Ref Range: 98 - 107 mmol/L 104   CO2 Latest Ref Range: 21 - 32 mmol/L 30   Anion gap Latest Ref Range: 7 - 16 mmol/L 5 (L)   Glucose Latest Ref Range: 65 - 100 mg/dL 96   BUN Latest Ref Range: 8 - 23 MG/DL 15   Creatinine Latest Ref Range: 0.6 - 1.0 MG/DL 0.92   Calcium Latest Ref Range: 8.3 - 10.4 MG/DL 10.0   GFR est non-AA Latest Ref Range: >60 ml/min/1.73m2 >60   GFR est AA Latest Ref Range: >60 ml/min/1.73m2 >60   Hemoglobin A1c, (calculated) Latest Units: % 5.3   Est. average glucose Latest Units: mg/dL 105       Problem List:  )  Patient Active Problem List   Diagnosis Code    Essential hypertension, benign I10    Dyslipidemia E78.5    S/P coronary artery stent placement (2.75 x 18 Cypher to mLAD on 9-) Z95.5    S/P laparoscopic cholecystectomy Z90.49    Depression F32.9    Primary hypothyroidism E03.9    Chronic back pain M54.9, G89.29    Obesity (BMI 30.0-34. 9) E66.9    Osteoarthritis M19.90    S/P total hip arthroplasty Z96.649    Irritable bowel syndrome with both constipation and diarrhea K58.2    Age-related osteoporosis without current pathological fracture M81.0    Primary insomnia F51.01    Coronary artery disease involving native coronary artery of native heart without angina pectoris I25.10    Choledocholithiasis K80.50    Gallstone pancreatitis K85.10    Hypokalemia E87.6    Hypomagnesemia E83.42       Total Joint Surgery Pre-Assessment Recommendations:  Recommend continuous saturation monitoring hours of sleep, during hospitalization.                  Signed By: SARAH Beck    January 22, 2020

## 2020-02-07 NOTE — H&P
H&P    Patient ID:  Silvia Armas  [id number]  37 y.o.  1950  Surgeon:  Gonzales Brooks MD  Date of Surgery: * No surgery date entered *  Procedure: Right Hip Total Arthroplasty  Primary Care Physician: Quinn Pemberton MD        Subjective: Silvia Armas is a 68 y.o. WHITE [place]  female who presents with Right Hip pain. She has history of Right Hip pain for several months. Symptoms worse with walking long distances and relieved with rest. Conservative treatment consisting of  activity modification has not helped. The patient lives with their family. The patients goal after surgery is improved pain and function. Past Medical History:   Diagnosis Date    Anxiety     Arthritis     general-back, neck, hips,knees/ managed with medication     Autoimmune disease ([address]     lichens sclerosis; pt states is dormant now    CAD (coronary artery disease) 2010    stent x 1, no MI    Chest pain     right    Chronic pain     back and neck    Depression     managed with medication     Family history of ischemic heart disease     H/O acute pancreatitis 08/2013    states gallbladder stone lodged creating a blockage.  H/O gastric bypass 2009    High cholesterol     managed w/ meds    Hx of echocardiogram 01/18/2019    Normal LV systolic function. Estimated EF 70-75%.  Hypertension     managed with medication     Hypothyroidism     managed with medication     Lichen sclerosus     Obesity (BMI 30-39. 9)     BMI 32.4 3/16    Osteopenia     Pancreatitis     Systolic murmur     ECHO: +/-PI, TR ; Per cardiologist note 1-5-17 no murmur ; pt states told by cardiologist that he was removeing murmur from her record    Ventral hernia       Past Surgical History:   Procedure Laterality Date    HX APPENDECTOMY  1954    HX BLADDER SUSPENSION      HX CARPAL TUNNEL RELEASE Right     HX CATARACT REMOVAL Bilateral 2015    HX COLONOSCOPY  2017    normal, repeat after 2027    HX ENDOSCOPY  2018    s/p bypass changes    HX GASTRIC BYPASS  2009    HX HEART CATHETERIZATION  2010    stenting of LAD    HX HEART CATHETERIZATION  08/2019    No stents placed this time    HX HERNIA REPAIR  8/14/14    ventral    HX HYSTERECTOMY  1978    cervix removed-Rt ovary removed. Dr. Arnold Ha  7/2013    [facility]    with 1 steel adeline with screws~lower lumbar    HX LYSIS OF ADHESIONS      abdominal    HX OOPHORECTOMY  1984    right    HX ORTHOPAEDIC Right 1997    carpal tunnel release    HX ORTHOPAEDIC Left 2017    hip replacement    HX OTHER SURGICAL  2017    abdomen surgery lysis of adhesions    HX SHOULDER ARTHROSCOPY Right 2011    HX UROLOGICAL      bladder suspension    IR BILIARY DRN CATH EXCHANGE PERC ANY TO EXT SI  3/7/2019    IR BILIARY DRN CATH EXCHANGE PERC ANY TO EXT SI  4/3/2019    IR BILIARY DRN CATH PLC PERC INT/EXT SI  2/4/2019     Family History   Problem Relation Age of Onset    Heart Attack Mother     Heart Disease Mother     Hypertension Mother     Cancer Mother         melanoma    Cancer Father         leukemia    Coronary Artery Disease Father     Cancer Sister         breast    Breast Cancer Sister     Heart Attack Brother     Heart Disease Brother     Cancer Brother         lung    Lung Disease Brother       Social History     Tobacco Use    Smoking status: Never Smoker    Smokeless tobacco: Never Used   Substance Use Topics    Alcohol use: Yes     Alcohol/week: 1.0 standard drinks     Types: 1 Glasses of wine per week     Comment: Occasional       Prior to Admission medications    Medication Sig Start Date End Date Taking? Authorizing Provider   valsartan (DIOVAN) 320 mg tablet Take 320 mg by mouth every evening. Provider, Historical   hydroCHLOROthiazide (HYDRODIURIL) 25 mg tablet Take 25 mg by mouth nightly.  8/11/19   Provider, Historical   atorvastatin (LIPITOR) 40 mg tablet Take 40 mg by mouth nightly. 4/9/19   Provider, Historical   losartan (COZAAR) 100 mg tablet Take 100 mg by mouth nightly. Provider, Historical   clobetasol (TEMOVATE) 0.05 % topical cream Apply  to affected area two (2) times a day. 7/16/19   Larry Ho MD   potassium chloride (KLOR-CON) 10 mEq tablet 1 every day for potassium  Patient taking differently: Take 10 mEq by mouth every evening. 1 every day for potassium 2/18/19   Janet Alfredo MD   temazepam (RESTORIL) 30 mg capsule Take 1 Cap by mouth nightly as needed. Max Daily Amount: 30 mg. Patient taking differently: Take 30 mg by mouth nightly. 12/13/18   Janet Alfredo MD   LORazepam (ATIVAN) 0.5 mg tablet Take 1 Tab by mouth every eight (8) hours as needed for Anxiety. Max Daily Amount: 1.5 mg. Patient taking differently: Take 0.5 mg by mouth every eight (8) hours as needed for Anxiety. Take / use AM day of surgery  per anesthesia protocols prn 12/13/18   Janet Alfredo MD   meloxicam [facility]) 15 mg tablet 1 every day for pain  Patient taking differently: Take 15 mg by mouth daily. 1 every day for pain 9/13/18   Janet Alfredo MD   alendronate (FOSAMAX) 70 mg tablet 1 q week for osteoporosis  Patient taking differently: Take 70 mg by mouth every seven (7) days. 1 q week for osteoporosis    Every wed. 9/13/18   Janet Alfredo MD   levothyroxine (SYNTHROID) 75 mcg tablet 1 every day for thyroid  Patient taking differently: Take 75 mcg by mouth Daily (before breakfast). Take / use AM day of surgery  per anesthesia protocols. 9/13/18   Janet Alfredo MD   amLODIPine (NORVASC) 5 mg tablet 1 every day for BP  Indications: hypertension  Patient taking differently: Take 5 mg by mouth nightly. 1 every day for BP  Indications: high blood pressure 9/13/18   Janet Alfredo MD   traZODone (DESYREL) 150 mg tablet 1 to 2 qhs for sleep  Patient taking differently: Take 150-300 mg by mouth nightly as needed.  1 to 2 qhs for sleep 9/13/18   Rosalba Madrigal MD   sertraline (ZOLOFT) 100 mg tablet Take 1 Tab by mouth nightly. Indications: major depressive disorder 9/13/18   Rosalba Madrigal MD   aspirin delayed-release 81 mg tablet Take 81 mg by mouth nightly. Provider, Historical   MULTIVITAMIN PO Take  by mouth nightly. Takes 1-2 melt-aways daily     Provider, Historical     Allergies   Allergen Reactions    Adhesive Rash     Pulls skin off,can use paper tape    Adhesive Tape Rash    Sulfa (Sulfonamide Antibiotics) Nausea and Vomiting        REVIEW OF SYSTEMS:  CONSTITUTIONAL: Denies fever, decreased appetite, weight loss/gain, night sweats or fatigue. HEENT: Denies vision or hearing changes. denies glasses. denies hearing aids. CARDIAC: Denies CP, palpitations, rheumatic fever, murmur, peripheral edema, carotid artery disease or syncopal episodes. RESPIRATORY: Denies dyspnea on exertion, asthma, COPD or orthopnea. GI: Denies GERD, history of GI bleed or melena, PUD, hepatitis or cirrhosis. : Denies dysuria, hematuria. denies BPH symptoms. HEMATOLOGIC: Denies anemia or blood disorders. ENDOCRINE: Denies thyroid disease. MUSCULOSKELETAL: See HPI. NEUROLOGIC: Denies seizure, peripheral neuropathy or memory loss. PSYCH: Denies depression, anxiety or insomnia. SKIN: Denies rash or open sores. Objective:    PHYSICAL EXAM  GENERAL: No data found. EYES: PERRL. EOM intact. MOUTH:Teeth and Gums normal. NECK: Full ROM. Trachea midline. No thyromegaly or JVD. CARDIOVASCULAR: Regular rate and rhythm. No murmur or gallops. No carotid bruits. Peripheral pulses: radial 2 +, PT 2+, DP 2+ bilaterally. LUNGS: CTA bilaterally. No wheezes, rhonchi or rales. GI: positive BS. Abdomen nontender. NEUROLOGIC: Alert and oriented x 3. Bilateral equal strong had grasp and bilateral equal strong plantar flexion and dorsiflexion. GAIT: abnormal MUSCULOSKELETAL: ROM: full without pain. Tenderness: . Crepitus: not present.  SKIN: No rash, bruising, swelling, redness or warmth. Labs:  No results found for this or any previous visit (from the past 24 hour(s)). Xray Right Hip: joint space narrowing    Assessment:  Advanced Right Hip Osteoarthritis. Total Right Hip Arthroplasty Indicated. Patient Active Problem List   Diagnosis Code    Essential hypertension, benign I10    Dyslipidemia E78.5    S/P coronary artery stent placement (2.75 x 18 Cypher to mLAD on 9-) Z95.5    S/P laparoscopic cholecystectomy Z90.49    Depression F32.9    Primary hypothyroidism E03.9    Chronic back pain M54.9, G89.29    Obesity (BMI 30.0-34. 9) E66.9    Osteoarthritis M19.90    S/P total hip arthroplasty Z96.649    Irritable bowel syndrome with both constipation and diarrhea K58.2    Age-related osteoporosis without current pathological fracture M81.0    Primary insomnia F51.01    Coronary artery disease involving native coronary artery of native heart without angina pectoris I25.10    Choledocholithiasis K80.50    Gallstone pancreatitis K85.10    Hypokalemia E87.6    Hypomagnesemia E83.42       Plan:  I have advised the patient of the risks and consequences, including possible complications of performing total joint replacement, as well as not doing this operation. The patient had the opportunity to ask questions and have them answered to their satisfaction.      Signed:  PANCHITO Gayle 2/7/2020

## 2020-02-10 ENCOUNTER — ANESTHESIA EVENT (OUTPATIENT)
Dept: SURGERY | Age: 70
DRG: 470 | End: 2020-02-10
Payer: MEDICARE

## 2020-02-11 ENCOUNTER — HOSPITAL ENCOUNTER (INPATIENT)
Age: 70
LOS: 2 days | Discharge: HOME HEALTH | DRG: 470 | End: 2020-02-13
Attending: ORTHOPAEDIC SURGERY | Admitting: ORTHOPAEDIC SURGERY
Payer: MEDICARE

## 2020-02-11 ENCOUNTER — ANESTHESIA (OUTPATIENT)
Dept: SURGERY | Age: 70
DRG: 470 | End: 2020-02-11
Payer: MEDICARE

## 2020-02-11 ENCOUNTER — APPOINTMENT (OUTPATIENT)
Dept: GENERAL RADIOLOGY | Age: 70
DRG: 470 | End: 2020-02-11
Attending: ORTHOPAEDIC SURGERY
Payer: MEDICARE

## 2020-02-11 DIAGNOSIS — Z96.641 H/O TOTAL HIP ARTHROPLASTY, RIGHT: Primary | ICD-10-CM

## 2020-02-11 PROBLEM — M16.11 ARTHRITIS OF RIGHT HIP: Status: ACTIVE | Noted: 2020-02-11

## 2020-02-11 LAB
GLUCOSE BLD STRIP.AUTO-MCNC: 90 MG/DL (ref 65–100)
HGB BLD-MCNC: 11.1 G/DL (ref 11.7–15.4)

## 2020-02-11 PROCEDURE — 77030013708 HC HNDPC SUC IRR PULS STRY –B: Performed by: ORTHOPAEDIC SURGERY

## 2020-02-11 PROCEDURE — 36415 COLL VENOUS BLD VENIPUNCTURE: CPT

## 2020-02-11 PROCEDURE — 77030018673: Performed by: ORTHOPAEDIC SURGERY

## 2020-02-11 PROCEDURE — 74011250636 HC RX REV CODE- 250/636

## 2020-02-11 PROCEDURE — 0SR904A REPLACEMENT OF RIGHT HIP JOINT WITH CERAMIC ON POLYETHYLENE SYNTHETIC SUBSTITUTE, UNCEMENTED, OPEN APPROACH: ICD-10-PCS | Performed by: ORTHOPAEDIC SURGERY

## 2020-02-11 PROCEDURE — 77030037713 HC CLOSR DEV INCIS ZIP STRY -B: Performed by: ORTHOPAEDIC SURGERY

## 2020-02-11 PROCEDURE — 97535 SELF CARE MNGMENT TRAINING: CPT

## 2020-02-11 PROCEDURE — 74011000250 HC RX REV CODE- 250: Performed by: ORTHOPAEDIC SURGERY

## 2020-02-11 PROCEDURE — 74011250636 HC RX REV CODE- 250/636: Performed by: ANESTHESIOLOGY

## 2020-02-11 PROCEDURE — 77030033067 HC SUT PDO STRATFX SPIR J&J -B: Performed by: ORTHOPAEDIC SURGERY

## 2020-02-11 PROCEDURE — 77030040922 HC BLNKT HYPOTHRM STRY -A: Performed by: ANESTHESIOLOGY

## 2020-02-11 PROCEDURE — 74011250636 HC RX REV CODE- 250/636: Performed by: ORTHOPAEDIC SURGERY

## 2020-02-11 PROCEDURE — 77030018547 HC SUT ETHBND1 J&J -B: Performed by: ORTHOPAEDIC SURGERY

## 2020-02-11 PROCEDURE — 76060000034 HC ANESTHESIA 1.5 TO 2 HR: Performed by: ORTHOPAEDIC SURGERY

## 2020-02-11 PROCEDURE — 74011000250 HC RX REV CODE- 250: Performed by: NURSE ANESTHETIST, CERTIFIED REGISTERED

## 2020-02-11 PROCEDURE — 74011000250 HC RX REV CODE- 250

## 2020-02-11 PROCEDURE — 72170 X-RAY EXAM OF PELVIS: CPT

## 2020-02-11 PROCEDURE — 77030035236 HC SUT PDS STRATFX BARB J&J -B: Performed by: ORTHOPAEDIC SURGERY

## 2020-02-11 PROCEDURE — 97110 THERAPEUTIC EXERCISES: CPT

## 2020-02-11 PROCEDURE — 77030006835 HC BLD SAW SAG STRY -B: Performed by: ORTHOPAEDIC SURGERY

## 2020-02-11 PROCEDURE — 77030003665 HC NDL SPN BBMI -A: Performed by: ANESTHESIOLOGY

## 2020-02-11 PROCEDURE — 94762 N-INVAS EAR/PLS OXIMTRY CONT: CPT

## 2020-02-11 PROCEDURE — 77030007880 HC KT SPN EPDRL BBMI -B: Performed by: ANESTHESIOLOGY

## 2020-02-11 PROCEDURE — 97165 OT EVAL LOW COMPLEX 30 MIN: CPT

## 2020-02-11 PROCEDURE — 65270000029 HC RM PRIVATE

## 2020-02-11 PROCEDURE — 77030018836 HC SOL IRR NACL ICUM -A: Performed by: ORTHOPAEDIC SURGERY

## 2020-02-11 PROCEDURE — 74011250637 HC RX REV CODE- 250/637: Performed by: ORTHOPAEDIC SURGERY

## 2020-02-11 PROCEDURE — 77030002966 HC SUT PDS J&J -A: Performed by: ORTHOPAEDIC SURGERY

## 2020-02-11 PROCEDURE — 94760 N-INVAS EAR/PLS OXIMETRY 1: CPT

## 2020-02-11 PROCEDURE — C1776 JOINT DEVICE (IMPLANTABLE): HCPCS | Performed by: ORTHOPAEDIC SURGERY

## 2020-02-11 PROCEDURE — 76210000006 HC OR PH I REC 0.5 TO 1 HR: Performed by: ORTHOPAEDIC SURGERY

## 2020-02-11 PROCEDURE — 74011000258 HC RX REV CODE- 258: Performed by: ORTHOPAEDIC SURGERY

## 2020-02-11 PROCEDURE — 77030003666 HC NDL SPINAL BD -A: Performed by: ORTHOPAEDIC SURGERY

## 2020-02-11 PROCEDURE — 76010000161 HC OR TIME 1 TO 1.5 HR INTENSV-TIER 1: Performed by: ORTHOPAEDIC SURGERY

## 2020-02-11 PROCEDURE — 74011000250 HC RX REV CODE- 250: Performed by: ANESTHESIOLOGY

## 2020-02-11 PROCEDURE — 77030012935 HC DRSG AQUACEL BMS -B: Performed by: ORTHOPAEDIC SURGERY

## 2020-02-11 PROCEDURE — 97161 PT EVAL LOW COMPLEX 20 MIN: CPT

## 2020-02-11 PROCEDURE — 85018 HEMOGLOBIN: CPT

## 2020-02-11 PROCEDURE — 77010033678 HC OXYGEN DAILY

## 2020-02-11 PROCEDURE — 82962 GLUCOSE BLOOD TEST: CPT

## 2020-02-11 PROCEDURE — 74011250636 HC RX REV CODE- 250/636: Performed by: NURSE ANESTHETIST, CERTIFIED REGISTERED

## 2020-02-11 PROCEDURE — 77030031139 HC SUT VCRL2 J&J -A: Performed by: ORTHOPAEDIC SURGERY

## 2020-02-11 DEVICE — PINNACLE HIP SOLUTIONS ALTRX POLYETHYLENE ACETABULAR LINER NEUTRAL 36MM ID 52MM OD
Type: IMPLANTABLE DEVICE | Site: HIP | Status: FUNCTIONAL
Brand: PINNACLE ALTRX

## 2020-02-11 DEVICE — BIOLOX DELTA CERAMIC FEMORAL HEAD +1.5 36MM DIA 12/14 TAPER
Type: IMPLANTABLE DEVICE | Site: HIP | Status: FUNCTIONAL
Brand: BIOLOX DELTA

## 2020-02-11 DEVICE — CORAIL HIP SYSTEM CEMENTLESS FEMORAL STEM 12/14 AMT 135 DEGREES KA SIZE 12 HA COATED STANDARD COLLAR
Type: IMPLANTABLE DEVICE | Site: HIP | Status: FUNCTIONAL
Brand: CORAIL

## 2020-02-11 DEVICE — APEX HOLE ELIMINATOR - PS
Type: IMPLANTABLE DEVICE | Site: HIP | Status: FUNCTIONAL
Brand: APEX

## 2020-02-11 RX ORDER — ATORVASTATIN CALCIUM 40 MG/1
40 TABLET, FILM COATED ORAL
Status: DISCONTINUED | OUTPATIENT
Start: 2020-02-11 | End: 2020-02-13 | Stop reason: HOSPADM

## 2020-02-11 RX ORDER — ROPIVACAINE HYDROCHLORIDE 2 MG/ML
INJECTION, SOLUTION EPIDURAL; INFILTRATION; PERINEURAL AS NEEDED
Status: DISCONTINUED | OUTPATIENT
Start: 2020-02-11 | End: 2020-02-11 | Stop reason: HOSPADM

## 2020-02-11 RX ORDER — LORAZEPAM 0.5 MG/1
0.5 TABLET ORAL
Status: DISCONTINUED | OUTPATIENT
Start: 2020-02-11 | End: 2020-02-13 | Stop reason: HOSPADM

## 2020-02-11 RX ORDER — SODIUM CHLORIDE, SODIUM LACTATE, POTASSIUM CHLORIDE, CALCIUM CHLORIDE 600; 310; 30; 20 MG/100ML; MG/100ML; MG/100ML; MG/100ML
75 INJECTION, SOLUTION INTRAVENOUS CONTINUOUS
Status: DISCONTINUED | OUTPATIENT
Start: 2020-02-11 | End: 2020-02-11 | Stop reason: HOSPADM

## 2020-02-11 RX ORDER — PROPOFOL 10 MG/ML
INJECTION, EMULSION INTRAVENOUS
Status: DISCONTINUED | OUTPATIENT
Start: 2020-02-11 | End: 2020-02-11 | Stop reason: HOSPADM

## 2020-02-11 RX ORDER — LIDOCAINE HYDROCHLORIDE 20 MG/ML
INJECTION, SOLUTION EPIDURAL; INFILTRATION; INTRACAUDAL; PERINEURAL AS NEEDED
Status: DISCONTINUED | OUTPATIENT
Start: 2020-02-11 | End: 2020-02-11 | Stop reason: HOSPADM

## 2020-02-11 RX ORDER — LOSARTAN POTASSIUM 50 MG/1
100 TABLET ORAL
Status: DISCONTINUED | OUTPATIENT
Start: 2020-02-11 | End: 2020-02-11

## 2020-02-11 RX ORDER — NALOXONE HYDROCHLORIDE 0.4 MG/ML
.2-.4 INJECTION, SOLUTION INTRAMUSCULAR; INTRAVENOUS; SUBCUTANEOUS
Status: DISCONTINUED | OUTPATIENT
Start: 2020-02-11 | End: 2020-02-13 | Stop reason: HOSPADM

## 2020-02-11 RX ORDER — OXYCODONE HYDROCHLORIDE 5 MG/1
5-10 TABLET ORAL
Status: DISCONTINUED | OUTPATIENT
Start: 2020-02-11 | End: 2020-02-13 | Stop reason: HOSPADM

## 2020-02-11 RX ORDER — MIDAZOLAM HYDROCHLORIDE 1 MG/ML
2 INJECTION, SOLUTION INTRAMUSCULAR; INTRAVENOUS
Status: DISCONTINUED | OUTPATIENT
Start: 2020-02-11 | End: 2020-02-11 | Stop reason: HOSPADM

## 2020-02-11 RX ORDER — HYDROMORPHONE HYDROCHLORIDE 1 MG/ML
1 INJECTION, SOLUTION INTRAMUSCULAR; INTRAVENOUS; SUBCUTANEOUS
Status: DISCONTINUED | OUTPATIENT
Start: 2020-02-11 | End: 2020-02-13 | Stop reason: HOSPADM

## 2020-02-11 RX ORDER — VALSARTAN 320 MG/1
320 TABLET ORAL EVERY EVENING
Status: DISCONTINUED | OUTPATIENT
Start: 2020-02-11 | End: 2020-02-13 | Stop reason: HOSPADM

## 2020-02-11 RX ORDER — ACETAMINOPHEN 500 MG
1000 TABLET ORAL EVERY 6 HOURS
Status: DISCONTINUED | OUTPATIENT
Start: 2020-02-12 | End: 2020-02-13 | Stop reason: HOSPADM

## 2020-02-11 RX ORDER — POTASSIUM CHLORIDE 750 MG/1
10 TABLET, EXTENDED RELEASE ORAL EVERY EVENING
Status: DISCONTINUED | OUTPATIENT
Start: 2020-02-11 | End: 2020-02-13 | Stop reason: HOSPADM

## 2020-02-11 RX ORDER — CLOBETASOL PROPIONATE 0.5 MG/G
CREAM TOPICAL
Status: DISCONTINUED | OUTPATIENT
Start: 2020-02-11 | End: 2020-02-13 | Stop reason: HOSPADM

## 2020-02-11 RX ORDER — PROMETHAZINE HYDROCHLORIDE 25 MG/1
25 TABLET ORAL
Status: DISCONTINUED | OUTPATIENT
Start: 2020-02-11 | End: 2020-02-13 | Stop reason: HOSPADM

## 2020-02-11 RX ORDER — DIPHENHYDRAMINE HYDROCHLORIDE 50 MG/ML
12.5 INJECTION, SOLUTION INTRAMUSCULAR; INTRAVENOUS ONCE
Status: DISCONTINUED | OUTPATIENT
Start: 2020-02-11 | End: 2020-02-11 | Stop reason: HOSPADM

## 2020-02-11 RX ORDER — CELECOXIB 200 MG/1
200 CAPSULE ORAL EVERY 12 HOURS
Status: DISCONTINUED | OUTPATIENT
Start: 2020-02-11 | End: 2020-02-13 | Stop reason: HOSPADM

## 2020-02-11 RX ORDER — PROPOFOL 10 MG/ML
INJECTION, EMULSION INTRAVENOUS AS NEEDED
Status: DISCONTINUED | OUTPATIENT
Start: 2020-02-11 | End: 2020-02-11 | Stop reason: HOSPADM

## 2020-02-11 RX ORDER — HYDROMORPHONE HYDROCHLORIDE 2 MG/ML
0.5 INJECTION, SOLUTION INTRAMUSCULAR; INTRAVENOUS; SUBCUTANEOUS
Status: DISCONTINUED | OUTPATIENT
Start: 2020-02-11 | End: 2020-02-11 | Stop reason: HOSPADM

## 2020-02-11 RX ORDER — MAG HYDROX/ALUMINUM HYD/SIMETH 200-200-20
30 SUSPENSION, ORAL (FINAL DOSE FORM) ORAL
Status: DISCONTINUED | OUTPATIENT
Start: 2020-02-11 | End: 2020-02-13 | Stop reason: HOSPADM

## 2020-02-11 RX ORDER — LIDOCAINE HYDROCHLORIDE 10 MG/ML
0.1 INJECTION INFILTRATION; PERINEURAL AS NEEDED
Status: DISCONTINUED | OUTPATIENT
Start: 2020-02-11 | End: 2020-02-11 | Stop reason: HOSPADM

## 2020-02-11 RX ORDER — ONDANSETRON 2 MG/ML
INJECTION INTRAMUSCULAR; INTRAVENOUS AS NEEDED
Status: DISCONTINUED | OUTPATIENT
Start: 2020-02-11 | End: 2020-02-11 | Stop reason: HOSPADM

## 2020-02-11 RX ORDER — SODIUM CHLORIDE 9 MG/ML
100 INJECTION, SOLUTION INTRAVENOUS CONTINUOUS
Status: DISPENSED | OUTPATIENT
Start: 2020-02-11 | End: 2020-02-12

## 2020-02-11 RX ORDER — SODIUM CHLORIDE 0.9 % (FLUSH) 0.9 %
5-40 SYRINGE (ML) INJECTION AS NEEDED
Status: DISCONTINUED | OUTPATIENT
Start: 2020-02-11 | End: 2020-02-13 | Stop reason: HOSPADM

## 2020-02-11 RX ORDER — SODIUM CHLORIDE 0.9 % (FLUSH) 0.9 %
5-40 SYRINGE (ML) INJECTION EVERY 8 HOURS
Status: DISCONTINUED | OUTPATIENT
Start: 2020-02-11 | End: 2020-02-13 | Stop reason: HOSPADM

## 2020-02-11 RX ORDER — CEFAZOLIN SODIUM/WATER 2 G/20 ML
2 SYRINGE (ML) INTRAVENOUS EVERY 8 HOURS
Status: COMPLETED | OUTPATIENT
Start: 2020-02-11 | End: 2020-02-12

## 2020-02-11 RX ORDER — ACETAMINOPHEN 10 MG/ML
1000 INJECTION, SOLUTION INTRAVENOUS ONCE
Status: ACTIVE | OUTPATIENT
Start: 2020-02-11 | End: 2020-02-12

## 2020-02-11 RX ORDER — OXYCODONE HYDROCHLORIDE 5 MG/1
5 TABLET ORAL
Status: DISCONTINUED | OUTPATIENT
Start: 2020-02-11 | End: 2020-02-11 | Stop reason: HOSPADM

## 2020-02-11 RX ORDER — AMLODIPINE BESYLATE 5 MG/1
5 TABLET ORAL
Status: DISCONTINUED | OUTPATIENT
Start: 2020-02-11 | End: 2020-02-13 | Stop reason: HOSPADM

## 2020-02-11 RX ORDER — DIPHENHYDRAMINE HCL 25 MG
25 CAPSULE ORAL
Status: DISCONTINUED | OUTPATIENT
Start: 2020-02-11 | End: 2020-02-13 | Stop reason: HOSPADM

## 2020-02-11 RX ORDER — AMOXICILLIN 250 MG
2 CAPSULE ORAL DAILY
Status: DISCONTINUED | OUTPATIENT
Start: 2020-02-12 | End: 2020-02-13 | Stop reason: HOSPADM

## 2020-02-11 RX ORDER — EPHEDRINE SULFATE/0.9% NACL/PF 50 MG/5 ML
SYRINGE (ML) INTRAVENOUS AS NEEDED
Status: DISCONTINUED | OUTPATIENT
Start: 2020-02-11 | End: 2020-02-11 | Stop reason: HOSPADM

## 2020-02-11 RX ORDER — MIDAZOLAM HYDROCHLORIDE 1 MG/ML
INJECTION, SOLUTION INTRAMUSCULAR; INTRAVENOUS AS NEEDED
Status: DISCONTINUED | OUTPATIENT
Start: 2020-02-11 | End: 2020-02-11 | Stop reason: HOSPADM

## 2020-02-11 RX ORDER — CEFAZOLIN SODIUM/WATER 2 G/20 ML
2 SYRINGE (ML) INTRAVENOUS ONCE
Status: COMPLETED | OUTPATIENT
Start: 2020-02-11 | End: 2020-02-11

## 2020-02-11 RX ORDER — LEVOTHYROXINE SODIUM 75 UG/1
75 TABLET ORAL
Status: DISCONTINUED | OUTPATIENT
Start: 2020-02-12 | End: 2020-02-13 | Stop reason: HOSPADM

## 2020-02-11 RX ORDER — SERTRALINE HYDROCHLORIDE 100 MG/1
100 TABLET, FILM COATED ORAL
Status: DISCONTINUED | OUTPATIENT
Start: 2020-02-11 | End: 2020-02-13 | Stop reason: HOSPADM

## 2020-02-11 RX ORDER — OXYCODONE HYDROCHLORIDE 5 MG/1
10 TABLET ORAL
Status: DISCONTINUED | OUTPATIENT
Start: 2020-02-11 | End: 2020-02-11 | Stop reason: HOSPADM

## 2020-02-11 RX ORDER — TRAZODONE HYDROCHLORIDE 50 MG/1
150 TABLET ORAL
Status: DISCONTINUED | OUTPATIENT
Start: 2020-02-11 | End: 2020-02-13 | Stop reason: HOSPADM

## 2020-02-11 RX ORDER — FENTANYL CITRATE 50 UG/ML
100 INJECTION, SOLUTION INTRAMUSCULAR; INTRAVENOUS AS NEEDED
Status: DISCONTINUED | OUTPATIENT
Start: 2020-02-11 | End: 2020-02-11 | Stop reason: HOSPADM

## 2020-02-11 RX ORDER — ONDANSETRON 2 MG/ML
4 INJECTION INTRAMUSCULAR; INTRAVENOUS ONCE
Status: DISCONTINUED | OUTPATIENT
Start: 2020-02-11 | End: 2020-02-11 | Stop reason: HOSPADM

## 2020-02-11 RX ORDER — ASPIRIN 81 MG/1
81 TABLET ORAL EVERY 12 HOURS
Status: DISCONTINUED | OUTPATIENT
Start: 2020-02-11 | End: 2020-02-13 | Stop reason: HOSPADM

## 2020-02-11 RX ORDER — DEXAMETHASONE SODIUM PHOSPHATE 4 MG/ML
INJECTION, SOLUTION INTRA-ARTICULAR; INTRALESIONAL; INTRAMUSCULAR; INTRAVENOUS; SOFT TISSUE AS NEEDED
Status: DISCONTINUED | OUTPATIENT
Start: 2020-02-11 | End: 2020-02-11 | Stop reason: HOSPADM

## 2020-02-11 RX ORDER — SODIUM CHLORIDE, SODIUM LACTATE, POTASSIUM CHLORIDE, CALCIUM CHLORIDE 600; 310; 30; 20 MG/100ML; MG/100ML; MG/100ML; MG/100ML
1000 INJECTION, SOLUTION INTRAVENOUS CONTINUOUS
Status: DISCONTINUED | OUTPATIENT
Start: 2020-02-11 | End: 2020-02-11 | Stop reason: HOSPADM

## 2020-02-11 RX ORDER — BUPIVACAINE HYDROCHLORIDE 7.5 MG/ML
INJECTION, SOLUTION INTRASPINAL
Status: COMPLETED | OUTPATIENT
Start: 2020-02-11 | End: 2020-02-11

## 2020-02-11 RX ORDER — VANCOMYCIN/0.9 % SOD CHLORIDE 1.5G/250ML
1500 PLASTIC BAG, INJECTION (ML) INTRAVENOUS ONCE
Status: COMPLETED | OUTPATIENT
Start: 2020-02-11 | End: 2020-02-11

## 2020-02-11 RX ORDER — NALOXONE HYDROCHLORIDE 0.4 MG/ML
0.1 INJECTION, SOLUTION INTRAMUSCULAR; INTRAVENOUS; SUBCUTANEOUS AS NEEDED
Status: DISCONTINUED | OUTPATIENT
Start: 2020-02-11 | End: 2020-02-11 | Stop reason: HOSPADM

## 2020-02-11 RX ORDER — DEXAMETHASONE SODIUM PHOSPHATE 100 MG/10ML
10 INJECTION INTRAMUSCULAR; INTRAVENOUS ONCE
Status: ACTIVE | OUTPATIENT
Start: 2020-02-12 | End: 2020-02-13

## 2020-02-11 RX ORDER — TEMAZEPAM 15 MG/1
30 CAPSULE ORAL
Status: DISCONTINUED | OUTPATIENT
Start: 2020-02-11 | End: 2020-02-13 | Stop reason: HOSPADM

## 2020-02-11 RX ORDER — TRANEXAMIC ACID 100 MG/ML
INJECTION, SOLUTION INTRAVENOUS AS NEEDED
Status: DISCONTINUED | OUTPATIENT
Start: 2020-02-11 | End: 2020-02-11 | Stop reason: HOSPADM

## 2020-02-11 RX ORDER — HYDROCHLOROTHIAZIDE 25 MG/1
25 TABLET ORAL
Status: DISCONTINUED | OUTPATIENT
Start: 2020-02-11 | End: 2020-02-13 | Stop reason: HOSPADM

## 2020-02-11 RX ADMIN — VALSARTAN 320 MG: 320 TABLET, FILM COATED ORAL at 17:04

## 2020-02-11 RX ADMIN — OXYCODONE 10 MG: 5 TABLET ORAL at 13:26

## 2020-02-11 RX ADMIN — PROPOFOL 30 MG: 10 INJECTION, EMULSION INTRAVENOUS at 08:33

## 2020-02-11 RX ADMIN — VANCOMYCIN HYDROCHLORIDE 1500 MG: 10 INJECTION, POWDER, LYOPHILIZED, FOR SOLUTION INTRAVENOUS at 08:22

## 2020-02-11 RX ADMIN — LORAZEPAM 0.5 MG: 0.5 TABLET ORAL at 17:04

## 2020-02-11 RX ADMIN — ATORVASTATIN CALCIUM 40 MG: 40 TABLET, FILM COATED ORAL at 20:35

## 2020-02-11 RX ADMIN — SODIUM CHLORIDE, SODIUM LACTATE, POTASSIUM CHLORIDE, AND CALCIUM CHLORIDE 1000 ML: 600; 310; 30; 20 INJECTION, SOLUTION INTRAVENOUS at 06:59

## 2020-02-11 RX ADMIN — PROPOFOL 120 MCG/KG/MIN: 10 INJECTION, EMULSION INTRAVENOUS at 08:33

## 2020-02-11 RX ADMIN — SERTRALINE HYDROCHLORIDE 100 MG: 100 TABLET ORAL at 20:35

## 2020-02-11 RX ADMIN — ASPIRIN 81 MG: 81 TABLET ORAL at 20:35

## 2020-02-11 RX ADMIN — ONDANSETRON 4 MG: 2 INJECTION INTRAMUSCULAR; INTRAVENOUS at 08:54

## 2020-02-11 RX ADMIN — PROPOFOL 50 MG: 10 INJECTION, EMULSION INTRAVENOUS at 08:30

## 2020-02-11 RX ADMIN — HYDROMORPHONE HYDROCHLORIDE 1 MG: 1 INJECTION, SOLUTION INTRAMUSCULAR; INTRAVENOUS; SUBCUTANEOUS at 15:49

## 2020-02-11 RX ADMIN — Medication 3 AMPULE: at 06:58

## 2020-02-11 RX ADMIN — Medication 2 G: at 15:49

## 2020-02-11 RX ADMIN — Medication 10 MG: at 09:18

## 2020-02-11 RX ADMIN — DEXAMETHASONE SODIUM PHOSPHATE 4 MG: 4 INJECTION, SOLUTION INTRAMUSCULAR; INTRAVENOUS at 08:54

## 2020-02-11 RX ADMIN — Medication 10 ML: at 20:36

## 2020-02-11 RX ADMIN — POTASSIUM CHLORIDE 10 MEQ: 10 TABLET, EXTENDED RELEASE ORAL at 17:04

## 2020-02-11 RX ADMIN — Medication 1 AMPULE: at 20:34

## 2020-02-11 RX ADMIN — TRANEXAMIC ACID 1000 MG: 100 INJECTION, SOLUTION INTRAVENOUS at 08:45

## 2020-02-11 RX ADMIN — AMLODIPINE BESYLATE 5 MG: 5 TABLET ORAL at 20:35

## 2020-02-11 RX ADMIN — MIDAZOLAM 2 MG: 1 INJECTION INTRAMUSCULAR; INTRAVENOUS at 08:20

## 2020-02-11 RX ADMIN — OXYCODONE 10 MG: 5 TABLET ORAL at 17:53

## 2020-02-11 RX ADMIN — Medication 10 MG: at 09:07

## 2020-02-11 RX ADMIN — BUPIVACAINE HYDROCHLORIDE IN DEXTROSE 12.5 MG: 7.5 INJECTION, SOLUTION SUBARACHNOID at 08:31

## 2020-02-11 RX ADMIN — HYDROCHLOROTHIAZIDE 25 MG: 25 TABLET ORAL at 20:36

## 2020-02-11 RX ADMIN — VANCOMYCIN HYDROCHLORIDE 1500 MG: 10 INJECTION, POWDER, LYOPHILIZED, FOR SOLUTION INTRAVENOUS at 07:33

## 2020-02-11 RX ADMIN — Medication 2 G: at 08:22

## 2020-02-11 RX ADMIN — CELECOXIB 200 MG: 200 CAPSULE ORAL at 20:34

## 2020-02-11 RX ADMIN — SODIUM CHLORIDE, SODIUM LACTATE, POTASSIUM CHLORIDE, AND CALCIUM CHLORIDE: 600; 310; 30; 20 INJECTION, SOLUTION INTRAVENOUS at 09:28

## 2020-02-11 RX ADMIN — LIDOCAINE HYDROCHLORIDE 0.1 ML: 10 INJECTION, SOLUTION INFILTRATION; PERINEURAL at 06:59

## 2020-02-11 RX ADMIN — TEMAZEPAM 30 MG: 15 CAPSULE ORAL at 20:35

## 2020-02-11 RX ADMIN — HYDROMORPHONE HYDROCHLORIDE 1 MG: 1 INJECTION, SOLUTION INTRAMUSCULAR; INTRAVENOUS; SUBCUTANEOUS at 20:36

## 2020-02-11 RX ADMIN — LIDOCAINE HYDROCHLORIDE 60 MG: 20 INJECTION, SOLUTION EPIDURAL; INFILTRATION; INTRACAUDAL; PERINEURAL at 08:30

## 2020-02-11 NOTE — ANESTHESIA POSTPROCEDURE EVALUATION
Procedure(s):  HIP ARTHROPLASTY TOTAL/ RIGHT/ DEPUY ANCEF 2gm / VANCOMYCIN.    spinal    Anesthesia Post Evaluation      Multimodal analgesia: multimodal analgesia used between 6 hours prior to anesthesia start to PACU discharge  Patient location during evaluation: bedside  Patient participation: complete - patient participated  Level of consciousness: responsive to verbal stimuli  Pain management: adequate  Airway patency: patent  Anesthetic complications: no  Cardiovascular status: hemodynamically stable  Respiratory status: spontaneous ventilation  Hydration status: stable        Vitals Value Taken Time   /58 2/11/2020 10:39 AM   Temp 36.5 °C (97.7 °F) 2/11/2020 10:22 AM   Pulse 57 2/11/2020 10:39 AM   Resp 16 2/11/2020 10:39 AM   SpO2 95 % 2/11/2020 10:39 AM

## 2020-02-11 NOTE — PROGRESS NOTES
Problem: Mobility Impaired (Adult and Pediatric)  Goal: *Acute Goals and Plan of Care (Insert Text)  Description  GOALS (1-4 days):  (1.)Ms. Tessie Nguyen will move from supine to sit and sit to supine  in bed with STAND BY ASSIST.    (2.)Ms. Tessie Nguyen will transfer from bed to chair and chair to bed with STAND BY ASSIST using the least restrictive device. (3.)Ms. Tessie Nguyen will ambulate with STAND BY ASSIST for 250 feet with the least restrictive device. (4.)Ms. Tessie Nguyen will ambulate up/down 3 steps with bilateral  railing with CONTACT GUARD ASSIST with no device. (5.)Ms. Tessie Nguyen will state/observe LAUREEN precautions with 0 verbal cues. ________________________________________________________________________________________________     Outcome: Progressing Towards Goal     PHYSICAL THERAPY JOINT CAMP LAUREEN: Initial Assessment, Treatment Day: Day of Assessment, and PM 2/11/2020  INPATIENT: Hospital Day: 1  [place] / [place] / Product Type: Medicare /      NAME/AGE/GENDER: Denise Parr is a 68 y.o. female   PRIMARY DIAGNOSIS:  Unilateral osteoarthritis of hip, right [M16.11]   Procedure(s) and Anesthesia Type:     * HIP ARTHROPLASTY TOTAL/ RIGHT/ DEPUY ANCEF 2gm / VANCOMYCIN - Spinal (Right)  ICD-10: Treatment Diagnosis:    Pain in Right Hip (M25.551)  Stiffness of Right Hip, Not elsewhere classified (M25.651)  Difficulty in walking, Not elsewhere classified (R26.2)      ASSESSMENT:     Ms. Tessie Nguyen presents with decreased strength and range of motion right lower extremity and with decreased independence with functional mobility s/p right LAUREEN. Pt will benefit from skilled PT interventions to maximize independence with functional mobility and LAUREEN management. Pt did well with assessment and walked out of bathroom. In chair worked on Inbox exercises verbal cues and encouragement. Pt stayed up in chair with needs in reach and spouse at bedside. Hope to progress in the morning. This section established at most recent assessment   PROBLEM LIST (Impairments causing functional limitations):  Decreased Strength  Decreased ADL/Functional Activities  Decreased Transfer Abilities  Decreased Ambulation Ability/Technique  Increased Pain  Edema/Girth  Decreased Knowledge of Precautions  Decreased Casey with Home Exercise Program   INTERVENTIONS PLANNED: (Benefits and precautions of physical therapy have been discussed with the patient.)  Cold  bed mobility  gait training  home exercise program (HEP)  Range of Motion: active/assisted/passive  Therapeutic Activities  therapeutic exercise/strengthening  transfer training  Group Therapy     TREATMENT PLAN: Frequency/Duration: Follow patient BID for duration of hospital stay to address above goals. Rehabilitation Potential For Stated Goals: Good     RECOMMENDED REHABILITATION/EQUIPMENT: (at time of discharge pending progress): Continue Skilled Therapy and Home Health: Physical Therapy. HISTORY:   History of Present Injury/Illness (Reason for Referral):  Pt s/p right LAUREEN on 2/11/2020  Past Medical History/Comorbidities:   Ms. Roselyn Marshall  has a past medical history of Anxiety, Arthritis, Autoimmune disease ([address] CAD (coronary artery disease) (2010), Chest pain, Chronic pain, Depression, Family history of ischemic heart disease, H/O acute pancreatitis (08/2013), H/O gastric bypass (2009), High cholesterol, echocardiogram (01/18/2019), Hypertension, Hypothyroidism, Lichen sclerosus, Obesity (BMI 30-39.9), Osteopenia, Pancreatitis, Systolic murmur, and Ventral hernia.  She also has no past medical history of Arrhythmia, Asthma, Cancer ([address] Chronic kidney disease, Chronic obstructive pulmonary disease ([address] Diabetes ([address] Difficult intubation, GERD (gastroesophageal reflux disease), Heart failure ([address] Liver disease, Malignant hyperthermia due to anesthesia, Nausea & vomiting, Pseudocholinesterase deficiency, PUD (peptic ulcer disease), Seizures ([facility] Sleep apnea, Stroke ([facility] Thromboembolus ([facility] or Unspecified adverse effect of anesthesia. Ms. Latonia Stroud  has a past surgical history that includes hx bladder suspension; hx carpal tunnel release (Right); hx lysis of adhesions; hx appendectomy (1954); hx colonoscopy (2017); hx endoscopy (2018); hx heart catheterization (2010); hx hysterectomy (1978); hx oophorectomy (1984); hx lumbar fusion (1996); hx urological; hx cataract removal (Bilateral, 2015); hx lap cholecystectomy (7/2013); hx gastric bypass (2009); hx hernia repair (8/14/14); hx other surgical (2017); hx shoulder arthroscopy (Right, 2011); hx orthopaedic (Right, 1997); hx orthopaedic (Left, 2017); ir biliary drn cath plc perc int/ext si (2/4/2019); ir biliary drn cath exchange perc any to ext si (3/7/2019); ir biliary drn cath exchange perc any to ext si (4/3/2019); and hx heart catheterization (08/2019). Social History/Living Environment:   Home Environment: Private residence  # Steps to Enter: 9  Hand Rails : Bilateral  One/Two Story Residence: Two story  # of Interior Steps: 10  Interior Rails: Right  Living Alone: No  Support Systems: Spouse/Significant Other/Partner  Patient Expects to be Discharged to[de-identified] Private residence  Current DME Used/Available at Home: Cane, straight  Tub or Shower Type: Shower  Prior Level of Function/Work/Activity:  Pt living at home, independent with gait and ADLs   Number of Personal Factors/Comorbidities that affect the Plan of Care: 0: LOW COMPLEXITY   EXAMINATION:   Most Recent Physical Functioning:   Gross Assessment: Yes  Gross Assessment  AROM: Within functional limits(except right lower extremity s/p LAUREEN)  Strength:  Within functional limits(except right lower extremity s/p LAUREEN)                     Bed Mobility  Supine to Sit: Minimum assistance    Transfers  Sit to Stand: Minimum assistance  Stand to Sit: Minimum assistance  Bed to Chair: Minimum assistance    Balance  Sitting: Intact  Standing: With support    Posture  Posture (WDL): Within defined limits         Weight Bearing Status  Right Side Weight Bearing: As tolerated  Distance (ft): 15 Feet (ft)  Ambulation - Level of Assistance: Minimal assistance  Assistive Device: Walker, rolling  Speed/Princess: Pace decreased (<100 feet/min)  Step Length: Left shortened  Stance: Right decreased  Gait Abnormalities: Antalgic;Decreased step clearance  Interventions: Safety awareness training;Verbal cues     Braces/Orthotics: none    Right Hip Cold  Type: Cold/ice packs  Patient Position: Sitting      Body Structures Involved:  Joints  Muscles Body Functions Affected: Movement Related Activities and Participation Affected: Mobility  Self Care   Number of elements that affect the Plan of Care: 4+: HIGH COMPLEXITY   CLINICAL PRESENTATION:   Presentation: Stable and uncomplicated: LOW COMPLEXITY   CLINICAL DECISION MAKIN[address] [place] [id number] AM-PAC 6 Clicks   Basic Mobility Inpatient Short Form  How much difficulty does the patient currently have. .. Unable A Lot A Little None   1. Turning over in bed (including adjusting bedclothes, sheets and blankets)? [] 1   [] 2   [x] 3   [] 4   2. Sitting down on and standing up from a chair with arms ( e.g., wheelchair, bedside commode, etc.)   [] 1   [] 2   [x] 3   [] 4   3. Moving from lying on back to sitting on the side of the bed? [] 1   [] 2   [x] 3   [] 4   How much help from another person does the patient currently need. .. Total A Lot A Little None   4. Moving to and from a bed to a chair (including a wheelchair)? [] 1   [] 2   [x] 3   [] 4   5. Need to walk in hospital room? [] 1   [] 2   [x] 3   [] 4   6. Climbing 3-5 steps with a railing? [] 1   [] 2   [x] 3   [] 4   © , Trustees of [address] [place] [id number], under license to [employer].  All rights reserved     Score:  Initial: 18 Most Recent: X (Date: -- )    Interpretation of Tool: Represents activities that are increasingly more difficult (i.e. Bed mobility, Transfers, Gait). Medical Necessity:     Patient is expected to demonstrate progress in   strength, range of motion, and functional technique   to   decrease assistance required with functional mobility and LAUREEN exercises   . Reason for Services/Other Comments:  Patient continues to require skilled intervention due to   Inability to complete functional mobility and LAUREEN exercises independently   . Use of outcome tool(s) and clinical judgement create a POC that gives a: Clear prediction of patient's progress: LOW COMPLEXITY            TREATMENT:   (In addition to Assessment/Re-Assessment sessions the following treatments were rendered)     Pre-treatment Symptoms/Complaints:  had pain meds before  Pain Initial: reporting it hurts and she did not think she would have any pain     Post Session: asking for more pain meds, RN aware     Therapeutic Exercise: (10 Minutes):  Exercises per grid below to improve mobility and strength. Required minimal verbal and manual cues to promote proper body alignment and promote proper body posture. Progressed repetitions as indicated. Date:  2/11 Date:   Date:     ACTIVITY/EXERCISE AM PM AM PM AM PM   GROUP THERAPY  []  []  []  []  []  []   Ankle Pumps  10       Quad Sets  10       Gluteal Sets  10       Hip ABd/ADduction  10       Straight Leg Raises         Knee Slides  10       Short Arc Quads         Long Arc Quads         Chair Slides                  B = bilateral; AA = active assistive; A = active; P = passive      Treatment/Session Assessment:     Response to Treatment:  pt tolerated fair.     Education:  [x] Home Exercises  [x] Fall Precautions  [] Hip Precautions [] D/C Instruction Review  [] Hip Prosthesis Review  [] Walker Management/Safety [] Adaptive Equipment as Needed       Interdisciplinary Collaboration:   Occupational Therapist  Registered Nurse    After treatment position/precautions:   Up in chair  Bed/Chair-wheels locked  Call light within reach  Family at bedside    Compliance with Program/Exercises: Compliant all of the time, Will assess as treatment progresses. Recommendations/Intent for next treatment session:  Treatment next visit will focus on increasing Ms. Saroj Victor independence with bed mobility, transfers, gait training, strength/ROM exercises, modalities for pain, and patient education.       Total Treatment Duration:  PT Patient Time In/Time Out  Time In: 1430  Time Out: Leandra Vo [address], PT

## 2020-02-11 NOTE — ANESTHESIA PREPROCEDURE EVALUATION
Anesthetic History   No history of anesthetic complications            Review of Systems / Medical History  Patient summary reviewed, nursing notes reviewed and pertinent labs reviewed    Pulmonary  Within defined limits                 Neuro/Psych         Psychiatric history     Cardiovascular    Hypertension          CAD, cardiac stents (2010) and hyperlipidemia    Exercise tolerance: >4 METS  Comments: Asa for STEFF.   Nl cath last year   GI/Hepatic/Renal     GERD: poorly controlled          Comments: Gastric bypass Endo/Other      Hypothyroidism: well controlled  Obesity and arthritis     Other Findings              Physical Exam    Airway  Mallampati: II  TM Distance: 4 - 6 cm  Neck ROM: normal range of motion   Mouth opening: Normal     Cardiovascular  Regular rate and rhythm,  S1 and S2 normal,  no murmur, click, rub, or gallop             Dental  No notable dental hx       Pulmonary  Breath sounds clear to auscultation               Abdominal         Other Findings            Anesthetic Plan    ASA: 3  Anesthesia type: spinal          Induction: Intravenous  Anesthetic plan and risks discussed with: Patient

## 2020-02-11 NOTE — PERIOP NOTES
Teach back method used with patient concerning hibiclens wash, TB screening, incentive spirometer, pain management goals, and discharge needs list. Is goal 2500

## 2020-02-11 NOTE — PERIOP NOTES
TRANSFER - OUT REPORT:    Verbal report given to Joie Awan on Nena Gibbons  being transferred to [facility] for routine post - op       Report consisted of patients Situation, Background, Assessment and   Recommendations(SBAR). Information from the following report(s) SBAR, OR Summary, Procedure Summary and Intake/Output was reviewed with the receiving nurse. Lines:   Peripheral IV 02/11/20 Left Forearm (Active)   Site Assessment Clean, dry, & intact 2/11/2020 10:22 AM   Phlebitis Assessment 0 2/11/2020 10:22 AM   Infiltration Assessment 0 2/11/2020 10:22 AM   Dressing Status Clean, dry, & intact 2/11/2020 10:22 AM   Dressing Type Tape;Transparent 2/11/2020 10:22 AM   Hub Color/Line Status Pink; Infusing;Patent 2/11/2020 10:22 AM   Action Taken Blood drawn 2/11/2020  7:15 AM        Opportunity for questions and clarification was provided. Patient transported with:   O2 @ 2 liters    VTE prophylaxis orders have been written for Nena Gibbons. Patient and family given floor number and nurses name. Family updated re: pt status after security code verified.

## 2020-02-11 NOTE — PROGRESS NOTES
TRANSFER - IN REPORT:    Verbal report received from [facility]) on Karen Sams  being received from [employer]) for routine post - op      Report consisted of patients Situation, Background, Assessment and   Recommendations(SBAR). Information from the following report(s) SBAR, OR Summary, Intake/Output and MAR was reviewed with the receiving nurse. Opportunity for questions and clarification was provided. Assessment completed upon patients arrival to unit and care assumed.

## 2020-02-11 NOTE — ANESTHESIA PROCEDURE NOTES
Spinal Block    Start time: 2/11/2020 8:27 AM  End time: 2/11/2020 8:31 AM  Performed by: Mahendra Jones MD  Authorized by: Juany Mai MD     Pre-procedure:   Indications: primary anesthetic  Preanesthetic Checklist: patient identified, risks and benefits discussed, anesthesia consent, site marked, patient being monitored and timeout performed    Timeout Time: 08:27          Spinal Block:   Patient Position:  Seated  Prep Region:  Lumbar  Prep: chlorhexidine      Location:  L3-4  Technique:  Single shot    Local Dose (mL):  3    Needle:   Needle Type:  Pencan  Needle Gauge:  25 G  Attempts:  1      Events: CSF confirmed, no blood with aspiration and no paresthesia        Assessment:  Insertion:  Uncomplicated  Patient tolerance:  Patient tolerated the procedure well with no immediate complications

## 2020-02-11 NOTE — PROGRESS NOTES
Care Management Interventions  PCP Verified by CM: Yes  Last Visit to PCP: 01/28/20  Mode of Transport at Discharge: Other (see comment)()  Transition of Care Consult (CM Consult): [address]: Yes  Discharge Durable Medical Equipment: Yes  Physical Therapy Consult: Yes  Occupational Therapy Consult: Yes  Current Support Network: Lives with Spouse  Confirm Follow Up Transport: Family  The Plan for Transition of Care is Related to the Following Treatment Goals : Strengthening in order for patient to return to baseline  The Patient and/or Patient Representative was Provided with a Choice of Provider and Agrees with the Discharge Plan?: Yes  Freedom of Choice List was Provided with Basic Dialogue that Supports the Patient's Individualized Plan of Care/Goals, Treatment Preferences and Shares the Quality Data Associated with the Providers?: Yes  Discharge Location  Discharge Placement: Home with home health    SW met with patient and . PCP is Quinn May - patient was last seen 2 weeks ago. Patient's primary support person after discharge is her . Patient does not currently have any DME for home use. RW & 3:1 BSC delivered at bedside (via [facility]). Home Health PT arranged thru [address] [place] [address]. No additional needs identified - SW will continue to follow and provide support.     LAKISHA Parikh-ARIANA  [facility]   [phone]

## 2020-02-11 NOTE — INTERVAL H&P NOTE
H&P Update: 
Branden Marquez was seen and examined. History and physical has been reviewed. The patient has been examined.  There have been no significant clinical changes since the completion of the originally dated History and Physical.

## 2020-02-11 NOTE — PROGRESS NOTES
[facility]  Face to Face Encounter    Patients Name: Hernan Ramsey    YOB: 1950    Ordering Physician: Anna Hyde    Primary Diagnosis: Unilateral osteoarthritis of hip, right [M16.11]  Arthritis of right hip [M16.11]    Date of Face to Face:   2/11/2020                                  Face to Face Encounter findings are related to primary reason for home care:   yes. 1. I certify that the patient needs intermittent care as follows: physical therapy: strengthening, stretching/ROM and balance training    2. I certify that this patient is homebound, that is: 1) patient requires the use of a crutches device, special transportation, or assistance of another to leave the home; or 2) patient's condition makes leaving the home medically contraindicated; and 3) patient has a normal inability to leave the home and leaving the home requires considerable and taxing effort. Patient may leave the home for infrequent and short duration for medical reasons, and occasional absences for non-medical reasons. Homebound status is due to the following functional limitations: Patient's ambulation limited secondary to severe pain and requires the use of an assistive device and the assistance of a caregiver for safe completion. Patient with strength and ROM deficits limiting ambulation endurance requiring the use of an assistive device and the assistance of a caregiver. Patient deemed temporarily homebound secondary to increased risk for infection when leaving home and going out into the community. 3. I certify that this patient is under my care and that I, or a nurse practitioner or [facility], or clinical nurse specialist, or certified nurse midwife, working with me, had a Face-to-Face Encounter that meets the physician Face-to-Face Encounter requirements.   The following are the clinical findings from the [address] [place], [place] [address]e [place] encounter that support the need for skilled services and is a summary of the encounter:     See Progress Note    Clive Wood  2/11/2020      THE FOLLOWING TO BE COMPLETED BY THE COMMUNITY PHYSICIAN:    I concur with the findings described above from the F2F encounter that this patient is homebound and in need of a skilled service.     Certifying Physician: _____________________________________      Printed Certifying Physician Name: _____________________________________    Date: _________________

## 2020-02-11 NOTE — OP NOTES
[facility]  Total Hip Procedure Note  [place]   : 1950  Medical Record [id number]      Pre-operative Diagnosis:  Unilateral osteoarthritis of hip, right [M16.11]  Post-operative Diagnosis: Unilateral osteoarthritis of hip, right [M16.11]    Surgeon: Antony Kerr MD  Assistant:Chauncey Montano PA-C    Anesthesia: Spinal      Procedure: Total Hip Arthroplasty   The complexity of the total joint surgery requires the use of a first assistant for positioning, retraction and assistance in closure. The patient's Body mass index is 34.53 kg/m²., BMI's greater then 40 make surgical exposure and retraction extremely difficult and increase operative time. Mitra Saavedra was brought to the operating room and positioned on the operating table. She was anesthestized . IV antibiotics per CMS protocol were administered. Prior to the incision being made a timeout was called identifying the patient, procedure ,operative side and surgeon. Mitra Saavedra was positioned in the lateral decubitus position with the  right  side up. The limb was prepped and draped in the usual sterile fashion. The direct lateral approach was utilized to expose the hip. The incision was carried through the subcutaneous tissue and underlying fascia with homeostasis obtained using the bovie cauterization. A Charnley retractor was inserted. The abductors were taken down at the junction of the anterior and middle third. The sciatic nerve was palpated and identified. Following comparison of leg lengths, the femoral head was dislocated. The neck was osteotomized in the appropriate position just above the lesser trochanteric region. The acetabular retractor was placed and the appropriate capsulotomy performed. Soft tissue was removed from the acetabulum. The acetabulum was sequentially reamed.   Using trial components the acetabulum was sized to a 52 mm acetabular cup. Utilizing the femoral retractor, the canal was prepared with appropriate laterization and reamed with the starter reamer. The canal was then broached progressively to size 12. The calcar planar was untilized. A trial reduction with a size +1.5 neck length was utilized. The Head size was 36mm. This was found to be the most stable to flexion greater than 90 degrees and an internal and external rotation. Limb lengths were found to be equilibrated and with appropriate stability as mentioned above. All trial components were removed. The cementless permanent stem was impacted into place. A trial reduction was performed and the appropiate neck length was selected. A permanent 36mm femoral head was impacted into place. Aly Bhakta's hip was reduced and the stability was as mentioned as above. The betadine lavage protocol was used. The sciatic nerve was palpated and noted to be intact. The abductors were repaired through drill holes in the greater trochanter. The remainder was closed in layers with a Zipline closure for the skin. The patient was then rolled to a supine position. The sponge and needle counts were correct. The patient tolerated the procedure without difficulty and left the operating room in satisfactory condition. EBL:300cc  Additional Findings: Severe DJD  Implants:   Implant Name Type Inv.  Item Serial No.  Lot No. LRB No. Used Action   LINER ACET PINN NEUT [id number] -- ALTRX - [id number]  LINER ACET PINN NEUT [id number] -- ALTRX  [facility] [employer] [id number] Right 1 Implanted   Acetabular Shell 100    [employer] [id number] Right 1 Implanted   ELIMINATOR APEX HOLE POSITIVE - [id number]  ELIMINATOR APEX HOLE POSITIVE  [facility] [employer] [id number] Right 1 Implanted   HEAD FEM 36MM +1.5MM NK -- BIOLOX DELTA - [id number]  HEAD FEM 36MM +1.5MM NK -- BIOLOX DELTA  [facility] [employer] [id number] Right 1 Implanted   STEM FEM CORAIL STD COL SZ 12 --  - [id number]  STEM FEM CORAIL STD COL SZ 12 --   [employer] [id number] Right 1 Implanted     Signed By: Nataliya Verma MD

## 2020-02-11 NOTE — PERIOP NOTES
TRANSFER - OUT REPORT:    Verbal report given to [facility]) on Sandeep Yao  being transferred to pre op for routine progression of care       Report consisted of patients Situation, Background, Assessment and   Recommendations(SBAR). Information from the following report(s) SBAR, Kard[facility], [facility] and Recent Results was reviewed with the receiving nurse. Lines:   Peripheral IV 02/11/20 Left Forearm (Active)   Site Assessment Clean, dry, & intact 2/11/2020  7:15 AM   Phlebitis Assessment 0 2/11/2020  7:15 AM   Infiltration Assessment 0 2/11/2020  7:15 AM   Dressing Status Clean, dry, & intact 2/11/2020  7:15 AM   Dressing Type Transparent;Tape 2/11/2020  7:15 AM   Hub Color/Line Status Infusing 2/11/2020  7:15 AM   Action Taken Blood drawn 2/11/2020  7:15 AM        Opportunity for questions and clarification was provided.       Patient transported with:   [employer]

## 2020-02-11 NOTE — PROGRESS NOTES
Problem: Self Care Deficits Care Plan (Adult)  Goal: *Acute Goals and Plan of Care (Insert Text)  Description  GOALS:   DISCHARGE GOALS (in preparation for going home/rehab):  3 days  1. Ms. Tahir Castellanos will perform one lower body dressing activity with minimal assistance with adaptive equipment to demonstrate improved functional mobility and safety. 2.  Ms. Tahir Castellanos will perform one lower body bathing activity with minimal  assistance with adaptive equipment to demonstrate improved functional mobility and safety. 3.  Ms. Tahir Castellanos will perform toileting/toilet transfer with contact guard assistance with adaptive equipment to demonstrate improved functional mobility and safety. 4.  Ms. Tahir Castellanos will perform shower transfer with contact guard assistance with adaptive equipment to demonstrate improved functional mobility and safety. 5.  Ms. Tahir Castellanos will state LAUREEN precautions with two verbal cues to demonstrate improved functional mobility and safety. JOINT CAMP OCCUPATIONAL THERAPY LAUREEN: Initial Assessment and Daily Note 2/11/2020  INPATIENT: Hospital Day: 1  [place] / [place] / Product Type: Medicare /      NAME/AGE/GENDER: Nurys Littlejohn is a 68 y.o. female   PRIMARY DIAGNOSIS:  Unilateral osteoarthritis of hip, right [M16.11]   Procedure(s) and Anesthesia Type:     * HIP ARTHROPLASTY TOTAL/ RIGHT/ DEPUY ANCEF 2gm / VANCOMYCIN - Spinal (Right)  ICD-10: Treatment Diagnosis:    Pain in Right Hip (M25.551)  Stiffness of Right Hip, Not elsewhere classified (M25.651)      ASSESSMENT:     Ms. Tahir Castellanos is s/p Right LAUREEN and presents with decreased weight bearing on R LE and decreased independence with functional mobility and activities of daily living as compared to prior level of function and safety. Patient would benefit from skilled Occupational Therapy to maximize independence and safety with self-care task and functional mobility. Pt would also benefit from education on lower body adaptive equipment and hip precautions post-surgery in preparation for going home. Patient able to don gown at edge of bed. SPT from bed to recliner using a rolling walker. Should progress well with ADL's tomorrow. This section established at most recent assessment   PROBLEM LIST (Impairments causing functional limitations):  Decreased Strength  Decreased ADL/Functional Activities  Decreased Transfer Abilities  Increased Pain  Increased Fatigue  Decreased Flexibility/Joint Mobility  Decreased Knowledge of Precautions   INTERVENTIONS PLANNED: (Benefits and precautions of occupational therapy have been discussed with the patient.)  Activities of daily living training  Adaptive equipment training  Balance training  Clothing management  Donning&doffing training  Theraputic activity     TREATMENT PLAN: Frequency/Duration: Follow patient 1-2tx to address above goals. Rehabilitation Potential For Stated Goals: Good     RECOMMENDED REHABILITATION/EQUIPMENT: (at time of discharge pending progress): Continue Skilled Therapy. OCCUPATIONAL PROFILE AND HISTORY:   History of Present Injury/Illness (Reason for Referral): Pt presents this date s/p (Right) LAUREEN. Past Medical History/Comorbidities:   Ms. Michael Owens  has a past medical history of Anxiety, Arthritis, Autoimmune disease ([address] CAD (coronary artery disease) (2010), Chest pain, Chronic pain, Depression, Family history of ischemic heart disease, H/O acute pancreatitis (08/2013), H/O gastric bypass (2009), High cholesterol, echocardiogram (01/18/2019), Hypertension, Hypothyroidism, Lichen sclerosus, Obesity (BMI 30-39.9), Osteopenia, Pancreatitis, Systolic murmur, and Ventral hernia.  She also has no past medical history of Arrhythmia, Asthma, Cancer ([address] Chronic kidney disease, Chronic obstructive pulmonary disease ([address] Diabetes ([address] Difficult intubation, GERD (gastroesophageal reflux disease), Heart failure ([address] Liver disease, Malignant hyperthermia due to anesthesia, Nausea & vomiting, Pseudocholinesterase deficiency, PUD (peptic ulcer disease), Seizures ([address] Sleep apnea, Stroke ([address] Thromboembolus ([address] or Unspecified adverse effect of anesthesia. Ms. Cyndy Dc  has a past surgical history that includes hx bladder suspension; hx carpal tunnel release (Right); hx lysis of adhesions; hx appendectomy (1954); hx colonoscopy (2017); hx endoscopy (2018); hx heart catheterization (2010); hx hysterectomy (1978); hx oophorectomy (1984); hx lumbar fusion (1996); hx urological; hx cataract removal (Bilateral, 2015); hx lap cholecystectomy (7/2013); hx gastric bypass (2009); hx hernia repair (8/14/14); hx other surgical (2017); hx shoulder arthroscopy (Right, 2011); hx orthopaedic (Right, 1997); hx orthopaedic (Left, 2017); ir biliary drn cath plc perc int/ext si (2/4/2019); ir biliary drn cath exchange perc any to ext si (3/7/2019); ir biliary drn cath exchange perc any to ext si (4/3/2019); and hx heart catheterization (08/2019). Social History/Living Environment:   Home Environment: Private residence  Prior Level of Function/Work/Activity:  Independent prior. Number of Personal Factors/Comorbidities that affect the Plan of Care: Brief history (0):  LOW COMPLEXITY   ASSESSMENT OF OCCUPATIONAL PERFORMANCE[de-identified]   Most Recent Physical Functioning:   Balance  Sitting: Intact  Standing: With support                    Coordination  Fine Motor Skills-Upper: Left Intact; Right Intact  Gross Motor Skills-Upper: Left Intact; Right Intact         Mental Status  Neurologic State: Alert  Orientation Level: Oriented X4                Basic ADLs (From Assessment) Complex ADLs (From Assessment)         Grooming/Bathing/Dressing Activities of Daily Living                       Functional Transfers  Bathroom Mobility: Minimum assistance  Toilet Transfer : Minimum assistance  Shower Transfer:  Moderate assistance     Bed/Mat Mobility  Supine to Sit: Minimum assistance  Sit to Stand: Minimum assistance  Stand to Sit: Minimum assistance  Bed to Chair: Minimum assistance         Physical Skills Involved:  Range of Motion  Balance  Strength Cognitive Skills Affected (resulting in the inability to perform in a timely and safe manner):  [facility]  Psychosocial Skills Affected:  WFL    Number of elements that affect the Plan of Care: 1-3:  LOW COMPLEXITY   CLINICAL DECISION MAKIN[address] [place] [address] AM-PAC 6 Clicks   Daily Activity Inpatient Short Form  How much help from another person does the patient currently need. .. Total A Lot A Little None   1. Putting on and taking off regular lower body clothing? [] 1   [x] 2   [] 3   [] 4   2. Bathing (including washing, rinsing, drying)? [] 1   [x] 2   [] 3   [] 4   3. Toileting, which includes using toilet, bedpan or urinal?   [] 1   [x] 2   [] 3   [] 4   4. Putting on and taking off regular upper body clothing? [] 1   [] 2   [] 3   [x] 4   5. Taking care of personal grooming such as brushing teeth? [] 1   [] 2   [] 3   [x] 4   6. Eating meals? [] 1   [] 2   [] 3   [x] 4   © , Trustees of [address] [place] [address], under license to [employer]. All rights reserved     Score:  Initial: 18 Most Recent: X (Date: -- )    Interpretation of Tool:  Represents activities that are increasingly more difficult (i.e. Bed mobility, Transfers, Gait). Medical Necessity:     Skilled intervention continues to be required due to Deficits noted above. Reason for Services/Other Comments:  Patient continues to require skilled intervention due to   New LAUREEN   .    Use of outcome tool(s) and clinical judgement create a POC that gives a: MODERATE COMPLEXITY            TREATMENT:   (In addition to Assessment/Re-Assessment sessions the following treatments were rendered)     Pre-treatment Symptoms/Complaints:    Pain: Initial:   Pain Intensity 1: 5  Post Session:  5     Self Care: (10): Procedure(s) (per grid) utilized to improve and/or restore self-care/home management as related to dressing and toileting. Required minimal verbal and tactile cueing to facilitate activities of daily living skills. Initial evaluation 10 minutes    Treatment/Session Assessment:     Response to Treatment:  Good, sitting up in recliner. Education:  [] Home Exercises  [x] Fall Precautions  [x] Hip Precautions [] Going Home Video  [] Knee/Hip Prosthesis Review  [x] Walker Management/Safety [x] Adaptive Equipment as Needed       Interdisciplinary Collaboration:   Physical Therapist  Occupational Therapist  Registered Nurse    After treatment position/precautions:   Up in chair  Bed/Chair-wheels locked  Caregiver at bedside  Call light within reach  RN notified     Compliance with Program/Exercises: Compliant all of the time, Will assess as treatment progresses. Recommendations/Intent for next treatment session:  Treatment next visit will focus on increasing Ms. Irma Chirinos independence with bed mobility, transfers, self care, functional mobility, modalities for pain, and patient education.       Total Treatment Duration:  OT Patient Time In/Time Out  Time In: 1410  Time Out: [address] [place], [place]

## 2020-02-11 NOTE — CONSULTS
Patient status post right hip arthroplasty. Chart reviewed. Vitals stable. We will sign off. No bill. Call us as needed.

## 2020-02-12 PROBLEM — Z96.641 H/O TOTAL HIP ARTHROPLASTY, RIGHT: Status: ACTIVE | Noted: 2020-02-12

## 2020-02-12 LAB
ANION GAP SERPL CALC-SCNC: 4 MMOL/L (ref 7–16)
BUN SERPL-MCNC: 14 MG/DL (ref 8–23)
CALCIUM SERPL-MCNC: 8.6 MG/DL (ref 8.3–10.4)
CHLORIDE SERPL-SCNC: 103 MMOL/L (ref 98–107)
CO2 SERPL-SCNC: 28 MMOL/L (ref 21–32)
CREAT SERPL-MCNC: 1.2 MG/DL (ref 0.6–1)
GLUCOSE SERPL-MCNC: 104 MG/DL (ref 65–100)
POTASSIUM SERPL-SCNC: 3.7 MMOL/L (ref 3.5–5.1)
SODIUM SERPL-SCNC: 135 MMOL/L (ref 136–145)

## 2020-02-12 PROCEDURE — 74011250637 HC RX REV CODE- 250/637: Performed by: ORTHOPAEDIC SURGERY

## 2020-02-12 PROCEDURE — 97535 SELF CARE MNGMENT TRAINING: CPT

## 2020-02-12 PROCEDURE — 97110 THERAPEUTIC EXERCISES: CPT

## 2020-02-12 PROCEDURE — 65270000029 HC RM PRIVATE

## 2020-02-12 PROCEDURE — 80048 BASIC METABOLIC PNL TOTAL CA: CPT

## 2020-02-12 PROCEDURE — 36415 COLL VENOUS BLD VENIPUNCTURE: CPT

## 2020-02-12 PROCEDURE — 97150 GROUP THERAPEUTIC PROCEDURES: CPT

## 2020-02-12 PROCEDURE — 97116 GAIT TRAINING THERAPY: CPT

## 2020-02-12 PROCEDURE — 94760 N-INVAS EAR/PLS OXIMETRY 1: CPT

## 2020-02-12 PROCEDURE — 74011250636 HC RX REV CODE- 250/636: Performed by: ORTHOPAEDIC SURGERY

## 2020-02-12 RX ORDER — ASPIRIN 81 MG/1
81 TABLET ORAL EVERY 12 HOURS
Qty: 60 TAB | Refills: 0 | Status: SHIPPED | OUTPATIENT
Start: 2020-02-12 | End: 2020-03-13

## 2020-02-12 RX ORDER — OXYCODONE HYDROCHLORIDE 5 MG/1
5-10 TABLET ORAL
Qty: 60 TAB | Refills: 0 | Status: SHIPPED | OUTPATIENT
Start: 2020-02-12 | End: 2020-02-19

## 2020-02-12 RX ORDER — POLYETHYLENE GLYCOL 3350 17 G/17G
17 POWDER, FOR SOLUTION ORAL DAILY
Status: DISCONTINUED | OUTPATIENT
Start: 2020-02-12 | End: 2020-02-13 | Stop reason: HOSPADM

## 2020-02-12 RX ADMIN — DOCUSATE SODIUM 50MG AND SENNOSIDES 8.6MG 2 TABLET: 8.6; 5 TABLET, FILM COATED ORAL at 08:42

## 2020-02-12 RX ADMIN — ACETAMINOPHEN 1000 MG: 500 TABLET, FILM COATED ORAL at 05:54

## 2020-02-12 RX ADMIN — HYDROCHLOROTHIAZIDE 25 MG: 25 TABLET ORAL at 21:59

## 2020-02-12 RX ADMIN — TEMAZEPAM 30 MG: 15 CAPSULE ORAL at 21:58

## 2020-02-12 RX ADMIN — OXYCODONE 10 MG: 5 TABLET ORAL at 08:42

## 2020-02-12 RX ADMIN — Medication 1 AMPULE: at 08:43

## 2020-02-12 RX ADMIN — TRAZODONE HYDROCHLORIDE 150 MG: 50 TABLET ORAL at 00:13

## 2020-02-12 RX ADMIN — ACETAMINOPHEN 1000 MG: 500 TABLET, FILM COATED ORAL at 17:39

## 2020-02-12 RX ADMIN — ATORVASTATIN CALCIUM 40 MG: 40 TABLET, FILM COATED ORAL at 21:58

## 2020-02-12 RX ADMIN — Medication 1 AMPULE: at 21:58

## 2020-02-12 RX ADMIN — OXYCODONE 10 MG: 5 TABLET ORAL at 00:13

## 2020-02-12 RX ADMIN — LEVOTHYROXINE SODIUM 75 MCG: 75 TABLET ORAL at 05:54

## 2020-02-12 RX ADMIN — SERTRALINE HYDROCHLORIDE 100 MG: 100 TABLET ORAL at 21:59

## 2020-02-12 RX ADMIN — Medication 10 ML: at 21:59

## 2020-02-12 RX ADMIN — HYDROMORPHONE HYDROCHLORIDE 1 MG: 1 INJECTION, SOLUTION INTRAMUSCULAR; INTRAVENOUS; SUBCUTANEOUS at 06:47

## 2020-02-12 RX ADMIN — ASPIRIN 81 MG: 81 TABLET ORAL at 21:58

## 2020-02-12 RX ADMIN — POLYETHYLENE GLYCOL (3350) 17 G: 17 POWDER, FOR SOLUTION ORAL at 21:57

## 2020-02-12 RX ADMIN — POTASSIUM CHLORIDE 10 MEQ: 10 TABLET, EXTENDED RELEASE ORAL at 17:39

## 2020-02-12 RX ADMIN — OXYCODONE 10 MG: 5 TABLET ORAL at 21:59

## 2020-02-12 RX ADMIN — ACETAMINOPHEN 1000 MG: 500 TABLET, FILM COATED ORAL at 11:50

## 2020-02-12 RX ADMIN — Medication 5 ML: at 06:47

## 2020-02-12 RX ADMIN — HYDROMORPHONE HYDROCHLORIDE 1 MG: 1 INJECTION, SOLUTION INTRAMUSCULAR; INTRAVENOUS; SUBCUTANEOUS at 14:20

## 2020-02-12 RX ADMIN — OXYCODONE 10 MG: 5 TABLET ORAL at 03:55

## 2020-02-12 RX ADMIN — CELECOXIB 200 MG: 200 CAPSULE ORAL at 21:58

## 2020-02-12 RX ADMIN — OXYCODONE 10 MG: 5 TABLET ORAL at 11:50

## 2020-02-12 RX ADMIN — AMLODIPINE BESYLATE 5 MG: 5 TABLET ORAL at 21:58

## 2020-02-12 RX ADMIN — VALSARTAN 320 MG: 320 TABLET, FILM COATED ORAL at 17:39

## 2020-02-12 RX ADMIN — CELECOXIB 200 MG: 200 CAPSULE ORAL at 08:42

## 2020-02-12 RX ADMIN — Medication 2 G: at 00:13

## 2020-02-12 RX ADMIN — OXYCODONE 10 MG: 5 TABLET ORAL at 17:39

## 2020-02-12 RX ADMIN — Medication 10 ML: at 00:13

## 2020-02-12 RX ADMIN — ASPIRIN 81 MG: 81 TABLET ORAL at 08:42

## 2020-02-12 NOTE — PROGRESS NOTES
Pt is alert and oriented. Laying in bed. Able to dorsi/plantar flex. Jonas Hopes in place. IV, dressing clean, dry and intact. Pt c/o pain saying she couldn't get comfortable and needed relief. IV dilaudid given. Also discussed repositioning in bed on her side for comfort. Pt stated she will likely try reposition later. Call light, IS at bedside within reach. Meds given as scheduled. Will continue to monitor.

## 2020-02-12 NOTE — DISCHARGE INSTRUCTIONS
[address] [facility]   Patient Discharge Instructions    Nurys Littlejohn / [id number] : 1950    Admitted 2020 Discharged: 2020     IF YOU HAVE ANY PROBLEMS ONCE YOU ARE AT HOME CALL THE FOLLOWING NUMBERS:   Main office number: [phone]    Take Home Medications     · It is important that you take the medication exactly as they are prescribed. · Keep your medication in the bottles provided by the pharmacist and keep a list of the medication names, dosages, and times to be taken in your wallet. · Do not take other medications without consulting your doctor. What to do at [address] your prehospital diet. If you have excessive nausea or vomitting call your doctor's office     Home Physical Therapy is arranged. Use rolling walker when walking. Patients who have had a joint replacement should not drive until you are seen for your follow up appointment by Dr. Christine Degroot. When to Call    - Call if you have a temperature greater then 101  - Unable to keep food down  - Loose control of your bladder or bowel function  - Are unable to bear any weight   - Need a pain medication refill       DISCHARGE SUMMARY from Nurse    The following personal items collected during your admission are returned to you:   Dental Appliance: Dental Appliances: None  Vision: Visual Aid: None  Hearing Aid:    Jewelry:    Clothing: Clothing: Pants, Shirt  Other Valuables: Other Valuables: Cell Phone  Valuables sent to safe:      PATIENT INSTRUCTIONS:    After general anesthesia or intravenous sedation, for 24 hours or while taking prescription Narcotics:  · Limit your activities  · Do not drive and operate hazardous machinery  · Do not make important personal or business decisions  · Do  not drink alcoholic beverages  · If you have not urinated within 8 hours after discharge, please contact your surgeon on call.     Report the following to your surgeon:  · Excessive pain, swelling, redness or odor of or around the surgical area  · Temperature over 101  · Nausea and vomiting lasting longer than 4 hours or if unable to take medications  · Any signs of decreased circulation or nerve impairment to extremity: change in color, persistent  numbness, tingling, coldness or increase pain  · Any questions, call office @ [phone]      Keep scheduled follow up appointment. If need to change, call office @ [phone]. *  Please give a list of your current medications to your Primary Care Provider. *  Please update this list whenever your medications are discontinued, doses are      changed, or new medications (including over-the-counter products) are added. *  Please carry medication information at all times in case of emergency situations. Patient Education        Hip Replacement Surgery (Posterior): What to Expect at Home  Your Recovery  Hip replacement surgery replaces the worn parts of your hip joint. When you leave the hospital, you will probably be walking with crutches or a walker. You may be able to climb a few stairs and get in and out of bed and chairs. But you will need someone to help you at home for the next few weeks or until you have more energy and can move around better. If you need more extensive rehab, you may go to a specialized rehab center for more treatment. You will go home with a bandage and stitches, staples, tissue glue, or tape strips. You can remove the bandage when your doctor tells you to. If you have stitches or staples, your doctor will remove them 10 days to 3 weeks after your surgery. Glue or tape strips will fall off on their own over time. You may still have some mild pain, and the area may be swollen for 3 to 4 months after surgery. Your doctor will give you medicine for the pain. You will continue the rehabilitation program (rehab) you started in the hospital. The better you do with your rehab exercises, the sooner you will get your strength and movement back.  Most people are able to return to work 4 weeks to 4 months after surgery. This care sheet gives you a general idea about how long it will take for you to recover. But each person recovers at a different pace. Follow the steps below to get better as quickly as possible. How can you care for yourself at home? Activity    · Your doctor may not want your affected leg to cross the center of your body toward the other leg. If so, your therapist may suggest these ideas:  ? Do not cross your legs. ? Be very careful as you get in or out of bed or a car, so your leg does not cross that imaginary line in the middle of your body.     · Rest when you feel tired. You may take a nap, but do not stay in bed all day.     · Work with your physical therapist to learn the best way to exercise. You will probably have to use crutches or a walker for at least 4 to 6 weeks.     · Your doctor may advise you to stay away from activities that put stress on the joint. This includes sports such as tennis, football, and jogging.     · Try not to sit for too long at one time. You will feel less stiff if you take a short walk about every hour. When you sit, use chairs with arms, and do not sit in low chairs.     · Do not bend over more than 90 degrees (like the angle in a letter \"L\").     · Sleep on your back with your legs slightly apart or on your side with a pillow between your knees for about 6 weeks or as your doctor tells you. Do not sleep on your stomach or affected leg.     · Ask your doctor when you can drive again.     · Most people are able to return to work 4 weeks to 4 months after surgery.     · Ask your doctor when it is okay for you to have sex. Diet    · By the time you leave the hospital, you will probably be eating your normal diet. If your stomach is upset, try bland, low-fat foods like plain rice, broiled chicken, toast, and yogurt.  Your doctor may recommend that you take iron and vitamin supplements.     · Drink plenty of fluids (unless your doctor tells you not to).   · Eat healthy foods, and watch your portion sizes. Try to stay at your ideal weight. Too much weight puts more stress on your new hip joint.     · You may notice that your bowel movements are not regular right after your surgery. This is common. Try to avoid constipation and straining with bowel movements. You may want to take a fiber supplement every day. If you have not had a bowel movement after a couple of days, ask your doctor about taking a mild laxative. Medicines    · Your doctor will tell you if and when you can restart your medicines. He or she will also give you instructions about taking any new medicines.     · If you take blood thinners, such as warfarin (Coumadin), clopidogrel (Plavix), or aspirin, be sure to talk to your doctor. He or she will tell you if and when to start taking those medicines again. Make sure that you understand exactly what your doctor wants you to do.     · Your doctor may give you a blood-thinning medicine to prevent blood clots. If you take a blood thinner, be sure you get instructions about how to take your medicine safely. Blood thinners can cause serious bleeding problems. This medicine could be in pill form or as a shot (injection). If a shot is necessary, your doctor will tell you how to do this.     · Be safe with medicines. Take pain medicines exactly as directed. ? If the doctor gave you a prescription medicine for pain, take it as prescribed. ? If you are not taking a prescription pain medicine, ask your doctor if you can take an over-the-counter medicine.     · If you think your pain medicine is making you sick to your stomach:  ? Take your medicine after meals (unless your doctor has told you not to). ? Ask your doctor for a different pain medicine.     · If your doctor prescribed antibiotics, take them as directed. Do not stop taking them just because you feel better.  You need to take the full course of antibiotics. Incision care    · If your doctor told you how to care for your cut (incision), follow your doctor's instructions. You will have a dressing over the cut. A dressing helps the incision heal and protects it. Your doctor will tell you how to take care of this.     · If you did not get instructions, follow this general advice:  ? If you have strips of tape on the cut the doctor made, leave the tape on for a week or until it falls off.  ? If you have stitches or staples, your doctor will tell you when to come back to have them removed. ? If you have skin adhesive on the cut, leave it on until it falls off. Skin adhesive is also called glue or liquid stitches. ? Change the bandage every day. ? Wash the area daily with warm water, and pat it dry. Don't use hydrogen peroxide or alcohol. They can slow healing. ? You may cover the area with a gauze bandage if it oozes fluid or rubs against clothing. ? You may shower 24 to 48 hours after surgery. Pat the incision dry. Don't swim or take a bath for the first 2 weeks, or until your doctor tells you it is okay. Exercise    · Your rehab program will include a number of exercises to do. Always do them as your therapist tells you. Ice and elevation    · For pain, put ice or a cold pack on the area for 10 to 20 minutes at a time. Put a thin cloth between the ice and your skin.     · Your ankle may swell for about 3 months. Prop up your ankle when you ice it or anytime you sit or lie down. Try to keep it above the level of your heart. This will help reduce swelling. Other instructions   Continue to wear your support stockings as your doctor says. These help to prevent blood clots. The length of time that you will have to wear them depends on your activity level and the amount of swelling you have. Most people wear these stockings for 4 to 6 weeks after surgery.   Preventing falls is also very important.  To prevent falls:    · Arrange furniture so that you will not trip on it.     · Get rid of throw rugs, and move electrical cords out of the way.     · Walk only in areas with plenty of light.     · Put grab bars in showers and bathtubs.     · Avoid icy or snowy sidewalks.     · Wear shoes with sturdy, flat soles. Follow-up care is a key part of your treatment and safety. Be sure to make and go to all appointments, and call your doctor if you are having problems. It's also a good idea to know your test results and keep a list of the medicines you take. When should you call for help? Call 911 anytime you think you may need emergency care. For example, call if:    · You passed out (lost consciousness).     · You have severe trouble breathing.     · You have sudden chest pain and shortness of breath, or you cough up blood.    Call your doctor now or seek immediate medical care if:    · You have signs that your hip may be dislocated, including:  ? Severe pain and not being able to stand. ? A crooked leg that looks like your hip is out of position. ? Not being able to bend or straighten your leg.     · Your leg or foot is cool or pale or changes color.     · You cannot feel or move your leg.     · You have signs of a blood clot, such as:  ? Pain in your calf, back of the knee, thigh, or groin. ? Redness and swelling in your leg or groin.     · Your incision comes open and begins to bleed, or the bleeding increases.     · You feel like your heart is racing or beating irregularly.     · You have signs of infection, such as:  ? Increased pain, swelling, warmth, or redness. ? Red streaks leading from the incision. ? Pus draining from the incision. ? A fever.    Watch closely for changes in your health, and be sure to contact your doctor if:    · You do not have a bowel movement after taking a laxative.     · You do not get better as expected. Where can you learn more? Go to http://evelio-noris.info/.   Enter O267 in the search box to learn more about \"Hip Replacement Surgery (Posterior): What to Expect at Home. \"  Current as of: June 26, 2019  Content Version: 12.2  © 9313-6873 [employer]. Care instructions adapted under license by [employer] (which disclaims liability or warranty for this information). If you have questions about a medical condition or this instruction, always ask your healthcare professional. [address] any warranty or liability for your use of this information. These are general instructions for a healthy lifestyle:    No smoking/ No tobacco products/ Avoid exposure to second hand smoke    Surgeon General's Warning:  Quitting smoking now greatly reduces serious risk to your health. Obesity, smoking, and sedentary lifestyle greatly increases your risk for illness    A healthy diet, regular physical exercise & weight monitoring are important for maintaining a healthy lifestyle    You may be retaining fluid if you have a history of heart failure or if you experience any of the following symptoms:  Weight gain of 3 pounds or more overnight or 5 pounds in a week, increased swelling in our hands or feet or shortness of breath while lying flat in bed. Please call your doctor as soon as you notice any of these symptoms; do not wait until your next office visit. Recognize signs and symptoms of STROKE:    F-face looks uneven    A-arms unable to move or move even    S-speech slurred or non-existent    T-time-call 911 as soon as signs and symptoms begin-DO NOT go       Back to bed or wait to see if you get better-TIME IS BRAIN. The discharge information has been reviewed with the patient. The patient verbalized understanding. Information obtained by :  I understand that if any problems occur once I am at home I am to contact my physician. I understand and acknowledge receipt of the instructions indicated above. Physician's or R.N.'s Signature                                                                  Date/Time                                                                                                                                              Patient or Representative Signature                                                          Date/Time

## 2020-02-12 NOTE — PROGRESS NOTES
Problem: Mobility Impaired (Adult and Pediatric)  Goal: *Acute Goals and Plan of Care (Insert Text)  Description  GOALS (1-4 days):  (1.)Ms. Corin Borden will move from supine to sit and sit to supine  in bed with STAND BY ASSIST.    (2.)Ms. Corin Borden will transfer from bed to chair and chair to bed with STAND BY ASSIST using the least restrictive device. (3.)Ms. Corin Borden will ambulate with STAND BY ASSIST for 250 feet with the least restrictive device. (4.)Ms. Corin Borden will ambulate up/down 3 steps with bilateral  railing with CONTACT GUARD ASSIST with no device. (5.)Ms. Corin Borden will state/observe LAUREEN precautions with 0 verbal cues. ________________________________________________________________________________________________     Outcome: Progressing Towards Goal     PHYSICAL THERAPY JOINT CAMP LAUREEN: Daily Note, Treatment Day: 1st and PM 2/12/2020  INPATIENT: Hospital Day: 2  [place] / Product Type: Medicare /      NAME/AGE/GENDER: Antoinette Ochoa is a 68 y.o. female   PRIMARY DIAGNOSIS:  Unilateral osteoarthritis of hip, right [M16.11]   Procedure(s) and Anesthesia Type:     * HIP ARTHROPLASTY TOTAL/ RIGHT/ DEPUY ANCEF 2gm / VANCOMYCIN - Spinal (Right)  ICD-10: Treatment Diagnosis:    · Pain in Right Hip (M25.551)  · Stiffness of Right Hip, Not elsewhere classified (M25.651)  · Difficulty in walking, Not elsewhere classified (R26.2)      ASSESSMENT:     Ms. Corin Borden presents with decreased strength and range of motion right lower extremity and with decreased independence with functional mobility s/p right LAUREEN. Pt will benefit from skilled PT interventions to maximize independence with functional mobility and LAUREEN management. 2/12/20 PM:  Pt sitting up in chair on arrival. She is doing better with gait, but still limited due to back pain.        This section established at most recent assessment   PROBLEM LIST (Impairments causing functional limitations):  1. Decreased Strength  2. Decreased ADL/Functional Activities  3. Decreased Transfer Abilities  4. Decreased Ambulation Ability/Technique  5. Increased Pain  6. Edema/Girth  7. Decreased Knowledge of Precautions  8. Decreased [place] with Home Exercise Program   INTERVENTIONS PLANNED: (Benefits and precautions of physical therapy have been discussed with the patient.)  1. Cold  2. bed mobility  3. gait training  4. home exercise program (HEP)  5. Range of Motion: active/assisted/passive  6. Therapeutic Activities  7. therapeutic exercise/strengthening  8. transfer training  9. Group Therapy     TREATMENT PLAN: Frequency/Duration: Follow patient BID for duration of hospital stay to address above goals. Rehabilitation Potential For Stated Goals: Good     RECOMMENDED REHABILITATION/EQUIPMENT: (at time of discharge pending progress): Continue Skilled Therapy and Home Health: Physical Therapy. HISTORY:   History of Present Injury/Illness (Reason for Referral):  Pt s/p right LAUREEN on 2/11/2020  Past Medical History/Comorbidities:   Ms. Soraida Muñoz  has a past medical history of Anxiety, Arthritis, Autoimmune disease ([address] CAD (coronary artery disease) (2010), Chest pain, Chronic pain, Depression, Family history of ischemic heart disease, H/O acute pancreatitis (08/2013), H/O gastric bypass (2009), High cholesterol, echocardiogram (01/18/2019), Hypertension, Hypothyroidism, Lichen sclerosus, Obesity (BMI 30-39.9), Osteopenia, Pancreatitis, Systolic murmur, and Ventral hernia.  She also has no past medical history of Arrhythmia, Asthma, Cancer ([address] Chronic kidney disease, Chronic obstructive pulmonary disease ([address] Diabetes ([address] Difficult intubation, GERD (gastroesophageal reflux disease), Heart failure ([address] Liver disease, Malignant hyperthermia due to anesthesia, Nausea & vomiting, Pseudocholinesterase deficiency, PUD (peptic ulcer disease), Seizures ([address] Sleep apnea, Stroke ([facility] Thromboembolus ([facility] or Unspecified adverse effect of anesthesia. Ms. Madhavi Duarte  has a past surgical history that includes hx bladder suspension; hx carpal tunnel release (Right); hx lysis of adhesions; hx appendectomy (1954); hx colonoscopy (2017); hx endoscopy (2018); hx heart catheterization (2010); hx hysterectomy (1978); hx oophorectomy (1984); hx lumbar fusion (1996); hx urological; hx cataract removal (Bilateral, 2015); hx lap cholecystectomy (7/2013); hx gastric bypass (2009); hx hernia repair (8/14/14); hx other surgical (2017); hx shoulder arthroscopy (Right, 2011); hx orthopaedic (Right, 1997); hx orthopaedic (Left, 2017); ir biliary drn cath plc perc int/ext si (2/4/2019); ir biliary drn cath exchange perc any to ext si (3/7/2019); ir biliary drn cath exchange perc any to ext si (4/3/2019); and hx heart catheterization (08/2019).   Social History/Living Environment:   Home Environment: Private residence  # Steps to Enter: 9  Hand Rails : Bilateral  One/Two Story Residence: Two story  # of Interior Steps: 10  Interior Rails: Right  Living Alone: No  Support Systems: Spouse/Significant Other/Partner  Patient Expects to be Discharged to[de-identified] Private residence  Current DME Used/Available at Home: Cane, straight  Tub or Shower Type: Shower  Prior Level of Function/Work/Activity:  Pt living at home, independent with gait and ADLs   Number of Personal Factors/Comorbidities that affect the Plan of Care: 0: LOW COMPLEXITY   EXAMINATION:   Most Recent Physical Functioning:                            Bed Mobility  Supine to Sit: Contact guard assistance;Minimum assistance    Transfers  Sit to Stand: Contact guard assistance  Stand to Sit: Contact guard assistance  Bed to Chair: Contact guard assistance    Balance  Sitting: Intact  Standing: With support              Weight Bearing Status  Right Side Weight Bearing: As tolerated  Distance (ft): 50 Feet (ft)  Ambulation - Level of Assistance: Contact guard assistance  Assistive Device: Walker, rolling  Speed/Princess: Delayed;Pace decreased (<100 feet/min)  Step Length: Left shortened;Right shortened  Stance: Right decreased  Gait Abnormalities: Antalgic;Decreased step clearance  Interventions: Safety awareness training;Verbal cues     Braces/Orthotics: none    Right Hip Cold  Type: Cold/ice packs      Body Structures Involved:  1. Joints  2. Muscles Body Functions Affected:  1. Movement Related Activities and Participation Affected:  1. Mobility  2. Self Care   Number of elements that affect the Plan of Care: 4+: HIGH COMPLEXITY   CLINICAL PRESENTATION:   Presentation: Stable and uncomplicated: LOW COMPLEXITY   CLINICAL DECISION MAKIN[address] [place] [address] AM-PAC 6 Clicks   Basic Mobility Inpatient Short Form  How much difficulty does the patient currently have. .. Unable A Lot A Little None   1. Turning over in bed (including adjusting bedclothes, sheets and blankets)? [] 1   [] 2   [x] 3   [] 4   2. Sitting down on and standing up from a chair with arms ( e.g., wheelchair, bedside commode, etc.)   [] 1   [] 2   [x] 3   [] 4   3. Moving from lying on back to sitting on the side of the bed? [] 1   [] 2   [x] 3   [] 4   How much help from another person does the patient currently need. .. Total A Lot A Little None   4. Moving to and from a bed to a chair (including a wheelchair)? [] 1   [] 2   [x] 3   [] 4   5. Need to walk in hospital room? [] 1   [] 2   [x] 3   [] 4   6. Climbing 3-5 steps with a railing? [] 1   [] 2   [x] 3   [] 4   © , Trustees of [address] [place] [address], under license to [employer]. All rights reserved     Score:  Initial: 18 Most Recent: X (Date: -- )    Interpretation of Tool:  Represents activities that are increasingly more difficult (i.e. Bed mobility, Transfers, Gait).     Medical Necessity:     · Patient is expected to demonstrate progress in   · strength, range of motion, and functional technique  ·  to · decrease assistance required with functional mobility and LAUREEN exercises   · . Reason for Services/Other Comments:  · Patient continues to require skilled intervention due to   · Inability to complete functional mobility and LAUREEN exercises independently   · . Use of outcome tool(s) and clinical judgement create a POC that gives a: Clear prediction of patient's progress: LOW COMPLEXITY            TREATMENT:   (In addition to Assessment/Re-Assessment sessions the following treatments were rendered)     Pre-treatment Symptoms/Complaints:  Pt still with pain in the hip  Pain Initial:  Not rated      Post Session: not rated     Gait Training (15 Minutes):  Gait training to improve and/or restore physical functioning as related to mobility, strength and balance. Ambulated 50 Feet (ft) with Contact guard assistance using a Walker, rolling and minimal Safety awareness training;Verbal cues related to their stance phase and stride length to promote proper body alignment, promote proper body posture and promote proper body mechanics. Therapeutic Exercise: (45 Minutes(group)):  Exercises per grid below to improve mobility and strength. Required minimal verbal and manual cues to promote proper body alignment and promote proper body posture. Progressed repetitions as indicated. Date:  2/11 Date:  2/12/20 Date:     ACTIVITY/EXERCISE AM PM AM PM AM PM   GROUP THERAPY  []  []  []  [x]  []  []   Ankle Pumps  10 10 15     Quad Sets  10 10 15     Gluteal Sets  10 10 15     Hip ABd/ADduction  10 10 15     Straight Leg Raises         Knee Slides  10 10 15     Short Arc Quads    15     Long Arc Quads   10 15     Chair Slides                  B = bilateral; AA = active assistive; A = active; P = passive      Treatment/Session Assessment:     Response to Treatment:   Pt feeling a little better this afternoon.       Education:  [x] Home Exercises  [x] Fall Precautions  [] Hip Precautions [] D/C Instruction Review  [] Hip Prosthesis Review  [] Walker Management/Safety [] Adaptive Equipment as Needed       Interdisciplinary Collaboration:   o Physical Therapy Assistant  o Registered Nurse    After treatment position/precautions:   o Up in chair  o Bed/Chair-wheels locked  o Caregiver at bedside  o Call light within reach    Compliance with Program/Exercises: Compliant all of the time, Will assess as treatment progresses. Recommendations/Intent for next treatment session:  Treatment next visit will focus on increasing Ms. Curly Fitzpatrick independence with bed mobility, transfers, gait training, strength/ROM exercises, modalities for pain, and patient education.       Total Treatment Duration:  PT Patient Time In/Time Out  Time In: 1300  Time Out: [address] [place] Brad, PTA

## 2020-02-12 NOTE — PROGRESS NOTES
Problem: Self Care Deficits Care Plan (Adult)  Goal: *Acute Goals and Plan of Care (Insert Text)  Description  GOALS:   DISCHARGE GOALS (in preparation for going home/rehab):  3 days  1. Ms. Ryann Sheppard will perform one lower body dressing activity with minimal assistance with adaptive equipment to demonstrate improved functional mobility and safety. 2.  Ms. Ryann Sheppard will perform one lower body bathing activity with minimal  assistance with adaptive equipment to demonstrate improved functional mobility and safety. 3.  Ms. Ryann Sheppard will perform toileting/toilet transfer with contact guard assistance with adaptive equipment to demonstrate improved functional mobility and safety. 4.  Ms. Ryann Sheppard will perform shower transfer with contact guard assistance with adaptive equipment to demonstrate improved functional mobility and safety. 5.  Ms. Ryann Sheppard will state LAUREEN precautions with two verbal cues to demonstrate improved functional mobility and safety. JOINT CAMP OCCUPATIONAL THERAPY LAUREEN: Daily Note, Treatment Day: 2nd and AM 2/12/2020  INPATIENT: Hospital Day: 2  [place] / Product Type: Medicare /      NAME/AGE/GENDER: Sandeep Yao is a 68 y.o. female   PRIMARY DIAGNOSIS:  Unilateral osteoarthritis of hip, right [M16.11]   Procedure(s) and Anesthesia Type:     * HIP ARTHROPLASTY TOTAL/ RIGHT/ DEPUY ANCEF 2gm / VANCOMYCIN - Spinal (Right)  ICD-10: Treatment Diagnosis:    · Pain in Right Hip (M25.551)  · Stiffness of Right Hip, Not elsewhere classified (M25.651)      ASSESSMENT:     Ms. Ryann Sheppard is s/p Right LAUREEN and presents with decreased weight bearing on R LE and decreased independence with functional mobility and activities of daily living as compared to prior level of function and safety.   Patient would benefit from skilled Occupational Therapy to maximize independence and safety with self-care task and functional mobility. Pt would also benefit from education on lower body adaptive equipment and hip precautions post-surgery in preparation for going home. Patient able to don gown at edge of bed. SPT from bed to recliner using a rolling walker. Should progress well with ADL's tomorrow. 2/12/2020 910am Patient supine in bed, encouraged her to get oob. She transferred supine to sit with min/CGA and transferred to recliner with CGA. She reported 9/10 pain. She was edcuated on OT POC and all needs in reach. Ot to return later this am for full ADL session. This section established at most recent assessment   PROBLEM LIST (Impairments causing functional limitations):  1. Decreased Strength  2. Decreased ADL/Functional Activities  3. Decreased Transfer Abilities  4. Increased Pain  5. Increased Fatigue  6. Decreased Flexibility/Joint Mobility  7. Decreased Knowledge of Precautions   INTERVENTIONS PLANNED: (Benefits and precautions of occupational therapy have been discussed with the patient.)  1. Activities of daily living training  2. Adaptive equipment training  3. Balance training  4. Clothing management  5. Donning&doffing training  6. Theraputic activity     TREATMENT PLAN: Frequency/Duration: Follow patient 1-2tx to address above goals. Rehabilitation Potential For Stated Goals: Good     RECOMMENDED REHABILITATION/EQUIPMENT: (at time of discharge pending progress): Continue Skilled Therapy. OCCUPATIONAL PROFILE AND HISTORY:   History of Present Injury/Illness (Reason for Referral): Pt presents this date s/p (Right) LAUREEN.     Past Medical History/Comorbidities:   Ms. Ryann Sheppard  has a past medical history of Anxiety, Arthritis, Autoimmune disease ([facility] CAD (coronary artery disease) (2010), Chest pain, Chronic pain, Depression, Family history of ischemic heart disease, H/O acute pancreatitis (08/2013), H/O gastric bypass (2009), High cholesterol, echocardiogram (01/18/2019), Hypertension, Hypothyroidism, Lichen sclerosus, Obesity (BMI 30-39.9), Osteopenia, Pancreatitis, Systolic murmur, and Ventral hernia. She also has no past medical history of Arrhythmia, Asthma, Cancer ([address] Chronic kidney disease, Chronic obstructive pulmonary disease ([address] Diabetes ([address] Difficult intubation, GERD (gastroesophageal reflux disease), Heart failure ([address] Liver disease, Malignant hyperthermia due to anesthesia, Nausea & vomiting, Pseudocholinesterase deficiency, PUD (peptic ulcer disease), Seizures ([address] Sleep apnea, Stroke ([address] Thromboembolus ([address] or Unspecified adverse effect of anesthesia. Ms. Tahir Castellanos  has a past surgical history that includes hx bladder suspension; hx carpal tunnel release (Right); hx lysis of adhesions; hx appendectomy (1954); hx colonoscopy (2017); hx endoscopy (2018); hx heart catheterization (2010); hx hysterectomy (1978); hx oophorectomy (1984); hx lumbar fusion (1996); hx urological; hx cataract removal (Bilateral, 2015); hx lap cholecystectomy (7/2013); hx gastric bypass (2009); hx hernia repair (8/14/14); hx other surgical (2017); hx shoulder arthroscopy (Right, 2011); hx orthopaedic (Right, 1997); hx orthopaedic (Left, 2017); ir biliary drn cath plc perc int/ext si (2/4/2019); ir biliary drn cath exchange perc any to ext si (3/7/2019); ir biliary drn cath exchange perc any to ext si (4/3/2019); and hx heart catheterization (08/2019). Social History/Living Environment:   Home Environment: Private residence  # Steps to Enter: 9  Hand Rails : Bilateral  One/Two Story Residence: Two story  # of Interior Steps: 10  Interior Rails: Right  Living Alone: No  Support Systems: Spouse/Significant Other/Partner  Patient Expects to be Discharged to[de-identified] Private residence  Current DME Used/Available at Home: Gary Re, straight  Tub or Shower Type: Shower  Prior Level of Function/Work/Activity:  Independent prior.       Number of Personal Factors/Comorbidities that affect the Plan of Care: Brief history (0):  LOW COMPLEXITY   ASSESSMENT OF OCCUPATIONAL PERFORMANCE[de-identified]   Most Recent Physical Functioning:   Balance  Sitting: Intact  Standing: With support                              Mental Status  Neurologic State: Alert; Appropriate for age  Orientation Level: Appropriate for age  Cognition: Appropriate decision making; Appropriate for age attention/concentration; Appropriate safety awareness; Follows commands  Perception: Appears intact  Perseveration: No perseveration noted  Safety/Judgement: Awareness of environment; Fall prevention                Basic ADLs (From Assessment) Complex ADLs (From Assessment)   Basic ADL  Feeding: Supervision  Oral Facial Hygiene/Grooming: Supervision  Bathing: Minimum assistance, Moderate assistance  Upper Body Dressing: Supervision  Lower Body Dressing: Moderate assistance  Toileting: Minimum assistance     Grooming/Bathing/Dressing Activities of Daily Living     Cognitive Retraining  Safety/Judgement: Awareness of environment; Fall prevention                       Bed/Mat Mobility  Supine to Sit: Contact guard assistance;Minimum assistance  Sit to Stand: Contact guard assistance  Stand to Sit: Contact guard assistance  Bed to Chair: Contact guard assistance         Physical Skills Involved:  1. Range of Motion  2. Balance  3. Strength Cognitive Skills Affected (resulting in the inability to perform in a timely and safe manner):  1. [facility]  Psychosocial Skills Affected:  1. WFL    Number of elements that affect the Plan of Care: 1-3:  LOW COMPLEXITY   CLINICAL DECISION MAKIN [facility] [address] [address] AM-PAC 6 Clicks   Daily Activity Inpatient Short Form  How much help from another person does the patient currently need. .. Total A Lot A Little None   1. Putting on and taking off regular lower body clothing? [] 1   [x] 2   [] 3   [] 4   2. Bathing (including washing, rinsing, drying)? [] 1   [x] 2   [] 3   [] 4   3.   Toileting, which includes using toilet, bedpan or urinal? [] 1   [x] 2   [] 3   [] 4   4. Putting on and taking off regular upper body clothing? [] 1   [] 2   [] 3   [x] 4   5. Taking care of personal grooming such as brushing teeth? [] 1   [] 2   [] 3   [x] 4   6. Eating meals? [] 1   [] 2   [] 3   [x] 4   © 2007, Trustees of [address] [place] [address] [address] [address], under license to [employer]. All rights reserved     Score:  Initial: 18 Most Recent: X (Date: -- )    Interpretation of Tool:  Represents activities that are increasingly more difficult (i.e. Bed mobility, Transfers, Gait). Medical Necessity:     · Skilled intervention continues to be required due to Deficits noted above. Reason for Services/Other Comments:  · Patient continues to require skilled intervention due to   · New LAUREEN   · . Use of outcome tool(s) and clinical judgement create a POC that gives a: MODERATE COMPLEXITY            TREATMENT:   (In addition to Assessment/Re-Assessment sessions the following treatments were rendered)     Pre-treatment Symptoms/Complaints:    Pain: Initial:   Pain Intensity 1: 9  Pain Location 1: Hip  Pain Orientation 1: Right  Pain Intervention(s) 1: Ambulation/Increased Activity  Post Session:  9/10 rest      Self Care: (13): Procedure(s) (per grid) utilized to improve and/or restore self-care/home management as related to functional transfers and ADL task performance. Required minimal verbal and tactile cueing to facilitate activities of daily living skills. Treatment/Session Assessment:     Response to Treatment:  Good, sitting up in recliner.     Education:  [] Home Exercises  [x] Fall Precautions  [x] Hip Precautions [] Going Home Video  [] Knee/Hip Prosthesis Review  [x] Walker Management/Safety [x] Adaptive Equipment as Needed       Interdisciplinary Collaboration:   o Occupational Therapist  o Registered Nurse    After treatment position/precautions:   o Up in chair  o Bed/Chair-wheels locked  o Call light within reach  o RN notified     Compliance with Program/Exercises: Compliant all of the time. Recommendations/Intent for next treatment session:  Treatment next visit will focus on increasing Ms. Ilean Bailey independence with bed mobility, transfers, self care, functional mobility, modalities for pain, and patient education.       Total Treatment Duration:13  OT Patient Time In/Time Out  Time In: 4631  Time Out: [address], OT

## 2020-02-12 NOTE — PROGRESS NOTES
Problem: Self Care Deficits Care Plan (Adult)  Goal: *Acute Goals and Plan of Care (Insert Text)  Description  GOALS:   DISCHARGE GOALS (in preparation for going home/rehab):  3 days  1. Ms. Naseem Rand will perform one lower body dressing activity with minimal assistance with adaptive equipment to demonstrate improved functional mobility and safety. PROGRESSING  2. Ms. Naseem Rand will perform one lower body bathing activity with minimal  assistance with adaptive equipment to demonstrate improved functional mobility and safety. PROGRESSING  3. Ms. Naseem Rand will perform toileting/toilet transfer with contact guard assistance with adaptive equipment to demonstrate improved functional mobility and safety. 4.  Ms. Naseem Rand will perform shower transfer with contact guard assistance with adaptive equipment to demonstrate improved functional mobility and safety. 5.  Ms. Naseem Rand will state LAUREEN precautions with two verbal cues to demonstrate improved functional mobility and safety. JOINT CAMP OCCUPATIONAL THERAPY LAUREEN: Daily Note, Treatment Day: 3rd and AM 2/12/2020  INPATIENT: Hospital Day: 2  [place] / [place] / Product Type: Medicare /      NAME/AGE/GENDER: Joseph Serrano is a 68 y.o. female   PRIMARY DIAGNOSIS:  Unilateral osteoarthritis of hip, right [M16.11]   Procedure(s) and Anesthesia Type:     * HIP ARTHROPLASTY TOTAL/ RIGHT/ DEPUY ANCEF 2gm / VANCOMYCIN - Spinal (Right)  ICD-10: Treatment Diagnosis:    · Pain in Right Hip (M25.551)  · Stiffness of Right Hip, Not elsewhere classified (M25.651)      ASSESSMENT:     Ms. Naseem Rand is s/p Right LAUREEN and presents with decreased weight bearing on R LE and decreased independence with functional mobility and activities of daily living as compared to prior level of function and safety.   Patient would benefit from skilled Occupational Therapy to maximize independence and safety with self-care task and functional mobility. Pt would also benefit from education on lower body adaptive equipment and hip precautions post-surgery in preparation for going home. Patient able to don gown at edge of bed. SPT from bed to recliner using a rolling walker. Should progress well with ADL's tomorrow. 2/12/2020 910am Patient supine in bed, encouraged her to get oob. She transferred supine to sit with min/CGA and transferred to recliner with CGA. She reported 9/10 pain. She was edcuated on OT POC and all needs in reach. Ot to return later this am for full ADL session. 2/12/2020 1045am Just finished with PT session feeling nauseous. She wanted to sponge bath. Sponge bath completed and dressing as charted below. She stood to wash steffany areas and complete clothing management. She is sitting up in recliner. OT to return in am for full ADL session to include shower. All needs in reach. This section established at most recent assessment   PROBLEM LIST (Impairments causing functional limitations):  1. Decreased Strength  2. Decreased ADL/Functional Activities  3. Decreased Transfer Abilities  4. Increased Pain  5. Increased Fatigue  6. Decreased Flexibility/Joint Mobility  7. Decreased Knowledge of Precautions   INTERVENTIONS PLANNED: (Benefits and precautions of occupational therapy have been discussed with the patient.)  1. Activities of daily living training  2. Adaptive equipment training  3. Balance training  4. Clothing management  5. Donning&doffing training  6. Theraputic activity     TREATMENT PLAN: Frequency/Duration: Follow patient 1-2tx to address above goals. Rehabilitation Potential For Stated Goals: Good     RECOMMENDED REHABILITATION/EQUIPMENT: (at time of discharge pending progress): Continue Skilled Therapy. OCCUPATIONAL PROFILE AND HISTORY:   History of Present Injury/Illness (Reason for Referral): Pt presents this date s/p (Right) LAUREEN.     Past Medical History/Comorbidities:   Ms. Madhavi Duarte  has a past medical history of Anxiety, Arthritis, Autoimmune disease ([address] CAD (coronary artery disease) (2010), Chest pain, Chronic pain, Depression, Family history of ischemic heart disease, H/O acute pancreatitis (08/2013), H/O gastric bypass (2009), High cholesterol, echocardiogram (01/18/2019), Hypertension, Hypothyroidism, Lichen sclerosus, Obesity (BMI 30-39.9), Osteopenia, Pancreatitis, Systolic murmur, and Ventral hernia. She also has no past medical history of Arrhythmia, Asthma, Cancer ([address] Chronic kidney disease, Chronic obstructive pulmonary disease ([address] Diabetes ([address] Difficult intubation, GERD (gastroesophageal reflux disease), Heart failure ([address] Liver disease, Malignant hyperthermia due to anesthesia, Nausea & vomiting, Pseudocholinesterase deficiency, PUD (peptic ulcer disease), Seizures ([address] Sleep apnea, Stroke ([address] Thromboembolus ([address] or Unspecified adverse effect of anesthesia. Ms. Madhavi Duarte  has a past surgical history that includes hx bladder suspension; hx carpal tunnel release (Right); hx lysis of adhesions; hx appendectomy (1954); hx colonoscopy (2017); hx endoscopy (2018); hx heart catheterization (2010); hx hysterectomy (1978); hx oophorectomy (1984); hx lumbar fusion (1996); hx urological; hx cataract removal (Bilateral, 2015); hx lap cholecystectomy (7/2013); hx gastric bypass (2009); hx hernia repair (8/14/14); hx other surgical (2017); hx shoulder arthroscopy (Right, 2011); hx orthopaedic (Right, 1997); hx orthopaedic (Left, 2017); ir biliary drn cath plc perc int/ext si (2/4/2019); ir biliary drn cath exchange perc any to ext si (3/7/2019); ir biliary drn cath exchange perc any to ext si (4/3/2019); and hx heart catheterization (08/2019).   Social History/Living Environment:   Home Environment: Private residence  # Steps to Enter: 9  Hand Rails : Bilateral  One/Two Story Residence: Two story  # of Interior Steps: [address]: Right  Living Alone: No  Support Systems: Spouse/Significant Other/Partner  Patient Expects to be Discharged to[de-identified] Private residence  Current DME Used/Available at Home: [employer], straight  Tub or Shower Type: Shower  Prior Level of Function/Work/Activity:  Independent prior. Number of Personal Factors/Comorbidities that affect the Plan of Care: Brief history (0):  LOW COMPLEXITY   ASSESSMENT OF OCCUPATIONAL PERFORMANCE[de-identified]   Most Recent Physical Functioning:   Balance  Sitting: Intact  Standing: With support                              Mental Status  Neurologic State: Alert; Appropriate for age  Orientation Level: Appropriate for age  Cognition: Appropriate decision making; Appropriate for age attention/concentration; Appropriate safety awareness; Follows commands  Perception: Appears intact  Perseveration: No perseveration noted  Safety/Judgement: Awareness of environment; Fall prevention                Basic ADLs (From Assessment) Complex ADLs (From Assessment)   Basic ADL  Feeding: Supervision  Oral Facial Hygiene/Grooming: Supervision  Bathing: Minimum assistance, Moderate assistance  Type of Bath: Chlorhexidine (CHG), Bath Pack, Partial  Upper Body Dressing: Supervision  Lower Body Dressing: Moderate assistance  Toileting: Minimum assistance     Grooming/Bathing/Dressing Activities of Daily Living   Grooming  Grooming Assistance: Supervision  Position Performed: Seated in chair  Washing Face: Supervision  Washing Hands: Supervision Cognitive Retraining  Safety/Judgement: Awareness of environment; Fall prevention   Upper Body Bathing  Bathing Assistance: Supervision  Position Performed: Seated in chair     Lower Body Bathing  Bathing Assistance: Moderate assistance  Perineal  : Contact guard assistance  Position Performed: Standing  Adaptive Equipment: Walker  Lower Body :  Moderate assistance  Position Performed: Seated in chair     Upper Body Dressing Assistance  Dressing Assistance: Supervision  Bra: [address]: Supervision  Pullover Shirt: Supervision     Lower Body Dressing Assistance  Dressing Assistance: Moderate assistance  Underpants: Moderate assistance  Pants With Elastic Waist: Moderate assistance Bed/Mat Mobility  Supine to Sit: Contact guard assistance;Minimum assistance  Sit to Stand: Contact guard assistance  Stand to Sit: Contact guard assistance  Bed to Chair: Contact guard assistance         Physical Skills Involved:  1. Range of Motion  2. Balance  3. Strength Cognitive Skills Affected (resulting in the inability to perform in a timely and safe manner):  1. [facility]  Psychosocial Skills Affected:  1. WFL    Number of elements that affect the Plan of Care: 1-3:  LOW COMPLEXITY   CLINICAL DECISION MAKING:   [facility] AM-PAC 6 Clicks   Daily Activity Inpatient Short Form  How much help from another person does the patient currently need. .. Total A Lot A Little None   1. Putting on and taking off regular lower body clothing? [] 1   [x] 2   [] 3   [] 4   2. Bathing (including washing, rinsing, drying)? [] 1   [x] 2   [] 3   [] 4   3. Toileting, which includes using toilet, bedpan or urinal?   [] 1   [x] 2   [] 3   [] 4   4. Putting on and taking off regular upper body clothing? [] 1   [] 2   [] 3   [x] 4   5. Taking care of personal grooming such as brushing teeth? [] 1   [] 2   [] 3   [x] 4   6. Eating meals? [] 1   [] 2   [] 3   [x] 4   © 2007, Trustees of [facility], under license to [employer]. All rights reserved     Score:  Initial: 18 Most Recent: X (Date: -- )    Interpretation of Tool:  Represents activities that are increasingly more difficult (i.e. Bed mobility, Transfers, Gait). Medical Necessity:     · Skilled intervention continues to be required due to Deficits noted above. Reason for Services/Other Comments:  · Patient continues to require skilled intervention due to   · New LAUREEN   · .    Use of outcome tool(s) and clinical judgement create a POC that gives a: MODERATE COMPLEXITY            TREATMENT:   (In addition to Assessment/Re-Assessment sessions the following treatments were rendered)     Pre-treatment Symptoms/Complaints:    Pain: Initial:   Pain Intensity 1: 0  Pain Location 1: Hip  Pain Orientation 1: Right  Pain Intervention(s) 1: Ambulation/Increased Activity  Post Session:  0/10 rest      Self Care: (18): Procedure(s) (per grid) utilized to improve and/or restore self-care/home management as related to dressing, bathing and grooming. Requiredvisual, verbal, manual and tactile cueing to facilitate activities of daily living skills. Treatment/Session Assessment:     Response to Treatment: plan for shower tomorrow    Education:  [] Home Exercises  [x] Fall Precautions  [x] Hip Precautions [] Going Home Video  [] Knee/Hip Prosthesis Review  [x] Walker Management/Safety [x] Adaptive Equipment as Needed       Interdisciplinary Collaboration:   o Physical Therapy Assistant  o Occupational Therapist  o Registered Nurse    After treatment position/precautions:   o Up in chair  o Bed/Chair-wheels locked  o Call light within reach  o RN notified  o Family at bedside     Compliance with Program/Exercises: Compliant all of the time. Recommendations/Intent for next treatment session:  Treatment next visit will focus on increasing Ms. Amalia Kee independence with bed mobility, transfers, self care, functional mobility, modalities for pain, and patient education.       Total Treatment Duration:18  OT Patient Time In/Time Out  Time In: 8788  Time Out: 116 Yakov Colindres,

## 2020-02-12 NOTE — PROGRESS NOTES
2020         Post Op day: 1 Day Post-Op     Admit Date: 2020  Admit Diagnosis: Unilateral osteoarthritis of hip, right [M16.11]  Arthritis of right hip [M16.11]        Subjective: Doing well, No complaints, No SOB, No Chest Pain, No Nausea or Vomiting     Objective:   Vital Signs are Stable, No Acute Distress, Alert and Oriented, Dressing is Dry,  Neurovascular exam is normal.     Assessment / Plan :  Patient Active Problem List   Diagnosis Code    Essential hypertension, benign I10    Dyslipidemia E78.5    S/P coronary artery stent placement (2.75 x 18 Cypher to mLAD on 9-) Z95.5    S/P laparoscopic cholecystectomy Z90.49    Depression F32.9    Primary hypothyroidism E03.9    Chronic back pain M54.9, G89.29    Obesity (BMI 30.0-34. 9) E66.9    Osteoarthritis M19.90    S/P total hip arthroplasty Z96.649    Irritable bowel syndrome with both constipation and diarrhea K58.2    Age-related osteoporosis without current pathological fracture M81.0    Primary insomnia F51.01    Coronary artery disease involving native coronary artery of native heart without angina pectoris I25.10    Choledocholithiasis K80.50    Gallstone pancreatitis K85.10    Hypokalemia E87.6    Hypomagnesemia E83.42    Arthritis of right hip M16.11    H/O total hip arthroplasty, right Z96.641      Patient Vitals for the past 8 hrs:   BP Temp Pulse Resp SpO2   20 0722 101/61 97.7 °F (36.5 °C) (!) 55 18 96 %   20 0348 110/70 97.5 °F (36.4 °C) 61 16 96 %   20 0000 105/63 98.1 °F (36.7 °C) 67 16 95 %    Temp (24hrs), Av.7 °F (36.5 °C), Min:97 °F (36.1 °C), Max:98.3 °F (36.8 °C)    Body mass index is 34.53 kg/m².     Lab Results   Component Value Date/Time    HGB 11.1 (L) 2020 07:47 PM      Pt seen by and discussed with Supervising Physician   Continue PT, current pain meds  Plan for home today        Signed By: PANCHITO Tan

## 2020-02-12 NOTE — DISCHARGE SUMMARY
[facility]  Total Joint Discharge Summary      Patient ID:  Denise Parr  [id number]  89 y.o.  1950    Admit date: 2/11/2020  Discharge date and time: 2-12-20  Admitting Physician: Clarence Marquez MD  Surgeon: Same  Admission Diagnoses: Unilateral osteoarthritis of hip, right [M16.11]  Arthritis of right hip [M16.11]  Discharge Diagnoses: Principal Problem:    H/O total hip arthroplasty, right (2/12/2020)    Active Problems:    Arthritis of right hip (2/11/2020)                                Perioperative Antibiotics: Ancef 1 to 2 mg was given depending on patient's weight. If allergic to Ancef or due to other indications, patient was given Vancomycin. Hospital Medications given:   [unfilled]  [unfilled]  [unfilled]    Discharge Medications given:  Current Discharge Medication List      START taking these medications    Details   oxyCODONE IR (ROXICODONE) 5 mg immediate release tablet Take 1-2 Tabs by mouth every four (4) hours as needed for Pain for up to 7 days. Max Daily Amount: 60 mg.  Qty: 60 Tab, Refills: 0    Associated Diagnoses: H/O total hip arthroplasty, right         CONTINUE these medications which have CHANGED    Details   aspirin delayed-release 81 mg tablet Take 1 Tab by mouth every twelve (12) hours every twelve (12) hours for 30 days. Qty: 60 Tab, Refills: 0         CONTINUE these medications which have NOT CHANGED    Details   valsartan (DIOVAN) 320 mg tablet Take 320 mg by mouth every evening. hydroCHLOROthiazide (HYDRODIURIL) 25 mg tablet Take 25 mg by mouth nightly. Refills: 12      atorvastatin (LIPITOR) 40 mg tablet Take 40 mg by mouth nightly. clobetasol (TEMOVATE) 0.05 % topical cream Apply  to affected area two (2) times a day.   Qty: 15 g, Refills: 11    Associated Diagnoses: Lichen sclerosus      potassium chloride (KLOR-CON) 10 mEq tablet 1 every day for potassium  Qty: 90 Tab, Refills: 12    Associated Diagnoses: Hypokalemia temazepam (RESTORIL) 30 mg capsule Take 1 Cap by mouth nightly as needed. Max Daily Amount: 30 mg.  Qty: 30 Cap, Refills: 5    Associated Diagnoses: Primary insomnia      LORazepam (ATIVAN) 0.5 mg tablet Take 1 Tab by mouth every eight (8) hours as needed for Anxiety. Max Daily Amount: 1.5 mg.  Qty: 30 Tab, Refills: 5    Associated Diagnoses: Depression, unspecified depression type      alendronate (FOSAMAX) 70 mg tablet 1 q week for osteoporosis  Qty: 12 Tab, Refills: 12    Associated Diagnoses: Age-related osteoporosis without current pathological fracture      levothyroxine (SYNTHROID) 75 mcg tablet 1 every day for thyroid  Qty: 90 Tab, Refills: 12    Associated Diagnoses: Primary hypothyroidism      amLODIPine (NORVASC) 5 mg tablet 1 every day for BP  Indications: hypertension  Qty: 90 Tab, Refills: 12    Associated Diagnoses: Essential hypertension, benign      traZODone (DESYREL) 150 mg tablet 1 to 2 qhs for sleep  Qty: 180 Tab, Refills: 12    Associated Diagnoses: Depression, unspecified depression type      sertraline (ZOLOFT) 100 mg tablet Take 1 Tab by mouth nightly. Indications: major depressive disorder  Qty: 90 Tab, Refills: 5    Associated Diagnoses: Depression, unspecified depression type      losartan (COZAAR) 100 mg tablet Take 100 mg by mouth nightly. STOP taking these medications       meloxicam (MOBIC) 15 mg tablet Comments:   Reason for Stopping:         MULTIVITAMIN PO Comments:   Reason for Stopping:                Additional DVT Prophylaxis:  VANDA Hose,Plexi-Pulse    Postoperative transfusions:   none  Post Op complications: none    Hemoglobin at discharge:   Lab Results   Component Value Date/Time    HGB 11.1 (L) 02/11/2020 07:47 PM       Wound appears to be healing without any evidence of infection. Physical Therapy started on the day following surgery and progressed to independent ambulation with the aid of a walker.   At the time of discharge, able to go up and down stairs and had understanding of precautions needed following surgery.       PT/OT:            Assistive Device: Walker (comment)                Discharged to: home    Discharge instructions:  -Rx pain medication given  - Anticoagulate with: Ecotrin 81 mg PO BID x 4 weeks  -Resume pre hospital diet             -Resume home medications per medical continuation form     -Ambulate with walker, appropriate total joint protocol  -Follow up in office as scheduled       Signed:  PANCHITO Estes  2/12/2020  7:46 AM

## 2020-02-12 NOTE — PROGRESS NOTES
Problem: Mobility Impaired (Adult and Pediatric)  Goal: *Acute Goals and Plan of Care (Insert Text)  Description  GOALS (1-4 days):  (1.)Ms. Katelynn Cole will move from supine to sit and sit to supine  in bed with STAND BY ASSIST.    (2.)Ms. Katelynn Cole will transfer from bed to chair and chair to bed with STAND BY ASSIST using the least restrictive device. (3.)Ms. Katelynn Cole will ambulate with STAND BY ASSIST for 250 feet with the least restrictive device. (4.)Ms. Katelynn Cole will ambulate up/down 3 steps with bilateral  railing with CONTACT GUARD ASSIST with no device. (5.)Ms. Katelynn Cole will state/observe LAUREEN precautions with 0 verbal cues. ________________________________________________________________________________________________     Outcome: Progressing Towards Goal     PHYSICAL THERAPY JOINT CAMP LAUREEN: Daily Note, Treatment Day: 1st and AM 2/12/2020  INPATIENT: Hospital Day: 2  [place] / [place] / Product Type: Medicare /      NAME/AGE/GENDER: Nena Gibbons is a 68 y.o. female   PRIMARY DIAGNOSIS:  Unilateral osteoarthritis of hip, right [M16.11]   Procedure(s) and Anesthesia Type:     * HIP ARTHROPLASTY TOTAL/ RIGHT/ DEPUY ANCEF 2gm / VANCOMYCIN - Spinal (Right)  ICD-10: Treatment Diagnosis:    · Pain in Right Hip (M25.551)  · Stiffness of Right Hip, Not elsewhere classified (M25.651)  · Difficulty in walking, Not elsewhere classified (R26.2)      ASSESSMENT:     Ms. Katelynn Cole presents with decreased strength and range of motion right lower extremity and with decreased independence with functional mobility s/p right LAUREEN. Pt will benefit from skilled PT interventions to maximize independence with functional mobility and LAUREEN management. 2/12/20 AM:  Pt sitting up in chair on arrival. She is very sleepy and is not very motivated to participate with therapy. Pt states that even ankle pumps are too hard to do.  Pt is able to work through all exercises with assist and increased time. Pt stood and took a few steps and became very lightheaded and nauseous. She returned to sitting with LE elevated. Pt left sitting up in chair to work with OT. This section established at most recent assessment   PROBLEM LIST (Impairments causing functional limitations):  1. Decreased Strength  2. Decreased ADL/Functional Activities  3. Decreased Transfer Abilities  4. Decreased Ambulation Ability/Technique  5. Increased Pain  6. Edema/Girth  7. Decreased Knowledge of Precautions  8. Decreased [place] with Home Exercise Program   INTERVENTIONS PLANNED: (Benefits and precautions of physical therapy have been discussed with the patient.)  1. Cold  2. bed mobility  3. gait training  4. home exercise program (HEP)  5. Range of Motion: active/assisted/passive  6. Therapeutic Activities  7. therapeutic exercise/strengthening  8. transfer training  9. Group Therapy     TREATMENT PLAN: Frequency/Duration: Follow patient BID for duration of hospital stay to address above goals. Rehabilitation Potential For Stated Goals: Good     RECOMMENDED REHABILITATION/EQUIPMENT: (at time of discharge pending progress): Continue Skilled Therapy and Home Health: Physical Therapy. HISTORY:   History of Present Injury/Illness (Reason for Referral):  Pt s/p right LAUREEN on 2/11/2020  Past Medical History/Comorbidities:   Ms. Lolly Michaels  has a past medical history of Anxiety, Arthritis, Autoimmune disease ([facility] [address] CAD (coronary artery disease) (2010), Chest pain, Chronic pain, Depression, Family history of ischemic heart disease, H/O acute pancreatitis (08/2013), H/O gastric bypass (2009), High cholesterol, echocardiogram (01/18/2019), Hypertension, Hypothyroidism, Lichen sclerosus, Obesity (BMI 30-39.9), Osteopenia, Pancreatitis, Systolic murmur, and Ventral hernia.  She also has no past medical history of Arrhythmia, Asthma, Cancer ([address] Chronic kidney disease, Chronic obstructive pulmonary disease ([address] Diabetes ([address] Difficult intubation, GERD (gastroesophageal reflux disease), Heart failure ([address] Liver disease, Malignant hyperthermia due to anesthesia, Nausea & vomiting, Pseudocholinesterase deficiency, PUD (peptic ulcer disease), Seizures ([address] Sleep apnea, Stroke ([address] Thromboembolus ([address] or Unspecified adverse effect of anesthesia. Ms. Inna Musa  has a past surgical history that includes hx bladder suspension; hx carpal tunnel release (Right); hx lysis of adhesions; hx appendectomy (1954); hx colonoscopy (2017); hx endoscopy (2018); hx heart catheterization (2010); hx hysterectomy (1978); hx oophorectomy (1984); hx lumbar fusion (1996); hx urological; hx cataract removal (Bilateral, 2015); hx lap cholecystectomy (7/2013); hx gastric bypass (2009); hx hernia repair (8/14/14); hx other surgical (2017); hx shoulder arthroscopy (Right, 2011); hx orthopaedic (Right, 1997); hx orthopaedic (Left, 2017); ir biliary drn cath plc perc int/ext si (2/4/2019); ir biliary drn cath exchange perc any to ext si (3/7/2019); ir biliary drn cath exchange perc any to ext si (4/3/2019); and hx heart catheterization (08/2019).   Social History/Living Environment:   Home Environment: Private residence  # Steps to Enter: 9  Hand Rails : Bilateral  One/Two Story Residence: Two story  # of Interior Steps: 10  Interior Rails: Right  Living Alone: No  Support Systems: Spouse/Significant Other/Partner  Patient Expects to be Discharged to[de-identified] Private residence  Current DME Used/Available at Home: Cane, straight  Tub or Shower Type: Shower  Prior Level of Function/Work/Activity:  Pt living at home, independent with gait and ADLs   Number of Personal Factors/Comorbidities that affect the Plan of Care: 0: LOW COMPLEXITY   EXAMINATION:   Most Recent Physical Functioning:                            Bed Mobility  Supine to Sit: Contact guard assistance;Minimum assistance    Transfers  Sit to Stand: Contact guard assistance  Stand to Sit: Contact guard assistance  Bed to Chair: Contact guard assistance    Balance  Sitting: Intact  Standing: With support              Weight Bearing Status  Right Side Weight Bearing: As tolerated  Distance (ft): 8 Feet (ft)  Ambulation - Level of Assistance: Contact guard assistance  Assistive Device: Walker, rolling  Speed/Princess: Delayed;Pace decreased (<100 feet/min)  Step Length: Left shortened;Right shortened  Stance: Right decreased  Gait Abnormalities: Antalgic;Decreased step clearance  Interventions: Safety awareness training;Verbal cues     Braces/Orthotics: none    Right Hip Cold  Type: Cold/ice packs      Body Structures Involved:  1. Joints  2. Muscles Body Functions Affected:  1. Movement Related Activities and Participation Affected:  1. Mobility  2. Self Care   Number of elements that affect the Plan of Care: 4+: HIGH COMPLEXITY   CLINICAL PRESENTATION:   Presentation: Stable and uncomplicated: LOW COMPLEXITY   CLINICAL DECISION MAKIN[address] [place] [id number] AM-PAC 6 Clicks   Basic Mobility Inpatient Short Form  How much difficulty does the patient currently have. .. Unable A Lot A Little None   1. Turning over in bed (including adjusting bedclothes, sheets and blankets)? [] 1   [] 2   [x] 3   [] 4   2. Sitting down on and standing up from a chair with arms ( e.g., wheelchair, bedside commode, etc.)   [] 1   [] 2   [x] 3   [] 4   3. Moving from lying on back to sitting on the side of the bed? [] 1   [] 2   [x] 3   [] 4   How much help from another person does the patient currently need. .. Total A Lot A Little None   4. Moving to and from a bed to a chair (including a wheelchair)? [] 1   [] 2   [x] 3   [] 4   5. Need to walk in hospital room? [] 1   [] 2   [x] 3   [] 4   6. Climbing 3-5 steps with a railing? [] 1   [] 2   [x] 3   [] 4   © , Trustees of [address] [place] [id number], under license to [employer].  All rights reserved     Score:  Initial: 18 Most Recent: X (Date: -- )    Interpretation of Tool:  Represents activities that are increasingly more difficult (i.e. Bed mobility, Transfers, Gait). Medical Necessity:     · Patient is expected to demonstrate progress in   · strength, range of motion, and functional technique  ·  to   · decrease assistance required with functional mobility and LAUREEN exercises   · . Reason for Services/Other Comments:  · Patient continues to require skilled intervention due to   · Inability to complete functional mobility and LAUREEN exercises independently   · . Use of outcome tool(s) and clinical judgement create a POC that gives a: Clear prediction of patient's progress: LOW COMPLEXITY            TREATMENT:   (In addition to Assessment/Re-Assessment sessions the following treatments were rendered)     Pre-treatment Symptoms/Complaints:  Pt with complaints of pain all over  Pain Initial:  Not rated      Post Session: not rated     Gait Training (15 Minutes):  Gait training to improve and/or restore physical functioning as related to mobility, strength and balance. Ambulated 8 Feet (ft) with Contact guard assistance using a Walker, rolling and minimal Safety awareness training;Verbal cues related to their stance phase and stride length to promote proper body alignment, promote proper body posture and promote proper body mechanics. Therapeutic Exercise: (10 Minutes):  Exercises per grid below to improve mobility and strength. Required minimal verbal and manual cues to promote proper body alignment and promote proper body posture. Progressed repetitions as indicated.       Date:  2/11 Date:  2/12/20 Date:     ACTIVITY/EXERCISE AM PM AM PM AM PM   GROUP THERAPY  []  []  []  []  []  []   Ankle Pumps  10 10      Quad Sets  10 10      Gluteal Sets  10 10      Hip ABd/ADduction  10 10      Straight Leg Raises         Knee Slides  10 10      Short Arc Quads         Long Arc Quads   10      Chair Slides                  B = bilateral; AA = active assistive; A = active; P = passive      Treatment/Session Assessment:     Response to Treatment:   Pt fatigues easily and not motivated     Education:  [x] Home Exercises  [x] Fall Precautions  [] Hip Precautions [] D/C Instruction Review  [] Hip Prosthesis Review  [] Walker Management/Safety [] Adaptive Equipment as Needed       Interdisciplinary Collaboration:   o Physical Therapy Assistant  o Registered Nurse    After treatment position/precautions:   o Up in chair  o Bed/Chair-wheels locked  o Caregiver at bedside  o Call light within reach    Compliance with Program/Exercises: Compliant all of the time, Will assess as treatment progresses. Recommendations/Intent for next treatment session:  Treatment next visit will focus on increasing Ms. Ilean Jerome independence with bed mobility, transfers, gait training, strength/ROM exercises, modalities for pain, and patient education.       Total Treatment Duration:  PT Patient Time In/Time Out  Time In: 1015  Time Out: [address], PTA

## 2020-02-13 VITALS
OXYGEN SATURATION: 95 % | HEIGHT: 65 IN | TEMPERATURE: 99 F | WEIGHT: 207.5 LBS | SYSTOLIC BLOOD PRESSURE: 126 MMHG | BODY MASS INDEX: 34.57 KG/M2 | HEART RATE: 83 BPM | RESPIRATION RATE: 20 BRPM | DIASTOLIC BLOOD PRESSURE: 69 MMHG

## 2020-02-13 PROCEDURE — 97116 GAIT TRAINING THERAPY: CPT

## 2020-02-13 PROCEDURE — 97535 SELF CARE MNGMENT TRAINING: CPT

## 2020-02-13 PROCEDURE — 97110 THERAPEUTIC EXERCISES: CPT

## 2020-02-13 PROCEDURE — 74011250637 HC RX REV CODE- 250/637: Performed by: ORTHOPAEDIC SURGERY

## 2020-02-13 RX ADMIN — OXYCODONE 10 MG: 5 TABLET ORAL at 07:35

## 2020-02-13 RX ADMIN — Medication 1 AMPULE: at 08:44

## 2020-02-13 RX ADMIN — ASPIRIN 81 MG: 81 TABLET ORAL at 08:44

## 2020-02-13 RX ADMIN — DOCUSATE SODIUM 50MG AND SENNOSIDES 8.6MG 2 TABLET: 8.6; 5 TABLET, FILM COATED ORAL at 08:43

## 2020-02-13 RX ADMIN — LEVOTHYROXINE SODIUM 75 MCG: 75 TABLET ORAL at 07:35

## 2020-02-13 RX ADMIN — OXYCODONE 10 MG: 5 TABLET ORAL at 11:08

## 2020-02-13 RX ADMIN — OXYCODONE 10 MG: 5 TABLET ORAL at 02:36

## 2020-02-13 NOTE — PROGRESS NOTES
Patient received resting in bed, offers no complaints. Shift assessment completed. Family member at bedside visiting, will monitor.

## 2020-02-13 NOTE — PROGRESS NOTES
Shift assessment completed. Pt is alert & oriented x4. Able to verbalize needs. Resting quietly with no distress noted. Dressing to right huip is clean, dry and intact. Norm Imus in place. Neurovascular and peripheral vascular checks WNL. Incentive Spirometry at bedside. Patient encouraged to use hourly x 10 repetitions. Patient voiding clear yellow urine without difficulty. Pain is being managed with Oxycodone with patient tolerating well. Bed low and locked. Call light within reach. Patient instructed to call for assistance. Pt verbalizes understanding. Will monitor.

## 2020-02-13 NOTE — PROGRESS NOTES
2020         Post Op day: 2 Days Post-Op     Admit Date: 2020  Admit Diagnosis: Unilateral osteoarthritis of hip, right [M16.11]  Arthritis of right hip [M16.11]        Subjective: Doing well, No complaints, No SOB, No Chest Pain, No Nausea or Vomiting     Objective:   Vital Signs are Stable, No Acute Distress, Alert and Oriented, Dressing is Dry,  Neurovascular exam is normal.     Assessment / Plan :  Patient Active Problem List   Diagnosis Code    Essential hypertension, benign I10    Dyslipidemia E78.5    S/P coronary artery stent placement (2.75 x 18 Cypher to mLAD on 9-) Z95.5    S/P laparoscopic cholecystectomy Z90.49    Depression F32.9    Primary hypothyroidism E03.9    Chronic back pain M54.9, G89.29    Obesity (BMI 30.0-34. 9) E66.9    Osteoarthritis M19.90    S/P total hip arthroplasty Z96.649    Irritable bowel syndrome with both constipation and diarrhea K58.2    Age-related osteoporosis without current pathological fracture M81.0    Primary insomnia F51.01    Coronary artery disease involving native coronary artery of native heart without angina pectoris I25.10    Choledocholithiasis K80.50    Gallstone pancreatitis K85.10    Hypokalemia E87.6    Hypomagnesemia E83.42    Arthritis of right hip M16.11    H/O total hip arthroplasty, right Z96.641      Patient Vitals for the past 8 hrs:   BP Temp Pulse Resp SpO2   20 0305 (P) 111/66 (P) 98.1 °F (36.7 °C) (P) 72 (P) 16 (P) 93 %    Temp (24hrs), Av.9 °F (36.6 °C), Min:97.5 °F (36.4 °C), Max:98.2 °F (36.8 °C)    Body mass index is 34.53 kg/m².     Lab Results   Component Value Date/Time    HGB 11.1 (L) 2020 07:47 PM      Pt seen by and discussed with [facility] today        Signed By: PANCHITO Esposito

## 2020-02-13 NOTE — PROGRESS NOTES
I have reviewed discharge instructions with the patient and spouse. The patient and spouse verbalized understanding. Prescription for oxycodone given. [facility] to follow.

## 2020-02-13 NOTE — PROGRESS NOTES
Problem: Self Care Deficits Care Plan (Adult)  Goal: *Acute Goals and Plan of Care (Insert Text)  Description  GOALS:   DISCHARGE GOALS (in preparation for going home/rehab):  3 days  1. Ms. Ashtyn Martinez will perform one lower body dressing activity with minimal assistance with adaptive equipment to demonstrate improved functional mobility and safety. GOAL MET 2/13/2020    2. Ms. Ashtyn Martinez will perform one lower body bathing activity with minimal  assistance with adaptive equipment to demonstrate improved functional mobility and safety. GOAL MET 2/13/2020  3. Ms. Ashtyn Martinez will perform toileting/toilet transfer with contact guard assistance with adaptive equipment to demonstrate improved functional mobility and safety. GOAL MET 2/13/2020  4. Ms. Ashtyn Martinez will perform shower transfer with contact guard assistance with adaptive equipment to demonstrate improved functional mobility and safety. GOAL MET 2/13/2020  5. Ms. Ashtyn Martinez will state LAUREEN precautions with two verbal cues to demonstrate improved functional mobility and safety. GOAL MET 2/13/2020         JOINT CAMP OCCUPATIONAL THERAPY LAUREEN: Daily Note, Discharge, Treatment Day: 4th and AM 2/13/2020  INPATIENT: Hospital Day: 3  [place] / Product Type: Medicare /      NAME/AGE/GENDER: Kelsey Clark is a 68 y.o. female   PRIMARY DIAGNOSIS:  Unilateral osteoarthritis of hip, right [M16.11]   Procedure(s) and Anesthesia Type:     * HIP ARTHROPLASTY TOTAL/ RIGHT/ DEPUY ANCEF 2gm / VANCOMYCIN - Spinal (Right)  ICD-10: Treatment Diagnosis:    · Pain in Right Hip (M25.551)  · Stiffness of Right Hip, Not elsewhere classified (M25.651)      ASSESSMENT:     Ms. Ashtyn Martinze is s/p Right LAUREEN and presents with decreased weight bearing on R LE and decreased independence with functional mobility and activities of daily living as compared to prior level of function and safety.   Patient would benefit from skilled Occupational Therapy to maximize independence and safety with self-care task and functional mobility. Pt would also benefit from education on lower body adaptive equipment and hip precautions post-surgery in preparation for going home. Patient able to don gown at edge of bed. SPT from bed to recliner using a rolling walker. Should progress well with ADL's tomorrow. 2/12/2020 910am Patient supine in bed, encouraged her to get oob. She transferred supine to sit with min/CGA and transferred to recliner with CGA. She reported 9/10 pain. She was edcuated on OT POC and all needs in reach. Ot to return later this am for full ADL session. 2/12/2020 1045am Just finished with PT session feeling nauseous. She wanted to sponge bath. Sponge bath completed and dressing as charted below. She stood to wash steffany areas and complete clothing management. She is sitting up in recliner. OT to return in am for full ADL session to include shower. All needs in reach. 2/13/2020 835am Ms. Domínguez Neighbor is s/p rigit LAUREEN and presents with decreased weight bearing on right LE and decreased independence with functional mobility and activities of daily living. Patient completed shower and dressing as charted below in ADL grid and is ambulating with rolling walker and CGA  assist.  Patient has met 5/5 goals and plans to return home with good family support. Family able to provide patient with appropriate level of assistance at this time. OT reviewed hip precautions throughout session. Patient instructed to call for assistance when needing to get up from recliner and all needs in reach. Patient verbalized understanding of call light. This section established at most recent assessment   PROBLEM LIST (Impairments causing functional limitations):  1. Decreased Strength  2. Decreased ADL/Functional Activities  3. Decreased Transfer Abilities  4. Increased Pain  5. Increased Fatigue  6.  Decreased Flexibility/Joint Mobility  7. Decreased Knowledge of Precautions   INTERVENTIONS PLANNED: (Benefits and precautions of occupational therapy have been discussed with the patient.)  1. Activities of daily living training  2. Adaptive equipment training  3. Balance training  4. Clothing management  5. Donning&doffing training  6. Theraputic activity     TREATMENT PLAN: Frequency/Duration: Follow patient 1-2tx to address above goals. Rehabilitation Potential For Stated Goals: Good     RECOMMENDED REHABILITATION/EQUIPMENT: (at time of discharge pending progress): Continue Skilled Therapy. OCCUPATIONAL PROFILE AND HISTORY:   History of Present Injury/Illness (Reason for Referral): Pt presents this date s/p (Right) LAUREEN. Past Medical History/Comorbidities:   Ms. Justin Morgan  has a past medical history of Anxiety, Arthritis, Autoimmune disease ([address] CAD (coronary artery disease) (2010), Chest pain, Chronic pain, Depression, Family history of ischemic heart disease, H/O acute pancreatitis (08/2013), H/O gastric bypass (2009), High cholesterol, echocardiogram (01/18/2019), Hypertension, Hypothyroidism, Lichen sclerosus, Obesity (BMI 30-39.9), Osteopenia, Pancreatitis, Systolic murmur, and Ventral hernia. She also has no past medical history of Arrhythmia, Asthma, Cancer ([address] Chronic kidney disease, Chronic obstructive pulmonary disease ([address] Diabetes ([address] Difficult intubation, GERD (gastroesophageal reflux disease), Heart failure ([address] Liver disease, Malignant hyperthermia due to anesthesia, Nausea & vomiting, Pseudocholinesterase deficiency, PUD (peptic ulcer disease), Seizures ([address] Sleep apnea, Stroke ([address] Thromboembolus ([address] or Unspecified adverse effect of anesthesia.   Ms. Justin Morgan  has a past surgical history that includes hx bladder suspension; hx carpal tunnel release (Right); hx lysis of adhesions; hx appendectomy (1954); hx colonoscopy (2017); hx endoscopy (2018); hx heart catheterization (2010); hx hysterectomy (1978); hx oophorectomy (1984); hx lumbar fusion (1996); hx urological; hx cataract removal (Bilateral, 2015); hx lap cholecystectomy (7/2013); hx gastric bypass (2009); hx hernia repair (8/14/14); hx other surgical (2017); hx shoulder arthroscopy (Right, 2011); hx orthopaedic (Right, 1997); hx orthopaedic (Left, 2017); ir biliary drn cath plc perc int/ext si (2/4/2019); ir biliary drn cath exchange perc any to ext si (3/7/2019); ir biliary drn cath exchange perc any to ext si (4/3/2019); and hx heart catheterization (08/2019). Social History/Living Environment:   Home Environment: Private residence  # Steps to Enter: 9  Hand Rails : Bilateral  One/Two Story Residence: Two story  # of Interior Steps: 10  Interior Rails: Right  Living Alone: No  Support Systems: Spouse/Significant Other/Partner  Patient Expects to be Discharged to[de-identified] Private residence  Current DME Used/Available at Home: [place], straight  Tub or Shower Type: Shower  Prior Level of Function/Work/Activity:  Independent prior. Number of Personal Factors/Comorbidities that affect the Plan of Care: Brief history (0):  LOW COMPLEXITY   ASSESSMENT OF OCCUPATIONAL PERFORMANCE[de-identified]   Most Recent Physical Functioning:   Balance  Sitting: Intact  Standing: Without support                              Mental Status  Neurologic State: Alert; Appropriate for age  Orientation Level: Appropriate for age  Cognition: Appropriate decision making; Appropriate for age attention/concentration; Appropriate safety awareness; Follows commands  Perception: Appears intact  Perseveration: No perseveration noted  Safety/Judgement: Awareness of environment; Fall prevention                Basic ADLs (From Assessment) Complex ADLs (From Assessment)   Basic ADL  Feeding: Supervision  Oral Facial Hygiene/Grooming: Supervision  Bathing: Minimum assistance, Moderate assistance  Type of Bath: Chlorhexidine (CHG), Shower  Upper Body Dressing: Supervision  Lower Body Dressing: Moderate assistance  Toileting: Minimum assistance     Grooming/Bathing/Dressing Activities of Daily Living   Grooming  Grooming Assistance: Supervision  Position Performed: Seated in chair;Standing  Washing Face: Supervision  Washing Hands: Supervision  Brushing/Combing Hair: Supervision Cognitive Retraining  Safety/Judgement: Awareness of environment; Fall prevention   Upper Body Bathing  Bathing Assistance: Supervision  Position Performed: Standing     Lower Body Bathing  Bathing Assistance: Minimum assistance  Perineal  : Supervision  Position Performed: Standing  Adaptive Equipment: Grab bar  Lower Body : Minimum assistance  Position Performed: Seated in chair;Standing  Adaptive Equipment: Grab bar;Long handled sponge; Tub bench Toileting  Toileting Assistance: Stand-by assistance  Bladder Hygiene: Stand-by assistance  Clothing Management: Stand-by assistance  Adaptive Equipment: Elevated seat;Walker   Upper Body Dressing Assistance  Dressing Assistance: Supervision  Bra: Supervision  Hospital Gown: Supervision  Pullover Shirt: Supervision Functional Transfers  Bathroom Mobility: Stand-by assistance;Contact guard assistance  Toilet Transfer : Stand-by assistance;Contact guard assistance  Shower Transfer: Stand-by assistance;Contact guard assistance  Adaptive Equipment: Grab bars; Tub transfer bench   Lower Body Dressing Assistance  Dressing Assistance: Minimum assistance  Underpants: Contact guard assistance  Pants With Elastic Waist: Contact guard assistance;Minimum assistance  Socks: Contact guard assistance  Slip on Shoes with Back: Minimum assistance  Adaptive Equipment Used: Long handled shoe horn;Reacher;Sock aid; Walker Bed/Mat Mobility  Supine to Sit: Contact guard assistance  Sit to Stand: Contact guard assistance  Stand to Sit: Contact guard assistance  Bed to Chair: Contact guard assistance         Physical Skills Involved:  1. Range of Motion  2. Balance  3.  Strength Cognitive Skills Affected (resulting in the inability to perform in a timely and safe manner):  1. [facility]  Psychosocial Skills Affected:  1. WFL    Number of elements that affect the Plan of Care: 1-3:  LOW COMPLEXITY   CLINICAL DECISION MAKIN[address] [place] [address] AM-PAC 6 Clicks   Daily Activity Inpatient Short Form  How much help from another person does the patient currently need. .. Total A Lot A Little None   1. Putting on and taking off regular lower body clothing? [] 1   [] 2   [x] 3   [] 4   2. Bathing (including washing, rinsing, drying)? [] 1   [] 2   [x] 3   [] 4   3. Toileting, which includes using toilet, bedpan or urinal?   [] 1   [] 2   [x] 3   [] 4   4. Putting on and taking off regular upper body clothing? [] 1   [] 2   [] 3   [x] 4   5. Taking care of personal grooming such as brushing teeth? [] 1   [] 2   [] 3   [x] 4   6. Eating meals? [] 1   [] 2   [] 3   [x] 4   © , Trustees of [address] [place] [address], under license to [employer]. All rights reserved     Score:  Initial: 18 Most Recent: 21 discharge 2020    Interpretation of Tool:  Represents activities that are increasingly more difficult (i.e. Bed mobility, Transfers, Gait). ·     Use of outcome tool(s) and clinical judgement create a POC that gives a: MODERATE COMPLEXITY            TREATMENT:   (In addition to Assessment/Re-Assessment sessions the following treatments were rendered)     Pre-treatment Symptoms/Complaints:    Pain: Initial:   Pain Intensity 1: 0  Pain Location 1: Hip  Pain Orientation 1: Right  Pain Intervention(s) 1: Ambulation/Increased Activity  Post Session:  0/10 rest      Self Care: (40): Procedure(s) (per grid) utilized to improve and/or restore self-care/home management as related to dressing, bathing, toileting and grooming. Required min dvisual, verbal, manual and tactile cueing to facilitate activities of daily living skills, compensatory activities and hip kit training. Treatment/Session Assessment:     Response to Treatment: needs encouragement but moved well completed shower    Education:  [] Home Exercises  [x] Fall Precautions  [x] Hip Precautions [] Going Home Video  [] Knee/Hip Prosthesis Review  [x] Walker Management/Safety [x] Adaptive Equipment as Needed       Interdisciplinary Collaboration:   o Physical Therapy Assistant  o Occupational Therapist  o Registered Nurse    After treatment position/precautions:   o Up in chair  o Bed/Chair-wheels locked  o Call light within reach  o RN notified  o Family at bedside     Compliance with Program/Exercises: Compliant all of the time. Recommendations/Intent for next treatment session:  Pt doing well all goals met and will do well at home with support from spouse. Patient will be discharged home with home health PT. No further Occupational Therapy warranted, will discharge Occupational Therapy services.         Total Treatment Duration:40  OT Patient Time In/Time Out  Time In: 7353  Time Out: 175 [facility],

## 2020-02-13 NOTE — PROGRESS NOTES
Problem: Mobility Impaired (Adult and Pediatric)  Goal: *Acute Goals and Plan of Care (Insert Text)  Description  GOALS (1-4 days):  (1.)Ms. Corin Borden will move from supine to sit and sit to supine  in bed with STAND BY ASSIST.    (2.)Ms. Corin Borden will transfer from bed to chair and chair to bed with STAND BY ASSIST using the least restrictive device. (3.)Ms. Corin Borden will ambulate with STAND BY ASSIST for 250 feet with the least restrictive device. (4.)Ms. Corin Borden will ambulate up/down 3 steps with bilateral  railing with CONTACT GUARD ASSIST with no device. (5.)Ms. Corin Borden will state/observe LAUREEN precautions with 0 verbal cues. ________________________________________________________________________________________________     Outcome: Progressing Towards Goal     PHYSICAL THERAPY JOINT CAMP LAUREEN: Daily Note, Treatment Day: 2nd and AM 2/13/2020  INPATIENT: Hospital Day: 3  [place] / Product Type: Medicare /      NAME/AGE/GENDER: Antoinette Ochoa is a 68 y.o. female   PRIMARY DIAGNOSIS:  Unilateral osteoarthritis of hip, right [M16.11]   Procedure(s) and Anesthesia Type:     * HIP ARTHROPLASTY TOTAL/ RIGHT/ DEPUY ANCEF 2gm / VANCOMYCIN - Spinal (Right)  ICD-10: Treatment Diagnosis:    · Pain in Right Hip (M25.551)  · Stiffness of Right Hip, Not elsewhere classified (M25.651)  · Difficulty in walking, Not elsewhere classified (R26.2)      ASSESSMENT:     Ms. Corin Borden presents up in chair on contact and agreeable to therapy. She is holding a green bag and reporting she feels like it is the end of the day already. Worked on sit to stand and gait training in the zhao with verbal cues. During gait she reported feeling hot and nauseated and like she might pass out. rolled her to rehab gym. We worked on [employer] exercises with verbal cues for exercises and to stay awake. She reports difficulty with most every exercise.  Worked on stair training with verbal cues and then worked on gait training again. Much encouragement to get her to her to move. She plans to go home today with HHPT for follow up. This section established at most recent assessment   PROBLEM LIST (Impairments causing functional limitations):  1. Decreased Strength  2. Decreased ADL/Functional Activities  3. Decreased Transfer Abilities  4. Decreased Ambulation Ability/Technique  5. Increased Pain  6. Edema/Girth  7. Decreased Knowledge of Precautions  8. Decreased [place] with Home Exercise Program   INTERVENTIONS PLANNED: (Benefits and precautions of physical therapy have been discussed with the patient.)  1. Cold  2. bed mobility  3. gait training  4. home exercise program (HEP)  5. Range of Motion: active/assisted/passive  6. Therapeutic Activities  7. therapeutic exercise/strengthening  8. transfer training  9. Group Therapy     TREATMENT PLAN: Frequency/Duration: Follow patient BID for duration of hospital stay to address above goals. Rehabilitation Potential For Stated Goals: Good     RECOMMENDED REHABILITATION/EQUIPMENT: (at time of discharge pending progress): Continue Skilled Therapy and Home Health: Physical Therapy. HISTORY:   History of Present Injury/Illness (Reason for Referral):  Pt s/p right LAUREEN on 2/11/2020  Past Medical History/Comorbidities:   Ms. Josue Elliott  has a past medical history of Anxiety, Arthritis, Autoimmune disease ([address] CAD (coronary artery disease) (2010), Chest pain, Chronic pain, Depression, Family history of ischemic heart disease, H/O acute pancreatitis (08/2013), H/O gastric bypass (2009), High cholesterol, echocardiogram (01/18/2019), Hypertension, Hypothyroidism, Lichen sclerosus, Obesity (BMI 30-39.9), Osteopenia, Pancreatitis, Systolic murmur, and Ventral hernia.  She also has no past medical history of Arrhythmia, Asthma, Cancer ([address] Chronic kidney disease, Chronic obstructive pulmonary disease ([address] Diabetes [facility]), Difficult intubation, GERD (gastroesophageal reflux disease), Heart failure ([facility] Liver disease, Malignant hyperthermia due to anesthesia, Nausea & vomiting, Pseudocholinesterase deficiency, PUD (peptic ulcer disease), Seizures ([facility] Sleep apnea, Stroke ([facility] Thromboembolus ([address] or Unspecified adverse effect of anesthesia. Ms. Sang Norris  has a past surgical history that includes hx bladder suspension; hx carpal tunnel release (Right); hx lysis of adhesions; hx appendectomy (1954); hx colonoscopy (2017); hx endoscopy (2018); hx heart catheterization (2010); hx hysterectomy (1978); hx oophorectomy (1984); hx lumbar fusion (1996); hx urological; hx cataract removal (Bilateral, 2015); hx lap cholecystectomy (7/2013); hx gastric bypass (2009); hx hernia repair (8/14/14); hx other surgical (2017); hx shoulder arthroscopy (Right, 2011); hx orthopaedic (Right, 1997); hx orthopaedic (Left, 2017); ir biliary drn cath plc perc int/ext si (2/4/2019); ir biliary drn cath exchange perc any to ext si (3/7/2019); ir biliary drn cath exchange perc any to ext si (4/3/2019); and hx heart catheterization (08/2019). Social History/Living Environment:   Home Environment: Private residence  # Steps to Enter: 9  Hand Rails : Bilateral  One/Two Story Residence: Two story  # of Interior Steps: 10  Interior Rails: Right  Living Alone: No  Support Systems: Spouse/Significant Other/Partner  Patient Expects to be Discharged to[de-identified] Private residence  Current DME Used/Available at Home: [place], straight  Tub or Shower Type: Shower  Prior Level of Function/Work/Activity:  Pt living at home, independent with gait and ADLs   Number of Personal Factors/Comorbidities that affect the Plan of Care: 0: LOW COMPLEXITY   EXAMINATION:   Most Recent Physical Functioning:                            Bed Mobility  Supine to Sit: Contact guard assistance    Transfers  Sit to Stand: Stand-by assistance; Additional time  Stand to Sit: Stand-by assistance; Additional time  Bed to Chair: Contact guard assistance    Balance  Sitting: Intact  Standing: Pull to stand; With support         Gait Training: Yes    Weight Bearing Status  Right Side Weight Bearing: As tolerated  Distance (ft): 60 Feet (ft)(and another 50 feet)  Ambulation - Level of Assistance: Stand-by assistance  Assistive Device: Walker, rolling  Speed/Princess: Pace decreased (<100 feet/min)  Step Length: Left shortened;Right shortened  Stance: Right decreased  Gait Abnormalities: Antalgic;Decreased step clearance  Number of Stairs Trained: 3  Stairs - Level of Assistance: Contact guard assistance  Rail Use: Both  Interventions: Safety awareness training;Verbal cues     Braces/Orthotics: none           Body Structures Involved:  1. Joints  2. Muscles Body Functions Affected:  1. Movement Related Activities and Participation Affected:  1. Mobility  2. Self Care   Number of elements that affect the Plan of Care: 4+: HIGH COMPLEXITY   CLINICAL PRESENTATION:   Presentation: Stable and uncomplicated: LOW COMPLEXITY   CLINICAL DECISION MAKIN[address] [place] [address] AM-PAC 6 Clicks   Basic Mobility Inpatient Short Form  How much difficulty does the patient currently have. .. Unable A Lot A Little None   1. Turning over in bed (including adjusting bedclothes, sheets and blankets)? [] 1   [] 2   [x] 3   [] 4   2. Sitting down on and standing up from a chair with arms ( e.g., wheelchair, bedside commode, etc.)   [] 1   [] 2   [x] 3   [] 4   3. Moving from lying on back to sitting on the side of the bed? [] 1   [] 2   [x] 3   [] 4   How much help from another person does the patient currently need. .. Total A Lot A Little None   4. Moving to and from a bed to a chair (including a wheelchair)? [] 1   [] 2   [x] 3   [] 4   5. Need to walk in hospital room? [] 1   [] 2   [x] 3   [] 4   6. Climbing 3-5 steps with a railing?    [] 1   [] 2   [x] 3   [] 4   © , Trustees of [address] [place] [id number], under license to [employer]. All rights reserved     Score:  Initial: 18 Most Recent: X (Date: -- )    Interpretation of Tool:  Represents activities that are increasingly more difficult (i.e. Bed mobility, Transfers, Gait). Medical Necessity:     · Patient is expected to demonstrate progress in   · strength, range of motion, and functional technique  ·  to   · decrease assistance required with functional mobility and LAUREEN exercises   · . Reason for Services/Other Comments:  · Patient continues to require skilled intervention due to   · Inability to complete functional mobility and LAUREEN exercises independently   · . Use of outcome tool(s) and clinical judgement create a POC that gives a: Clear prediction of patient's progress: LOW COMPLEXITY            TREATMENT:   (In addition to Assessment/Re-Assessment sessions the following treatments were rendered)     Pre-treatment Symptoms/Complaints:  Pt still with pain in the hip  Pain Initial: having pain     Post Session: sore after therapy     Gait Training (20 Minutes):  Gait training to improve and/or restore physical functioning as related to mobility, strength and balance. Ambulated 60 Feet (ft)(and another 50 feet) with Stand-by assistance using a Walker, rolling and minimal Safety awareness training;Verbal cues related to their stance phase and stride length to promote proper body alignment, promote proper body posture and promote proper body mechanics. Therapeutic Exercise: (20 Minutes):  Exercises per grid below to improve mobility and strength. Required minimal verbal and manual cues to promote proper body alignment and promote proper body posture. Progressed repetitions as indicated.       Date:  2/11 Date:  2/12/20 Date:  2/13/20   ACTIVITY/EXERCISE AM PM AM PM AM PM   GROUP THERAPY  []  []  []  [x]  [x]  []   Ankle Pumps  10 10 15 20    Quad Sets  10 10 15 20    Gluteal Sets  10 10 15 20    Hip ABd/ADduction  10 10 15 20    Straight Leg Raises Knee Slides  10 10 15 20    Short Arc Quads    15 20    Long Arc Quads   10 15 20    Chair Slides                  B = bilateral; AA = active assistive; A = active; P = passive      Treatment/Session Assessment:     Response to Treatment:   Pt tolerated fair      Education:  [x] Home Exercises  [x] Fall Precautions  [] Hip Precautions [x] D/C Instruction Review  [] Hip Prosthesis Review  [x] Walker Management/Safety [] Adaptive Equipment as Needed       Interdisciplinary Collaboration:   o Registered Nurse    After treatment position/precautions:   o Up in chair  o Bed/Chair-wheels locked  o Call light within reach  o Family at bedside    Compliance with Program/Exercises: Compliant all of the time, Will assess as treatment progresses. Recommendations/Intent for next treatment session:  Treatment next visit will focus on increasing Ms. [place] Amas independence with bed mobility, transfers, gait training, strength/ROM exercises, modalities for pain, and patient education.       Total Treatment Duration:  PT Patient Time In/Time Out  Time In: 1000  Time Out: [address], PT

## 2020-02-14 ENCOUNTER — HOME CARE VISIT (OUTPATIENT)
Dept: SCHEDULING | Facility: HOME HEALTH | Age: 70
End: 2020-02-14
Payer: MEDICARE

## 2020-02-14 ENCOUNTER — PATIENT OUTREACH (OUTPATIENT)
Dept: CASE MANAGEMENT | Age: 70
End: 2020-02-14

## 2020-02-14 VITALS
RESPIRATION RATE: 16 BRPM | SYSTOLIC BLOOD PRESSURE: 128 MMHG | TEMPERATURE: 99.8 F | DIASTOLIC BLOOD PRESSURE: 64 MMHG | HEART RATE: 70 BPM

## 2020-02-14 PROCEDURE — 3331090002 HH PPS REVENUE DEBIT

## 2020-02-14 PROCEDURE — 400013 HH SOC

## 2020-02-14 PROCEDURE — G0151 HHCP-SERV OF PT,EA 15 MIN: HCPCS

## 2020-02-14 PROCEDURE — 3331090001 HH PPS REVENUE CREDIT

## 2020-02-14 NOTE — PROGRESS NOTES
"[facility] Cardiology   Comprehensive Heart Failure Clinic    Heart Failure Follow Up Visit    Date of visit: 9/9/2019  Patient Name: Sarthak Mock Record Number: [id number]  YOB: 1949   Primary Physician: Nisha Hammonds MD.    CHIEF COMPLAINT:    Chief Complaint   Patient presents with   â¢ Follow-up     LOV 8/26/19 with AH; CHF, CAD-NSTEMI/CABG 2012; ICMP; PAF; HTN; HLD; DM; PTSD; Schixophrenia; PE     SUBJECTIVE     HISTORY OF PRESENT ILLNESS:Diego Mendez is a 65year old male who presents to the clinic with the following CV (cardiovascular) history:  Patient with complicated history. He is a poor historian. A majority of the information in this consult came from reviewed medical records. Â   Patient with past medical history that consists of coronary artery disease s/p remote hx of CABG, acute on chronic systolic heart failure, DM, hypertension, dyslipidemia, ischemic cardiomyopathy EF 20-25% in Oct 2017. He has multiple social issues that include PTSD, he is a  and has not utilized the [employer]. Bipolar, depression, ?schizophrenia. He is currently living at [facility] and has an appointed Guardian. Since last visit:    He is accompanied by his guardian. The patient is here for a follow up visit. He was able to answer all questions needing very little redirection. He appears happy. He states that he feels ""fantastic\"". He notes improvement in his symptoms. His weight is up 2 kg and notes that he will occasionally have some lower extremity edema. But no increased shortness of breath. He denies any lightheadedness or dizziness. No chest pain, pressure or tightness. No fluttering or palpitations. He is in a wheelchair. He states he is sleeping well. He is taking all of his medications as prescribed.      He is currently a New [place] Heart Association class [] 1 [x] 2 [] 3 [] 4    HEART FAILURE MEDICATIONS   [x] ACEi [] ARB [] BB (beta blocker) [x] " This note will not be viewable in [address] Transition of Care Discharge Follow-up Questionnaire   Date/Time of Call:   02/14/2020   1039am   What was the patient hospitalized for? H/O total hip arthroplasty ,right     Does the patient understand his/her diagnosis and/or treatment and what happened during the hospitalization? Yes, spoke with patient, she states understanding of diagnosis and treatment; and is agreeable to call. Patient states doing well. Patient denies any c/o during this phone call. Patient states grateful for the f/u call. Did the patient receive discharge instructions? Yes    CM Assessed Risk for Readmission:       Patient stated Risk for Readmission:      Moderate to high r/t diagnosis ,comorbidities and/or complication of surgery. None Stated   Review any discharge instructions (see discharge instructions/AVS in [facility]). Ask patient if they understand these. Do they have any questions? Reviewed, understanding is stated, no questions at this time       Were home services ordered (nursing, PT, OT, ST, etc.)? [facility], [place], operated by [facility]      If so, has the first visit occurred? If not, why? (Assist with coordination of services if necessary.)   [facility] [place] first home visit 2/14/2020     Was any DME ordered? Yes     If so, has it been received? If not, why?  (Assist patient in obtaining DME orders &/or equipment if necessary.) Patient states received rolling walker and bedside commode   Complete a review of all medications (new, continued and discontinued meds per the D/C instructions and medication tab in [facility]). Completed  START taking:  oxyCODONE IR 5 mg immediate release tablet (ROXICODONE)  CHANGE how you take:  aspirin delayed-release 81 mg tablet  STOP taking:  meloxicam 15 mg tablet (MOBIC)  MULTIVITAMIN PO   Were all new prescriptions filled?   If not, why?  (Assist patient in obtaining medications if necessary  escalate for CCM &/or SW if ongoing issues are verbalized by pt or CCB (calcium channel blocker)   [] Corlanor   [] DIG (digoxin) [x] Diuretic  [] Entresto [] Hydralazine [] Aldactone [] Nitrate       DEVICES   [] ICD (implantable cardioverter-defibrillator)  [] BIV (biventricular) - ICD [] PPM (permanent pacemaker) [] Life Vest  [] [facility]: NONE    COMPLIANCE   Description   [x]  YES    []  NO Medication:   [x]  YES    []  NO Diet:    REVIEW OF SYSTEMS:  10 point review of systems was completed and is negative except as stated above. MEDICATIONS  Outpatient Medications Marked as Taking for the 9/9/19 encounter (Office Visit) with VAHE Flores   Medication Sig Dispense Refill   â¢ torsemide (DEMADEX) 10 MG tablet Take 2 tablets by mouth daily. 60 tablet 5   â¢ lisinopril (ZESTRIL) 2.5 MG tablet Take 2.5 mg by mouth daily. â¢ HYDROcodone-acetaminophen (NORCO) 5-325 MG per tablet Take 1 tablet by mouth every 4 hours as needed for Pain. 15 tablet 0   â¢ theophylline (DANA-24) 300 MG 24 hr capsule Take 1 capsule by mouth daily. Do not start before August 17, 2019. â¢ ceFAZolin (ANCEF) 2 GM/20ML syringe Inject 20 mLs into the vein every 8 hours. â¢ MELATONIN PO Take 5 mg by mouth at bedtime. â¢ calcium carbonate (TUMS) 500 MG chewable tablet Chew 1 tablet by mouth every 4 hours as needed for Heartburn. â¢ memantine (NAMENDA) 5 MG tablet Take by mouth daily. Indications: XR titration pack      â¢ citalopram (CELEXA) 40 MG tablet Take 1 tablet by mouth daily. 30 tablet 1   â¢ donepezil (ARICEPT) 10 MG tablet Take 1 tablet by mouth nightly. 30 tablet 1   â¢ ARTIFICIAL TEAR SOLUTION OP Apply 1 drop to eye as needed. â¢ metOLazone (ZAROXOLYN) 2.5 MG tablet Take 1 tablet by mouth daily. Do not start before May 4, 2019. 30 minutes prior to Torsemide on 5/4 and 5/5 only     â¢ guaiFENesin (MUCINEX) 600 MG 12 hr tablet Take 600 mg by mouth 2 times daily. Indications: Cough AKA Mucinex     â¢ aspirin 81 MG tablet Take 1 tablet by mouth daily.  80 anticipated)   yes   Does the patient understand the purpose and dosing instructions for all medications? (If patient has questions, provide explanation and education.)   Yes      Does the patient have any problems in performing ADLs? (If patient is unable to perform ADLs  what is the limiting factor(s)? Do they have a support system that can assist? If no support system is present, discuss possible assistance that they may be able to obtain. Escalate for CCM/SW if ongoing issues are verbalized by pt or anticipated)   Independent with ADLs at baseline      Does the patient have all follow-up appointments scheduled? 7 day f/up with PCP?   (f/up with PCP may be w/in 14 days if patient has a f/up with their specialist w/in 7 days)    7-14 day f/up with specialist?   (or per discharge instructions)    If f/up has not been made  what actions has the care coordinator made to accomplish this? Has transportation been arranged? Yes          Patient will call Dr. Terrance Grullon office for a f/u appointment. Dr. Castillo Slice 3/16/2020  . Yes, no transportation issues identified at this time. Any other questions or concerns expressed by the patient? No other needs or concerns identified. Contact information for Care Coordinator was given, instructed to call with new questions or concerns. Schedule next appointment with KALE Christopher or refer to RN Case Manager/ per the workflow guidelines. When is care coordinators next follow-up call scheduled? If referred for CCM  what RN care manager was the referral assigned? Care coordinator will follow per workflow guidelines.           Within 21 days       OTONIEL Call Completed By: Alex Capps LPN  Care Coordinator tablet 3   â¢ magnesium lactate (MAGTAB) 84 MG (7MEQ) Tab CR Take 1 tablet by mouth daily (with breakfast). 120 tablet    â¢ cyanocobalamin 500 MCG tablet Take 1 tablet by mouth daily. 30 tablet 0   â¢ Multiple Vitamins-Minerals (VITAMIN - THERAPEUTIC MULTIVITAMINS W/MINERALS) Tab Take 1 tablet by mouth daily. â¢ Lactobacillus Tab Take 1 tablet by mouth 2 times daily. â¢ bisacodyl (DULCOLAX) 10 MG suppository Place 10 mg rectally daily as needed for Constipation. â¢ sodium biphosphate (FLEET) 7-19 GM/118ML enema Place 1 enema rectally as needed for Constipation. â¢ glucagon (GLUCAGON EMERGENCY) 1 MG injection kit Inject 1 mg into the muscle 1 time. As needed     â¢ magnesium hydroxide (MILK OF MAGNESIA) 400 MG/5ML suspension Take 30 mLs by mouth daily as needed for Constipation. â¢ polyethylene glycol (MIRALAX) powder Take 17 g by mouth daily as needed (to promote BM). Mix with 240 cc of liquids     â¢ albuterol-ipratropium 2.5 mg/0.5 mg (DUONEB) 0.5-2.5 (3) MG/3ML nebulizer solution Take 3 mLs by nebulization 4 times daily as needed for Wheezing. â¢ loperamide (IMODIUM) 1 MG/5ML solution Give 30 mL after first loose stool PRN, then may give 15 mL after each loose stool. Max 60 mL/24 hrs. â¢ aluminum-magnesium hydroxide-simethicone (MAALOX) 025-127-99 MG/5ML Suspension Take 30 mLs by mouth every 3 hours as needed. â¢ nicotine polacrilex (NICORETTE) 4 MG gum Take 4 mg by mouth 4 times daily as needed for Smoking cessation. â¢ acetaminophen (TYLENOL) 325 MG tablet Take 650 mg by mouth every 4 hours as needed for Pain. â¢ iodine wound-clean dressing (IODOSORB) 0.9 % gel Apply topically 2 times daily as needed for Wound Care. â¢ atorvastatin (LIPITOR) 40 MG tablet Take 40 mg by mouth nightly.         ALLERGIES:   Allergen Reactions   â¢ Haldol Other (See Comments)     Neuroleptic Malignat syndrome   â¢ Unknown Other (See Comments)     Antipsychotics medication aggravates neuroleptic malignant "syndrome      OBJECTIVE  PAST HISTORY:  I have reviewed the past medical history, family history, social history, medications and allergies listed in the medical record as obtained by my nursing staff and support staff and agree with their documentation. PHYSICAL EXAMINATION:  Vitals:    Visit Vitals  /68   Pulse 78   Ht 6' 1"" (1.854 m)   Wt 80.7 kg   SpO2 96%   BMI 23.48 kg/mÂ²      BMI (body mass index): Body mass index is 23.48 kg/mÂ². Constitutional: well nourished 65year old male in no acute distress. Skin: Warm, dry, intact, no lesions. HEENT: Normocephalic, atraumatic. Oral mucous membranes moist   Neck: Supple, trachea midline, negative carotid bruits bilateral.  No thyromegaly. Cardiovascular: Normal S1, S2. regular rhythm. Murmur: none. negative S3 and S4. Respiratory: Anterior/posterior lung sounds diminished to auscultation bilaterally. Abdomen: Soft, nontender, negative hepatomegaly and normal bowel sounds. Musculoskeletal/Extremities: no clubbing, no cyanosis, trace edema. LABORATORY:  Lab Results   Component Value Date    POTASSIUM 3.7 08/16/2019    SODIUM 136 08/16/2019    CO2 29 08/16/2019    CHLORIDE 101 08/16/2019    BUN 17 08/16/2019    CREATININE 0.76 08/16/2019    GLUCOSE 134 (H) 08/16/2019    CALCIUM 9.5 08/16/2019    WBC 7.9 08/14/2019    RBC 3.32 (L) 08/14/2019    HCT 29.5 (L) 08/14/2019    HGB 9.5 (L) 08/14/2019     08/14/2019    TSH 2.231 12/03/2018    INR 1.1 08/13/2019     DIAGNOSTICS:  The following diagnostics were reviewed. Cardiac     7/9/18 Echo  ATRIAL FIBRILLATION  ALL CARDIAC CHAMBERS ARE ENLARGED  Severe LV and RV systolic dysfunction  LVEF = 15%  Elevated LV filling pressure  Large pleural effusion    Non-Cardiac   EPIC    ASSESSMENT:  1) Ischemic cardiomyopathy  2) Acute on chronic systolic heart failure  3) Pulmonary emboli  4) Hypertension  5) Elevated troponin    Plan: Will continue to monitor weekly and watch weights and symptoms.  May need to " resume scheduled metolazone but for now will not make any changes. Labs as scheduled. Follow up 3 months. Teaching: Patient has been advised to record weights on a daily basis. Patient to call with a weight gain greater than 3 pounds in one day or 5 pounds in one week. Patient advised to follow a 2 gram sodium (2000mg)/2 liter (64 ounces) fluid restricted diet. Patient counseled to avoid the use of NSAID including but not limited to Ibuprofen, Advil and Aleve. Encouraged to check with community pharmacist with all OTC (over the counter) medications to ensure product does not contain NSAIDs. We have explained that non-adherence to the theses instruction will result progressive heart failure symptoms, frequent hospitalizations and shortened life span. Clint Ferrara verbalizes understanding of instructions. Follow-up:  3 months   Clinic:PA/NP     Visit coordinated by: Margarita Monte RN Heart Failure Coordinator. Patient understands he/she can return sooner if any issues should arise.      Gwenette Schlatter, APNP  [facility]

## 2020-02-15 PROCEDURE — 3331090001 HH PPS REVENUE CREDIT

## 2020-02-15 PROCEDURE — 3331090002 HH PPS REVENUE DEBIT

## 2020-02-16 PROCEDURE — 3331090002 HH PPS REVENUE DEBIT

## 2020-02-16 PROCEDURE — 3331090001 HH PPS REVENUE CREDIT

## 2020-02-17 ENCOUNTER — HOME CARE VISIT (OUTPATIENT)
Dept: SCHEDULING | Facility: HOME HEALTH | Age: 70
End: 2020-02-17
Payer: MEDICARE

## 2020-02-17 VITALS
TEMPERATURE: 97.9 F | SYSTOLIC BLOOD PRESSURE: 128 MMHG | RESPIRATION RATE: 18 BRPM | DIASTOLIC BLOOD PRESSURE: 74 MMHG | HEART RATE: 78 BPM

## 2020-02-17 PROCEDURE — G0151 HHCP-SERV OF PT,EA 15 MIN: HCPCS

## 2020-02-17 PROCEDURE — 3331090001 HH PPS REVENUE CREDIT

## 2020-02-17 PROCEDURE — 3331090002 HH PPS REVENUE DEBIT

## 2020-02-18 PROCEDURE — 3331090002 HH PPS REVENUE DEBIT

## 2020-02-18 PROCEDURE — 3331090001 HH PPS REVENUE CREDIT

## 2020-02-19 ENCOUNTER — HOME CARE VISIT (OUTPATIENT)
Dept: SCHEDULING | Facility: HOME HEALTH | Age: 70
End: 2020-02-19
Payer: MEDICARE

## 2020-02-19 VITALS
TEMPERATURE: 98 F | SYSTOLIC BLOOD PRESSURE: 110 MMHG | HEART RATE: 64 BPM | RESPIRATION RATE: 18 BRPM | DIASTOLIC BLOOD PRESSURE: 70 MMHG

## 2020-02-19 PROCEDURE — G0157 HHC PT ASSISTANT EA 15: HCPCS

## 2020-02-19 PROCEDURE — 3331090002 HH PPS REVENUE DEBIT

## 2020-02-19 PROCEDURE — 3331090001 HH PPS REVENUE CREDIT

## 2020-02-20 PROCEDURE — 3331090001 HH PPS REVENUE CREDIT

## 2020-02-20 PROCEDURE — 3331090002 HH PPS REVENUE DEBIT

## 2020-02-21 ENCOUNTER — HOME CARE VISIT (OUTPATIENT)
Dept: SCHEDULING | Facility: HOME HEALTH | Age: 70
End: 2020-02-21
Payer: MEDICARE

## 2020-02-21 VITALS
TEMPERATURE: 98.1 F | SYSTOLIC BLOOD PRESSURE: 126 MMHG | DIASTOLIC BLOOD PRESSURE: 78 MMHG | RESPIRATION RATE: 18 BRPM | HEART RATE: 78 BPM

## 2020-02-21 PROCEDURE — 3331090001 HH PPS REVENUE CREDIT

## 2020-02-21 PROCEDURE — G0151 HHCP-SERV OF PT,EA 15 MIN: HCPCS

## 2020-02-21 PROCEDURE — 3331090002 HH PPS REVENUE DEBIT

## 2020-02-22 PROCEDURE — 3331090002 HH PPS REVENUE DEBIT

## 2020-02-22 PROCEDURE — 3331090001 HH PPS REVENUE CREDIT

## 2020-02-23 PROCEDURE — 3331090002 HH PPS REVENUE DEBIT

## 2020-02-23 PROCEDURE — 3331090001 HH PPS REVENUE CREDIT

## 2020-02-24 ENCOUNTER — HOME CARE VISIT (OUTPATIENT)
Dept: HOME HEALTH SERVICES | Facility: HOME HEALTH | Age: 70
End: 2020-02-24
Payer: MEDICARE

## 2020-02-24 PROCEDURE — 3331090001 HH PPS REVENUE CREDIT

## 2020-02-24 PROCEDURE — 3331090002 HH PPS REVENUE DEBIT

## 2020-02-25 PROCEDURE — 3331090002 HH PPS REVENUE DEBIT

## 2020-02-25 PROCEDURE — 3331090001 HH PPS REVENUE CREDIT

## 2020-02-26 ENCOUNTER — HOME CARE VISIT (OUTPATIENT)
Dept: SCHEDULING | Facility: HOME HEALTH | Age: 70
End: 2020-02-26
Payer: MEDICARE

## 2020-02-26 VITALS
TEMPERATURE: 98 F | RESPIRATION RATE: 17 BRPM | DIASTOLIC BLOOD PRESSURE: 80 MMHG | HEART RATE: 80 BPM | SYSTOLIC BLOOD PRESSURE: 152 MMHG

## 2020-02-26 PROCEDURE — G0157 HHC PT ASSISTANT EA 15: HCPCS

## 2020-02-26 PROCEDURE — 3331090001 HH PPS REVENUE CREDIT

## 2020-02-26 PROCEDURE — 3331090002 HH PPS REVENUE DEBIT

## 2020-02-27 PROCEDURE — 3331090002 HH PPS REVENUE DEBIT

## 2020-02-27 PROCEDURE — 3331090001 HH PPS REVENUE CREDIT

## 2020-02-28 PROCEDURE — 3331090002 HH PPS REVENUE DEBIT

## 2020-02-28 PROCEDURE — 3331090001 HH PPS REVENUE CREDIT

## 2020-02-29 PROCEDURE — 3331090001 HH PPS REVENUE CREDIT

## 2020-02-29 PROCEDURE — 3331090002 HH PPS REVENUE DEBIT

## 2020-03-01 PROCEDURE — 3331090002 HH PPS REVENUE DEBIT

## 2020-03-01 PROCEDURE — 3331090001 HH PPS REVENUE CREDIT

## 2020-03-02 PROCEDURE — 3331090001 HH PPS REVENUE CREDIT

## 2020-03-02 PROCEDURE — 3331090002 HH PPS REVENUE DEBIT

## 2020-03-03 ENCOUNTER — HOME CARE VISIT (OUTPATIENT)
Dept: SCHEDULING | Facility: HOME HEALTH | Age: 70
End: 2020-03-03
Payer: MEDICARE

## 2020-03-03 VITALS
DIASTOLIC BLOOD PRESSURE: 80 MMHG | SYSTOLIC BLOOD PRESSURE: 142 MMHG | HEART RATE: 90 BPM | RESPIRATION RATE: 17 BRPM | TEMPERATURE: 97.6 F

## 2020-03-03 PROCEDURE — G0157 HHC PT ASSISTANT EA 15: HCPCS

## 2020-03-03 PROCEDURE — 3331090001 HH PPS REVENUE CREDIT

## 2020-03-03 PROCEDURE — 3331090002 HH PPS REVENUE DEBIT

## 2020-03-04 PROCEDURE — 3331090002 HH PPS REVENUE DEBIT

## 2020-03-04 PROCEDURE — 3331090001 HH PPS REVENUE CREDIT

## 2020-03-05 ENCOUNTER — HOME CARE VISIT (OUTPATIENT)
Dept: SCHEDULING | Facility: HOME HEALTH | Age: 70
End: 2020-03-05
Payer: MEDICARE

## 2020-03-05 VITALS
DIASTOLIC BLOOD PRESSURE: 90 MMHG | SYSTOLIC BLOOD PRESSURE: 140 MMHG | TEMPERATURE: 97.9 F | RESPIRATION RATE: 17 BRPM | HEART RATE: 70 BPM

## 2020-03-05 PROCEDURE — G0157 HHC PT ASSISTANT EA 15: HCPCS

## 2020-03-05 PROCEDURE — 3331090002 HH PPS REVENUE DEBIT

## 2020-03-05 PROCEDURE — 3331090001 HH PPS REVENUE CREDIT

## 2020-03-06 PROCEDURE — 3331090002 HH PPS REVENUE DEBIT

## 2020-03-06 PROCEDURE — 3331090001 HH PPS REVENUE CREDIT

## 2020-03-07 PROCEDURE — 3331090001 HH PPS REVENUE CREDIT

## 2020-03-07 PROCEDURE — 3331090002 HH PPS REVENUE DEBIT

## 2020-03-08 PROCEDURE — 3331090001 HH PPS REVENUE CREDIT

## 2020-03-08 PROCEDURE — 3331090002 HH PPS REVENUE DEBIT

## 2020-03-09 ENCOUNTER — PATIENT OUTREACH (OUTPATIENT)
Dept: CASE MANAGEMENT | Age: 70
End: 2020-03-09

## 2020-03-09 PROCEDURE — 3331090002 HH PPS REVENUE DEBIT

## 2020-03-09 PROCEDURE — 3331090001 HH PPS REVENUE CREDIT

## 2020-03-09 NOTE — PROGRESS NOTES
This note will not be viewable in [address] Attempted to call patient to f/u on OTONIEL call, left voice message to call this Care coordinator , phone number provided. According to Connect [facility] notes [facility] is still active and a planned d/c for 3/10/2020. This Care Coordinator will graduate this patient from this program, patient will be added back to [facility] if phone call is returned.

## 2020-03-10 ENCOUNTER — HOME CARE VISIT (OUTPATIENT)
Dept: SCHEDULING | Facility: HOME HEALTH | Age: 70
End: 2020-03-10
Payer: MEDICARE

## 2020-03-10 VITALS
DIASTOLIC BLOOD PRESSURE: 76 MMHG | HEART RATE: 78 BPM | TEMPERATURE: 98.3 F | SYSTOLIC BLOOD PRESSURE: 128 MMHG | RESPIRATION RATE: 18 BRPM

## 2020-03-10 PROCEDURE — 3331090002 HH PPS REVENUE DEBIT

## 2020-03-10 PROCEDURE — 3331090001 HH PPS REVENUE CREDIT

## 2020-03-10 PROCEDURE — G0151 HHCP-SERV OF PT,EA 15 MIN: HCPCS

## 2020-10-26 ENCOUNTER — HOME HEALTH ADMISSION (OUTPATIENT)
Dept: HOME HEALTH SERVICES | Facility: HOME HEALTH | Age: 70
End: 2020-10-26
Payer: MEDICARE

## 2020-10-26 ENCOUNTER — HOSPITAL ENCOUNTER (OUTPATIENT)
Dept: PHYSICAL THERAPY | Age: 70
Discharge: HOME | End: 2020-10-26
Payer: MEDICARE

## 2020-10-26 ENCOUNTER — HOSPITAL ENCOUNTER (OUTPATIENT)
Dept: SURGERY | Age: 70
Discharge: HOME | End: 2020-10-26
Payer: MEDICARE

## 2020-10-26 LAB
ANION GAP SERPL CALC-SCNC: 4 MMOL/L (ref 7–16)
APTT PPP: 29 SEC (ref 24.1–35.1)
BACTERIA SPEC CULT: ABNORMAL
BASOPHILS # BLD: 0.1 K/UL (ref 0–0.2)
BASOPHILS NFR BLD: 1 % (ref 0–2)
BUN SERPL-MCNC: 21 MG/DL (ref 8–23)
CALCIUM SERPL-MCNC: 9.2 MG/DL (ref 8.3–10.4)
CHLORIDE SERPL-SCNC: 105 MMOL/L (ref 98–107)
CO2 SERPL-SCNC: 29 MMOL/L (ref 21–32)
CREAT SERPL-MCNC: 0.88 MG/DL (ref 0.6–1)
DIFFERENTIAL METHOD BLD: NORMAL
EOSINOPHIL # BLD: 0.4 K/UL (ref 0–0.8)
EOSINOPHIL NFR BLD: 6 % (ref 0.5–7.8)
ERYTHROCYTE [DISTWIDTH] IN BLOOD BY AUTOMATED COUNT: 13 % (ref 11.9–14.6)
EST. AVERAGE GLUCOSE BLD GHB EST-MCNC: 114 MG/DL
GLUCOSE SERPL-MCNC: 86 MG/DL (ref 65–100)
HBA1C MFR BLD: 5.6 %
HCT VFR BLD AUTO: 41.6 % (ref 35.8–46.3)
HGB BLD-MCNC: 13.8 G/DL (ref 11.7–15.4)
IMM GRANULOCYTES # BLD AUTO: 0 K/UL (ref 0–0.5)
IMM GRANULOCYTES NFR BLD AUTO: 0 % (ref 0–5)
INR PPP: 0.9
LYMPHOCYTES # BLD: 1.7 K/UL (ref 0.5–4.6)
LYMPHOCYTES NFR BLD: 29 % (ref 13–44)
MCH RBC QN AUTO: 31.2 PG (ref 26.1–32.9)
MCHC RBC AUTO-ENTMCNC: 33.2 G/DL (ref 31.4–35)
MCV RBC AUTO: 93.9 FL (ref 79.6–97.8)
MONOCYTES # BLD: 0.5 K/UL (ref 0.1–1.3)
MONOCYTES NFR BLD: 9 % (ref 4–12)
NEUTS SEG # BLD: 3.3 K/UL (ref 1.7–8.2)
NEUTS SEG NFR BLD: 55 % (ref 43–78)
NRBC # BLD: 0 K/UL (ref 0–0.2)
PLATELET # BLD AUTO: 173 K/UL (ref 150–450)
PMV BLD AUTO: 11 FL (ref 9.4–12.3)
POTASSIUM SERPL-SCNC: 3.9 MMOL/L (ref 3.5–5.1)
PROTHROMBIN TIME: 12.7 SEC (ref 12.5–14.7)
RBC # BLD AUTO: 4.43 M/UL (ref 4.05–5.2)
SERVICE CMNT-IMP: ABNORMAL
SODIUM SERPL-SCNC: 138 MMOL/L (ref 136–145)
WBC # BLD AUTO: 6 K/UL (ref 4.3–11.1)

## 2020-10-26 PROCEDURE — 85730 THROMBOPLASTIN TIME PARTIAL: CPT

## 2020-10-26 PROCEDURE — 85025 COMPLETE CBC W/AUTO DIFF WBC: CPT

## 2020-10-26 PROCEDURE — 80048 BASIC METABOLIC PNL TOTAL CA: CPT

## 2020-10-26 PROCEDURE — 85610 PROTHROMBIN TIME: CPT

## 2020-10-26 PROCEDURE — 77030027138 HC INCENT SPIROMETER -A

## 2020-10-26 PROCEDURE — 87641 MR-STAPH DNA AMP PROBE: CPT

## 2020-10-26 PROCEDURE — 97161 PT EVAL LOW COMPLEX 20 MIN: CPT

## 2020-10-26 PROCEDURE — 83036 HEMOGLOBIN GLYCOSYLATED A1C: CPT

## 2020-10-26 PROCEDURE — 36415 COLL VENOUS BLD VENIPUNCTURE: CPT

## 2020-10-26 RX ORDER — ASPIRIN 81 MG/1
TABLET ORAL
COMMUNITY
End: 2020-11-14

## 2020-10-26 RX ORDER — ACETAMINOPHEN 325 MG/1
325 TABLET ORAL
COMMUNITY

## 2020-10-26 RX ORDER — MELOXICAM 15 MG/1
15 TABLET ORAL DAILY
COMMUNITY
End: 2020-11-14

## 2020-10-26 NOTE — ADVANCED PRACTICE NURSE
Total Joint Surgery Preoperative Chart Review      Patient ID:  Lora Amos  [id number]  17 y.o.  1950  Surgeon: Dr. Darshana Nava  Date of Surgery: 11/12/2020  Procedure: Total Right Knee Arthroplasty  Primary Care Physician: Alfred Ricardo [phone]  Specialty Physician(s):      Subjective: Lora Amos is a 65 y.o. WHITE OR  female who presents for preoperative evaluation for Total Right Knee arthroplasty. This is a preoperative chart review note based on data collected by the nurse at the surgical Pre-Assessment visit. Past Medical History:   Diagnosis Date    Anxiety     Arthritis     general-back, neck, hips,knees/ managed with medication     Autoimmune disease ([address]     lichens sclerosis; pt states is dormant now    CAD (coronary artery disease) 2010    stent x 1, no MI    Chest pain     right    Chronic pain     back and neck    Depression     managed with medication     Family history of ischemic heart disease     H/O acute pancreatitis 08/2013    states gallbladder stone lodged creating a blockage.  H/O gastric bypass 2009    High cholesterol     managed w/ meds    Hx of echocardiogram 01/18/2019    Normal LV systolic function. Estimated EF 70-75%.  Hypertension     managed with medication     Hypothyroidism     managed with medication     Lichen sclerosus     Obesity (BMI 30-39. 9)     BMI 32.4 3/16    Osteopenia     Pancreatitis     Systolic murmur     ECHO: +/-PI, TR ; Per cardiologist note 1-5-17 no murmur ; pt states told by cardiologist that he was removeing murmur from her record    Ventral hernia       Past Surgical History:   Procedure Laterality Date    HX APPENDECTOMY  1954    HX BLADDER SUSPENSION      HX CARPAL TUNNEL RELEASE Right     HX CATARACT REMOVAL Bilateral 2015    HX COLONOSCOPY  2017    normal, repeat after 2027    HX ENDOSCOPY  2018    s/p bypass changes    HX GASTRIC BYPASS  2009    HX HEART CATHETERIZATION  2010    stenting of LAD    HX HEART CATHETERIZATION  08/2019    No stents placed this time    HX HERNIA REPAIR  8/14/14    ventral    HX HIP REPLACEMENT Right 02/11/2020    HX HYSTERECTOMY  1978    cervix removed-Rt ovary removed. Dr. Raygoza Comp  7/2013    [facility]    with 1 steel adeline with screws~lower lumbar    HX LYSIS OF ADHESIONS      abdominal    HX OOPHORECTOMY  1984    right    HX ORTHOPAEDIC Right 1997    carpal tunnel release    HX ORTHOPAEDIC Left 2017    hip replacement    HX OTHER SURGICAL  2017    abdomen surgery lysis of adhesions    HX SHOULDER ARTHROSCOPY Right 2011    HX UROLOGICAL      bladder suspension    IR BILIARY DRN CATH EXCHANGE PERC ANY TO EXT SI  3/7/2019    IR BILIARY DRN CATH EXCHANGE PERC ANY TO EXT SI  4/3/2019    IR BILIARY DRN CATH PLC PERC INT/EXT SI  2/4/2019     Family History   Problem Relation Age of Onset    Heart Attack Mother     Heart Disease Mother     Hypertension Mother     Cancer Mother         melanoma    Cancer Father         leukemia    Coronary Artery Disease Father     Cancer Sister         breast    Breast Cancer Sister     Heart Attack Brother     Heart Disease Brother     Cancer Brother         lung    Lung Disease Brother       Social History     Tobacco Use    Smoking status: Never Smoker    Smokeless tobacco: Never Used   Substance Use Topics    Alcohol use: Yes     Alcohol/week: 1.0 standard drinks     Types: 1 Glasses of wine per week     Comment: Occasional       Prior to Admission medications    Medication Sig Start Date End Date Taking? Authorizing Provider   aspirin delayed-release 81 mg tablet Take  by mouth nightly. Yes Provider, Historical   acetaminophen (TylenoL) 325 mg tablet Take  by mouth every four (4) hours as needed for Pain. Yes Provider, Historical   meloxicam (MOBIC) 15 mg tablet Take 15 mg by mouth daily.    Yes Provider, Historical   valsartan (DIOVAN) 320 mg tablet Take 320 mg by mouth every evening. Yes Provider, Historical   hydroCHLOROthiazide (HYDRODIURIL) 25 mg tablet Take 25 mg by mouth nightly. 8/11/19  Yes Provider, Historical   atorvastatin (LIPITOR) 40 mg tablet Take 40 mg by mouth nightly. 4/9/19  Yes Provider, Historical   losartan (COZAAR) 100 mg tablet Take 100 mg by mouth nightly. Yes Provider, Historical   potassium chloride (KLOR-CON) 10 mEq tablet 1 every day for potassium  Patient taking differently: Take 10 mEq by mouth every evening. 1 every day for potassium 2/18/19  Yes Tyrone Lucas MD   temazepam (RESTORIL) 30 mg capsule Take 1 Cap by mouth nightly as needed. Max Daily Amount: 30 mg. Patient taking differently: Take 30 mg by mouth nightly. 12/13/18  Yes Tyrone Lucas MD   LORazepam (ATIVAN) 0.5 mg tablet Take 1 Tab by mouth every eight (8) hours as needed for Anxiety. Max Daily Amount: 1.5 mg. Patient taking differently: Take 0.5 mg by mouth every eight (8) hours as needed for Anxiety. Take / use AM day of surgery  per anesthesia protocols prn 12/13/18  Yes Tyrone Lucas MD   alendronate (FOSAMAX) 70 mg tablet 1 q week for osteoporosis  Patient taking differently: Take 70 mg by mouth every seven (7) days. every Wednesday 9/13/18  Yes Tyrone Lucas MD   levothyroxine (SYNTHROID) 75 mcg tablet 1 every day for thyroid  Patient taking differently: Take 75 mcg by mouth Daily (before breakfast). Take / use AM day of surgery  per anesthesia protocols. 9/13/18  Yes Tyrone Lucas MD   amLODIPine (NORVASC) 5 mg tablet 1 every day for BP  Indications: hypertension  Patient taking differently: Take 5 mg by mouth nightly. 1 every day for BP  Indications: high blood pressure 9/13/18  Yes Tyrone Lucas MD   traZODone (DESYREL) 150 mg tablet 1 to 2 qhs for sleep  Patient taking differently: Take 150 mg by mouth nightly as needed for Sleep.  9/13/18  Yes Tyrone Lucas MD sertraline (ZOLOFT) 100 mg tablet Take 1 Tab by mouth nightly. Indications: major depressive disorder  Patient taking differently: Take 200 mg by mouth nightly. Indications: major depressive disorder 9/13/18  Yes Keagan Pina MD     Allergies   Allergen Reactions    Adhesive Rash     Pulls skin off,can use paper tape    Adhesive Tape Rash    Sulfa (Sulfonamide Antibiotics) Nausea and Vomiting          Objective:     Physical Exam:   Patient Vitals for the past 24 hrs:   Temp Pulse Resp BP SpO2   10/26/20 1209 97.8 °F (36.6 °C) (!) 58 18 (!) 143/70 97 %       ECG:    EKG Results     None          Data Review:   Labs:   Recent Results (from the past 24 hour(s))   CBC WITH AUTOMATED DIFF    Collection Time: 10/26/20 11:03 AM   Result Value Ref Range    WBC 6.0 4.3 - 11.1 K/uL    RBC 4.43 4.05 - 5.2 M/uL    HGB 13.8 11.7 - 15.4 g/dL    HCT 41.6 35.8 - 46.3 %    MCV 93.9 79.6 - 97.8 FL    MCH 31.2 26.1 - 32.9 PG    MCHC 33.2 31.4 - 35.0 g/dL    RDW 13.0 11.9 - 14.6 %    PLATELET 901 351 - 040 K/uL    MPV 11.0 9.4 - 12.3 FL    ABSOLUTE NRBC 0.00 0.0 - 0.2 K/uL    DF AUTOMATED      NEUTROPHILS 55 43 - 78 %    LYMPHOCYTES 29 13 - 44 %    MONOCYTES 9 4.0 - 12.0 %    EOSINOPHILS 6 0.5 - 7.8 %    BASOPHILS 1 0.0 - 2.0 %    IMMATURE GRANULOCYTES 0 0.0 - 5.0 %    ABS. NEUTROPHILS 3.3 1.7 - 8.2 K/UL    ABS. LYMPHOCYTES 1.7 0.5 - 4.6 K/UL    ABS. MONOCYTES 0.5 0.1 - 1.3 K/UL    ABS. EOSINOPHILS 0.4 0.0 - 0.8 K/UL    ABS. BASOPHILS 0.1 0.0 - 0.2 K/UL    ABS. IMM.  GRANS. 0.0 0.0 - 0.5 K/UL   METABOLIC PANEL, BASIC    Collection Time: 10/26/20 11:03 AM   Result Value Ref Range    Sodium 138 136 - 145 mmol/L    Potassium 3.9 3.5 - 5.1 mmol/L    Chloride 105 98 - 107 mmol/L    CO2 29 21 - 32 mmol/L    Anion gap 4 (L) 7 - 16 mmol/L    Glucose 86 65 - 100 mg/dL    BUN 21 8 - 23 MG/DL    Creatinine 0.88 0.6 - 1.0 MG/DL    GFR est AA >60 >60 ml/min/1.73m2    GFR est non-AA >60 >60 ml/min/1.73m2    Calcium 9.2 8.3 - 10.4 MG/DL   PROTHROMBIN TIME + INR    Collection Time: 10/26/20 11:03 AM   Result Value Ref Range    Prothrombin time 12.7 12.5 - 14.7 sec    INR 0.9     PTT    Collection Time: 10/26/20 11:03 AM   Result Value Ref Range    aPTT 29.0 24.1 - 35.1 SEC   HEMOGLOBIN A1C WITH EAG    Collection Time: 10/26/20 11:04 AM   Result Value Ref Range    Hemoglobin A1c 5.6 %    Est. average glucose 114 mg/dL         Problem List:  )  Patient Active Problem List   Diagnosis Code    Essential hypertension, benign I10    Dyslipidemia E78.5    S/P coronary artery stent placement (2.75 x 18 Cypher to mLAD on 9-) Z95.5    S/P laparoscopic cholecystectomy Z90.49    Depression F32.9    Primary hypothyroidism E03.9    Chronic back pain M54.9, G89.29    Obesity (BMI 30.0-34. 9) E66.9    Osteoarthritis M19.90    S/P total hip arthroplasty Z96.649    Irritable bowel syndrome with both constipation and diarrhea K58.2    Age-related osteoporosis without current pathological fracture M81.0    Primary insomnia F51.01    Coronary artery disease involving native coronary artery of native heart without angina pectoris I25.10    Choledocholithiasis K80.50    Gallstone pancreatitis K85.10    Hypokalemia E87.6    Hypomagnesemia E83.42    Arthritis of right hip M16.11    H/O total hip arthroplasty, right Z96.641       Total Joint Surgery Pre-Assessment Recommendations:           Recommend continuous saturation monitoring hours of sleep, during hospitalization. O2 prn per respiratory protocol.        Signed By: Jaida Vazquez NP-C    October 26, 2020

## 2020-10-26 NOTE — PERIOP NOTES
Patient verified name and . Order for consent found in EHR and matches case posting; patient verified. Type 3 surgery, PAT JOINT assessment complete. Labs per surgeon: CBC,BMP, PT/PTT, Hemoglobin A1c; results within anesthesia limits. T&S DOS and POC glucose DOS; orders signed and held in EHR. Labs per anesthesia protocol: no additional  EKG: completed 10/22/20; results within anesthesia limits. Tracing in EHR if needed. Cardiology note with surgical clearance (10/22/20), Cath (19), Stress (19), and Echo (19) located in EHR if needed. Patient has hx of lumbar fusion; CT of lumbar (19) located in EHR under Care Everywhere if needed for anesthesia reference. Patient aware that a negative Covid swab result is required to proceed with surgery; appointments are made by the surgeon office and should test should be collected 7 days prior to surgery. The testing center is located at the . [address], [place]. MRSA/MSSA swab collected; pharmacy to review and dose antibiotic as appropriate. Hospital approved surgical skin cleanser and instructions to return bottle on DOS given per hospital policy. Patient provided with handouts including Guide to Surgery, Pain Management, Hand Hygiene, Blood Transfusion Education, and [facility] Brochure. Patient answered medical/surgical history questions at their best of ability. All prior to admission medications documented in [facility]. Original medication prescription bottle NOT visualized during patient appointment. Patient instructed to hold all vitamins, supplements, herbals 3 weeks prior to surgery and NSAIDS 5 days prior to  surgery. Patient instructed to continue nightly potassium and nightly 81 mg ASA per anesthesia protocol. Patient teach back successful and patient demonstrates knowledge of instruction.

## 2020-10-26 NOTE — PERIOP NOTES
Labs dated 10/26/20 routed via [address] [facility] to patients PCP, Dr. Adrián Kraus, per Dr. Myesha Castillo request.

## 2020-10-26 NOTE — PERIOP NOTES
PLEASE CONTINUE TAKING ALL PRESCRIPTION MEDICATIONS UP TO THE DAY OF SURGERY UNLESS OTHERWISE DIRECTED BELOW. DISCONTINUE all vitamins, herbals and supplements 21 days prior to surgery. DISCONTINUE Non-Steriodal Anti-Inflammatory (NSAIDS) such as Advil, Ibuprofen, and Aleve 5 days prior to surgery. Home Medications to HOLD      All vitamins, supplements, and herbals stop NOW. All NSAIDs such as Meloxicam, Advil, Aleve, Ibuprofen, Diclofenac, Naproxen, etc. Stop 5 days prior to surgery. Home Medications to take  the day of surgery   Levothyroxine, Lorazepam if needed        Comments   BRING: bottle of soap (Dynahex) and incentive spirometer to the hospital on the day of surgery. *Do NOT stop prescribed potassium supplement and do NOT stop nightly 81 mg Aspirin*   *It is ok to take Tylenol up until the day of surgery*       Please do not bring home medications with you on the day of surgery unless otherwise directed by your nurse. If you are instructed to bring home medications, please give them to your nurse as they will be administered by the nursing staff. If you have any questions, please call [address] [place] [address] [phone] or [address] [place] [address] [phone]. Copy of above instructions given to patient.

## 2020-10-26 NOTE — PROGRESS NOTES
[facility] Case Management note:  Patient screened in Prehab for discharge planning needs. Patient scheduled for a future total joint replacement. We discussed Home Health and equipment needed after surgery. List of Home Health providers offered. Patient w/o preference towards provider. Initial referral sent to [facility].  Patient has a walker and 3-1 BSC

## 2020-10-26 NOTE — PERIOP NOTES
Recent Results (from the past 12 hour(s))   CBC WITH AUTOMATED DIFF    Collection Time: 10/26/20 11:03 AM   Result Value Ref Range    WBC 6.0 4.3 - 11.1 K/uL    RBC 4.43 4.05 - 5.2 M/uL    HGB 13.8 11.7 - 15.4 g/dL    HCT 41.6 35.8 - 46.3 %    MCV 93.9 79.6 - 97.8 FL    MCH 31.2 26.1 - 32.9 PG    MCHC 33.2 31.4 - 35.0 g/dL    RDW 13.0 11.9 - 14.6 %    PLATELET 221 153 - 519 K/uL    MPV 11.0 9.4 - 12.3 FL    ABSOLUTE NRBC 0.00 0.0 - 0.2 K/uL    DF AUTOMATED      NEUTROPHILS 55 43 - 78 %    LYMPHOCYTES 29 13 - 44 %    MONOCYTES 9 4.0 - 12.0 %    EOSINOPHILS 6 0.5 - 7.8 %    BASOPHILS 1 0.0 - 2.0 %    IMMATURE GRANULOCYTES 0 0.0 - 5.0 %    ABS. NEUTROPHILS 3.3 1.7 - 8.2 K/UL    ABS. LYMPHOCYTES 1.7 0.5 - 4.6 K/UL    ABS. MONOCYTES 0.5 0.1 - 1.3 K/UL    ABS. EOSINOPHILS 0.4 0.0 - 0.8 K/UL    ABS. BASOPHILS 0.1 0.0 - 0.2 K/UL    ABS. IMM.  GRANS. 0.0 0.0 - 0.5 K/UL   METABOLIC PANEL, BASIC    Collection Time: 10/26/20 11:03 AM   Result Value Ref Range    Sodium 138 136 - 145 mmol/L    Potassium 3.9 3.5 - 5.1 mmol/L    Chloride 105 98 - 107 mmol/L    CO2 29 21 - 32 mmol/L    Anion gap 4 (L) 7 - 16 mmol/L    Glucose 86 65 - 100 mg/dL    BUN 21 8 - 23 MG/DL    Creatinine 0.88 0.6 - 1.0 MG/DL    GFR est AA >60 >60 ml/min/1.73m2    GFR est non-AA >60 >60 ml/min/1.73m2    Calcium 9.2 8.3 - 10.4 MG/DL   PROTHROMBIN TIME + INR    Collection Time: 10/26/20 11:03 AM   Result Value Ref Range    Prothrombin time 12.7 12.5 - 14.7 sec    INR 0.9     PTT    Collection Time: 10/26/20 11:03 AM   Result Value Ref Range    aPTT 29.0 24.1 - 35.1 SEC   HEMOGLOBIN A1C WITH EAG    Collection Time: 10/26/20 11:04 AM   Result Value Ref Range    Hemoglobin A1c 5.6 %    Est. average glucose 114 mg/dL

## 2020-10-26 NOTE — PROGRESS NOTES
Padmini Cueva  : [id number](31 y.o.) [facility] at [address] [place] [address]  [phone]       Physical Therapy Prehab Plan of Treatment and Evaluation Summary:10/26/2020    ICD-10: Treatment Diagnosis:   · Pain in Right Knee (M25.561)  · Stiffness of Right Knee, Not elsewhere classified (M25.661)  · Difficulty in walking, Not elsewhere classified (R26.2)  Precautions/Allergies:   Adhesive; Adhesive tape; and Sulfa (sulfonamide antibiotics)  MEDICAL/REFERRING DIAGNOSIS:  Unilateral primary osteoarthritis, right knee [M17.11]  REFERRING PHYSICIAN: Alma March MD  DATE OF SURGERY: 20    Assessment:   Comments:  Pt. Plans to go home with . She has had bilateral jacy's here with most recent one being earlier this year. She needs a left tka at some point. PROBLEM LIST (Impacting functional limitations):  Ms. Rasta Grant presents with the following right lower extremity(s) problems:  1. Strength  2. Range of Motion  3. Home Exercise Program  4. Pain   INTERVENTIONS PLANNED:  1. Home Exercise Program  2. Educational Discussion      TREATMENT PLAN: Effective Dates: 10/26/2020 TO 10/26/2020. Frequency/Duration: Patient to continue to perform home exercise program at least twice per day up until her surgery. GOALS: (Goals have been discussed and agreed upon with patient.)  Discharge Goals: Time Frame: 1 Day  1. Patient will demonstrate independence with a home exercise program designed to increase strength, range of motion and pain control to minimize functional deficits and optimize patient for total joint replacement. Rehabilitation Potential For Stated Goals: [address] [facility]'s therapy, I certify that the treatment plan above will be carried out by a therapist or under their direction.   Thank you for this referral,  Bear Lerner, PT               HISTORY:   Present Symptoms:  Pain Intensity 1: (9 at worst)  Pain Location 1: Knee   History of Present Injury/Illness (Reason for Referral):  Medical/Referring Diagnosis: Unilateral primary osteoarthritis, right knee [M17.11]   Past Medical History/Comorbidities:   Ms. Charles Schuler  has a past medical history of Anxiety, Arthritis, Autoimmune disease ([facility] [address] CAD (coronary artery disease) (2010), Chest pain, Chronic pain, Depression, Family history of ischemic heart disease, H/O acute pancreatitis (08/2013), H/O gastric bypass (2009), High cholesterol, echocardiogram (01/18/2019), Hypertension, Hypothyroidism, Lichen sclerosus, Obesity (BMI 30-39.9), Osteopenia, Pancreatitis, Systolic murmur, and Ventral hernia. She also has no past medical history of Unspecified adverse effect of anesthesia. Ms. Charles Schuler  has a past surgical history that includes hx bladder suspension; hx carpal tunnel release (Right); hx lysis of adhesions; hx appendectomy (1954); hx colonoscopy (2017); hx endoscopy (2018); hx heart catheterization (2010); hx hysterectomy (1978); hx oophorectomy (1984); hx lumbar fusion (1996); hx urological; hx cataract removal (Bilateral, 2015); hx lap cholecystectomy (7/2013); hx gastric bypass (2009); hx hernia repair (8/14/14); hx other surgical (2017); hx shoulder arthroscopy (Right, 2011); hx orthopaedic (Right, 1997); hx orthopaedic (Left, 2017); ir biliary drn cath plc perc int/ext si (2/4/2019); ir biliary drn cath exchange perc any to ext si (3/7/2019); ir biliary drn cath exchange perc any to ext si (4/3/2019); hx heart catheterization (08/2019); and hx hip replacement (Right, 02/11/2020). Social History/Living Environment:   Home Environment: Private residence  # Steps to Enter:  9(ramp in back)  Rails to Enter: Yes  Wheelchair Ramp: Yes  One/Two Story Residence: Two story  # of Interior Steps: 13  Interior Rails: Left  Lift Chair Available: No  Living Alone: No  Support Systems: Spouse/Significant Other/Partner  Patient Expects to be Discharged to[de-identified] Private residence  Current DME Used/Available at Home: Garth Libman, gema, Liza Chaves, straight, [employer], bedside  Tub or Shower Type: Shower  Work/Activity:  retired  Dominant Side:  RIGHT  Current Medications:  See [address] note   Number of Personal Factors/Comorbidities that affect the Plan of Care: 1-2: MODERATE COMPLEXITY   EXAMINATION:   ADLs (Current Functional Status):   Ambulation:  [x] Independent  [x] Walk Indoors Only  [] Walk Outdoors  [] Use Assistive Device  [] Use Wheelchair Only Dressing:  [x] [address] from Someone for:  [] Sock/Shoes  [] Pants  [] Everything   Bathing/Showering:   [x] Independent  [] Requires Assistance from Someone  [] [address] Only Household Activities:  [] Routine house and yard work  [x] Light Housework Only  [] None   Observation/Orthostatic Postural Assessment:   Exceptions to [employer] shoulders  ROM/Flexibility:   Gross Assessment: Yes  AROM: Within functional limits(left LE)                       RLE Assessment  RLE Assessment (WDL): Exceptions to WDL  RLE AROM  R Knee Flexion: 121  R Knee Extension: 4   Strength:   Gross Assessment: Yes  Strength: Generally decreased, functional(left LE)              RLE Strength  R Knee Flexion: 4  R Knee Extension: 4   Functional Mobility:    Gross Assessment: Yes    Gait Description (WDL): Exceptions to WDL  Stand to Sit: Additional time, Independent  Sit to Stand: Independent, Additional time  Distance (ft): 200 Feet (ft)  Ambulation - Level of Assistance: Independent  Speed/Princess: Delayed  Stance: Right decreased  Gait Abnormalities: Antalgic          Balance:    Sitting: Intact  Standing: Intact   Body Structures Involved:  1. Bones  2. Joints  3. Muscles  4. Ligaments Body Functions Affected:  1. Movement Related Activities and Participation Affected:  1.  Mobility   Number of elements that affect the Plan of Care: 3: MODERATE COMPLEXITY   CLINICAL PRESENTATION:   Presentation: Stable and uncomplicated: LOW COMPLEXITY   CLINICAL DECISION MAKING:   Outcome Measure: Tool Used: Lower Extremity Functional Scale (LEFS)  Score:  Initial: 18/80 Most Recent: X/80 (Date: -- )   Interpretation of Score: 20 questions each scored on a 5 point scale with 0 representing \"extreme difficulty or unable to perform\" and 4 representing \"no difficulty\". The lower the score, the greater the functional disability. 80/80 represents no disability. Minimal detectable change is 9 points. Medical Necessity:   · Ms. Sharron Blue is expected to optimize her lower extremity strength and ROM in preparation for joint replacement surgery. Reason for Services/Other Comments:  · Achieve baseline assesment of musculoskeletal system, functional mobility and home environment. , educate in PT HEP in preparation for surgery, educate in hospital plan of care. Use of outcome tool(s) and clinical judgement create a POC that gives a: Clear prediction of patient's progress: LOW COMPLEXITY   TREATMENT:   Treatment/Session Assessment:  Patient was instructed in PT- HEP to increase strength and ROM in LEs. Answered all questions. · Post session pain:  Knee pain  · Compliance with Program/Exercises: compliant most of the time.   Total Treatment Duration:  PT Patient Time In/Time Out  Time In: 1100  Time Out: 10 Ranjeet Campbell., PT

## 2020-10-27 VITALS
BODY MASS INDEX: 34.66 KG/M2 | TEMPERATURE: 97.8 F | WEIGHT: 208 LBS | DIASTOLIC BLOOD PRESSURE: 70 MMHG | OXYGEN SATURATION: 97 % | SYSTOLIC BLOOD PRESSURE: 143 MMHG | HEART RATE: 58 BPM | HEIGHT: 65 IN | RESPIRATION RATE: 18 BRPM

## 2020-10-27 NOTE — PROGRESS NOTES
10/26/20 1030   Oxygen Therapy   O2 Sat (%) 97 %   Pulse via Oximetry 62 beats per minute   O2 Device Room air   Pre-Treatment   Breath Sounds Bilateral Clear     Initial respiratory Assessment completed with pt. Pt was interviewed and evaluated in [facility] prior to surgery. Patient ID:  Rowena Stevens  [id number]  80 y.o.  1950  Surgeon: Dr. Dajuan Márquez  Date of Surgery: 11/12/2020  Procedure: Total Right Knee Arthroplasty  Primary Care Physician: Chuck Pack [phone]  Specialists:     Pt. IS SOMEWHAT PRACTICING SOCIAL DISTANCING AND WEARING A MASK WHEN IN PUBLIC. Pt taught proper COUGH technique  DIAPHRAGMATIC BREATHING EXERCISE INSTRUCTIONS GIVEN    History of smoking:   DENIES                 Quit date:         Secondhand smoke:DENIES    Past procedures with Oxygen desaturation or delayed awakening:DENIES    Past Medical History:   Diagnosis Date    Anxiety     Arthritis     general-back, neck, hips,knees/ managed with medication     Autoimmune disease ([facility] [address]     lichens sclerosis; pt states is dormant now    CAD (coronary artery disease) 2010    stent x 1, no MI    Chest pain     right    Chronic pain     back and neck    Depression     managed with medication     Family history of ischemic heart disease     H/O acute pancreatitis 08/2013    states gallbladder stone lodged creating a blockage.  H/O gastric bypass 2009    High cholesterol     managed w/ meds    Hx of echocardiogram 01/18/2019    Normal LV systolic function. Estimated EF 70-75%.  Hypertension     managed with medication     Hypothyroidism     managed with medication     Lichen sclerosus     Obesity (BMI 30-39. 9)     BMI 32.4 3/16    Osteopenia     Pancreatitis     Systolic murmur     ECHO: +/-PI, TR ; Per cardiologist note 1-5-17 no murmur ; pt states told by cardiologist that he was removeing murmur from her record    Ventral hernia         Respiratory history:DENIES SOB Respiratory meds:  DENIES    FAMILY PRESENT:             NO                                                   PAST SLEEP STUDY:                        DENIES  HX OF SYLVIA:                                        DENIES  SYLVIA assessment:                                               SLEEPS ON SIDE        PHYSICAL EXAM   Body mass index is 34.61 kg/m².    Visit Vitals  BP (!) 143/70 (BP 1 Location: Left arm, BP Patient Position: Sitting)   Pulse (!) 58   Temp 97.8 °F (36.6 °C)   Resp 18   Ht 5' 5\" (1.651 m)   Wt 94.3 kg (208 lb)   SpO2 97%   BMI 34.61 kg/m²     Neck circumference:  37    cm    Loud snoring:                                                 YES            Witnessed apnea or wakening gasping or choking:        DENIES       Awakens with headaches:                                               DENIES  Morning or daytime tiredness/ sleepiness:                              TIRED  Dry mouth or sore throat in morning:            YES                                             Emanuel stage:  4                                   SACS score:6  Stop Bang   STOP-BANG  Does the patient snore loudly (louder than talking or loud enough to be heard through closed doors)?: Yes  Does the patient often feel tired, fatigued, or sleepy during the daytime, even after a \"good\" night's sleep?: Yes  Has anyone ever observed the patient stop breathing during their sleep? : No  Does the patient have or are they being treated for high blood pressure?: Yes  Is the patient's BMI greater than 35?: No  Is your neck circumference greater than 17 inches (Male) or 16 inches (Female)?: No  Is the patient older than 48?: Yes  Is the patient male?: No  SYLVIA Score: 4  Has the patient been referred to Sleep Medicine?: No  Has the patient previously been diagnosed with Obstructive Sleep Apnea?: No                                   [facility]                                 Referrals:    Pt. Phone Number:

## 2020-11-06 NOTE — H&P
H&P    Patient ID:  Henri Singh  [id number]  89 y.o.  1950  Surgeon:  Neisha Martinez MD  Date of Surgery: * No surgery date entered *  Procedure: robot assisted Right Knee Total Arthroplasty  Primary Care Physician: Ira Wright MD        Subjective: Henri Singh is a 65 y.o. WHITE [place]  female who presents with Right Knee pain. She has history of Right Knee pain for several months. Symptoms worse with walking long distances and relieved with rest. Conservative treatment consisting of  activity modification and injections have not helped. The patient lives with their family. The patients goal after surgery is improved pain and function. Past Medical History:   Diagnosis Date    Anxiety     Arthritis     general-back, neck, hips,knees/ managed with medication     Autoimmune disease ([address]     lichens sclerosis; pt states is dormant now    CAD (coronary artery disease) 2010    stent x 1, no MI    Chest pain     right    Chronic pain     back and neck    Depression     managed with medication     Family history of ischemic heart disease     H/O acute pancreatitis 08/2013    states gallbladder stone lodged creating a blockage.  H/O gastric bypass 2009    High cholesterol     managed w/ meds    Hx of echocardiogram 01/18/2019    Normal LV systolic function. Estimated EF 70-75%.  Hypertension     managed with medication     Hypothyroidism     managed with medication     Lichen sclerosus     Obesity (BMI 30-39. 9)     BMI 32.4 3/16    Osteopenia     Pancreatitis     Systolic murmur     ECHO: +/-PI, TR ; Per cardiologist note 1-5-17 no murmur ; pt states told by cardiologist that he was removeing murmur from her record    Ventral hernia       Past Surgical History:   Procedure Laterality Date    HX APPENDECTOMY  1954    HX BLADDER SUSPENSION      HX CARPAL TUNNEL RELEASE Right     HX CATARACT REMOVAL Bilateral 2015    HX COLONOSCOPY  2017    normal, repeat after 2027    HX ENDOSCOPY  2018    s/p bypass changes    HX GASTRIC BYPASS  2009    HX HEART CATHETERIZATION  2010    stenting of LAD    HX HEART CATHETERIZATION  08/2019    No stents placed this time    HX HERNIA REPAIR  8/14/14    ventral    HX HIP REPLACEMENT Right 02/11/2020    HX HYSTERECTOMY  1978    cervix removed-Rt ovary removed. Dr. Jemma Marcelo  7/2013    [facility]    with 1 steel adeline with screws~lower lumbar    HX LYSIS OF ADHESIONS      abdominal    HX OOPHORECTOMY  1984    right    HX ORTHOPAEDIC Right 1997    carpal tunnel release    HX ORTHOPAEDIC Left 2017    hip replacement    HX OTHER SURGICAL  2017    abdomen surgery lysis of adhesions    HX SHOULDER ARTHROSCOPY Right 2011    HX UROLOGICAL      bladder suspension    IR BILIARY DRN CATH EXCHANGE PERC ANY TO EXT SI  3/7/2019    IR BILIARY DRN CATH EXCHANGE PERC ANY TO EXT SI  4/3/2019    IR BILIARY DRN CATH PLC PERC INT/EXT SI  2/4/2019     Family History   Problem Relation Age of Onset    Heart Attack Mother     Heart Disease Mother     Hypertension Mother     Cancer Mother         melanoma    Cancer Father         leukemia    Coronary Artery Disease Father     Cancer Sister         breast    Breast Cancer Sister     Heart Attack Brother     Heart Disease Brother     Cancer Brother         lung    Lung Disease Brother       Social History     Tobacco Use    Smoking status: Never Smoker    Smokeless tobacco: Never Used   Substance Use Topics    Alcohol use: Yes     Alcohol/week: 1.0 standard drinks     Types: 1 Glasses of wine per week     Comment: Occasional       Prior to Admission medications    Medication Sig Start Date End Date Taking? Authorizing Provider   aspirin delayed-release 81 mg tablet Take  by mouth nightly. Provider, Historical   acetaminophen (TylenoL) 325 mg tablet Take  by mouth every four (4) hours as needed for Pain. Provider, Historical   meloxicam (MOBIC) 15 mg tablet Take 15 mg by mouth daily. Provider, Historical   valsartan (DIOVAN) 320 mg tablet Take 320 mg by mouth every evening. Provider, Historical   hydroCHLOROthiazide (HYDRODIURIL) 25 mg tablet Take 25 mg by mouth nightly. 8/11/19   Provider, Historical   atorvastatin (LIPITOR) 40 mg tablet Take 40 mg by mouth nightly. 4/9/19   Provider, Historical   losartan (COZAAR) 100 mg tablet Take 100 mg by mouth nightly. Provider, Historical   potassium chloride (KLOR-CON) 10 mEq tablet 1 every day for potassium  Patient taking differently: Take 10 mEq by mouth every evening. 1 every day for potassium 2/18/19   Alfred Ricardo MD   temazepam (RESTORIL) 30 mg capsule Take 1 Cap by mouth nightly as needed. Max Daily Amount: 30 mg. Patient taking differently: Take 30 mg by mouth nightly. 12/13/18   Alfred Ricardo MD   LORazepam (ATIVAN) 0.5 mg tablet Take 1 Tab by mouth every eight (8) hours as needed for Anxiety. Max Daily Amount: 1.5 mg. Patient taking differently: Take 0.5 mg by mouth every eight (8) hours as needed for Anxiety. Take / use AM day of surgery  per anesthesia protocols prn 12/13/18   Alfred Ricardo MD   alendronate (FOSAMAX) 70 mg tablet 1 q week for osteoporosis  Patient taking differently: Take 70 mg by mouth every seven (7) days. every Wednesday 9/13/18   Alfred Ricardo MD   levothyroxine (SYNTHROID) 75 mcg tablet 1 every day for thyroid  Patient taking differently: Take 75 mcg by mouth Daily (before breakfast). Take / use AM day of surgery  per anesthesia protocols. 9/13/18   Alfred Ricardo MD   amLODIPine (NORVASC) 5 mg tablet 1 every day for BP  Indications: hypertension  Patient taking differently: Take 5 mg by mouth nightly.  1 every day for BP  Indications: high blood pressure 9/13/18   Alfred Ricardo MD   traZODone (DESYREL) 150 mg tablet 1 to 2 qhs for sleep  Patient taking differently: Take 150 mg by mouth nightly as needed for Sleep. 9/13/18   Moises Castillo MD   sertraline (ZOLOFT) 100 mg tablet Take 1 Tab by mouth nightly. Indications: major depressive disorder  Patient taking differently: Take 200 mg by mouth nightly. Indications: major depressive disorder 9/13/18   Moises Castillo MD     Allergies   Allergen Reactions    Adhesive Rash     Pulls skin off,can use paper tape    Adhesive Tape Rash    Sulfa (Sulfonamide Antibiotics) Nausea and Vomiting        REVIEW OF SYSTEMS:  CONSTITUTIONAL: Denies fever, decreased appetite, weight loss/gain, night sweats or fatigue. HEENT: Denies vision or hearing changes. denies glasses. denies hearing aids. CARDIAC: Denies CP, palpitations, rheumatic fever, murmur, peripheral edema, carotid artery disease or syncopal episodes. RESPIRATORY: Denies dyspnea on exertion, asthma, COPD or orthopnea. GI: Denies GERD, history of GI bleed or melena, PUD, hepatitis or cirrhosis. : Denies dysuria, hematuria. denies BPH symptoms. HEMATOLOGIC: Denies anemia or blood disorders. ENDOCRINE: Denies thyroid disease. MUSCULOSKELETAL: See HPI. NEUROLOGIC: Denies seizure, peripheral neuropathy or memory loss. PSYCH: Denies depression, anxiety or insomnia. SKIN: Denies rash or open sores. Objective:    PHYSICAL EXAM  GENERAL: No data found. EYES: PERRL. EOM intact. MOUTH:Teeth and Gums normal. NECK: Full ROM. Trachea midline. No thyromegaly or JVD. CARDIOVASCULAR: Regular rate and rhythm. No murmur or gallops. No carotid bruits. Peripheral pulses: radial 2 +, PT 2+, DP 2+ bilaterally. LUNGS: CTA bilaterally. No wheezes, rhonchi or rales. GI: positive BS. Abdomen nontender. NEUROLOGIC: Alert and oriented x 3. Bilateral equal strong had grasp and bilateral equal strong plantar flexion and dorsiflexion. GAIT: abnormal MUSCULOSKELETAL: ROM: full with pain. Tenderness: over the medial and lateral joint lines. Crepitus: present.  SKIN: No rash, bruising, swelling, redness or warmth. Labs:    Recent Results (from the past 24 hour(s))   NOVEL CORONAVIRUS (COVID-19)    Collection Time: 11/05/20 10:32 AM   Result Value Ref Range    SARS-CoV-2, ALFRED Not Detected Not Detected   INPATIENT    Collection Time: 11/05/20 10:32 AM   Result Value Ref Range    Inpatient Comment        Xray Right Knee: joint space narrowing    Assessment:  Advanced Right Knee Osteoarthritis. Robot assistedTotal Right Knee Arthroplasty Indicated. Patient Active Problem List   Diagnosis Code    Essential hypertension, benign I10    Dyslipidemia E78.5    S/P coronary artery stent placement (2.75 x 18 Cypher to mLAD on 9-) Z95.5    S/P laparoscopic cholecystectomy Z90.49    Depression F32.9    Primary hypothyroidism E03.9    Chronic back pain M54.9, G89.29    Obesity (BMI 30.0-34. 9) E66.9    Osteoarthritis M19.90    S/P total hip arthroplasty Z96.649    Irritable bowel syndrome with both constipation and diarrhea K58.2    Age-related osteoporosis without current pathological fracture M81.0    Primary insomnia F51.01    Coronary artery disease involving native coronary artery of native heart without angina pectoris I25.10    Choledocholithiasis K80.50    Gallstone pancreatitis K85.10    Hypokalemia E87.6    Hypomagnesemia E83.42    Arthritis of right hip M16.11    H/O total hip arthroplasty, right Z96.641       Plan:  I have advised the patient of the risks and consequences, including possible complications of performing total joint replacement, as well as not doing this operation. The patient had the opportunity to ask questions and have them answered to their satisfaction.      Signed:  Merlinda Alto, PA 11/6/2020

## 2020-11-12 ENCOUNTER — ANESTHESIA EVENT (OUTPATIENT)
Dept: SURGERY | Age: 70
DRG: 470 | End: 2020-11-12
Payer: MEDICARE

## 2020-11-12 ENCOUNTER — ANESTHESIA (OUTPATIENT)
Dept: SURGERY | Age: 70
DRG: 470 | End: 2020-11-12
Payer: MEDICARE

## 2020-11-12 ENCOUNTER — HOSPITAL ENCOUNTER (INPATIENT)
Age: 70
LOS: 2 days | Discharge: HOME HEALTH | DRG: 470 | End: 2020-11-14
Attending: ORTHOPAEDIC SURGERY | Admitting: ORTHOPAEDIC SURGERY
Payer: MEDICARE

## 2020-11-12 DIAGNOSIS — Z96.651 TOTAL KNEE REPLACEMENT STATUS, RIGHT: Primary | ICD-10-CM

## 2020-11-12 PROBLEM — M17.11 ARTHRITIS OF KNEE, RIGHT: Status: ACTIVE | Noted: 2020-11-12

## 2020-11-12 LAB
GLUCOSE BLD STRIP.AUTO-MCNC: 99 MG/DL (ref 65–100)
HGB BLD-MCNC: 12.1 G/DL (ref 11.7–15.4)

## 2020-11-12 PROCEDURE — 77030040361 HC SLV COMPR DVT MDII -B

## 2020-11-12 PROCEDURE — 77030029820: Performed by: ORTHOPAEDIC SURGERY

## 2020-11-12 PROCEDURE — 74011250636 HC RX REV CODE- 250/636: Performed by: ORTHOPAEDIC SURGERY

## 2020-11-12 PROCEDURE — 65270000029 HC RM PRIVATE

## 2020-11-12 PROCEDURE — 97535 SELF CARE MNGMENT TRAINING: CPT

## 2020-11-12 PROCEDURE — 74011000250 HC RX REV CODE- 250: Performed by: NURSE ANESTHETIST, CERTIFIED REGISTERED

## 2020-11-12 PROCEDURE — 77030040922 HC BLNKT HYPOTHRM STRY -A: Performed by: ANESTHESIOLOGY

## 2020-11-12 PROCEDURE — 74011250636 HC RX REV CODE- 250/636: Performed by: ANESTHESIOLOGY

## 2020-11-12 PROCEDURE — 74011000258 HC RX REV CODE- 258: Performed by: ORTHOPAEDIC SURGERY

## 2020-11-12 PROCEDURE — 0SRC0J9 REPLACEMENT OF RIGHT KNEE JOINT WITH SYNTHETIC SUBSTITUTE, CEMENTED, OPEN APPROACH: ICD-10-PCS | Performed by: ORTHOPAEDIC SURGERY

## 2020-11-12 PROCEDURE — C1713 ANCHOR/SCREW BN/BN,TIS/BN: HCPCS | Performed by: ORTHOPAEDIC SURGERY

## 2020-11-12 PROCEDURE — C1776 JOINT DEVICE (IMPLANTABLE): HCPCS | Performed by: ORTHOPAEDIC SURGERY

## 2020-11-12 PROCEDURE — 94760 N-INVAS EAR/PLS OXIMETRY 1: CPT

## 2020-11-12 PROCEDURE — 76060000034 HC ANESTHESIA 1.5 TO 2 HR: Performed by: ORTHOPAEDIC SURGERY

## 2020-11-12 PROCEDURE — 77030008462 HC STPLR SKN PROX J&J -A: Performed by: ORTHOPAEDIC SURGERY

## 2020-11-12 PROCEDURE — 77030037715 HC DRSG ZIP STRY -B: Performed by: ORTHOPAEDIC SURGERY

## 2020-11-12 PROCEDURE — 76942 ECHO GUIDE FOR BIOPSY: CPT | Performed by: ORTHOPAEDIC SURGERY

## 2020-11-12 PROCEDURE — 77030038149 HC BLD SAW SAG STRY -D: Performed by: ORTHOPAEDIC SURGERY

## 2020-11-12 PROCEDURE — 74011250636 HC RX REV CODE- 250/636: Performed by: NURSE ANESTHETIST, CERTIFIED REGISTERED

## 2020-11-12 PROCEDURE — 3E0T3BZ INTRODUCTION OF ANESTHETIC AGENT INTO PERIPHERAL NERVES AND PLEXI, PERCUTANEOUS APPROACH: ICD-10-PCS | Performed by: ANESTHESIOLOGY

## 2020-11-12 PROCEDURE — 2709999900 HC NON-CHARGEABLE SUPPLY: Performed by: ORTHOPAEDIC SURGERY

## 2020-11-12 PROCEDURE — 77030029829 HC TIB INST CKPNT DISP STRY -B: Performed by: ORTHOPAEDIC SURGERY

## 2020-11-12 PROCEDURE — 77030033067 HC SUT PDO STRATFX SPIR J&J -B: Performed by: ORTHOPAEDIC SURGERY

## 2020-11-12 PROCEDURE — 77030031139 HC SUT VCRL2 J&J -A: Performed by: ORTHOPAEDIC SURGERY

## 2020-11-12 PROCEDURE — 77030003665 HC NDL SPN BBMI -A: Performed by: ANESTHESIOLOGY

## 2020-11-12 PROCEDURE — 76210000017 HC OR PH I REC 1.5 TO 2 HR: Performed by: ORTHOPAEDIC SURGERY

## 2020-11-12 PROCEDURE — 97110 THERAPEUTIC EXERCISES: CPT

## 2020-11-12 PROCEDURE — 97165 OT EVAL LOW COMPLEX 30 MIN: CPT

## 2020-11-12 PROCEDURE — 77030029828 HC FEM TIB CKPNT KT DISP STRY -B: Performed by: ORTHOPAEDIC SURGERY

## 2020-11-12 PROCEDURE — 77030003602 HC NDL NRV BLK BBMI -B: Performed by: ANESTHESIOLOGY

## 2020-11-12 PROCEDURE — 77030008075: Performed by: ORTHOPAEDIC SURGERY

## 2020-11-12 PROCEDURE — 74011000250 HC RX REV CODE- 250: Performed by: ORTHOPAEDIC SURGERY

## 2020-11-12 PROCEDURE — 85018 HEMOGLOBIN: CPT

## 2020-11-12 PROCEDURE — 94762 N-INVAS EAR/PLS OXIMTRY CONT: CPT

## 2020-11-12 PROCEDURE — 77030010509 HC AIRWY LMA MSK TELE -A: Performed by: ANESTHESIOLOGY

## 2020-11-12 PROCEDURE — 76010010054 HC POST OP PAIN BLOCK: Performed by: ORTHOPAEDIC SURGERY

## 2020-11-12 PROCEDURE — 2709999900 HC NON-CHARGEABLE SUPPLY

## 2020-11-12 PROCEDURE — 82962 GLUCOSE BLOOD TEST: CPT

## 2020-11-12 PROCEDURE — 3E0T33Z INTRODUCTION OF ANTI-INFLAMMATORY INTO PERIPHERAL NERVES AND PLEXI, PERCUTANEOUS APPROACH: ICD-10-PCS | Performed by: ANESTHESIOLOGY

## 2020-11-12 PROCEDURE — 36415 COLL VENOUS BLD VENIPUNCTURE: CPT

## 2020-11-12 PROCEDURE — 77030012935 HC DRSG AQUACEL BMS -B: Performed by: ORTHOPAEDIC SURGERY

## 2020-11-12 PROCEDURE — 74011250637 HC RX REV CODE- 250/637: Performed by: ORTHOPAEDIC SURGERY

## 2020-11-12 PROCEDURE — 77030016544 HC BLD SAW RECIP1 STRY -B: Performed by: ORTHOPAEDIC SURGERY

## 2020-11-12 PROCEDURE — 77030007880 HC KT SPN EPDRL BBMI -B: Performed by: ANESTHESIOLOGY

## 2020-11-12 PROCEDURE — 97161 PT EVAL LOW COMPLEX 20 MIN: CPT

## 2020-11-12 PROCEDURE — 76010000875 HC OR TIME 1.5 TO 2HR INTENSV - TIER 2: Performed by: ORTHOPAEDIC SURGERY

## 2020-11-12 PROCEDURE — 77030020275 HC MISC ORTHOPEDIC

## 2020-11-12 PROCEDURE — 74011250637 HC RX REV CODE- 250/637: Performed by: ANESTHESIOLOGY

## 2020-11-12 PROCEDURE — 77030037714 HC CLOSR DEV INCIS ZIP STRY -C: Performed by: ORTHOPAEDIC SURGERY

## 2020-11-12 PROCEDURE — 8E0Y0CZ ROBOTIC ASSISTED PROCEDURE OF LOWER EXTREMITY, OPEN APPROACH: ICD-10-PCS | Performed by: ORTHOPAEDIC SURGERY

## 2020-11-12 PROCEDURE — 77030029830 HC FEM INST CKPNT DISP STRY -B: Performed by: ORTHOPAEDIC SURGERY

## 2020-11-12 PROCEDURE — 77030019557 HC ELECTRD VES SEAL MEDT -F: Performed by: ORTHOPAEDIC SURGERY

## 2020-11-12 PROCEDURE — 77030038023 HC PIN FIX MAKO STRY -B: Performed by: ORTHOPAEDIC SURGERY

## 2020-11-12 DEVICE — PALACOS® R IS A FAST-CURING, RADIOPAQUE, POLY(METHYL METHACRYLATE)-BASED BONE CEMENT.PALACOS ® R CONTAINS THE X-RAY CONTRAST MEDIUM ZIRCONIUM DIOXIDE. TO IMPROVE VISIBILITY IN THE SURGICAL FIELD PALACOS ® R HAS BEEN COLOURED WITH CHLOROPHYLL (E141). THE BONE CEMENT IS PREPARED DIRECTLY BEFORE USE BY MIXING A POLYMER POWDER COMPONENT WITH A LIQUID MONOMER COMPONENT. A DUCTILE DOUGH FORMS WHICH CURES WITHIN A FEW MINUTES.
Type: IMPLANTABLE DEVICE | Site: KNEE | Status: FUNCTIONAL
Brand: PALACOS®

## 2020-11-12 DEVICE — CRUCIATE RETAINING FEMORAL
Type: IMPLANTABLE DEVICE | Site: KNEE | Status: FUNCTIONAL
Brand: TRIATHLON

## 2020-11-12 DEVICE — UNIVERSAL TIBIAL BASEPLATE
Type: IMPLANTABLE DEVICE | Site: KNEE | Status: FUNCTIONAL
Brand: TRIATHLON

## 2020-11-12 DEVICE — KNEE K1 TOT HEMI STD CEM -- IMPL CAPPED K1: Type: IMPLANTABLE DEVICE | Status: FUNCTIONAL

## 2020-11-12 DEVICE — TIBIAL BEARING INSERT - CS
Type: IMPLANTABLE DEVICE | Site: KNEE | Status: FUNCTIONAL
Brand: TRIATHLON

## 2020-11-12 RX ORDER — FENTANYL CITRATE 50 UG/ML
100 INJECTION, SOLUTION INTRAMUSCULAR; INTRAVENOUS ONCE
Status: COMPLETED | OUTPATIENT
Start: 2020-11-12 | End: 2020-11-12

## 2020-11-12 RX ORDER — PROPOFOL 10 MG/ML
INJECTION, EMULSION INTRAVENOUS
Status: DISCONTINUED | OUTPATIENT
Start: 2020-11-12 | End: 2020-11-12 | Stop reason: HOSPADM

## 2020-11-12 RX ORDER — SODIUM CHLORIDE, SODIUM LACTATE, POTASSIUM CHLORIDE, CALCIUM CHLORIDE 600; 310; 30; 20 MG/100ML; MG/100ML; MG/100ML; MG/100ML
INJECTION, SOLUTION INTRAVENOUS
Status: DISCONTINUED | OUTPATIENT
Start: 2020-11-12 | End: 2020-11-12 | Stop reason: HOSPADM

## 2020-11-12 RX ORDER — SODIUM CHLORIDE, SODIUM LACTATE, POTASSIUM CHLORIDE, CALCIUM CHLORIDE 600; 310; 30; 20 MG/100ML; MG/100ML; MG/100ML; MG/100ML
75 INJECTION, SOLUTION INTRAVENOUS CONTINUOUS
Status: DISCONTINUED | OUTPATIENT
Start: 2020-11-12 | End: 2020-11-12 | Stop reason: HOSPADM

## 2020-11-12 RX ORDER — OXYCODONE AND ACETAMINOPHEN 10; 325 MG/1; MG/1
1 TABLET ORAL AS NEEDED
Status: DISCONTINUED | OUTPATIENT
Start: 2020-11-12 | End: 2020-11-12 | Stop reason: HOSPADM

## 2020-11-12 RX ORDER — KETOROLAC TROMETHAMINE 30 MG/ML
INJECTION, SOLUTION INTRAMUSCULAR; INTRAVENOUS AS NEEDED
Status: DISCONTINUED | OUTPATIENT
Start: 2020-11-12 | End: 2020-11-12 | Stop reason: HOSPADM

## 2020-11-12 RX ORDER — TRANEXAMIC ACID 100 MG/ML
INJECTION, SOLUTION INTRAVENOUS AS NEEDED
Status: DISCONTINUED | OUTPATIENT
Start: 2020-11-12 | End: 2020-11-12 | Stop reason: HOSPADM

## 2020-11-12 RX ORDER — HYDROMORPHONE HYDROCHLORIDE 2 MG/ML
0.5 INJECTION, SOLUTION INTRAMUSCULAR; INTRAVENOUS; SUBCUTANEOUS
Status: DISCONTINUED | OUTPATIENT
Start: 2020-11-12 | End: 2020-11-12 | Stop reason: HOSPADM

## 2020-11-12 RX ORDER — OXYCODONE HYDROCHLORIDE 5 MG/1
5 TABLET ORAL
Status: DISCONTINUED | OUTPATIENT
Start: 2020-11-12 | End: 2020-11-12 | Stop reason: HOSPADM

## 2020-11-12 RX ORDER — AMOXICILLIN 250 MG
2 CAPSULE ORAL DAILY
Status: DISCONTINUED | OUTPATIENT
Start: 2020-11-13 | End: 2020-11-14 | Stop reason: HOSPADM

## 2020-11-12 RX ORDER — SODIUM CHLORIDE 0.9 % (FLUSH) 0.9 %
5-40 SYRINGE (ML) INJECTION EVERY 8 HOURS
Status: DISCONTINUED | OUTPATIENT
Start: 2020-11-12 | End: 2020-11-14 | Stop reason: HOSPADM

## 2020-11-12 RX ORDER — VALSARTAN 320 MG/1
320 TABLET ORAL EVERY EVENING
Status: DISCONTINUED | OUTPATIENT
Start: 2020-11-12 | End: 2020-11-14 | Stop reason: HOSPADM

## 2020-11-12 RX ORDER — TRAZODONE HYDROCHLORIDE 50 MG/1
150 TABLET ORAL
Status: DISCONTINUED | OUTPATIENT
Start: 2020-11-12 | End: 2020-11-14 | Stop reason: HOSPADM

## 2020-11-12 RX ORDER — POTASSIUM CHLORIDE 750 MG/1
10 TABLET, EXTENDED RELEASE ORAL EVERY EVENING
Status: DISCONTINUED | OUTPATIENT
Start: 2020-11-12 | End: 2020-11-14 | Stop reason: HOSPADM

## 2020-11-12 RX ORDER — DIPHENHYDRAMINE HCL 25 MG
25 CAPSULE ORAL
Status: DISCONTINUED | OUTPATIENT
Start: 2020-11-12 | End: 2020-11-14 | Stop reason: HOSPADM

## 2020-11-12 RX ORDER — CEFAZOLIN SODIUM/WATER 2 G/20 ML
2 SYRINGE (ML) INTRAVENOUS ONCE
Status: COMPLETED | OUTPATIENT
Start: 2020-11-12 | End: 2020-11-12

## 2020-11-12 RX ORDER — ASPIRIN 81 MG/1
81 TABLET ORAL EVERY 12 HOURS
Status: DISCONTINUED | OUTPATIENT
Start: 2020-11-12 | End: 2020-11-14 | Stop reason: HOSPADM

## 2020-11-12 RX ORDER — CELECOXIB 200 MG/1
200 CAPSULE ORAL EVERY 12 HOURS
Status: DISCONTINUED | OUTPATIENT
Start: 2020-11-12 | End: 2020-11-14 | Stop reason: HOSPADM

## 2020-11-12 RX ORDER — EPHEDRINE SULFATE/0.9% NACL/PF 50 MG/5 ML
SYRINGE (ML) INTRAVENOUS AS NEEDED
Status: DISCONTINUED | OUTPATIENT
Start: 2020-11-12 | End: 2020-11-12 | Stop reason: HOSPADM

## 2020-11-12 RX ORDER — ACETAMINOPHEN 500 MG
1000 TABLET ORAL EVERY 6 HOURS
Status: DISCONTINUED | OUTPATIENT
Start: 2020-11-13 | End: 2020-11-14 | Stop reason: HOSPADM

## 2020-11-12 RX ORDER — LEVOTHYROXINE SODIUM 75 UG/1
75 TABLET ORAL
Status: DISCONTINUED | OUTPATIENT
Start: 2020-11-13 | End: 2020-11-14 | Stop reason: HOSPADM

## 2020-11-12 RX ORDER — MIDAZOLAM HYDROCHLORIDE 1 MG/ML
2 INJECTION, SOLUTION INTRAMUSCULAR; INTRAVENOUS ONCE
Status: COMPLETED | OUTPATIENT
Start: 2020-11-12 | End: 2020-11-12

## 2020-11-12 RX ORDER — ROPIVACAINE HYDROCHLORIDE 2 MG/ML
INJECTION, SOLUTION EPIDURAL; INFILTRATION; PERINEURAL AS NEEDED
Status: DISCONTINUED | OUTPATIENT
Start: 2020-11-12 | End: 2020-11-12 | Stop reason: HOSPADM

## 2020-11-12 RX ORDER — AMLODIPINE BESYLATE 5 MG/1
5 TABLET ORAL
Status: DISCONTINUED | OUTPATIENT
Start: 2020-11-12 | End: 2020-11-14 | Stop reason: HOSPADM

## 2020-11-12 RX ORDER — PROMETHAZINE HYDROCHLORIDE 25 MG/1
25 TABLET ORAL
Status: DISCONTINUED | OUTPATIENT
Start: 2020-11-12 | End: 2020-11-14 | Stop reason: HOSPADM

## 2020-11-12 RX ORDER — ATORVASTATIN CALCIUM 40 MG/1
40 TABLET, FILM COATED ORAL
Status: DISCONTINUED | OUTPATIENT
Start: 2020-11-12 | End: 2020-11-14 | Stop reason: HOSPADM

## 2020-11-12 RX ORDER — ONDANSETRON 2 MG/ML
INJECTION INTRAMUSCULAR; INTRAVENOUS AS NEEDED
Status: DISCONTINUED | OUTPATIENT
Start: 2020-11-12 | End: 2020-11-12 | Stop reason: HOSPADM

## 2020-11-12 RX ORDER — SODIUM CHLORIDE 9 MG/ML
100 INJECTION, SOLUTION INTRAVENOUS CONTINUOUS
Status: DISPENSED | OUTPATIENT
Start: 2020-11-12 | End: 2020-11-14

## 2020-11-12 RX ORDER — PROPOFOL 10 MG/ML
INJECTION, EMULSION INTRAVENOUS AS NEEDED
Status: DISCONTINUED | OUTPATIENT
Start: 2020-11-12 | End: 2020-11-12 | Stop reason: HOSPADM

## 2020-11-12 RX ORDER — OXYCODONE HYDROCHLORIDE 5 MG/1
5-10 TABLET ORAL
Status: DISCONTINUED | OUTPATIENT
Start: 2020-11-12 | End: 2020-11-14 | Stop reason: HOSPADM

## 2020-11-12 RX ORDER — HYDROCHLOROTHIAZIDE 25 MG/1
25 TABLET ORAL
Status: DISCONTINUED | OUTPATIENT
Start: 2020-11-12 | End: 2020-11-14 | Stop reason: HOSPADM

## 2020-11-12 RX ORDER — ROPIVACAINE HYDROCHLORIDE 2 MG/ML
INJECTION, SOLUTION EPIDURAL; INFILTRATION; PERINEURAL
Status: COMPLETED | OUTPATIENT
Start: 2020-11-12 | End: 2020-11-12

## 2020-11-12 RX ORDER — SERTRALINE HYDROCHLORIDE 100 MG/1
200 TABLET, FILM COATED ORAL
Status: DISCONTINUED | OUTPATIENT
Start: 2020-11-12 | End: 2020-11-14 | Stop reason: HOSPADM

## 2020-11-12 RX ORDER — ACETAMINOPHEN 325 MG/1
650 TABLET ORAL ONCE
Status: COMPLETED | OUTPATIENT
Start: 2020-11-12 | End: 2020-11-12

## 2020-11-12 RX ORDER — ONDANSETRON 8 MG/1
8 TABLET, ORALLY DISINTEGRATING ORAL
Status: DISCONTINUED | OUTPATIENT
Start: 2020-11-12 | End: 2020-11-14 | Stop reason: HOSPADM

## 2020-11-12 RX ORDER — DEXAMETHASONE SODIUM PHOSPHATE 4 MG/ML
INJECTION, SOLUTION INTRA-ARTICULAR; INTRALESIONAL; INTRAMUSCULAR; INTRAVENOUS; SOFT TISSUE
Status: COMPLETED | OUTPATIENT
Start: 2020-11-12 | End: 2020-11-12

## 2020-11-12 RX ORDER — NALOXONE HYDROCHLORIDE 0.4 MG/ML
.2-.4 INJECTION, SOLUTION INTRAMUSCULAR; INTRAVENOUS; SUBCUTANEOUS
Status: DISCONTINUED | OUTPATIENT
Start: 2020-11-12 | End: 2020-11-14 | Stop reason: HOSPADM

## 2020-11-12 RX ORDER — SODIUM CHLORIDE 0.9 % (FLUSH) 0.9 %
5-40 SYRINGE (ML) INJECTION AS NEEDED
Status: DISCONTINUED | OUTPATIENT
Start: 2020-11-12 | End: 2020-11-14 | Stop reason: HOSPADM

## 2020-11-12 RX ORDER — TEMAZEPAM 15 MG/1
30 CAPSULE ORAL
Status: DISCONTINUED | OUTPATIENT
Start: 2020-11-12 | End: 2020-11-14 | Stop reason: HOSPADM

## 2020-11-12 RX ORDER — SODIUM CHLORIDE 0.9 % (FLUSH) 0.9 %
5-40 SYRINGE (ML) INJECTION EVERY 8 HOURS
Status: DISCONTINUED | OUTPATIENT
Start: 2020-11-12 | End: 2020-11-12 | Stop reason: HOSPADM

## 2020-11-12 RX ORDER — LORAZEPAM 0.5 MG/1
0.5 TABLET ORAL
Status: DISCONTINUED | OUTPATIENT
Start: 2020-11-12 | End: 2020-11-14 | Stop reason: HOSPADM

## 2020-11-12 RX ORDER — VANCOMYCIN/0.9 % SOD CHLORIDE 1.5G/250ML
1500 PLASTIC BAG, INJECTION (ML) INTRAVENOUS ONCE
Status: COMPLETED | OUTPATIENT
Start: 2020-11-12 | End: 2020-11-12

## 2020-11-12 RX ORDER — CALCIUM CARBONATE 200(500)MG
200 TABLET,CHEWABLE ORAL
Status: DISCONTINUED | OUTPATIENT
Start: 2020-11-12 | End: 2020-11-14 | Stop reason: HOSPADM

## 2020-11-12 RX ORDER — FENTANYL CITRATE 50 UG/ML
INJECTION, SOLUTION INTRAMUSCULAR; INTRAVENOUS AS NEEDED
Status: DISCONTINUED | OUTPATIENT
Start: 2020-11-12 | End: 2020-11-12 | Stop reason: HOSPADM

## 2020-11-12 RX ORDER — SODIUM CHLORIDE 0.9 % (FLUSH) 0.9 %
5-40 SYRINGE (ML) INJECTION AS NEEDED
Status: DISCONTINUED | OUTPATIENT
Start: 2020-11-12 | End: 2020-11-12 | Stop reason: HOSPADM

## 2020-11-12 RX ORDER — DEXAMETHASONE SODIUM PHOSPHATE 100 MG/10ML
10 INJECTION INTRAMUSCULAR; INTRAVENOUS ONCE
Status: ACTIVE | OUTPATIENT
Start: 2020-11-13 | End: 2020-11-14

## 2020-11-12 RX ORDER — HYDROMORPHONE HYDROCHLORIDE 1 MG/ML
1 INJECTION, SOLUTION INTRAMUSCULAR; INTRAVENOUS; SUBCUTANEOUS
Status: DISCONTINUED | OUTPATIENT
Start: 2020-11-12 | End: 2020-11-14 | Stop reason: HOSPADM

## 2020-11-12 RX ORDER — CEFAZOLIN SODIUM/WATER 2 G/20 ML
2 SYRINGE (ML) INTRAVENOUS EVERY 8 HOURS
Status: COMPLETED | OUTPATIENT
Start: 2020-11-12 | End: 2020-11-13

## 2020-11-12 RX ADMIN — ONDANSETRON 8 MG: 8 TABLET, ORALLY DISINTEGRATING ORAL at 15:34

## 2020-11-12 RX ADMIN — PHENYLEPHRINE HYDROCHLORIDE 100 MCG: 10 INJECTION INTRAVENOUS at 11:38

## 2020-11-12 RX ADMIN — POTASSIUM CHLORIDE 10 MEQ: 750 TABLET, EXTENDED RELEASE ORAL at 17:52

## 2020-11-12 RX ADMIN — FENTANYL CITRATE 25 MCG: 50 INJECTION INTRAMUSCULAR; INTRAVENOUS at 11:15

## 2020-11-12 RX ADMIN — Medication 1 AMPULE: at 20:41

## 2020-11-12 RX ADMIN — SODIUM CHLORIDE, SODIUM LACTATE, POTASSIUM CHLORIDE, AND CALCIUM CHLORIDE: 600; 310; 30; 20 INJECTION, SOLUTION INTRAVENOUS at 10:25

## 2020-11-12 RX ADMIN — Medication 3 AMPULE: at 09:04

## 2020-11-12 RX ADMIN — ACETAMINOPHEN 650 MG: 325 TABLET, FILM COATED ORAL at 09:04

## 2020-11-12 RX ADMIN — SODIUM CHLORIDE, SODIUM LACTATE, POTASSIUM CHLORIDE, AND CALCIUM CHLORIDE: 600; 310; 30; 20 INJECTION, SOLUTION INTRAVENOUS at 11:10

## 2020-11-12 RX ADMIN — FENTANYL CITRATE 25 MCG: 50 INJECTION INTRAMUSCULAR; INTRAVENOUS at 10:44

## 2020-11-12 RX ADMIN — ROPIVACAINE HYDROCHLORIDE 40 MG: 2 INJECTION, SOLUTION EPIDURAL; INFILTRATION at 10:08

## 2020-11-12 RX ADMIN — PROPOFOL 100 MCG/KG/MIN: 10 INJECTION, EMULSION INTRAVENOUS at 10:40

## 2020-11-12 RX ADMIN — ONDANSETRON 4 MG: 2 INJECTION INTRAMUSCULAR; INTRAVENOUS at 10:53

## 2020-11-12 RX ADMIN — CELECOXIB 200 MG: 200 CAPSULE ORAL at 20:44

## 2020-11-12 RX ADMIN — SERTRALINE HYDROCHLORIDE 200 MG: 100 TABLET ORAL at 20:44

## 2020-11-12 RX ADMIN — FENTANYL CITRATE 50 MCG: 50 INJECTION INTRAMUSCULAR; INTRAVENOUS at 12:12

## 2020-11-12 RX ADMIN — DEXAMETHASONE SODIUM PHOSPHATE 5 MG: 4 INJECTION, SOLUTION INTRAMUSCULAR; INTRAVENOUS at 10:08

## 2020-11-12 RX ADMIN — AMLODIPINE BESYLATE 5 MG: 5 TABLET ORAL at 20:45

## 2020-11-12 RX ADMIN — OXYCODONE HYDROCHLORIDE 10 MG: 5 TABLET ORAL at 15:33

## 2020-11-12 RX ADMIN — Medication 10 MG: at 11:04

## 2020-11-12 RX ADMIN — HYDROCHLOROTHIAZIDE 25 MG: 25 TABLET ORAL at 20:43

## 2020-11-12 RX ADMIN — ANTACID TABLETS 200 MG: 500 TABLET, CHEWABLE ORAL at 20:36

## 2020-11-12 RX ADMIN — PHENYLEPHRINE HYDROCHLORIDE 100 MCG: 10 INJECTION INTRAVENOUS at 11:30

## 2020-11-12 RX ADMIN — OXYCODONE HYDROCHLORIDE AND ACETAMINOPHEN 1 TABLET: 10; 325 TABLET ORAL at 13:25

## 2020-11-12 RX ADMIN — FENTANYL CITRATE 100 MCG: 50 INJECTION, SOLUTION INTRAMUSCULAR; INTRAVENOUS at 10:06

## 2020-11-12 RX ADMIN — TRANEXAMIC ACID 1000 MG: 100 INJECTION, SOLUTION INTRAVENOUS at 10:53

## 2020-11-12 RX ADMIN — PHENYLEPHRINE HYDROCHLORIDE 100 MCG: 10 INJECTION INTRAVENOUS at 12:05

## 2020-11-12 RX ADMIN — ATORVASTATIN CALCIUM 40 MG: 40 TABLET, FILM COATED ORAL at 20:45

## 2020-11-12 RX ADMIN — SODIUM CHLORIDE, SODIUM LACTATE, POTASSIUM CHLORIDE, AND CALCIUM CHLORIDE 75 ML/HR: 600; 310; 30; 20 INJECTION, SOLUTION INTRAVENOUS at 09:03

## 2020-11-12 RX ADMIN — VALSARTAN 320 MG: 320 TABLET, FILM COATED ORAL at 17:52

## 2020-11-12 RX ADMIN — Medication 81 MG: at 20:42

## 2020-11-12 RX ADMIN — MEPIVACAINE HYDROCHLORIDE 60 MG: 20 INJECTION, SOLUTION EPIDURAL; INFILTRATION at 10:34

## 2020-11-12 RX ADMIN — HYDROMORPHONE HYDROCHLORIDE 1 MG: 1 INJECTION, SOLUTION INTRAMUSCULAR; INTRAVENOUS; SUBCUTANEOUS at 17:52

## 2020-11-12 RX ADMIN — VANCOMYCIN HYDROCHLORIDE 1500 MG: 10 INJECTION, POWDER, LYOPHILIZED, FOR SOLUTION INTRAVENOUS at 09:03

## 2020-11-12 RX ADMIN — PHENYLEPHRINE HYDROCHLORIDE 100 MCG: 10 INJECTION INTRAVENOUS at 11:54

## 2020-11-12 RX ADMIN — Medication 2 G: at 10:25

## 2020-11-12 RX ADMIN — CEFAZOLIN SODIUM 2 G: 10 INJECTION, POWDER, FOR SOLUTION INTRAVENOUS at 19:32

## 2020-11-12 RX ADMIN — Medication 10 MG: at 11:24

## 2020-11-12 RX ADMIN — HYDROMORPHONE HYDROCHLORIDE 0.5 MG: 2 INJECTION INTRAMUSCULAR; INTRAVENOUS; SUBCUTANEOUS at 14:22

## 2020-11-12 RX ADMIN — PROPOFOL 150 MG: 10 INJECTION, EMULSION INTRAVENOUS at 11:15

## 2020-11-12 RX ADMIN — TEMAZEPAM 30 MG: 15 CAPSULE ORAL at 20:45

## 2020-11-12 RX ADMIN — MIDAZOLAM 2 MG: 1 INJECTION INTRAMUSCULAR; INTRAVENOUS at 10:06

## 2020-11-12 RX ADMIN — PROPOFOL 50 MG: 10 INJECTION, EMULSION INTRAVENOUS at 10:40

## 2020-11-12 RX ADMIN — OXYCODONE HYDROCHLORIDE 10 MG: 5 TABLET ORAL at 20:44

## 2020-11-12 RX ADMIN — Medication 10 MG: at 11:46

## 2020-11-12 NOTE — ANESTHESIA PREPROCEDURE EVALUATION
Anesthetic History   No history of anesthetic complications            Review of Systems / Medical History  Patient summary reviewed, nursing notes reviewed and pertinent labs reviewed    Pulmonary  Within defined limits                 Neuro/Psych         Psychiatric history     Cardiovascular    Hypertension          CAD, cardiac stents (2010) and hyperlipidemia    Exercise tolerance: >4 METS  Comments: [facility] for STEFF.   Nl cath last year  1/19 echo-EF 75%, nl valves   GI/Hepatic/Renal     GERD: poorly controlled          Comments: Gastric bypass  Gallstone pancreatitis requiring stent 2019 Endo/Other      Hypothyroidism: well controlled  Obesity and arthritis     Other Findings            Physical Exam    Airway  Mallampati: II  TM Distance: 4 - 6 cm  Neck ROM: normal range of motion   Mouth opening: Normal     Cardiovascular  Regular rate and rhythm,  S1 and S2 normal,  no murmur, click, rub, or gallop             Dental  No notable dental hx       Pulmonary  Breath sounds clear to auscultation               Abdominal         Other Findings            Anesthetic Plan    ASA: 3  Anesthesia type: spinal - femoral single shot      Post-op pain plan if not by surgeon: peripheral nerve block single    Induction: Intravenous  Anesthetic plan and risks discussed with: Patient

## 2020-11-12 NOTE — PROGRESS NOTES
11/12/20 1712   Oxygen Therapy   O2 Sat (%) 96 %   Pulse via Oximetry 62 beats per minute   O2 Device Room air   O2 Flow Rate (L/min) 0 l/min   Incentive Spirometry Treatment   Actual Volume (ml) 2000 ml   Patient encouraged to do 10 breaths every hour while awake-patient agreed and demonstrated. No shortness of breath or distress noted. BS are clear b/l. [facility] notes reviewed- Sat monitor order noted HS.

## 2020-11-12 NOTE — ANESTHESIA PROCEDURE NOTES
Peripheral Block    Start time: 11/12/2020 10:06 AM  End time: 11/12/2020 10:08 AM  Performed by: Lesli Gustafson MD  Authorized by: Lesli Gustafson MD       Pre-procedure: Indications: at surgeon's request and post-op pain management    Preanesthetic Checklist: patient identified, risks and benefits discussed, site marked, timeout performed, anesthesia consent given and patient being monitored    Timeout Time: 10:06          Block Type:   Block Type:   Adductor canal  Laterality:  Right  Monitoring:  Standard ASA monitoring, continuous pulse ox, frequent vital sign checks, heart rate, responsive to questions and oxygen  Injection Technique:  Single shot  Procedures: ultrasound guided    Patient Position: supine  Prep: chlorhexidine    Location:  Mid thigh  Needle Type:  Stimuplex  Needle Gauge:  20 G  Needle Localization:  Anatomical landmarks and ultrasound guidance  Medication Injected:  Ropivacaine (NAROPIN) 2 mg/mL (0.2 %) injection, 40 mg  dexamethasone (DECADRON) 4 mg/mL injection, 5 mg  Med Admin Time: 11/12/2020 10:08 AM    Assessment:  Number of attempts:  1  Injection Assessment:  Incremental injection every 5 mL, local visualized surrounding nerve on ultrasound, negative aspiration for blood, no intravascular symptoms, ultrasound image on chart and no paresthesia  Patient tolerance:  Patient tolerated the procedure well with no immediate complications

## 2020-11-12 NOTE — OP NOTES
[facility]  Robot Assisted Cemented Total Knee Arthroplasty - CS  Donovan Swenson   : 1950  Medical Record [id number]  Pre-operative Diagnosis:  Unilateral primary osteoarthritis, right knee [M17.11]  Post-operative Diagnosis: Unilateral primary osteoarthritis, right knee [M17.11]    Surgeon: Nerissa Clayton MD  Assistant: Ibeth Moody PA-C    Anesthesia: Spinal    Procedure: Total Knee Arthroplasty   The complexity of the total joint surgery requires the use of a first assistant for positioning, retraction and assistance in closure. The patient's Body mass index is 34.61 kg/m²., BMI's greater then 40 make surgical exposure and retraction extremely difficult and increase operative time. Tourniquet Time: none  EBL: 150cc  Additional Findings: Severe DJD/ Osteoporosis   Releases none    Tim Fuentes was brought to the operating room and positioned on the operating table. She was anethestized  IV antibiotics were administered per CMS protocol. Prior to the incision being made a timeout was called identifying the patient, procedure ,operative side and surgeon. The right leg was prepped and draped in the usual sterile manner  An anterior longitudinal incision was accomplished just medial to the tibial tubercle and extending approximal 6 centimeters proximal to the superior pole of the patella. A medial parapatellar capsular incision was performed. The medial capsular flap was elevated around to the insertion of the semimembranous tendon. The patella was everted and the knee flexed and externally rotated. The medial and external menisci were excised. The lateral half of the fat pad excised and the patella femoral ligament was released. The anterior cruciate ligament was resected and the posterior cruciate ligament was substituted. The Guzman arrays were placed in the distal femur and proximal tibia per protocol and the knee was registered. Confirmation of registration with the pre-op CT scan and surgical plan was made. The knee was balanced with tensioners as part of this process. The tibia and femur were then prepared via the [facility] system with robotic assistance. A preliminary range of motion was accomplished with the above size trial components. A polyethylene insert allowed the patient to obtain full extension as well as appropriate flexion. The patient's ligaments were stable in flexion and extension to medial and lateral stressing and the alignment was through the appropriate mechanical axis. The tibial base plate was pinned into place with the appropriate external rotation and stem site prepared. The femur was finished with the notch guide. The patella was then everted. Being in acceptable condition, it was left unresurfaced following patelloplasty. All trial components were removed and the implants were impacted into position. At this point, it was noted that the cementless implants had unacceptable fixation and bony stability. The implants were then atraumatically removed and the knee was prepared for cementation and the femoral and tibial components were cemented in placed with 2 batched of Palacos cement per routine. The betadine lavage protocol was not performed, given the unresurfaced patella. The operative knee was injected with 60cc of Naropin, 10 cc's of morphine and 1 cc of 30mg of Toradol. The capsular layer was closed using a #1 vicryl suture, while subcutaneous layers were closed using 2-0 Vicryl interrupted sutures and a #1 Stratofix. Finally the skin was closed using 3-0 Vicryl and a  STAPLE closure. A sterile sterile bandage was applied. An Iceman cryo pad was applied on the operative leg. Sponge count and needle counts were correct. Megan Fry left the operating room     Implants:   Implant Name Type Inv.  Item Serial No.  Lot No. LRB No. Used Action COMPONENT FEM SZ 4 R KNEE CRUCE RET CEMENTLESS BEAD W/ HELDER - [id number]  COMPONENT FEM SZ 4 R KNEE CRUCE RET CEMENTLESS BEAD W/ HELDER  [employer] HOW_WD [id number] Right 1 Implanted and Explanted   BASEPLATE TIB SZ 4 [id number] ML70MM KNEE TRITANIUM 4 CRUCFRM - [id number]  BASEPLATE TIB SZ 4 [id number] ML70MM KNEE TRITANIUM 4 CRUCFRM  [employer] HOW_WD [id number] Right 1 Implanted and Explanted   INSERT TIB SZ 4 [id number] KNEE CONVENTIONAL POLYETH CNDYL - [id number]  INSERT TIB SZ 4 [id number] KNEE CONVENTIONAL POLYETH CNDYL  [employer] HOW_WD [id number] Right 1 Implanted and Explanted   FEM KNE SALIMA CR SZ 4 RT -- TRIATHLON - [id number]  FEM KNE SALIMA CR SZ 4 RT -- TRIATHLON  [employer] HOW_WD [id number] Right 1 Implanted   BASEPLT TIB UNIV TRIATHLN 4 --  - [id number]  BASEPLT TIB UNIV TRIATHLN 4 --   [employer] HOW_WD [id number] Right 1 Implanted   BONE CEMENT     [id number] Right 2 Implanted   TIBIAL BEARING INSERT     [id number] Right 1 Implanted     Signed By: Juany Up MD

## 2020-11-12 NOTE — INTERVAL H&P NOTE
Update History & Physical 
 
The Patient's History and Physical of November 6, 2020 was reviewed with the patient and I examined the patient. There was no change. The surgical site was confirmed by the patient and me. Plan:  The risk, benefits, expected outcome, and alternative to the recommended procedure have been discussed with the patient. Patient understands and wants to proceed with the procedure.  
 
Electronically signed by PANCHITO Levin on 11/12/2020 at 8:56 AM

## 2020-11-12 NOTE — PROGRESS NOTES
Problem: Mobility Impaired (Adult and Pediatric)  Goal: *Acute Goals and Plan of Care (Insert Text)  Note: GOALS (1-4 days):  (1.)Ms. Mj Lindsey will move from supine to sit and sit to supine  in bed with STAND BY ASSIST.  (2.)Ms. Mj Lindsey will transfer from bed to chair and chair to bed with CONTACT GUARD ASSIST using the least restrictive device. (3.)Ms. jM Lindsey will ambulate with CONTACT GUARD ASSIST for 150 feet with the least restrictive device. (4.)Ms. Mj Lindsey will ambulate up/down 13 steps with left railing with MINIMAL ASSIST with no device. (5.)Ms. Mj Lindsey will increase right knee ROM to 5°-80°.  ________________________________________________________________________________________________      PHYSICAL THERAPY JOINT CAMP TKA: Initial Assessment 11/12/2020  INPATIENT: Hospital Day: 1  [place] / Product Type: Medicare /      NAME/AGE/GENDER: Liane Zamora is a 65 y.o. female   PRIMARY DIAGNOSIS:  Unilateral primary osteoarthritis, right knee [M17.11]   Procedure(s) and Anesthesia Type:     * RIGHT KNEE ARTHROPLASTY TOTAL ROBOTIC ASSISTED / BRIAN/MARYCRUZ ANCEF / VANCOMYCIN - Spinal (Right)  ICD-10: Treatment Diagnosis:    Pain in Right Knee (M25.561)  Stiffness of Right Knee, Not elsewhere classified (M25.661)  Difficulty in walking, Not elsewhere classified (R26.2)  Other abnormalities of gait and mobility (R26.89)      ASSESSMENT:     Ms. Mj Lindsey presents with decreased strength and ROM R LE and limited functional mobility S/P R TKA. Patient was drowsy and moving slowly. [facility] and [facility] as below.  She will benefit from skilled therapy services to address the below problem list.     This section established at most recent assessment   PROBLEM LIST (Impairments causing functional limitations):  Decreased Strength  Decreased ADL/Functional Activities  Decreased Transfer Abilities  Decreased Ambulation Ability/Technique  Decreased Flexibility/Joint Mobility  Decreased Watkins with Home Exercise Program   INTERVENTIONS PLANNED: (Benefits and precautions of physical therapy have been discussed with the patient.)  Bed Mobility  Cold  Gait Training  Home Exercise Program (HEP)  Range of Motion (ROM)  Therapeutic Activites  Therapeutic Exercise/Strengthening  Transfer Training     TREATMENT PLAN: Frequency/Duration: Follow patient BID for duration of hospital stay to address above goals. Rehabilitation Potential For Stated Goals: Good     RECOMMENDED REHABILITATION/EQUIPMENT: (at time of discharge pending progress): Continue Skilled Therapy and Home Health: Physical Therapy. HISTORY:   History of Present Injury/Illness (Reason for Referral):  S/P R TKA  Past Medical History/Comorbidities:   Ms. Gogo Olea  has a past medical history of Anxiety, Arthritis, Autoimmune disease ([address] CAD (coronary artery disease) (2010), Chest pain, Chronic pain, Depression, Family history of ischemic heart disease, H/O acute pancreatitis (08/2013), H/O gastric bypass (2009), High cholesterol, echocardiogram (01/18/2019), Hypertension, Hypothyroidism, Lichen sclerosus, Obesity (BMI 30-39.9), Osteopenia, Pancreatitis, Systolic murmur, and Ventral hernia. She also has no past medical history of Unspecified adverse effect of anesthesia.   Ms. Gogo Olea  has a past surgical history that includes hx bladder suspension; hx carpal tunnel release (Right); hx lysis of adhesions; hx appendectomy (1954); hx colonoscopy (2017); hx endoscopy (2018); hx heart catheterization (2010); hx hysterectomy (1978); hx oophorectomy (1984); hx lumbar fusion (1996); hx urological; hx cataract removal (Bilateral, 2015); hx lap cholecystectomy (7/2013); hx gastric bypass (2009); hx hernia repair (8/14/14); hx other surgical (2017); hx shoulder arthroscopy (Right, 2011); hx orthopaedic (Right, 1997); hx orthopaedic (Left, 2017); ir biliary drn cath plc perc int/ext si (2/4/2019); ir biliary drn cath exchange perc any to ext si (3/7/2019); ir biliary drn cath exchange perc any to ext si (4/3/2019); hx heart catheterization (08/2019); and hx hip replacement (Right, 02/11/2020). Social History/Living Environment:   Home Environment: Private residence  # Steps to Enter: 9(ramp)  Rails to [employer]: Yes  Wheelchair Ramp: Yes  One/Two Story Residence: Two story  # of Interior Steps: 15  Interior Rails: Left  Lift Chair Available: No  Living Alone: No  Support Systems: Spouse/Significant Other/Partner  Patient Expects to be Discharged to[de-identified] Private residence  Current DME Used/Available at Home: Cane, straight, Commode, bedside, Walker, rolling  Tub or Shower Type: Shower  Prior Level of Function/Work/Activity:  independent   Number of Personal Factors/Comorbidities that affect the Plan of Care: 0: LOW COMPLEXITY   EXAMINATION:   Most Recent Physical Functioning:      Gross Assessment  AROM: Within functional limits(except R knee; S/P R TKA)  Strength: Generally decreased, functional        RLE AROM  R Knee Flexion: 55(approximate)  R Knee Extension: -15            Bed Mobility  Supine to Sit: Minimum assistance; Additional time  Scooting: Contact guard assistance; Additional time    Transfers  Sit to Stand: Minimum assistance  Stand to Sit: Minimum assistance  Bed to Chair: Minimum assistance  Toilet Transfers : Minimum assistance    Balance  Sitting: Intact  Standing: Pull to stand; With support              Weight Bearing Status  Right Side Weight Bearing: As tolerated  Distance (ft): 5 Feet (ft)(X 2)  Ambulation - Level of Assistance: Minimal assistance; Additional time  Assistive Device: Walker, rolling  Speed/Princess: Pace decreased (<100 feet/min)  Step Length: Left shortened;Right shortened  Stance: Right decreased  Gait Abnormalities: Antalgic;Decreased step clearance  Interventions: Manual cues; Safety awareness training;Verbal cues     Braces/Orthotics: none    Right Knee Cold  Type: Cryocuff      Body Structures Involved:  Bones  Joints  Muscles Body Functions Affected:  Neuromusculoskeletal  Movement Related Activities and Participation Affected:  General Tasks and Demands  Mobility   Number of elements that affect the Plan of Care: 3: MODERATE COMPLEXITY   CLINICAL PRESENTATION:   Presentation: Stable and uncomplicated: LOW COMPLEXITY   CLINICAL DECISION MAKIN[address] [place] [address] AM-PAC 6 Clicks   Basic Mobility Inpatient Short Form  How much difficulty does the patient currently have. .. Unable A Lot A Little None   1. Turning over in bed (including adjusting bedclothes, sheets and blankets)? [] 1   [] 2   [x] 3   [] 4   2. Sitting down on and standing up from a chair with arms ( e.g., wheelchair, bedside commode, etc.)   [] 1   [] 2   [x] 3   [] 4   3. Moving from lying on back to sitting on the side of the bed? [] 1   [] 2   [x] 3   [] 4   How much help from another person does the patient currently need. .. Total A Lot A Little None   4. Moving to and from a bed to a chair (including a wheelchair)? [] 1   [] 2   [x] 3   [] 4   5. Need to walk in hospital room? [] 1   [] 2   [x] 3   [] 4   6. Climbing 3-5 steps with a railing? [] 1   [x] 2   [] 3   [] 4   © , Trustees of [address] [place] [address], under license to [employer]. All rights reserved     Score:  Initial: 17 Most Recent: X (Date: -- )    Interpretation of Tool:  Represents activities that are increasingly more difficult (i.e. Bed mobility, Transfers, Gait). Medical Necessity:     Patient demonstrates   good   rehab potential due to higher previous functional level. Reason for Services/Other Comments:  Patient   continues to require present interventions due to patient's inability to perform exercises and functional mobility independently  .    Use of outcome tool(s) and clinical judgement create a POC that gives a: Clear prediction of patient's progress: LOW COMPLEXITY            TREATMENT:   (In addition to Assessment/Re-Assessment sessions the following treatments were rendered)     Pre-treatment Symptoms/Complaints:  R knee pain  Pain Initial:   Pain Intensity 1: 5  Pain Location 1: Knee  Pain Orientation 1: Right  Pain Intervention(s) 1: Cold pack, Repositioned  Post Session:  5     Therapeutic Exercise: (8 Minutes):  Exercises per grid below to improve mobility and strength. Required minimal verbal and manual cues to  perform exercises correctly . Date:  11/12/20 Date:   Date:     ACTIVITY/EXERCISE AM PM AM PM AM PM   GROUP THERAPY  []  []  []  []  []  []   Ankle Pumps  10       Quad Sets  10       Gluteal Sets  10       Hip ABd/ADduction  10       Straight Leg Raises         Knee Slides  10 AA       Short Arc Quads  10       Long Arc Quads         Chair Slides                  B = bilateral; AA = active assistive; A = active; P = passive      Treatment/Session Assessment:     Response to Treatment:  tolerated fair. Education:  [x] Home Exercises  [x] Fall Precautions  [x] Use of Cold Therapy Unit [] D/C Instruction Review  [] Knee Prosthesis Review  [x] Walker Management/Safety [] Adaptive Equipment as Needed       Interdisciplinary Collaboration:   Physical Therapist  Occupational Therapist  Registered Nurse    After treatment position/precautions:   Up in chair  Bed/Chair-wheels locked  Call light within reach  RN notified    Compliance with Program/Exercises: Compliant most of the time, Will assess as treatment progresses. Recommendations/Intent for next treatment session:  Treatment next visit will focus on increasing Ms. Nneka Vallejo independence with bed mobility, transfers, gait training, strength/ROM exercises, modalities for pain, and patient education.       Total Treatment Duration:  PT Patient Time In/Time Out  Time In: 1710  Time Out: [address] Sarina Gave

## 2020-11-12 NOTE — PROGRESS NOTES
Received to room from PACU. Alert and oriented x3.  at bedside. Right knee aquacel is clean, dry, and intact. Sensation present bilaterally, right lower extremity plantar/dorsi flexion absent and pharmacologically paralyzed. Pt complained of pain 6/10. Medicated per PRN pain medication. Discussed diet and pain control. Oriented to room and bed functions. Bed locked, in lowest position, call light within reach.

## 2020-11-12 NOTE — PERIOP NOTES
TRANSFER - OUT REPORT:    Verbal report given to lorrie rn on Leila Tijerina  being transferred to room 316 for routine post - op       Report consisted of patients Situation, Background, Assessment and   Recommendations(SBAR). Information from the following report(s) SBAR, Kardex, OR Summary, Intake/Output and MAR was reviewed with the receiving nurse. Lines:   Peripheral IV 11/12/20 Right Hand (Active)   Site Assessment Clean, dry, & intact 11/12/20 1321   Phlebitis Assessment 0 11/12/20 1321   Infiltration Assessment 0 11/12/20 1321   Dressing Status Clean, dry, & intact 11/12/20 1321   Dressing Type Transparent 11/12/20 1321   Hub Color/Line Status Pink; Infusing 11/12/20 1321   Action Taken Blood drawn 11/12/20 0901        Opportunity for questions and clarification was provided. Patient transported with:   O2 @ 2 liters  Tech    VTE prophylaxis orders have not been written for Leila Tijerina. Patient and family given floor number and nurses name. Family updated re: pt status after security code verified.

## 2020-11-12 NOTE — ANESTHESIA PROCEDURE NOTES
Spinal Block    Start time: 11/12/2020 10:31 AM  End time: 11/12/2020 10:34 AM  Performed by: Hui Can MD  Authorized by: Hui Can MD     Pre-procedure:   Indications: primary anesthetic  Preanesthetic Checklist: patient identified, risks and benefits discussed, anesthesia consent, site marked, patient being monitored and timeout performed    Timeout Time: 10:31          Spinal Block:   Patient Position:  Seated  Prep Region:  Lumbar  Prep: chlorhexidine and patient draped      Location:  L2-3  Technique:  Single shot        Needle:   Needle Type:  Pencan  Needle Gauge:  25 G  Attempts:  1      Events: CSF confirmed, no blood with aspiration and paresthesia        Assessment:  Insertion:  Uncomplicated  Patient tolerance:  Patient tolerated the procedure well with no immediate complications  L parethesia-resolved, clear csf prior to injection 60 mg preserv-free mepivacaine

## 2020-11-12 NOTE — PROGRESS NOTES
Problem: Self Care Deficits Care Plan (Adult)  Goal: *Acute Goals and Plan of Care (Insert Text)  Description: GOALS:   DISCHARGE GOALS (in preparation for going home/rehab):  3 days  1. Ms. Mj Lindsey will perform one lower body dressing activity with minimal assistance required to demonstrate improved functional mobility and safety. 2.  Ms. Mj Lindsey will perform one lower body bathing activity with minimal assistance required to demonstrate improved functional mobility and safety. 3.  Ms. Mj Lindsey will perform toileting/toilet transfer with contact guard assistance to demonstrate improved functional mobility and safety. 4.  Ms. Mj Lindsey will perform shower transfer with contact guard assistance to demonstrate improved functional mobility and safety. Outcome: Progressing Towards Goal    JOINT CAMP OCCUPATIONAL THERAPY TKA: Initial Assessment, Daily Note, Treatment Day: Day of Assessment, and PM 11/12/2020  INPATIENT: Hospital Day: 1  [place] / Product Type: Medicare /      NAME/AGE/GENDER: Liane Zamora is a 65 y.o. female   PRIMARY DIAGNOSIS:  Unilateral primary osteoarthritis, right knee [M17.11]   Procedure(s) and Anesthesia Type:     * RIGHT KNEE ARTHROPLASTY TOTAL ROBOTIC ASSISTED / BRIAN/MARYCRUZ ANCEF / Archie Turner - Spinal (Right)  ICD-10: Treatment Diagnosis:    Pain in Right Knee (M25.561)  Stiffness of Right Knee, Not elsewhere classified (L39.222)      ASSESSMENT:     Ms. Mj Lindsey is s/p right TKA and presents with decreased weight bearing on right LE and decreased independence with functional mobility and activities of daily living as compared to baseline level of function and safety. Patient would benefit from skilled Occupational Therapy to maximize independence and safety with self-care task and functional mobility.   Pt would also benefit from education on adaptive equipment and safety precautions in preparation for going home. Pt up in room and to [facility] for toileting, donned gown and underpants. Pt moves well and should progress well tomorrow. This section established at most recent assessment   PROBLEM LIST (Impairments causing functional limitations):  Decreased Strength  Decreased ADL/Functional Activities  Decreased Transfer Abilities  Increased Pain  Increased Fatigue  Decreased Flexibility/Joint Mobility  Decreased Knowledge of Precautions   INTERVENTIONS PLANNED: (Benefits and precautions of occupational therapy have been discussed with the patient.)  Activities of daily living training  Adaptive equipment training  Balance training  Clothing management  Donning&doffing training  Theraputic activity     TREATMENT PLAN: Frequency/Duration: Follow patient 1-2 times to address above goals. Rehabilitation Potential For Stated Goals: Good     RECOMMENDED REHABILITATION/EQUIPMENT: (at time of discharge pending progress): Continue Skilled Therapy. OCCUPATIONAL PROFILE AND HISTORY:   History of Present Injury/Illness (Reason for Referral): Pt presents this date s/p (right) TKA. Past Medical History/Comorbidities:   Ms. Berto Blackwood  has a past medical history of Anxiety, Arthritis, Autoimmune disease ([facility] [address] CAD (coronary artery disease) (2010), Chest pain, Chronic pain, Depression, Family history of ischemic heart disease, H/O acute pancreatitis (08/2013), H/O gastric bypass (2009), High cholesterol, echocardiogram (01/18/2019), Hypertension, Hypothyroidism, Lichen sclerosus, Obesity (BMI 30-39.9), Osteopenia, Pancreatitis, Systolic murmur, and Ventral hernia. She also has no past medical history of Unspecified adverse effect of anesthesia.   Ms. Berto Blackwood  has a past surgical history that includes hx bladder suspension; hx carpal tunnel release (Right); hx lysis of adhesions; hx appendectomy (1954); hx colonoscopy (2017); hx endoscopy (2018); hx heart catheterization (2010); hx hysterectomy (1978); hx oophorectomy (1984); hx lumbar fusion (1996); hx urological; hx cataract removal (Bilateral, 2015); hx lap cholecystectomy (7/2013); hx gastric bypass (2009); hx hernia repair (8/14/14); hx other surgical (2017); hx shoulder arthroscopy (Right, 2011); hx orthopaedic (Right, 1997); hx orthopaedic (Left, 2017); ir biliary drn cath plc perc int/ext si (2/4/2019); ir biliary drn cath exchange perc any to ext si (3/7/2019); ir biliary drn cath exchange perc any to ext si (4/3/2019); hx heart catheterization (08/2019); and hx hip replacement (Right, 02/11/2020). Social History/Living Environment:      Prior Level of Function/Work/Activity:  Independent      Number of Personal Factors/Comorbidities that affect the Plan of Care: Brief history (0):  LOW COMPLEXITY   ASSESSMENT OF OCCUPATIONAL PERFORMANCE[de-identified]   Most Recent Physical Functioning:   Balance  Sitting: Intact  Standing: Intact; With support                    Coordination  Fine Motor Skills-Upper: Left Intact; Right Intact  Gross Motor Skills-Upper: Left Intact; Right Intact         Mental Status  Neurologic State: Alert  Orientation Level: Oriented X4  Cognition: Appropriate decision making  Perception: Appears intact  Perseveration: No perseveration noted  Safety/Judgement: Awareness of environment                Basic ADLs (From Assessment) Complex ADLs (From Assessment)   Basic ADL  Feeding: Independent  Oral Facial Hygiene/Grooming: Supervision  Bathing: Moderate assistance  Upper Body Dressing: Supervision  Lower Body Dressing:  Moderate assistance  Toileting: Minimum assistance     Grooming/Bathing/Dressing Activities of Daily Living     Cognitive Retraining  Safety/Judgement: Awareness of environment                 Functional Transfers  Bathroom Mobility: Minimum assistance  Toilet Transfer : Minimum assistance  Shower Transfer: Minimum assistance     Bed/Mat Mobility  Supine to Sit: Minimum assistance  Sit to Stand: Minimum assistance  Stand to Sit: Minimum assistance  Bed to Chair: Minimum assistance         Physical Skills Involved:  Range of Motion  Balance  Strength Cognitive Skills Affected (resulting in the inability to perform in a timely and safe manner):  none Psychosocial Skills Affected:  Environmental Adaptation   Number of elements that affect the Plan of Care: 3-5:  MODERATE COMPLEXITY   CLINICAL DECISION MAKIN[address] [place] [address] AM-PAC 6 Clicks   Daily Activity Inpatient Short Form  How much help from another person does the patient currently need. .. Total A Lot A Little None   1. Putting on and taking off regular lower body clothing? [] 1   [x] 2   [] 3   [] 4   2. Bathing (including washing, rinsing, drying)? [] 1   [x] 2   [] 3   [] 4   3. Toileting, which includes using toilet, bedpan or urinal?   [] 1   [] 2   [x] 3   [] 4   4. Putting on and taking off regular upper body clothing? [] 1   [] 2   [] 3   [x] 4   5. Taking care of personal grooming such as brushing teeth? [] 1   [] 2   [] 3   [x] 4   6. Eating meals? [] 1   [] 2   [] 3   [x] 4   © , Trustees of [address] [place] [address], under license to [employer]. All rights reserved     Score:  Initial: 19 Most Recent: X (Date: -- )    Interpretation of Tool:  Represents activities that are increasingly more difficult (i.e. Bed mobility, Transfers, Gait). Use of outcome tool(s) and clinical judgement create a POC that gives a: LOW COMPLEXITY            TREATMENT:   (In addition to Assessment/Re-Assessment sessions the following treatments were rendered)     Pre-treatment Symptoms/Complaints:  pt up in room without complaint of pain  Pain: Initial:     0 Post Session:  0     Self Care: (10 min): Procedure(s) (per grid) utilized to improve and/or restore self-care/home management as related to dressing, toileting, and grooming. Required minimal verbal, manual, and   cueing to facilitate activities of daily living skills and compensatory activities.   OT evaluation completed   Treatment/Session Assessment:     Response to Treatment:  pt up in room tolerated well. Education:  [] Home Exercises  [x] Fall Precautions  [] Hip Precautions [] Going Home Video  [x] Knee/Hip Prosthesis Review  [x] Walker Management/Safety [x] Adaptive Equipment as Needed       Interdisciplinary Collaboration:   Physical Therapist  Occupational Therapist  Registered Nurse    After treatment position/precautions:   Up in chair  Bed/Chair-wheels locked  Caregiver at bedside  Call light within reach  RN notified     Compliance with Program/Exercises: Compliant all of the time, Will assess as treatment progresses. Recommendations/Intent for next treatment session:  Treatment next visit will focus on increasing Ms. Kelsey López independence with bed mobility, transfers, self care, functional mobility, modalities for pain, and patient education.       Total Treatment Duration:  OT Patient Time In/Time Out  Time In: 1650  Time Out: Leyla 88, OT

## 2020-11-12 NOTE — ANESTHESIA POSTPROCEDURE EVALUATION
Procedure(s):  RIGHT KNEE ARTHROPLASTY TOTAL ROBOTIC ASSISTED / BRIAN/MARYCRUZ ANCEF / VANCOMYCIN.    spinal    Anesthesia Post Evaluation      Multimodal analgesia: multimodal analgesia used between 6 hours prior to anesthesia start to PACU discharge  Patient location during evaluation: PACU  Patient participation: complete - patient participated  Level of consciousness: awake  Pain management: adequate  Airway patency: patent  Anesthetic complications: no  Cardiovascular status: acceptable  Respiratory status: acceptable  Hydration status: acceptable    Final Post Anesthesia Temperature Assessment:  Normothermia (36.0-37.5 degrees C)      INITIAL Post-op Vital signs:   Vitals Value Taken Time   /75 11/12/2020  1:41 PM   Temp 36.7 °C (98 °F) 11/12/2020 12:23 PM   Pulse 63 11/12/2020  1:41 PM   Resp 16 11/12/2020  1:41 PM   SpO2 94 % 11/12/2020  1:41 PM

## 2020-11-13 PROBLEM — Z96.651 TOTAL KNEE REPLACEMENT STATUS, RIGHT: Status: ACTIVE | Noted: 2020-11-13

## 2020-11-13 PROCEDURE — 74011250637 HC RX REV CODE- 250/637: Performed by: ORTHOPAEDIC SURGERY

## 2020-11-13 PROCEDURE — 74011250636 HC RX REV CODE- 250/636: Performed by: ORTHOPAEDIC SURGERY

## 2020-11-13 PROCEDURE — 97535 SELF CARE MNGMENT TRAINING: CPT

## 2020-11-13 PROCEDURE — 74011000250 HC RX REV CODE- 250: Performed by: ORTHOPAEDIC SURGERY

## 2020-11-13 PROCEDURE — 65270000029 HC RM PRIVATE

## 2020-11-13 PROCEDURE — 97530 THERAPEUTIC ACTIVITIES: CPT

## 2020-11-13 PROCEDURE — 97116 GAIT TRAINING THERAPY: CPT

## 2020-11-13 PROCEDURE — 97110 THERAPEUTIC EXERCISES: CPT

## 2020-11-13 RX ORDER — ASPIRIN 81 MG/1
81 TABLET ORAL EVERY 12 HOURS
Qty: 60 TAB | Refills: 0 | Status: SHIPPED | OUTPATIENT
Start: 2020-11-13 | End: 2020-12-13

## 2020-11-13 RX ORDER — OXYCODONE HYDROCHLORIDE 5 MG/1
5-10 TABLET ORAL
Qty: 60 TAB | Refills: 0 | Status: SHIPPED | OUTPATIENT
Start: 2020-11-13 | End: 2020-11-20

## 2020-11-13 RX ORDER — MAG HYDROX/ALUMINUM HYD/SIMETH 200-200-20
30 SUSPENSION, ORAL (FINAL DOSE FORM) ORAL
Status: DISCONTINUED | OUTPATIENT
Start: 2020-11-13 | End: 2020-11-14 | Stop reason: HOSPADM

## 2020-11-13 RX ADMIN — TEMAZEPAM 30 MG: 15 CAPSULE ORAL at 21:54

## 2020-11-13 RX ADMIN — HYDROMORPHONE HYDROCHLORIDE 1 MG: 1 INJECTION, SOLUTION INTRAMUSCULAR; INTRAVENOUS; SUBCUTANEOUS at 00:12

## 2020-11-13 RX ADMIN — OXYCODONE HYDROCHLORIDE 10 MG: 5 TABLET ORAL at 03:09

## 2020-11-13 RX ADMIN — AMLODIPINE BESYLATE 5 MG: 5 TABLET ORAL at 21:54

## 2020-11-13 RX ADMIN — HYDROMORPHONE HYDROCHLORIDE 1 MG: 1 INJECTION, SOLUTION INTRAMUSCULAR; INTRAVENOUS; SUBCUTANEOUS at 13:52

## 2020-11-13 RX ADMIN — HYDROCHLOROTHIAZIDE 25 MG: 25 TABLET ORAL at 21:54

## 2020-11-13 RX ADMIN — OXYCODONE HYDROCHLORIDE 10 MG: 5 TABLET ORAL at 15:10

## 2020-11-13 RX ADMIN — ACETAMINOPHEN 1000 MG: 500 TABLET, FILM COATED ORAL at 05:16

## 2020-11-13 RX ADMIN — ALUMINUM HYDROXIDE, MAGNESIUM HYDROXIDE, AND SIMETHICONE 30 ML: 200; 200; 20 SUSPENSION ORAL at 00:21

## 2020-11-13 RX ADMIN — VALSARTAN 320 MG: 320 TABLET, FILM COATED ORAL at 22:25

## 2020-11-13 RX ADMIN — ATORVASTATIN CALCIUM 40 MG: 40 TABLET, FILM COATED ORAL at 21:54

## 2020-11-13 RX ADMIN — ACETAMINOPHEN 1000 MG: 500 TABLET, FILM COATED ORAL at 11:36

## 2020-11-13 RX ADMIN — Medication 10 ML: at 21:56

## 2020-11-13 RX ADMIN — OXYCODONE HYDROCHLORIDE 10 MG: 5 TABLET ORAL at 11:36

## 2020-11-13 RX ADMIN — OXYCODONE HYDROCHLORIDE 10 MG: 5 TABLET ORAL at 21:55

## 2020-11-13 RX ADMIN — POTASSIUM CHLORIDE 10 MEQ: 750 TABLET, EXTENDED RELEASE ORAL at 22:25

## 2020-11-13 RX ADMIN — Medication 10 ML: at 15:26

## 2020-11-13 RX ADMIN — Medication 81 MG: at 07:47

## 2020-11-13 RX ADMIN — CELECOXIB 200 MG: 200 CAPSULE ORAL at 07:48

## 2020-11-13 RX ADMIN — PROMETHAZINE HYDROCHLORIDE 6.25 MG: 25 INJECTION INTRAMUSCULAR; INTRAVENOUS at 17:25

## 2020-11-13 RX ADMIN — CELECOXIB 200 MG: 200 CAPSULE ORAL at 21:54

## 2020-11-13 RX ADMIN — Medication 81 MG: at 21:55

## 2020-11-13 RX ADMIN — Medication 1 AMPULE: at 07:48

## 2020-11-13 RX ADMIN — LEVOTHYROXINE SODIUM 75 MCG: 0.07 TABLET ORAL at 05:17

## 2020-11-13 RX ADMIN — DOCUSATE SODIUM 50MG AND SENNOSIDES 8.6MG 2 TABLET: 8.6; 5 TABLET, FILM COATED ORAL at 07:48

## 2020-11-13 RX ADMIN — Medication 1 AMPULE: at 21:55

## 2020-11-13 RX ADMIN — HYDROMORPHONE HYDROCHLORIDE 1 MG: 1 INJECTION, SOLUTION INTRAMUSCULAR; INTRAVENOUS; SUBCUTANEOUS at 05:16

## 2020-11-13 RX ADMIN — ACETAMINOPHEN 1000 MG: 500 TABLET, FILM COATED ORAL at 00:12

## 2020-11-13 RX ADMIN — SERTRALINE HYDROCHLORIDE 200 MG: 100 TABLET ORAL at 21:55

## 2020-11-13 RX ADMIN — OXYCODONE HYDROCHLORIDE 10 MG: 5 TABLET ORAL at 07:47

## 2020-11-13 RX ADMIN — ALUMINUM HYDROXIDE, MAGNESIUM HYDROXIDE, AND SIMETHICONE 30 ML: 200; 200; 20 SUSPENSION ORAL at 21:52

## 2020-11-13 RX ADMIN — CEFAZOLIN SODIUM 2 G: 10 INJECTION, POWDER, FOR SOLUTION INTRAVENOUS at 03:09

## 2020-11-13 NOTE — PROGRESS NOTES
Pt vomiting multiple times. Still feels nauseated. Given IV phenergan. Pt states a little relief but still feeling nauseous. Will continue to monitor.

## 2020-11-13 NOTE — PROGRESS NOTES
Problem: Mobility Impaired (Adult and Pediatric)  Goal: *Acute Goals and Plan of Care (Insert Text)  Note: GOALS (1-4 days):  (1.)Ms. Mary Carey will move from supine to sit and sit to supine  in bed with STAND BY ASSIST.  (2.)Ms. Mary Carey will transfer from bed to chair and chair to bed with CONTACT GUARD ASSIST using the least restrictive device. (3.)Ms. Mary Carey will ambulate with CONTACT GUARD ASSIST for 150 feet with the least restrictive device. (4.)Ms. Mary Carey will ambulate up/down 13 steps with left railing with MINIMAL ASSIST with no device. (5.)Ms. Mary Carey will increase right knee ROM to 5°-80°.  ________________________________________________________________________________________________      PHYSICAL THERAPY JOINT CAMP TKA: Daily Note and AM 11/13/2020  INPATIENT: Hospital Day: 2  [place] / Product Type: Medicare /      NAME/AGE/GENDER: Moni Arreguin is a 65 y.o. female   PRIMARY DIAGNOSIS:  Unilateral primary osteoarthritis, right knee [M17.11]   Procedure(s) and Anesthesia Type:     * RIGHT KNEE ARTHROPLASTY TOTAL ROBOTIC ASSISTED / BRIAN/MARYCRUZ ANCEF / VANCOMYCIN - Spinal (Right)  ICD-10: Treatment Diagnosis:    · Pain in Right Knee (M25.561)  · Stiffness of Right Knee, Not elsewhere classified (M25.661)  · Difficulty in walking, Not elsewhere classified (R26.2)  · Other abnormalities of gait and mobility (R26.89)      ASSESSMENT:     Ms. Mary Carey presents with decreased strength and ROM R LE and limited functional mobility S/P R TKA. She will benefit from skilled therapy services to address the below problem list.   Pt. In chair complaining of pain. She plans to go home tomorrow. She did tka exercises with cues and assistance, has difficulty moving right LE with most exercises. She ambulated in the room with RW with very slow gait pattern. Slower progress.     This section established at most recent assessment   PROBLEM LIST (Impairments causing functional limitations):  1. Decreased Strength  2. Decreased ADL/Functional Activities  3. Decreased Transfer Abilities  4. Decreased Ambulation Ability/Technique  5. Decreased Flexibility/Joint Mobility  6. Decreased [place] with Home Exercise Program   INTERVENTIONS PLANNED: (Benefits and precautions of physical therapy have been discussed with the patient.)  1. Bed Mobility  2. Cold  3. Gait Training  4. Home Exercise Program (HEP)  5. Range of Motion (ROM)  6. Therapeutic Activites  7. Therapeutic Exercise/Strengthening  8. Transfer Training     TREATMENT PLAN: Frequency/Duration: Follow patient BID for duration of hospital stay to address above goals. Rehabilitation Potential For Stated Goals: Good     RECOMMENDED REHABILITATION/EQUIPMENT: (at time of discharge pending progress): Continue Skilled Therapy and Home Health: Physical Therapy. HISTORY:   History of Present Injury/Illness (Reason for Referral):  S/P R TKA  Past Medical History/Comorbidities:   Ms. Mj Lindsey  has a past medical history of Anxiety, Arthritis, Autoimmune disease ([address] CAD (coronary artery disease) (2010), Chest pain, Chronic pain, Depression, Family history of ischemic heart disease, H/O acute pancreatitis (08/2013), H/O gastric bypass (2009), High cholesterol, echocardiogram (01/18/2019), Hypertension, Hypothyroidism, Lichen sclerosus, Obesity (BMI 30-39.9), Osteopenia, Pancreatitis, Systolic murmur, and Ventral hernia. She also has no past medical history of Unspecified adverse effect of anesthesia.   Ms. Mj Lindsey  has a past surgical history that includes hx bladder suspension; hx carpal tunnel release (Right); hx lysis of adhesions; hx appendectomy (1954); hx colonoscopy (2017); hx endoscopy (2018); hx heart catheterization (2010); hx hysterectomy (1978); hx oophorectomy (1984); hx lumbar fusion (1996); hx urological; hx cataract removal (Bilateral, 2015); hx lap cholecystectomy (7/2013); hx gastric bypass (2009); hx hernia repair (8/14/14); hx other surgical (2017); hx shoulder arthroscopy (Right, 2011); hx orthopaedic (Right, 1997); hx orthopaedic (Left, 2017); ir biliary drn cath plc perc int/ext si (2/4/2019); ir biliary drn cath exchange perc any to ext si (3/7/2019); ir biliary drn cath exchange perc any to ext si (4/3/2019); hx heart catheterization (08/2019); and hx hip replacement (Right, 02/11/2020).   Social History/Living Environment:   Home Environment: Private residence  # Steps to Enter: 9  Rails to Enter: Yes  Wheelchair Ramp: Yes  One/Two Story Residence: Two story  # of Interior Steps: 15  Interior Rails: Left  Lift Chair Available: No  Living Alone: No  Support Systems: Spouse/Significant Other/Partner  Patient Expects to be Discharged to[de-identified] Private residence  Current DME Used/Available at Home: Cane, straight, Commode, bedside, Walker, rolling  Tub or Shower Type: Shower  Prior Level of Function/Work/Activity:  independent   Number of Personal Factors/Comorbidities that affect the Plan of Care: 0: LOW COMPLEXITY   EXAMINATION:   Most Recent Physical Functioning:                            Bed Mobility  Supine to Sit: Minimum assistance  Sit to Supine: Minimum assistance    Transfers  Sit to Stand: Contact guard assistance  Stand to Sit: Contact guard assistance  Bed to Chair: Contact guard assistance    Balance  Sitting: Intact  Standing: With support              Weight Bearing Status  Right Side Weight Bearing: As tolerated  Distance (ft): 25 Feet (ft)  Ambulation - Level of Assistance: Contact guard assistance  Assistive Device: Walker, rolling  Speed/Princess: Delayed  Step Length: Left shortened;Right shortened  Stance: Right decreased  Gait Abnormalities: Antalgic;Decreased step clearance  Interventions: Safety awareness training;Verbal cues     Braces/Orthotics: none    Right Knee Cold  Type: Cryocuff      Body Structures Involved:  1. Bones  2. Joints  3. Muscles Body Functions Affected:  1. Neuromusculoskeletal  2. Movement Related Activities and Participation Affected:  1. General Tasks and Demands  2. Mobility   Number of elements that affect the Plan of Care: 3: MODERATE COMPLEXITY   CLINICAL PRESENTATION:   Presentation: Stable and uncomplicated: LOW COMPLEXITY   CLINICAL DECISION MAKIN[address] [place] [address] AM-PAC 6 Clicks   Basic Mobility Inpatient Short Form  How much difficulty does the patient currently have. .. Unable A Lot A Little None   1. Turning over in bed (including adjusting bedclothes, sheets and blankets)? [] 1   [] 2   [x] 3   [] 4   2. Sitting down on and standing up from a chair with arms ( e.g., wheelchair, bedside commode, etc.)   [] 1   [] 2   [x] 3   [] 4   3. Moving from lying on back to sitting on the side of the bed? [] 1   [] 2   [x] 3   [] 4   How much help from another person does the patient currently need. .. Total A Lot A Little None   4. Moving to and from a bed to a chair (including a wheelchair)? [] 1   [] 2   [x] 3   [] 4   5. Need to walk in hospital room? [] 1   [] 2   [x] 3   [] 4   6. Climbing 3-5 steps with a railing? [] 1   [x] 2   [] 3   [] 4   © , Trustees of [address] [place] [address], under license to [employer]. All rights reserved     Score:  Initial: 17 Most Recent: X (Date: -- )    Interpretation of Tool:  Represents activities that are increasingly more difficult (i.e. Bed mobility, Transfers, Gait). Medical Necessity:     · Patient demonstrates   · good  ·  rehab potential due to higher previous functional level. Reason for Services/Other Comments:  · Patient   · continues to require present interventions due to patient's inability to perform exercises and functional mobility independently  · .    Use of outcome tool(s) and clinical judgement create a POC that gives a: Clear prediction of patient's progress: LOW COMPLEXITY            TREATMENT:   (In addition to Assessment/Re-Assessment sessions the following treatments were rendered)     Pre-treatment Symptoms/Complaints:  R knee pain  Pain Initial:      Post Session:  9 with gait     Therapeutic Exercise: (15 Minutes):  Exercises per grid below to improve mobility and strength. Required minimal verbal and manual cues to  perform exercises correctly . Gait Training (15 Minutes):  Gait training to improve and/or restore physical functioning as related to mobility, strength and balance. Ambulated 25 Feet (ft) with Contact guard assistance using a Walker, rolling and minimal Safety awareness training;Verbal cues related to their stance phase to promote proper body alignment, promote proper body posture and promote proper body mechanics. Date:  11/12/20 Date:  11/13 Date:     ACTIVITY/EXERCISE AM PM AM PM AM PM   GROUP THERAPY  []  []  []  []  []  []   Ankle Pumps  10 15a      Quad Sets  10 15a      Gluteal Sets  10 15a      Hip ABd/ADduction  10 15aa      Straight Leg Raises   15aa      Knee Slides  10 AA 15aa      Short Arc Quads  10       Long Arc Quads         Chair Slides                  B = bilateral; AA = active assistive; A = active; P = passive      Treatment/Session Assessment:     Response to Treatment:  Slow progress    Education:  [x] Home Exercises  [x] Fall Precautions  [x] Use of Cold Therapy Unit [] D/C Instruction Review  [x] Knee Prosthesis Review  [x] Walker Management/Safety [] Adaptive Equipment as Needed       Interdisciplinary Collaboration:   o Physical Therapist  o Registered Nurse    After treatment position/precautions:   o Supine in bed  o Bed/Chair-wheels locked  o Bed in low position  o Call light within reach  o Nurse at bedside    Compliance with Program/Exercises: Compliant most of the time, Will assess as treatment progresses. Recommendations/Intent for next treatment session:  Treatment next visit will focus on increasing Ms. Funmi Madera independence with bed mobility, transfers, gait training, strength/ROM exercises, modalities for pain, and patient education.       Total Treatment Duration:  PT Patient Time In/Time Out  Time In: 1110  Time Out: 528 Cory Trinh, PT

## 2020-11-13 NOTE — PROGRESS NOTES
Problem: Mobility Impaired (Adult and Pediatric)  Goal: *Acute Goals and Plan of Care (Insert Text)  Note: GOALS (1-4 days):  (1.)Ms. Jimena Driscoll will move from supine to sit and sit to supine  in bed with STAND BY ASSIST.  (2.)Ms. Jimena Driscoll will transfer from bed to chair and chair to bed with CONTACT GUARD ASSIST using the least restrictive device. (3.)Ms. Jimena Driscoll will ambulate with CONTACT GUARD ASSIST for 150 feet with the least restrictive device. (4.)Ms. Jimena Driscoll will ambulate up/down 13 steps with left railing with MINIMAL ASSIST with no device. (5.)Ms. Jimena Driscoll will increase right knee ROM to 5°-80°.  ________________________________________________________________________________________________      PHYSICAL THERAPY JOINT CAMP TKA: Daily Note and PM 11/13/2020  INPATIENT: Hospital Day: 2  [place] / Product Type: Medicare /      NAME/AGE/GENDER: Ezekiel Solares is a 65 y.o. female   PRIMARY DIAGNOSIS:  Unilateral primary osteoarthritis, right knee [M17.11]   Procedure(s) and Anesthesia Type:     * RIGHT KNEE ARTHROPLASTY TOTAL ROBOTIC ASSISTED / BRIAN/MARYCRUZ ANCEF / VANCOMYCIN - Spinal (Right)  ICD-10: Treatment Diagnosis:    · Pain in Right Knee (M25.561)  · Stiffness of Right Knee, Not elsewhere classified (M25.661)  · Difficulty in walking, Not elsewhere classified (R26.2)  · Other abnormalities of gait and mobility (R26.89)      ASSESSMENT:     Ms. Jimena Driscoll presents with decreased strength and ROM R LE and limited functional mobility S/P R TKA. She will benefit from skilled therapy services to address the below problem list.     Pt. In bed, reports a lot of pain. She plans to go home tomorrow. She did tka exercises with cues and assistance. Needs help with most of them. She sat up with assistance and ambulated with RW to restroom(RN assisted as needed) and then she ambulated in the zhao with RW with encouragement. Improved gait this afternoon with increased distance and step through gait. She returned to supine, refused chair. All exercises and mobility take extra time. Slower progress but she did better this pm.  She reported feeling a little better afterward.  present. She has stairs to get in house tomorrow. .    This section established at most recent assessment   PROBLEM LIST (Impairments causing functional limitations):  1. Decreased Strength  2. Decreased ADL/Functional Activities  3. Decreased Transfer Abilities  4. Decreased Ambulation Ability/Technique  5. Decreased Flexibility/Joint Mobility  6. Decreased [place] with Home Exercise Program   INTERVENTIONS PLANNED: (Benefits and precautions of physical therapy have been discussed with the patient.)  1. Bed Mobility  2. Cold  3. Gait Training  4. Home Exercise Program (HEP)  5. Range of Motion (ROM)  6. Therapeutic Activites  7. Therapeutic Exercise/Strengthening  8. Transfer Training     TREATMENT PLAN: Frequency/Duration: Follow patient BID for duration of hospital stay to address above goals. Rehabilitation Potential For Stated Goals: Good     RECOMMENDED REHABILITATION/EQUIPMENT: (at time of discharge pending progress): Continue Skilled Therapy and Home Health: Physical Therapy. HISTORY:   History of Present Injury/Illness (Reason for Referral):  S/P R TKA  Past Medical History/Comorbidities:   Ms. Cristy Mcmahan  has a past medical history of Anxiety, Arthritis, Autoimmune disease ([address] CAD (coronary artery disease) (2010), Chest pain, Chronic pain, Depression, Family history of ischemic heart disease, H/O acute pancreatitis (08/2013), H/O gastric bypass (2009), High cholesterol, echocardiogram (01/18/2019), Hypertension, Hypothyroidism, Lichen sclerosus, Obesity (BMI 30-39.9), Osteopenia, Pancreatitis, Systolic murmur, and Ventral hernia. She also has no past medical history of Unspecified adverse effect of anesthesia.   Ms. Steiner Mess Jocelyne Kanner  has a past surgical history that includes hx bladder suspension; hx carpal tunnel release (Right); hx lysis of adhesions; hx appendectomy (1954); hx colonoscopy (2017); hx endoscopy (2018); hx heart catheterization (2010); hx hysterectomy (1978); hx oophorectomy (1984); hx lumbar fusion (1996); hx urological; hx cataract removal (Bilateral, 2015); hx lap cholecystectomy (7/2013); hx gastric bypass (2009); hx hernia repair (8/14/14); hx other surgical (2017); hx shoulder arthroscopy (Right, 2011); hx orthopaedic (Right, 1997); hx orthopaedic (Left, 2017); ir biliary drn cath plc perc int/ext si (2/4/2019); ir biliary drn cath exchange perc any to ext si (3/7/2019); ir biliary drn cath exchange perc any to ext si (4/3/2019); hx heart catheterization (08/2019); and hx hip replacement (Right, 02/11/2020).   Social History/Living Environment:   Home Environment: Private residence  # Steps to Enter: 9  Rails to Enter: Yes  Wheelchair Ramp: Yes  One/Two Story Residence: Two story  # of Interior Steps: 15  Interior Rails: Left  Lift Chair Available: No  Living Alone: No  Support Systems: Spouse/Significant Other/Partner  Patient Expects to be Discharged to[de-identified] Private residence  Current DME Used/Available at Home: Cane, straight, Commode, bedside, Walker, rolling  Tub or Shower Type: Shower  Prior Level of Function/Work/Activity:  independent   Number of Personal Factors/Comorbidities that affect the Plan of Care: 0: LOW COMPLEXITY   EXAMINATION:   Most Recent Physical Functioning:               RLE AROM  R Knee Flexion: 75  R Knee Extension: 4            Bed Mobility  Supine to Sit: Minimum assistance  Sit to Supine: Minimum assistance    Transfers  Sit to Stand: Contact guard assistance;Minimum assistance  Stand to Sit: Contact guard assistance  Bed to Chair: Contact guard assistance    Balance  Sitting: Intact  Standing: With support              Weight Bearing Status  Right Side Weight Bearing: As tolerated  Distance (ft): 80 Feet (ft)  Ambulation - Level of Assistance: Stand-by assistance  Assistive Device: Walker, rolling  Speed/Princess: Delayed  Step Length: Left shortened;Right shortened  Stance: Right decreased  Gait Abnormalities: Antalgic  Interventions: Safety awareness training;Verbal cues     Braces/Orthotics: none    Right Knee Cold  Type: Cryocuff      Body Structures Involved:  1. Bones  2. Joints  3. Muscles Body Functions Affected:  1. Neuromusculoskeletal  2. Movement Related Activities and Participation Affected:  1. General Tasks and Demands  2. Mobility   Number of elements that affect the Plan of Care: 3: MODERATE COMPLEXITY   CLINICAL PRESENTATION:   Presentation: Stable and uncomplicated: LOW COMPLEXITY   CLINICAL DECISION MAKING:   [facility] AM-PAC 6 Clicks   Basic Mobility Inpatient Short Form  How much difficulty does the patient currently have. .. Unable A Lot A Little None   1. Turning over in bed (including adjusting bedclothes, sheets and blankets)? [] 1   [] 2   [x] 3   [] 4   2. Sitting down on and standing up from a chair with arms ( e.g., wheelchair, bedside commode, etc.)   [] 1   [] 2   [x] 3   [] 4   3. Moving from lying on back to sitting on the side of the bed? [] 1   [] 2   [x] 3   [] 4   How much help from another person does the patient currently need. .. Total A Lot A Little None   4. Moving to and from a bed to a chair (including a wheelchair)? [] 1   [] 2   [x] 3   [] 4   5. Need to walk in hospital room? [] 1   [] 2   [x] 3   [] 4   6. Climbing 3-5 steps with a railing? [] 1   [x] 2   [] 3   [] 4   © 2007, Trustees of [facility], under license to [employer]. All rights reserved     Score:  Initial: 17 Most Recent: X (Date: -- )    Interpretation of Tool:  Represents activities that are increasingly more difficult (i.e. Bed mobility, Transfers, Gait).     Medical Necessity:     · Patient demonstrates   · good  ·  rehab potential due to higher previous functional level. Reason for Services/Other Comments:  · Patient   · continues to require present interventions due to patient's inability to perform exercises and functional mobility independently  · . Use of outcome tool(s) and clinical judgement create a POC that gives a: Clear prediction of patient's progress: LOW COMPLEXITY            TREATMENT:   (In addition to Assessment/Re-Assessment sessions the following treatments were rendered)     Pre-treatment Symptoms/Complaints:  R knee pain  Pain Initial: 9     Post Session:  9      Therapeutic Exercise: (15 Minutes):  Exercises per grid below to improve mobility and strength. Required minimal verbal and manual cues to  perform exercises correctly . Gait Training (15 Minutes):  Gait training to improve and/or restore physical functioning as related to mobility, strength and balance. Ambulated 80 Feet (ft) with Stand-by assistance using a Walker, rolling and minimal Safety awareness training;Verbal cues related to their stance phase to promote proper body alignment, promote proper body posture and promote proper body mechanics. Therapeutic Activity: (  15 Minutes ):  Therapeutic activities including Bed transfers, Chair transfers, Ambulation on level ground and standing with RW to improve mobility, strength and balance. Required min Safety awareness training;Verbal cues to promote static and dynamic balance in standing.         Date:  11/12/20 Date:  11/13 Date:     ACTIVITY/EXERCISE AM PM AM PM AM PM   GROUP THERAPY  []  []  []  []  []  []   Ankle Pumps  10 10a 12a     Quad Sets  10 10a 12a     Gluteal Sets  10 10a 12a     Hip ABd/ADduction  10 10aa 12aa     Straight Leg Raises   10aa 12aa     Knee Slides  10 AA 10aa 12aa     Short Arc Quads  10  12aa     Long Arc Quads         Chair Slides    12aa              B = bilateral; AA = active assistive; A = active; P = passive      Treatment/Session Assessment:     Response to Treatment: Slower progress but little better this pm.    Education:  [x] Home Exercises  [x] Fall Precautions  [x] Use of Cold Therapy Unit [] D/C Instruction Review  [x] Knee Prosthesis Review  [x] Walker Management/Safety [] Adaptive Equipment as Needed       Interdisciplinary Collaboration:   o Physical Therapist  o Registered Nurse    After treatment position/precautions:   o Supine in bed  o Bed/Chair-wheels locked  o Bed in low position  o Caregiver at bedside  o Call light within reach  o Family at bedside    Compliance with Program/Exercises: Compliant most of the time, Will assess as treatment progresses. Recommendations/Intent for next treatment session:  Treatment next visit will focus on increasing Ms. Benny Coleman independence with bed mobility, transfers, gait training, strength/ROM exercises, modalities for pain, and patient education.       Total Treatment Duration:  PT Patient Time In/Time Out  Time In: 1430  Time Out: [address] [address]

## 2020-11-13 NOTE — PROGRESS NOTES
Pt sitting up in recliner. Alert and Oriented x3. Denies pain. No NV deficits noted. +2 pedal pulses. Strong dorsi/plantar flexion. Surgical bandage to right knee is clean, dry and intact. Call light within reach.

## 2020-11-13 NOTE — PROGRESS NOTES
2020         Post Op day: 1 Day Post-Op     Admit Date: 2020  Admit Diagnosis: Unilateral primary osteoarthritis, right knee [M17.11]  Arthritis of knee, right [M17.11]        Subjective: having some pain but well controlled with pain meds, No SOB, No Chest Pain, No Nausea or Vomiting     Objective:   Vital Signs are Stable, No Acute Distress, Alert and Oriented, Dressing is Dry,  Neurovascular exam is normal.     Assessment / Plan :  Patient Active Problem List   Diagnosis Code    Essential hypertension, benign I10    Dyslipidemia E78.5    S/P coronary artery stent placement (2.75 x 18 [place] to mLAD on 9-) Z95.5    S/P laparoscopic cholecystectomy Z90.49    Depression F32.9    Primary hypothyroidism E03.9    Chronic back pain M54.9, G89.29    Obesity (BMI 30.0-34. 9) E66.9    Osteoarthritis M19.90    S/P total hip arthroplasty Z96.649    Irritable bowel syndrome with both constipation and diarrhea K58.2    Age-related osteoporosis without current pathological fracture M81.0    Primary insomnia F51.01    Coronary artery disease involving native coronary artery of native heart without angina pectoris I25.10    Choledocholithiasis K80.50    Gallstone pancreatitis K85.10    Hypokalemia E87.6    Hypomagnesemia E83.42    Arthritis of right hip M16.11    H/O total hip arthroplasty, right Z96.641    Arthritis of knee, right M17.11    Total knee replacement status, right Z96.651      Patient Vitals for the past 8 hrs:   BP Temp Pulse Resp SpO2   20 0309 111/64 98.1 °F (36.7 °C) 61 16 95 %   20 0012 117/72 97.4 °F (36.3 °C) 64 16 96 %    Temp (24hrs), Av °F (36.7 °C), Min:97.4 °F (36.3 °C), Max:98.6 °F (37 °C)    Body mass index is 34.61 kg/m².     Lab Results   Component Value Date/Time    HGB 12.1 2020 07:10 PM      Pt seen by and discussed with Supervising Physician   Continue PT, current pain meds  Home today vs tomorrow morning depending on how therapy angelica.       Signed By: PANCHITO Benson Cera

## 2020-11-13 NOTE — PROGRESS NOTES
Problem: Self Care Deficits Care Plan (Adult)  Goal: *Acute Goals and Plan of Care (Insert Text)  Description: GOALS:   DISCHARGE GOALS (in preparation for going home/rehab):  3 days  1. Ms. Jm Tanner will perform one lower body dressing activity with minimal assistance required to demonstrate improved functional mobility and safety. GOAL MET 11/13/2020       2. Ms. Jm Tanner will perform one lower body bathing activity with minimal assistance required to demonstrate improved functional mobility and safety. GOAL MET 11/13/2020    3. Ms. Jm Tanner will perform toileting/toilet transfer with contact guard assistance to demonstrate improved functional mobility and safety. GOAL MET 11/13/2020    4. Ms. Jm Tanner will perform shower transfer with contact guard assistance to demonstrate improved functional mobility and safety. GOAL MET 11/13/2020    Outcome: Progressing Towards Goal    JOINT CAMP OCCUPATIONAL THERAPY TKA: Daily Note, Discharge, Treatment Day: 2nd and AM 11/13/2020  INPATIENT: Hospital Day: 2  [place] / [place] / Product Type: Medicare /      NAME/AGE/GENDER: Megan Fry is a 65 y.o. female   PRIMARY DIAGNOSIS:  Unilateral primary osteoarthritis, right knee [M17.11]   Procedure(s) and Anesthesia Type:     * RIGHT KNEE ARTHROPLASTY TOTAL ROBOTIC ASSISTED / BRIAN/MARYCRUZ ANCEF / [place] Nagel - Spinal (Right)  ICD-10: Treatment Diagnosis:    · Pain in Right Knee (M25.561)  · Stiffness of Right Knee, Not elsewhere classified (O14.761)      ASSESSMENT:     Ms. Jm Tanner is s/p right TKA and presents with decreased weight bearing on right LE and decreased independence with functional mobility and activities of daily living as compared to baseline level of function and safety. Patient would benefit from skilled Occupational Therapy to maximize independence and safety with self-care task and functional mobility.   Pt would also benefit from education on adaptive equipment and safety precautions in preparation for going home. Pt up in room and to [facility] for toileting, donned gown and underpants. Pt moves well and should progress well tomorrow. 11/13/2020 930am She is supine in bed, min assist supine to sit. She stood and ambulated to the bathroom and she is having a lot of pain. She toileted, showered and dressed. [facility]. She plans to go home tomorrow. She had two hips replaced beforer this surgey. She met 4/4 goals. Will discharge OT. This section established at most recent assessment   PROBLEM LIST (Impairments causing functional limitations):  1. Decreased Strength  2. Decreased ADL/Functional Activities  3. Decreased Transfer Abilities  4. Increased Pain  5. Increased Fatigue  6. Decreased Flexibility/Joint Mobility  7. Decreased Knowledge of Precautions   INTERVENTIONS PLANNED: (Benefits and precautions of occupational therapy have been discussed with the patient.)  1. Activities of daily living training  2. Adaptive equipment training  3. Balance training  4. Clothing management  5. Donning&doffing training  6. Theraputic activity     TREATMENT PLAN: Frequency/Duration: Follow patient 1-2 times to address above goals. Rehabilitation Potential For Stated Goals: Good     RECOMMENDED REHABILITATION/EQUIPMENT: (at time of discharge pending progress): Continue Skilled Therapy. OCCUPATIONAL PROFILE AND HISTORY:   History of Present Injury/Illness (Reason for Referral): Pt presents this date s/p (right) TKA.     Past Medical History/Comorbidities:   Ms. Claire Peguero  has a past medical history of Anxiety, Arthritis, Autoimmune disease ([facility] [address] CAD (coronary artery disease) (2010), Chest pain, Chronic pain, Depression, Family history of ischemic heart disease, H/O acute pancreatitis (08/2013), H/O gastric bypass (2009), High cholesterol, echocardiogram (01/18/2019), Hypertension, Hypothyroidism, Lichen sclerosus, Obesity (BMI 30-39.9), Osteopenia, Pancreatitis, Systolic murmur, and Ventral hernia. She also has no past medical history of Unspecified adverse effect of anesthesia. Ms. Mj Lindsey  has a past surgical history that includes hx bladder suspension; hx carpal tunnel release (Right); hx lysis of adhesions; hx appendectomy (1954); hx colonoscopy (2017); hx endoscopy (2018); hx heart catheterization (2010); hx hysterectomy (1978); hx oophorectomy (1984); hx lumbar fusion (1996); hx urological; hx cataract removal (Bilateral, 2015); hx lap cholecystectomy (7/2013); hx gastric bypass (2009); hx hernia repair (8/14/14); hx other surgical (2017); hx shoulder arthroscopy (Right, 2011); hx orthopaedic (Right, 1997); hx orthopaedic (Left, 2017); ir biliary drn cath plc perc int/ext si (2/4/2019); ir biliary drn cath exchange perc any to ext si (3/7/2019); ir biliary drn cath exchange perc any to ext si (4/3/2019); hx heart catheterization (08/2019); and hx hip replacement (Right, 02/11/2020). Social History/Living Environment:   Home Environment: Private residence  # Steps to Enter: 9  Rails to Enter: Yes  Wheelchair Ramp: Yes  One/Two Story Residence: Two story  # of Interior Steps: 15  Interior Rails: Left  Lift Chair Available: No  Living Alone: No  Support Systems: Spouse/Significant Other/Partner  Patient Expects to be Discharged to[de-identified] Private residence  Current DME Used/Available at Home: Puja Delacruz, straight, Commode, bedside, Walker, rolling  Tub or Shower Type: Shower  Prior Level of Function/Work/Activity:  Independent      Number of Personal Factors/Comorbidities that affect the Plan of Care: Brief history (0):  LOW COMPLEXITY   ASSESSMENT OF OCCUPATIONAL PERFORMANCE[de-identified]   Most Recent Physical Functioning:   Balance  Sitting: Intact  Standing: With support                              Mental Status  Neurologic State: Alert; Appropriate for age  Orientation Level: Appropriate for age  Cognition: Appropriate decision making; Appropriate for age attention/concentration; Appropriate safety awareness; Follows commands  Perception: Appears intact  Perseveration: No perseveration noted  Safety/Judgement: Awareness of environment; Fall prevention                Basic ADLs (From Assessment) Complex ADLs (From Assessment)   Basic ADL  Feeding: Independent  Oral Facial Hygiene/Grooming: Supervision  Bathing: Moderate assistance  Type of Bath: Chlorhexidine (CHG), Shower  Upper Body Dressing: Supervision  Lower Body Dressing: Moderate assistance  Toileting: Minimum assistance     Grooming/Bathing/Dressing Activities of Daily Living   Grooming  Grooming Assistance: Supervision  Position Performed: Seated in chair;Standing  Washing Face: Supervision  Washing Hands: Supervision  Brushing Teeth: Supervision  Brushing/Combing Hair: Supervision Cognitive Retraining  Safety/Judgement: Awareness of environment; Fall prevention   Upper Body Bathing  Bathing Assistance: Supervision  Position Performed: Seated in chair;Standing  Adaptive Equipment: Grab bar;Tub bench     Lower Body Bathing  Bathing Assistance: Supervision;Minimum assistance  Perineal  : Stand-by assistance;Contact guard assistance  Position Performed: Standing  Adaptive Equipment: Grab bar  Lower Body : Minimum assistance  Position Performed: Seated in chair;Standing  Adaptive Equipment: Grab bar;Tub bench Toileting  Toileting Assistance: Stand-by assistance  Bladder Hygiene: Stand-by assistance  Clothing Management: Stand-by assistance  Adaptive Equipment: Elevated seat;Walker   Upper Body Dressing Assistance  Dressing Assistance: Supervision  Pullover Shirt: Supervision(sierra sparks) Functional Transfers  Bathroom Mobility: Stand-by assistance;Contact guard assistance  Toilet Transfer : Contact guard assistance  Shower Transfer: Contact guard assistance   Lower Body Dressing Assistance  Dressing Assistance: Minimum assistance  Underpants: Contact guard assistance  Socks: Minimum assistance Bed/Mat Mobility  Supine to Sit: Minimum assistance  Sit to Stand: Contact guard assistance  Stand to Sit: Contact guard assistance  Bed to Chair: Contact guard assistance         Physical Skills Involved:  1. Range of Motion  2. Balance  3. Strength Cognitive Skills Affected (resulting in the inability to perform in a timely and safe manner): 1. none Psychosocial Skills Affected:  1. Environmental Adaptation   Number of elements that affect the Plan of Care: 3-5:  MODERATE COMPLEXITY   CLINICAL DECISION MAKING:   M [employer] AM-PAC 6 Clicks   Daily Activity Inpatient Short Form  How much help from another person does the patient currently need. .. Total A Lot A Little None   1. Putting on and taking off regular lower body clothing? [] 1   [] 2   [x] 3   [] 4   2. Bathing (including washing, rinsing, drying)? [] 1   [] 2   [x] 3   [] 4   3. Toileting, which includes using toilet, bedpan or urinal?   [] 1   [] 2   [x] 3   [] 4   4. Putting on and taking off regular upper body clothing? [] 1   [] 2   [] 3   [x] 4   5. Taking care of personal grooming such as brushing teeth? [] 1   [] 2   [] 3   [x] 4   6. Eating meals? [] 1   [] 2   [] 3   [x] 4   © 2007, Trustees of [facility] [employer], under license to [employer] All rights reserved     Score:  Initial: 19 Most Recent:21 discharge 11/13/2020    Interpretation of Tool:  Represents activities that are increasingly more difficult (i.e. Bed mobility, Transfers, Gait).      Use of outcome tool(s) and clinical judgement create a POC that gives a: LOW COMPLEXITY            TREATMENT:   (In addition to Assessment/Re-Assessment sessions the following treatments were rendered)     Pre-treatment Symptoms/Complaints:  pt up in room without complaint of pain  Pain: Initial:   Pain Intensity 1: 4  Pain Location 1: Knee  Pain Orientation 1: Right  Pain Intervention(s) 1: Shower 0 Post Session:  4/10 rest Self Care: (40 min): Procedure(s) (per grid) utilized to improve and/or restore self-care/home management as related to dressing, bathing, toileting and grooming. Required minimal visual, verbal, manual and tactile cueing to facilitate activities of daily living skills and compensatory activities. Treatment/Session Assessment:     Response to Treatment:  pt up in room tolerated well. Education:  [] Home Exercises  [x] Fall Precautions  [] Hip Precautions [] Going Home Video  [x] Knee/Hip Prosthesis Review  [x] Walker Management/Safety [x] Adaptive Equipment as Needed       Interdisciplinary Collaboration:   o Physical Therapist  o Occupational Therapist  o Registered Nurse    After treatment position/precautions:   o Up in chair  o Bed/Chair-wheels locked  o Caregiver at bedside  o Call light within reach  o RN notified     Compliance with Program/Exercises: Compliant all of the time. Recommendations/Intent for next treatment session:  Pt doing well all goals met and will do well at home with support from spouse. Patient will be discharged home with home health PT. No further Occupational Therapy warranted, will discharge Occupational Therapy services.         Total Treatment Duration:40  OT Patient Time In/Time Out  Time In: 0930  Time Out: [address], OT

## 2020-11-13 NOTE — PROGRESS NOTES
11/12/20 2044   Oxygen Therapy   O2 Sat (%) 96 %   Pulse via Oximetry 86 beats per minute   O2 Device Room air   Incentive Spirometry Treatment   Actual Volume (ml) 2000 ml   Number of Attempts 2   Pt on continuous monitor for HS. Alarm limits set.  Pt working on IS

## 2020-11-13 NOTE — DISCHARGE SUMMARY
[address] [place] [address]  Total Joint Discharge Summary      Patient ID:  Lora Amos  [id number]  42 y.o.  1950    Admit date: 11/12/2020  Discharge date and time: 11-13-20  Admitting Physician: Lloyd Edward MD  Surgeon: Same  Admission Diagnoses: Unilateral primary osteoarthritis, right knee [M17.11]  Arthritis of knee, right [M17.11]  Discharge Diagnoses: Principal Problem: Total knee replacement status, right (11/13/2020)    Active Problems:    Arthritis of knee, right (11/12/2020)                                Perioperative Antibiotics: Ancef 1 to 2 mg was given depending on patient's weight. If allergic to Ancef or due to other indications, patient was given Vancomycin. Hospital Medications given:   [unfilled]  [unfilled]  [unfilled]    Discharge Medications given:  Current Discharge Medication List      START taking these medications    Details   oxyCODONE IR (ROXICODONE) 5 mg immediate release tablet Take 1-2 Tabs by mouth every four (4) hours as needed for Pain for up to 7 days. Max Daily Amount: 60 mg.  Qty: 60 Tab, Refills: 0    Associated Diagnoses: Total knee replacement status, right         CONTINUE these medications which have CHANGED    Details   aspirin delayed-release 81 mg tablet Take 1 Tab by mouth every twelve (12) hours every twelve (12) hours for 30 days. Qty: 60 Tab, Refills: 0         CONTINUE these medications which have NOT CHANGED    Details   acetaminophen (TylenoL) 325 mg tablet Take  by mouth every four (4) hours as needed for Pain.      valsartan (DIOVAN) 320 mg tablet Take 320 mg by mouth every evening. hydroCHLOROthiazide (HYDRODIURIL) 25 mg tablet Take 25 mg by mouth nightly. Refills: 12      atorvastatin (LIPITOR) 40 mg tablet Take 40 mg by mouth nightly.       potassium chloride (KLOR-CON) 10 mEq tablet 1 every day for potassium  Qty: 90 Tab, Refills: 12    Associated Diagnoses: Hypokalemia      temazepam (RESTORIL) 30 mg capsule Take 1 Cap by mouth nightly as needed. Max Daily Amount: 30 mg.  Qty: 30 Cap, Refills: 5    Associated Diagnoses: Primary insomnia      LORazepam (ATIVAN) 0.5 mg tablet Take 1 Tab by mouth every eight (8) hours as needed for Anxiety. Max Daily Amount: 1.5 mg.  Qty: 30 Tab, Refills: 5    Associated Diagnoses: Depression, unspecified depression type      alendronate (FOSAMAX) 70 mg tablet 1 q week for osteoporosis  Qty: 12 Tab, Refills: 12    Associated Diagnoses: Age-related osteoporosis without current pathological fracture      levothyroxine (SYNTHROID) 75 mcg tablet 1 every day for thyroid  Qty: 90 Tab, Refills: 12    Associated Diagnoses: Primary hypothyroidism      amLODIPine (NORVASC) 5 mg tablet 1 every day for BP  Indications: hypertension  Qty: 90 Tab, Refills: 12    Associated Diagnoses: Essential hypertension, benign      traZODone (DESYREL) 150 mg tablet 1 to 2 qhs for sleep  Qty: 180 Tab, Refills: 12    Associated Diagnoses: Depression, unspecified depression type      sertraline (ZOLOFT) 100 mg tablet Take 1 Tab by mouth nightly. Indications: major depressive disorder  Qty: 90 Tab, Refills: 5    Associated Diagnoses: Depression, unspecified depression type         STOP taking these medications       meloxicam (MOBIC) 15 mg tablet Comments:   Reason for Stopping:                Additional DVT Prophylaxis:  VANDA Hose,Plexi-Pulse    Postoperative transfusions:   none  Post Op complications: none    Hemoglobin at discharge:   Lab Results   Component Value Date/Time    HGB 12.1 11/12/2020 07:10 PM       Wound appears to be healing without any evidence of infection. Physical Therapy started on the day following surgery and progressed to independent ambulation with the aid of a walker. At the time of discharge, able to go up and down stairs and had understanding of precautions needed following surgery.       PT/OT:            Assistive Device: Walker (comment)  RLE AROM  R Knee Flexion: 55(approximate)  R Knee Extension: -15             Discharged to: home    Discharge instructions:  -Rx pain medication given  - Anticoagulate with: Ecotrin 81 mg PO BID x 4 weeks  -Resume pre hospital diet             -Resume home medications per medical continuation form     -Ambulate with walker, appropriate total joint protocol  -Follow up in office as scheduled       Signed:  PANCHITO Avalos  11/13/2020  7:23 AM

## 2020-11-13 NOTE — PROGRESS NOTES
Care Management Interventions  PCP Verified by CM: Yes  Mode of Transport at Discharge: Self  Transition of Care Consult (CM Consult): [address]: Yes  Discharge Durable Medical Equipment: No  Physical Therapy Consult: Yes  Occupational Therapy Consult: Yes  Current Support Network: Family Lives [place], Lives with Spouse  Confirm Follow Up Transport: Family  The Plan for Transition of Care is Related to the Following Treatment Goals : return to independent function  The Patient and/or Patient Representative was Provided with a Choice of Provider and Agrees with the Discharge Plan?: Yes  Freedom of Choice List was Provided with Basic Dialogue that Supports the Patient's Individualized Plan of Care/Goals, Treatment Preferences and Shares the Quality Data Associated with the Providers?: Yes  Discharge Location  Discharge Placement: Home with home health  Patient is a 65y.o. year old female admitted for Right TKA . Patient plans to return home on discharge. Order received to arrange home health. Patient without preference towards agency. Referral sent to [facility]. Patient denies any equipment needs as patient has a walker and raised toilet seat. Will follow until discharge.

## 2020-11-13 NOTE — PROGRESS NOTES
Shift assessment completed. Alert and oriented x4. Pt resting in bed. Right knee Aquacel is clean dry and intact. Sensation present, plantar/dorsiflexion strong, pulses 2+ bilaterally in lower extremities. Lung sounds clear bilaterally. No abnormal heart sounds. Ice on right knee. Pt voiding. Pt complains of pain 5/10. Medicated per PRN orders. Bed locked, in lowest position, call light within reach.

## 2020-11-14 VITALS
SYSTOLIC BLOOD PRESSURE: 136 MMHG | TEMPERATURE: 98.2 F | BODY MASS INDEX: 34.66 KG/M2 | WEIGHT: 208 LBS | OXYGEN SATURATION: 90 % | DIASTOLIC BLOOD PRESSURE: 67 MMHG | HEIGHT: 65 IN | RESPIRATION RATE: 16 BRPM | HEART RATE: 61 BPM

## 2020-11-14 PROCEDURE — 97110 THERAPEUTIC EXERCISES: CPT

## 2020-11-14 PROCEDURE — 74011250637 HC RX REV CODE- 250/637: Performed by: ORTHOPAEDIC SURGERY

## 2020-11-14 PROCEDURE — 97530 THERAPEUTIC ACTIVITIES: CPT

## 2020-11-14 PROCEDURE — 97116 GAIT TRAINING THERAPY: CPT

## 2020-11-14 RX ADMIN — OXYCODONE HYDROCHLORIDE 10 MG: 5 TABLET ORAL at 05:21

## 2020-11-14 RX ADMIN — ACETAMINOPHEN 1000 MG: 500 TABLET, FILM COATED ORAL at 12:10

## 2020-11-14 RX ADMIN — CELECOXIB 200 MG: 200 CAPSULE ORAL at 08:00

## 2020-11-14 RX ADMIN — OXYCODONE HYDROCHLORIDE 10 MG: 5 TABLET ORAL at 09:18

## 2020-11-14 RX ADMIN — Medication 81 MG: at 08:00

## 2020-11-14 RX ADMIN — OXYCODONE HYDROCHLORIDE 10 MG: 5 TABLET ORAL at 01:18

## 2020-11-14 RX ADMIN — OXYCODONE HYDROCHLORIDE 10 MG: 5 TABLET ORAL at 13:01

## 2020-11-14 RX ADMIN — Medication 1 AMPULE: at 08:00

## 2020-11-14 RX ADMIN — DOCUSATE SODIUM 50MG AND SENNOSIDES 8.6MG 2 TABLET: 8.6; 5 TABLET, FILM COATED ORAL at 08:00

## 2020-11-14 RX ADMIN — Medication 10 ML: at 05:22

## 2020-11-14 RX ADMIN — LEVOTHYROXINE SODIUM 75 MCG: 0.07 TABLET ORAL at 05:21

## 2020-11-14 NOTE — PROGRESS NOTES
Discharge instructions provided to pt. Chance given to ask questions and answers provided. IV removed, tip intact. Rxs provided. Waiting to be wheeled downstairs.

## 2020-11-14 NOTE — PROGRESS NOTES
Problem: Falls - Risk of  Goal: *Absence of Falls  Description: Document Pippa Galindo Fall Risk and appropriate interventions in the flowsheet.   Outcome: Progressing Towards Goal  Note: Fall Risk Interventions:  Mobility Interventions: OT consult for ADLs, Patient to call before getting OOB, PT Consult for mobility concerns, PT Consult for assist device competence, Strengthening exercises (ROM-active/passive), Utilize walker, cane, or other assistive device         Medication Interventions: Patient to call before getting OOB, Teach patient to arise slowly    Elimination Interventions: Call light in reach, Patient to call for help with toileting needs              Problem: Patient Education: Go to Patient Education Activity  Goal: Patient/Family Education  Outcome: Progressing Towards Goal     Problem: Patient Education: Go to Patient Education Activity  Goal: Patient/Family Education  Outcome: Progressing Towards Goal     Problem: Patient Education: Go to Patient Education Activity  Goal: Patient/Family Education  Outcome: Progressing Towards Goal

## 2020-11-14 NOTE — DISCHARGE INSTRUCTIONS
Patient Education        Total Knee Replacement: What to Expect at [address] [place] [address] Drive had a total knee replacement. The doctor replaced the worn ends of the bones that connect to your knee (thighbone and lower leg bone) with plastic and metal parts. When you leave the hospital, you should be able to move around with a walker or crutches. But you will need someone to help you at home for the next few weeks or until you have more energy and can move around better. If you need more extensive rehab, you may go to a specialized rehab center for more treatment. You will go home with a bandage and stitches, staples, tissue glue, or tape strips. Change the bandage as your doctor tells you to. If you have stitches or staples, your doctor will remove them 10 to 21 days after your surgery. Glue or tape strips will fall off on their own over time. You may still have some mild pain, and the area may be swollen for 3 to 6 months after surgery. Your knee will continue to improve for 6 to 12 months. You will probably use a walker for 1 to 3 weeks and then use crutches. When you are ready, you can use a cane. You will probably be able to walk on your own in 4 to 8 weeks. You will need to do months of physical rehabilitation (rehab) after a knee replacement. Rehab will help you strengthen the muscles of the knee and help you regain movement. After you recover, your artificial knee will allow you to do normal daily activities with less pain or no pain at all. You may be able to hike, dance, ride a bike, and play golf. Talk to your doctor about whether you can do more strenuous activities. Always tell your caregivers that you have an artificial knee. How long it will take to walk on your own, return to normal activities, and go back to work depends on your health and how well your rehabilitation (rehab) program goes.  The better you do with your rehab exercises, the quicker you will get your strength and movement back.  This care sheet gives you a general idea about how long it will take for you to recover. But each person recovers at a different pace. Follow the steps below to get better as quickly as possible. How can you care for yourself at home? Activity    · Rest when you feel tired. You may take a nap, but don't stay in bed all day. When you sit, use a chair with arms. You can use the arms to help you stand up.     · Work with your physical therapist to find the best way to exercise. What you can do as your knee heals will depend on whether your new knee is cemented or uncemented. You may not be able to do certain things for a while if your new knee is uncemented.     · After your knee has healed enough, you can do more strenuous activities with caution. ? You can golf, but use a golf cart. And don't wear shoes with spikes. ? You can bike on a flat road or on a stationary bike. Avoid biking up hills. ? Your doctor may suggest that you stay away from activities that put stress on your knee. These include tennis, badminton, squash, racquetball, contact sports like football, jumping (such as in basketball), jogging, and running. ? Avoid activities where you might fall. These include horseback riding, skiing, and mountain biking.     · Do not sit for more than 1 hour at a time. Get up and walk around for a while before you sit again. If you must sit for a long time, prop up your leg with a chair or footstool. This will help you avoid swelling.     · Ask your doctor when you can drive again. It may take up to 8 weeks after knee replacement surgery before it's safe for you to drive.     · When you get into a car, sit on the edge of the seat. Then pull in your legs, and turn to face the front.     · You should be able to do many everyday activities 3 to 6 weeks after your surgery. You will probably need to take 4 to 16 weeks off from work.  When you can go back to work depends on the type of work you do and how you feel.     · Ask your doctor when it is okay for you to have sex.     · For 12 weeks, do not lift anything heavier than 10 pounds and do not lift weights. Diet    · By the time you leave the hospital, you should be eating your normal diet. If your stomach is upset, try bland, low-fat foods like plain rice, broiled chicken, toast, and yogurt. Your doctor may suggest that you take iron and vitamin supplements.     · Drink plenty of fluids (unless your doctor tells you not to).   · Eat healthy foods, and watch your portion sizes. Try to stay at your ideal weight. Too much weight puts more stress on your new knee.     · You may notice that your bowel movements are not regular right after your surgery. This is common. Try to avoid constipation and straining with bowel movements. You may want to take a fiber supplement every day. If you have not had a bowel movement after a couple of days, ask your doctor about taking a mild laxative. Medicines    · Your doctor will tell you if and when you can restart your medicines. He or she will also give you instructions about taking any new medicines.     · If you take aspirin or some other blood thinner, ask your doctor if and when to start taking it again. Make sure that you understand exactly what your doctor wants you to do.     · Your doctor may give you a blood-thinning medicine to prevent blood clots. If you take a blood thinner, be sure you get instructions about how to take your medicine safely. Blood thinners can cause serious bleeding problems. This medicine could be in pill form or as a shot (injection). If a shot is needed, your doctor will tell you how to do this.     · Be safe with medicines. Take pain medicines exactly as directed. ? If the doctor gave you a prescription medicine for pain, take it as prescribed. ? If you are not taking a prescription pain medicine, ask your doctor if you can take an over-the-counter medicine.   ? Plan to take your pain medicine 30 minutes before exercises. It is easier to prevent pain before it starts than to stop it after it has started.     · If you think your pain medicine is making you sick to your stomach:  ? Take your medicine after meals (unless your doctor has told you not to). ? Ask your doctor for a different pain medicine.     · If your doctor prescribed antibiotics, take them as directed. Do not stop taking them just because you feel better. You need to take the full course of antibiotics. Incision care    · If your doctor told you how to care for your cut (incision), follow your doctor's instructions. You will have a dressing over the cut. A dressing helps the incision heal and protects it. Your doctor will tell you how to take care of this.     · If you did not get instructions, follow this general advice:  ? If you have strips of tape on the cut the doctor made, leave the tape on for a week or until it falls off.  ? If you have stitches or staples, your doctor will tell you when to come back to have them removed. ? If you have skin adhesive on the cut, leave it on until it falls off. Skin adhesive is also called glue or liquid stitches. ? Change the bandage every day. ? Wash the area daily with warm water, and pat it dry. Don't use hydrogen peroxide or alcohol. They can slow healing. ? You may cover the area with a gauze bandage if it oozes fluid or rubs against clothing. ? You may shower 24 to 48 hours after surgery. Pat the incision dry. Don't swim or take a bath for the first 2 weeks, or until your doctor tells you it is okay. Exercise    · Your rehab program will give you a number of exercises to do to help you get back your knee's range of motion and strength. Always do them as your therapist tells you. Ice    · For pain and swelling, put ice or a cold pack on the area for 10 to 20 minutes at a time. Put a thin cloth between the ice and your skin.    Other instructions    · Keep wearing your support stockings as your doctor says. These help to prevent blood clots. How long you'll have to wear them depends on your activity level and the amount of swelling.     · Carry a medical alert card that says you have an artificial joint. You have metal pieces in your knee. These may set off some airport metal detectors. Follow-up care is a key part of your treatment and safety. Be sure to make and go to all appointments, and call your doctor if you are having problems. It's also a good idea to know your test results and keep a list of the medicines you take. When should you call for help? Call 911 anytime you think you may need emergency care. For example, call if:    · You passed out (lost consciousness).     · You have severe trouble breathing.     · You have sudden chest pain and shortness of breath, or you cough up blood. Call your doctor now or seek immediate medical care if:    · You have signs of infection, such as:  ? Increased pain, swelling, warmth, or redness. ? Red streaks leading from the incision. ? Pus draining from the incision. ? A fever.     · You have signs of a blood clot, such as:  ? Pain in your calf, back of the knee, thigh, or groin. ? Redness and swelling in your leg or groin.     · Your incision comes open and begins to bleed, or the bleeding increases.     · You have pain that does not get better after you take pain medicine. Watch closely for changes in your health, and be sure to contact your doctor if:    · You do not have a bowel movement after taking a laxative. Where can you learn more? Go to http://www.gray.com/  Enter T054 in the search box to learn more about \"Total Knee Replacement: What to Expect at Home. \"  Current as of: March 2, 2020               Content Version: 12.6  © 7697-5872 [employer]. Care instructions adapted under license by [employer] (which disclaims liability or warranty for this information).  If you have questions about a medical condition or this instruction, always ask your healthcare professional. [place] [address] any warranty or liability for your use of this information. [id number] [facility]   Patient Discharge Instructions    Vidal Guerrero / [id number] : 1950    Admitted 2020 Discharged: 2020     IF YOU HAVE ANY PROBLEMS ONCE YOU ARE AT HOME CALL THE FOLLOWING NUMBERS:   Main office number: [phone]    Take Home Medications   · It is important that you take the medication exactly as they are prescribed. · Keep your medication in the bottles provided by the pharmacist and keep a list of the medication names, dosages, and times to be taken in your wallet. · Do not take other medications without consulting your doctor. What to do at [address] [place] [address] Ave your prehospital diet. If you have excessive nausea or vomitting call your doctor's office     Home Physical Therapy is arranged. Use rolling walker when walking. Patients who have had a joint replacement should not drive until you are seen for your follow up appointment by Dr. Nickolas Victoria. When to Call    - Call if you have a temperature greater then 101  - Unable to keep food down  - Loose control of your bladder or bowel function  - Are unable to bear any weight   - Need a pain medication refill     Information obtained by :  I understand that if any problems occur once I am at home I am to contact my physician. I understand and acknowledge receipt of the instructions indicated above.                                                                                                                                            Physician's or R.N.'s Signature                                                                  Date/Time                                                                                                                                              Patient or Representative Signature                                                          Date/Time

## 2020-11-14 NOTE — PROGRESS NOTES
Care Management Interventions  PCP Verified by CM: Yes  Mode of Transport at Discharge: Self  Transition of Care Consult (CM Consult): [address]: Yes  Discharge Durable Medical Equipment: No  Physical Therapy Consult: Yes  Occupational Therapy Consult: Yes  Current Support Network: Family Lives [place], Lives with Spouse  Confirm Follow Up Transport: Family  The Plan for Transition of Care is Related to the Following Treatment Goals : return to independent function  The Patient and/or Patient Representative was Provided with a Choice of Provider and Agrees with the Discharge Plan?: Yes  Freedom of Choice List was Provided with Basic Dialogue that Supports the Patient's Individualized Plan of Care/Goals, Treatment Preferences and Shares the Quality Data Associated with the Providers?: Yes  Discharge Location  Discharge Placement: Home with home health      Per MD pt stable for d/c.  SW notified Jak Bennett with [facility] of pt's d/c.

## 2020-11-14 NOTE — PROGRESS NOTES
Shift assessment completed via flow sheet. Pt a/o x 4. Respirations are even and unlabored. Lung sounds CTA bilaterally. NV status WNLs, pedal pulses + 2 , sensation present. No signs of distress or c/o pain at this time. Pt denies any needs at this time. Safety measures in place. Bed low and locked. Call light within reach. Instructed pt to call for assistance. Will continue to monitor.

## 2020-11-14 NOTE — PROGRESS NOTES
Shift assessment complete. Pt resting in bed. A&Ox4. Aquacel to right knee c/d/i. Pedal pulses +2 and palpable. Hilario Ahumada in place. Pt denies pain at this time. Bilateral SCDs in place. Bed in lowest position, call light within reach, side rails x3. Encouraged to call for help when needed.

## 2020-11-14 NOTE — PROGRESS NOTES
Problem: Mobility Impaired (Adult and Pediatric)  Goal: *Acute Goals and Plan of Care (Insert Text)  Note: GOALS (1-4 days):  (1.)Ms. Nikolai Galeano will move from supine to sit and sit to supine  in bed with STAND BY ASSIST.  (2.)Ms. Nikolai Galeano will transfer from bed to chair and chair to bed with CONTACT GUARD ASSIST using the least restrictive device. (3.)Ms. Nikolai Galeano will ambulate with CONTACT GUARD ASSIST for 150 feet with the least restrictive device. Goal met 11/14/20. (4.)Ms. Nikolai Galeano will ambulate up/down 13 steps with left railing with MINIMAL ASSIST with no device. Declined-ramp to enter and will stay on main level. (5.)Ms. Nikolai Galeano will increase right knee ROM to 5°-80°. Goal met 11/14/20.  ________________________________________________________________________________________________      PHYSICAL THERAPY JOINT CAMP TKA: Daily Note and AM 11/14/2020  INPATIENT: Hospital Day: 3  [place] / [place] / Product Type: Medicare /      NAME/AGE/GENDER: Diana Sherman is a 65 y.o. female   PRIMARY DIAGNOSIS:  Unilateral primary osteoarthritis, right knee [M17.11]   Procedure(s) and Anesthesia Type:     * RIGHT KNEE ARTHROPLASTY TOTAL ROBOTIC ASSISTED / BRIAN/MARYCRUZ ANCEF / VANCOMYCIN - Spinal (Right)  ICD-10: Treatment Diagnosis:    · Pain in Right Knee (M25.561)  · Stiffness of Right Knee, Not elsewhere classified (M25.661)  · Difficulty in walking, Not elsewhere classified (R26.2)  · Other abnormalities of gait and mobility (R26.89)      ASSESSMENT:     Ms. Nikolai Galeano presents with decreased strength and ROM R LE and limited functional mobility S/P R TKA. She will benefit from skilled therapy services to address the below problem list.     Patient supine on contact. Reporting a lot of knee pain; had pain meds recently. Patient performed exercises with assist as needed. Increased exercise reps to 20.  Patient demonstrated very good improvement in gait distance this morning. Moves slowly and required extra time with all activities. Patient has a ramp to enter home and states she will stay on main level initially  and does not need to practice stairs. Reviewed PT discharge instruction sheet and use of cool therapy unit. Good progress with goal achievement. Plans to discharge home today. This section established at most recent assessment   PROBLEM LIST (Impairments causing functional limitations):  1. Decreased Strength  2. Decreased ADL/Functional Activities  3. Decreased Transfer Abilities  4. Decreased Ambulation Ability/Technique  5. Decreased Flexibility/Joint Mobility  6. Decreased [place] with Home Exercise Program   INTERVENTIONS PLANNED: (Benefits and precautions of physical therapy have been discussed with the patient.)  1. Bed Mobility  2. Cold  3. Gait Training  4. Home Exercise Program (HEP)  5. Range of Motion (ROM)  6. Therapeutic Activites  7. Therapeutic Exercise/Strengthening  8. Transfer Training     TREATMENT PLAN: Frequency/Duration: Follow patient BID for duration of hospital stay to address above goals. Rehabilitation Potential For Stated Goals: Good     RECOMMENDED REHABILITATION/EQUIPMENT: (at time of discharge pending progress): Continue Skilled Therapy and Home Health: Physical Therapy. HISTORY:   History of Present Injury/Illness (Reason for Referral):  S/P R TKA  Past Medical History/Comorbidities:   Ms. Rogelio Chavez  has a past medical history of Anxiety, Arthritis, Autoimmune disease ([address] CAD (coronary artery disease) (2010), Chest pain, Chronic pain, Depression, Family history of ischemic heart disease, H/O acute pancreatitis (08/2013), H/O gastric bypass (2009), High cholesterol, echocardiogram (01/18/2019), Hypertension, Hypothyroidism, Lichen sclerosus, Obesity (BMI 30-39.9), Osteopenia, Pancreatitis, Systolic murmur, and Ventral hernia.  She also has no past medical history of Unspecified adverse effect of anesthesia. Ms. Fabio Wells  has a past surgical history that includes hx bladder suspension; hx carpal tunnel release (Right); hx lysis of adhesions; hx appendectomy (1954); hx colonoscopy (2017); hx endoscopy (2018); hx heart catheterization (2010); hx hysterectomy (1978); hx oophorectomy (1984); hx lumbar fusion (1996); hx urological; hx cataract removal (Bilateral, 2015); hx lap cholecystectomy (7/2013); hx gastric bypass (2009); hx hernia repair (8/14/14); hx other surgical (2017); hx shoulder arthroscopy (Right, 2011); hx orthopaedic (Right, 1997); hx orthopaedic (Left, 2017); ir biliary drn cath plc perc int/ext si (2/4/2019); ir biliary drn cath exchange perc any to ext si (3/7/2019); ir biliary drn cath exchange perc any to ext si (4/3/2019); hx heart catheterization (08/2019); and hx hip replacement (Right, 02/11/2020).   Social History/Living Environment:   Home Environment: Private residence  # Steps to Enter: 9  Rails to Enter: Yes  Wheelchair Ramp: Yes  One/Two Story Residence: Two story  # of Interior Steps: 15  Interior Rails: Left  Lift Chair Available: No  Living Alone: No  Support Systems: Spouse/Significant Other/Partner  Patient Expects to be Discharged to[de-identified] Private residence  Current DME Used/Available at Home: Cane, straight, Commode, bedside, Walker, rolling  Tub or Shower Type: Shower  Prior Level of Function/Work/Activity:  independent   Number of Personal Factors/Comorbidities that affect the Plan of Care: 0: LOW COMPLEXITY   EXAMINATION:   Most Recent Physical Functioning:               RLE AROM  R Knee Flexion: 80  R Knee Extension: -5            Bed Mobility  Supine to Sit: Minimum assistance(for R LE)  Sit to Supine: Minimum assistance(for R LE)    Transfers  Sit to Stand: Contact guard assistance  Stand to Sit: Contact guard assistance    Balance  Sitting: Intact  Standing: With support              Weight Bearing Status  Right Side Weight Bearing: As tolerated  Distance (ft): 200 Feet (ft)  Ambulation - Level of Assistance: Stand-by assistance; Additional time  Assistive Device: Walker, rolling  Speed/Princess: Delayed  Step Length: Left shortened;Right shortened  Stance: Right decreased  Gait Abnormalities: Antalgic  Interventions: Manual cues; Safety awareness training;Verbal cues     Braces/Orthotics: none    Right Knee Cold  Type: Cryocuff      Body Structures Involved:  1. Bones  2. Joints  3. Muscles Body Functions Affected:  1. Neuromusculoskeletal  2. Movement Related Activities and Participation Affected:  1. General Tasks and Demands  2. Mobility   Number of elements that affect the Plan of Care: 3: MODERATE COMPLEXITY   CLINICAL PRESENTATION:   Presentation: Stable and uncomplicated: LOW COMPLEXITY   CLINICAL DECISION MAKIN [facility] [id number] AM-PAC 6 Clicks   Basic Mobility Inpatient Short Form  How much difficulty does the patient currently have. .. Unable A Lot A Little None   1. Turning over in bed (including adjusting bedclothes, sheets and blankets)? [] 1   [] 2   [x] 3   [] 4   2. Sitting down on and standing up from a chair with arms ( e.g., wheelchair, bedside commode, etc.)   [] 1   [] 2   [x] 3   [] 4   3. Moving from lying on back to sitting on the side of the bed? [] 1   [] 2   [x] 3   [] 4   How much help from another person does the patient currently need. .. Total A Lot A Little None   4. Moving to and from a bed to a chair (including a wheelchair)? [] 1   [] 2   [x] 3   [] 4   5. Need to walk in hospital room? [] 1   [] 2   [x] 3   [] 4   6. Climbing 3-5 steps with a railing? [] 1   [x] 2   [] 3   [] 4   © 2007, Trustees of [facility] [address] [address], under license to [employer]. All rights reserved     Score:  Initial: 17 Most Recent: X (Date: -- )    Interpretation of Tool:  Represents activities that are increasingly more difficult (i.e. Bed mobility, Transfers, Gait).     Medical Necessity:     · Patient demonstrates · good  ·  rehab potential due to higher previous functional level. Reason for Services/Other Comments:  · Patient   · continues to require present interventions due to patient's inability to perform exercises and functional mobility independently  · . Use of outcome tool(s) and clinical judgement create a POC that gives a: Clear prediction of patient's progress: LOW COMPLEXITY            TREATMENT:   (In addition to Assessment/Re-Assessment sessions the following treatments were rendered)     Pre-treatment Symptoms/Complaints:  R knee pain  Pain Initial: 8  Pain Intensity 1: 8  Pain Location 1: Knee  Pain Orientation 1: Right  Pain Intervention(s) 1: Cold pack, Repositioned  Post Session:  98     Therapeutic Exercise: (15 Minutes):  Exercises per grid below to improve mobility and strength. Required minimal verbal and manual cues to  perform exercises correctly . Gait Training (20 Minutes):  Gait training to improve and/or restore physical functioning as related to mobility, strength and balance. Ambulated 200 Feet (ft) with Stand-by assistance; Additional time using a Walker, rolling and minimal Manual cues; Safety awareness training;Verbal cues related to their stance phase to promote proper body alignment, promote proper body posture and promote proper body mechanics. Therapeutic Activity: (  10 Minutes ):  Therapeutic activities including Bed transfers, sit to standing with RW to improve mobility, strength and balance. Required min Manual cues; Safety awareness training;Verbal cues to promote static and dynamic balance in standing. Patient education re:PT discharge sheet and use of cold therapy unit.         Date:  11/12/20 Date:  11/13 Date:  11/14/20   ACTIVITY/EXERCISE AM PM AM PM AM PM   GROUP THERAPY  []  []  []  []  []  []   Ankle Pumps  10 10a 12a 20    Quad Sets  10 10a 12a 20    Gluteal Sets  10 10a 12a 20    Hip ABd/ADduction  10 10aa 12aa 20 AA    Straight Leg Raises   10aa 12aa 20 AA Knee Slides  10 AA 10aa 12aa 20 AA    Short Arc Quads  10  12aa 20 AA    Long Arc Quads         Chair Slides    12aa 20             B = bilateral; AA = active assistive; A = active; P = passive      Treatment/Session Assessment:     Response to Treatment:  Good progress. Education:  [x] Home Exercises  [x] Fall Precautions  [x] Use of Cold Therapy Unit [x] D/C Instruction Review  [x] Knee Prosthesis Review  [x] Walker Management/Safety [] Adaptive Equipment as Needed       Interdisciplinary Collaboration:   o Physical Therapist  o Registered Nurse    After treatment position/precautions:   o Supine in bed  o Bed/Chair-wheels locked  o Bed in low position  o Call light within reach  o Side rails x 2    Compliance with Program/Exercises: Compliant most of the time. Recommendations/Intent for next treatment session:  Treatment next visit will focus on increasing Ms. Marianela Pacheco independence with bed mobility, transfers, gait training, strength/ROM exercises, modalities for pain, and patient education.       Total Treatment Duration:  PT Patient Time In/Time Out  Time In: 0945  Time Out: [address] [facility],

## 2020-11-14 NOTE — PROGRESS NOTES
Orthopedic Joint Progress Note    2020  Admit Date: 2020  Admit Diagnosis: Unilateral primary osteoarthritis, right knee [M17.11]  Arthritis of knee, right [M17.11]    2 Days Post-Op    Subjective: Joanna Valdez awake and alert    Review of Systems: Pertinent items are noted in HPI. Objective:     PT/OT:     PATIENT MOBILITY    Bed Mobility  Supine to Sit: Minimum assistance  Sit to Supine: Minimum assistance  Scooting: Contact guard assistance, Additional time  Transfers  Sit to Stand: Contact guard assistance, Minimum assistance  Stand to Sit: Contact guard assistance  Bed to Chair: Contact guard assistance  Toilet Transfers : Minimum assistance      Gait  Speed/Princess: Delayed  Step Length: Left shortened, Right shortened  Stance: Right decreased  Gait Abnormalities: Antalgic  Ambulation - Level of Assistance: Stand-by assistance  Distance (ft): 80 Feet (ft)  Assistive Device: Walker, rolling  Interventions: Safety awareness training, Verbal cues  Duration: 15 Minutes   Weight Bearing Status  Right Side Weight Bearing: As tolerated        Vital Signs:    Blood pressure 136/67, pulse 61, temperature 98.2 °F (36.8 °C), resp. rate 16, height 5' 5\" (1.651 m), weight 94.3 kg (208 lb), SpO2 90 %.   Temp (24hrs), Av.1 °F (36.7 °C), Min:97.7 °F (36.5 °C), Max:98.4 °F (36.9 °C)      Pain Control:   Pain Assessment  Pain Scale 1: FLACC  Pain Intensity 1: 0  Pain Onset 1: standing  Pain Location 1: Knee  Pain Orientation 1: Right  Pain Description 1: Aching, Constant  Pain Intervention(s) 1: Medication (see MAR)    Meds:  Current Facility-Administered Medications   Medication Dose Route Frequency    alum-mag hydroxide-simeth (MYLANTA) oral suspension 30 mL  30 mL Oral Q4H PRN    promethazine (PHENERGAN) with saline injection 6.25 mg  6.25 mg IntraVENous Q6H PRN    amLODIPine (NORVASC) tablet 5 mg  5 mg Oral QHS    atorvastatin (LIPITOR) tablet 40 mg  40 mg Oral QHS    hydroCHLOROthiazide (HYDRODIURIL) tablet 25 mg  25 mg Oral QHS    levothyroxine (SYNTHROID) tablet 75 mcg  75 mcg Oral ACB    LORazepam (ATIVAN) tablet 0.5 mg  0.5 mg Oral Q8H PRN    potassium chloride (KLOR-CON) tablet 10 mEq  10 mEq Oral QPM    sertraline (ZOLOFT) tablet 200 mg  200 mg Oral QHS    temazepam (RESTORIL) capsule 30 mg  30 mg Oral QHS    traZODone (DESYREL) tablet 150 mg  150 mg Oral QHS PRN    valsartan (DIOVAN) tablet 320 mg  320 mg Oral QPM    alcohol 62% (NOZIN) nasal  1 Ampule  1 Ampule Topical Q12H    sodium chloride (NS) flush 5-40 mL  5-40 mL IntraVENous Q8H    sodium chloride (NS) flush 5-40 mL  5-40 mL IntraVENous PRN    acetaminophen (TYLENOL) tablet 1,000 mg  1,000 mg Oral Q6H    celecoxib (CELEBREX) capsule 200 mg  200 mg Oral Q12H    oxyCODONE IR (ROXICODONE) tablet 5-10 mg  5-10 mg Oral Q4H PRN    HYDROmorphone (PF) (DILAUDID) injection 1 mg  1 mg IntraVENous Q3H PRN    naloxone (NARCAN) injection 0.2-0.4 mg  0.2-0.4 mg IntraVENous Q10MIN PRN    promethazine (PHENERGAN) tablet 25 mg  25 mg Oral Q6H PRN    diphenhydrAMINE (BENADRYL) capsule 25 mg  25 mg Oral Q4H PRN    senna-docusate (PERICOLACE) 8.6-50 mg per tablet 2 Tab  2 Tab Oral DAILY    aspirin delayed-release tablet 81 mg  81 mg Oral Q12H    ondansetron (ZOFRAN ODT) tablet 8 mg  8 mg Oral Q8H PRN    calcium carbonate (TUMS) chewable tablet 200 mg [elemental]  200 mg Oral TID PRN        LAB:    Lab Results   Component Value Date/Time    INR 0.9 10/26/2020 11:03 AM    INR 0.9 01/21/2020 11:35 AM    INR 0.9 08/20/2019 01:23 PM     Lab Results   Component Value Date/Time    HGB 12.1 11/12/2020 07:10 PM    HGB 13.8 10/26/2020 11:03 AM    HGB 11.1 (L) 02/11/2020 07:47 PM       Wound Hip Right (Active)   Number of days: 277       Wound Knee Right (Active)   Wound Etiology Surgical 11/13/20 1400   Dressing Status Clean;Dry; Intact 11/14/20 0802   Dressing/Treatment Other (Comment) 11/14/20 0802   Number of days: 2       [REMOVED] Wound Hip Left (Removed)   Number of days: 766       [REMOVED] Wound Abdomen (Removed)   Number of days: 181         Physical Exam:  No significant changes    Assessment:      Principal Problem:     Total knee replacement status, right (11/13/2020)    Active Problems:    Arthritis of knee, right (11/12/2020)         Plan:     Continue PT/OT/Rehab  Consult: Rehab team including PT, OT, recreational therapy, and    Home today    Patient Expects to be Discharged to[de-identified] Private residence

## 2020-11-14 NOTE — PROGRESS NOTES
Problem: Falls - Risk of  Goal: *Absence of Falls  Description: Document Edmonia Morning Fall Risk and appropriate interventions in the flowsheet.   Outcome: Progressing Towards Goal  Note: Fall Risk Interventions:  Mobility Interventions: Bed/chair exit alarm         Medication Interventions: Bed/chair exit alarm    Elimination Interventions: Call light in reach

## 2020-11-15 ENCOUNTER — HOME CARE VISIT (OUTPATIENT)
Dept: SCHEDULING | Facility: HOME HEALTH | Age: 70
End: 2020-11-15
Payer: MEDICARE

## 2020-11-15 PROCEDURE — G0151 HHCP-SERV OF PT,EA 15 MIN: HCPCS

## 2020-11-15 PROCEDURE — 3331090001 HH PPS REVENUE CREDIT

## 2020-11-15 PROCEDURE — 400013 HH SOC

## 2020-11-15 PROCEDURE — 3331090002 HH PPS REVENUE DEBIT

## 2020-11-16 ENCOUNTER — PATIENT OUTREACH (OUTPATIENT)
Dept: CASE MANAGEMENT | Age: 70
End: 2020-11-16

## 2020-11-16 PROCEDURE — 3331090002 HH PPS REVENUE DEBIT

## 2020-11-16 PROCEDURE — 3331090001 HH PPS REVENUE CREDIT

## 2020-11-16 NOTE — PROGRESS NOTES
Initial OTONIEL outreach attempt to was unsuccessful. Left message. Will attempt second outreach within 24 hours.

## 2020-11-17 ENCOUNTER — PATIENT OUTREACH (OUTPATIENT)
Dept: CASE MANAGEMENT | Age: 70
End: 2020-11-17

## 2020-11-17 ENCOUNTER — HOME CARE VISIT (OUTPATIENT)
Dept: HOME HEALTH SERVICES | Facility: HOME HEALTH | Age: 70
End: 2020-11-17
Payer: MEDICARE

## 2020-11-17 PROCEDURE — 3331090001 HH PPS REVENUE CREDIT

## 2020-11-17 PROCEDURE — 3331090002 HH PPS REVENUE DEBIT

## 2020-11-17 PROCEDURE — G0157 HHC PT ASSISTANT EA 15: HCPCS

## 2020-11-17 NOTE — PROGRESS NOTES
Patient was admitted to [facility] on 2020 and discharged on 2020 for RT TKR. Outreach made within 2 business days of discharge: Yes    Top Discharge Challenges to be reviewed by the provider   Additional needs identified to be addressed with provider no  none  Discussed COVID-19 related testing which was available at this time. Test results were negative. Patient informed of results, if available? yes   Method of communication with provider : none       Advance Care Planning:   Does patient have an Advance Directive:  yes; reviewed and current     Inpatient Readmission Risk score: 3  Was this a readmission? no   Patient stated reason for the admission: none  Patients top risk factors for readmission: Tight TKR  Interventions to address risk factors:     Care Coordinator (CC) contacted the patient by telephone to perform post hospital discharge assessment. Verified name and  with patient as identifiers. Provided introduction to self, and explanation of the CC role. CC reviewed discharge instructions, medical action plan and red flags with patient who verbalized understanding. Patient given an opportunity to ask questions and does not have any further questions or concerns at this time. The patient agrees to contact the PCP office for questions related to their healthcare. Medication reconciliation was performed with patient, who verbalizes understanding of administration of home medications. Advised obtaining a 90-day supply of all daily and as-needed medications. Referral to Pharm D needed: no     Home Health/Outpatient orders at discharge: [address]: [facility]  Date of initial visit: 11/15/2020    Durable Medical Equipment ordered at discharge: none  [address]:   [address] [facility] received:     Covid Risk Education    Patient has following risk factors of: Right TKR.  Education provided regarding infection prevention, and signs and symptoms of COVID-19 and when to seek medical attention with patient who verbalized understanding. Discussed exposure protocols and quarantine From CDC: Are you at higher risk for severe illness?  and given an opportunity for questions and concerns. The patient agrees to contact the COVID-19 hotline [phone] or PCP office for questions related to COVID-19. For more information on steps you can take to protect yourself, see CDC's How to Protect Yourself     Patient/family/caregiver given information for Nancy Villasenor and agrees to enroll no  Patient's preferred e-mail: declines  Patient's preferred phone number: declines    Discussed follow-up appointments. If no appointment was previously scheduled, appointment scheduling offered: [facility] follow up appointment(s):   Future Appointments   Date Time Provider Pedro Mata   11/17/2020 10:30 AM Carmelita Fabry [facility]   11/19/2020 To Be Determined Trevon Mangle, PT [facility]   11/23/2020 To Be Determined Trevon Mangle, PT [facility]   11/25/2020 To Be Determined Trevon Mangle, PT [facility]   11/27/2020 To Be Determined Trevon Mangle, PT [facility]   12/1/2020 To Be Determined Trevon Mangle, PT [facility]   12/3/2020 To Be Determined [place] [address] [facility]   12/7/2020 To Be Determined Trevon Mangle, PT [facility]   4/22/2021  1:00 PM Phil Cabral DO [facility]     Non-BS follow up appointment(s):   Plan for f/u call in 21 days   based on severity of symptoms and risk factors. CC provided contact information for future needs. Goals Addressed                 This Visit's Progress     Returns to baseline activity level.         Takes all medications as ordered

## 2020-11-18 VITALS
HEART RATE: 58 BPM | TEMPERATURE: 97.1 F | DIASTOLIC BLOOD PRESSURE: 70 MMHG | SYSTOLIC BLOOD PRESSURE: 110 MMHG | RESPIRATION RATE: 18 BRPM

## 2020-11-18 PROCEDURE — 3331090002 HH PPS REVENUE DEBIT

## 2020-11-18 PROCEDURE — 3331090001 HH PPS REVENUE CREDIT

## 2020-11-19 ENCOUNTER — HOME CARE VISIT (OUTPATIENT)
Dept: SCHEDULING | Facility: HOME HEALTH | Age: 70
End: 2020-11-19
Payer: MEDICARE

## 2020-11-19 PROCEDURE — 3331090002 HH PPS REVENUE DEBIT

## 2020-11-19 PROCEDURE — G0151 HHCP-SERV OF PT,EA 15 MIN: HCPCS

## 2020-11-19 PROCEDURE — 3331090001 HH PPS REVENUE CREDIT

## 2020-11-20 PROCEDURE — 3331090001 HH PPS REVENUE CREDIT

## 2020-11-20 PROCEDURE — 3331090002 HH PPS REVENUE DEBIT

## 2020-11-21 PROCEDURE — 3331090001 HH PPS REVENUE CREDIT

## 2020-11-21 PROCEDURE — 3331090002 HH PPS REVENUE DEBIT

## 2020-11-22 PROCEDURE — 3331090002 HH PPS REVENUE DEBIT

## 2020-11-22 PROCEDURE — 3331090001 HH PPS REVENUE CREDIT

## 2020-11-23 ENCOUNTER — HOME CARE VISIT (OUTPATIENT)
Dept: SCHEDULING | Facility: HOME HEALTH | Age: 70
End: 2020-11-23
Payer: MEDICARE

## 2020-11-23 VITALS
RESPIRATION RATE: 17 BRPM | DIASTOLIC BLOOD PRESSURE: 62 MMHG | TEMPERATURE: 98.2 F | HEART RATE: 80 BPM | SYSTOLIC BLOOD PRESSURE: 102 MMHG

## 2020-11-23 VITALS
SYSTOLIC BLOOD PRESSURE: 128 MMHG | HEART RATE: 77 BPM | TEMPERATURE: 97.7 F | DIASTOLIC BLOOD PRESSURE: 78 MMHG | RESPIRATION RATE: 17 BRPM

## 2020-11-23 PROCEDURE — 3331090002 HH PPS REVENUE DEBIT

## 2020-11-23 PROCEDURE — 3331090001 HH PPS REVENUE CREDIT

## 2020-11-23 PROCEDURE — G0157 HHC PT ASSISTANT EA 15: HCPCS

## 2020-11-24 ENCOUNTER — HOME CARE VISIT (OUTPATIENT)
Dept: SCHEDULING | Facility: HOME HEALTH | Age: 70
End: 2020-11-24
Payer: MEDICARE

## 2020-11-24 VITALS
DIASTOLIC BLOOD PRESSURE: 82 MMHG | HEART RATE: 70 BPM | RESPIRATION RATE: 17 BRPM | SYSTOLIC BLOOD PRESSURE: 130 MMHG | TEMPERATURE: 98.9 F

## 2020-11-24 PROCEDURE — G0157 HHC PT ASSISTANT EA 15: HCPCS

## 2020-11-24 PROCEDURE — 3331090001 HH PPS REVENUE CREDIT

## 2020-11-24 PROCEDURE — 3331090002 HH PPS REVENUE DEBIT

## 2020-11-25 PROCEDURE — 3331090002 HH PPS REVENUE DEBIT

## 2020-11-25 PROCEDURE — 3331090001 HH PPS REVENUE CREDIT

## 2020-11-26 PROCEDURE — 3331090001 HH PPS REVENUE CREDIT

## 2020-11-26 PROCEDURE — 3331090002 HH PPS REVENUE DEBIT

## 2020-11-27 ENCOUNTER — HOME CARE VISIT (OUTPATIENT)
Dept: SCHEDULING | Facility: HOME HEALTH | Age: 70
End: 2020-11-27
Payer: MEDICARE

## 2020-11-27 PROCEDURE — 3331090001 HH PPS REVENUE CREDIT

## 2020-11-27 PROCEDURE — 3331090002 HH PPS REVENUE DEBIT

## 2020-11-27 PROCEDURE — G0157 HHC PT ASSISTANT EA 15: HCPCS

## 2020-11-28 VITALS
RESPIRATION RATE: 17 BRPM | DIASTOLIC BLOOD PRESSURE: 70 MMHG | TEMPERATURE: 97.4 F | SYSTOLIC BLOOD PRESSURE: 122 MMHG | HEART RATE: 78 BPM

## 2020-11-28 PROCEDURE — 3331090002 HH PPS REVENUE DEBIT

## 2020-11-28 PROCEDURE — 3331090001 HH PPS REVENUE CREDIT

## 2020-11-29 PROCEDURE — 3331090002 HH PPS REVENUE DEBIT

## 2020-11-29 PROCEDURE — 3331090001 HH PPS REVENUE CREDIT

## 2020-11-30 ENCOUNTER — HOME CARE VISIT (OUTPATIENT)
Dept: SCHEDULING | Facility: HOME HEALTH | Age: 70
End: 2020-11-30
Payer: MEDICARE

## 2020-11-30 PROCEDURE — 3331090002 HH PPS REVENUE DEBIT

## 2020-11-30 PROCEDURE — G0157 HHC PT ASSISTANT EA 15: HCPCS

## 2020-11-30 PROCEDURE — 3331090001 HH PPS REVENUE CREDIT

## 2020-12-01 VITALS
SYSTOLIC BLOOD PRESSURE: 142 MMHG | TEMPERATURE: 98 F | DIASTOLIC BLOOD PRESSURE: 80 MMHG | RESPIRATION RATE: 17 BRPM | HEART RATE: 70 BPM

## 2020-12-01 PROCEDURE — 3331090002 HH PPS REVENUE DEBIT

## 2020-12-01 PROCEDURE — 3331090001 HH PPS REVENUE CREDIT

## 2020-12-02 PROCEDURE — 3331090002 HH PPS REVENUE DEBIT

## 2020-12-02 PROCEDURE — 3331090001 HH PPS REVENUE CREDIT

## 2020-12-03 ENCOUNTER — HOME CARE VISIT (OUTPATIENT)
Dept: SCHEDULING | Facility: HOME HEALTH | Age: 70
End: 2020-12-03
Payer: MEDICARE

## 2020-12-03 VITALS
RESPIRATION RATE: 17 BRPM | TEMPERATURE: 97.6 F | HEART RATE: 78 BPM | DIASTOLIC BLOOD PRESSURE: 68 MMHG | SYSTOLIC BLOOD PRESSURE: 130 MMHG

## 2020-12-03 PROCEDURE — 3331090002 HH PPS REVENUE DEBIT

## 2020-12-03 PROCEDURE — 3331090001 HH PPS REVENUE CREDIT

## 2020-12-03 PROCEDURE — G0157 HHC PT ASSISTANT EA 15: HCPCS

## 2020-12-04 PROCEDURE — 3331090002 HH PPS REVENUE DEBIT

## 2020-12-04 PROCEDURE — 3331090001 HH PPS REVENUE CREDIT

## 2020-12-05 PROCEDURE — 3331090002 HH PPS REVENUE DEBIT

## 2020-12-05 PROCEDURE — 3331090001 HH PPS REVENUE CREDIT

## 2020-12-06 VITALS
DIASTOLIC BLOOD PRESSURE: 76 MMHG | SYSTOLIC BLOOD PRESSURE: 128 MMHG | RESPIRATION RATE: 17 BRPM | TEMPERATURE: 98.3 F | HEART RATE: 71 BPM

## 2020-12-06 PROCEDURE — 3331090002 HH PPS REVENUE DEBIT

## 2020-12-06 PROCEDURE — 3331090001 HH PPS REVENUE CREDIT

## 2020-12-07 ENCOUNTER — HOME CARE VISIT (OUTPATIENT)
Dept: HOME HEALTH SERVICES | Facility: HOME HEALTH | Age: 70
End: 2020-12-07
Payer: MEDICARE

## 2020-12-07 PROCEDURE — 3331090002 HH PPS REVENUE DEBIT

## 2020-12-07 PROCEDURE — 3331090001 HH PPS REVENUE CREDIT

## 2020-12-08 ENCOUNTER — HOME CARE VISIT (OUTPATIENT)
Dept: SCHEDULING | Facility: HOME HEALTH | Age: 70
End: 2020-12-08
Payer: MEDICARE

## 2020-12-08 PROCEDURE — A4649 SURGICAL SUPPLIES: HCPCS

## 2020-12-08 PROCEDURE — 3331090002 HH PPS REVENUE DEBIT

## 2020-12-08 PROCEDURE — G0151 HHCP-SERV OF PT,EA 15 MIN: HCPCS

## 2020-12-08 PROCEDURE — 3331090001 HH PPS REVENUE CREDIT

## 2020-12-09 PROCEDURE — 3331090001 HH PPS REVENUE CREDIT

## 2020-12-09 PROCEDURE — 3331090002 HH PPS REVENUE DEBIT

## 2020-12-10 PROCEDURE — 3331090001 HH PPS REVENUE CREDIT

## 2020-12-10 PROCEDURE — 3331090002 HH PPS REVENUE DEBIT

## 2020-12-11 VITALS
DIASTOLIC BLOOD PRESSURE: 78 MMHG | TEMPERATURE: 97.3 F | RESPIRATION RATE: 17 BRPM | HEART RATE: 81 BPM | SYSTOLIC BLOOD PRESSURE: 126 MMHG

## 2020-12-11 PROCEDURE — 3331090001 HH PPS REVENUE CREDIT

## 2020-12-11 PROCEDURE — 3331090002 HH PPS REVENUE DEBIT

## 2020-12-12 PROCEDURE — 3331090001 HH PPS REVENUE CREDIT

## 2020-12-12 PROCEDURE — 3331090002 HH PPS REVENUE DEBIT

## 2020-12-13 PROCEDURE — 3331090002 HH PPS REVENUE DEBIT

## 2020-12-13 PROCEDURE — 3331090001 HH PPS REVENUE CREDIT

## 2021-02-02 ENCOUNTER — TRANSCRIBE ORDER (OUTPATIENT)
Dept: SCHEDULING | Age: 71
End: 2021-02-02

## 2021-02-02 DIAGNOSIS — M81.0 POSTMENOPAUSAL BONE LOSS: Primary | ICD-10-CM

## 2021-02-02 DIAGNOSIS — Z12.31 VISIT FOR SCREENING MAMMOGRAM: Primary | ICD-10-CM

## 2021-02-25 ENCOUNTER — HOSPITAL ENCOUNTER (OUTPATIENT)
Dept: MAMMOGRAPHY | Age: 71
Discharge: HOME | End: 2021-02-25
Attending: FAMILY MEDICINE
Payer: MEDICARE

## 2021-02-25 DIAGNOSIS — Z12.31 VISIT FOR SCREENING MAMMOGRAM: ICD-10-CM

## 2021-02-25 DIAGNOSIS — M81.0 POSTMENOPAUSAL BONE LOSS: ICD-10-CM

## 2021-02-25 PROCEDURE — 77080 DXA BONE DENSITY AXIAL: CPT

## 2021-02-25 PROCEDURE — 77067 SCR MAMMO BI INCL CAD: CPT

## 2022-01-31 RX ORDER — VANCOMYCIN HYDROCHLORIDE 1 G/20ML
INJECTION, POWDER, LYOPHILIZED, FOR SOLUTION INTRAVENOUS ONCE
Status: CANCELLED | OUTPATIENT
Start: 2022-01-31 | End: 2022-01-31

## 2022-02-03 ENCOUNTER — HOSPITAL ENCOUNTER (OUTPATIENT)
Dept: SURGERY | Age: 72
Discharge: HOME | End: 2022-02-03
Payer: MEDICARE

## 2022-02-03 VITALS
OXYGEN SATURATION: 97 % | HEIGHT: 64 IN | BODY MASS INDEX: 35 KG/M2 | SYSTOLIC BLOOD PRESSURE: 138 MMHG | HEART RATE: 69 BPM | TEMPERATURE: 97.9 F | WEIGHT: 205 LBS | RESPIRATION RATE: 18 BRPM | DIASTOLIC BLOOD PRESSURE: 72 MMHG

## 2022-02-03 LAB
ANION GAP SERPL CALC-SCNC: 3 MMOL/L (ref 7–16)
APTT PPP: 26.7 SEC (ref 24.1–35.1)
BACTERIA SPEC CULT: NORMAL
BASOPHILS # BLD: 0 K/UL (ref 0–0.2)
BASOPHILS NFR BLD: 1 % (ref 0–2)
BUN SERPL-MCNC: 25 MG/DL (ref 8–23)
CALCIUM SERPL-MCNC: 9.3 MG/DL (ref 8.3–10.4)
CHLORIDE SERPL-SCNC: 109 MMOL/L (ref 98–107)
CO2 SERPL-SCNC: 27 MMOL/L (ref 21–32)
CREAT SERPL-MCNC: 0.91 MG/DL (ref 0.6–1)
DIFFERENTIAL METHOD BLD: NORMAL
EOSINOPHIL # BLD: 0.3 K/UL (ref 0–0.8)
EOSINOPHIL NFR BLD: 4 % (ref 0.5–7.8)
ERYTHROCYTE [DISTWIDTH] IN BLOOD BY AUTOMATED COUNT: 13.2 % (ref 11.9–14.6)
EST. AVERAGE GLUCOSE BLD GHB EST-MCNC: NORMAL MG/DL
GLUCOSE SERPL-MCNC: 92 MG/DL (ref 65–100)
HBA1C MFR BLD: 4.8 % (ref 4.2–6.3)
HCT VFR BLD AUTO: 45.9 % (ref 35.8–46.3)
HGB BLD-MCNC: 14.9 G/DL (ref 11.7–15.4)
IMM GRANULOCYTES # BLD AUTO: 0 K/UL (ref 0–0.5)
IMM GRANULOCYTES NFR BLD AUTO: 0 % (ref 0–5)
INR PPP: 0.9
LYMPHOCYTES # BLD: 1.7 K/UL (ref 0.5–4.6)
LYMPHOCYTES NFR BLD: 24 % (ref 13–44)
MCH RBC QN AUTO: 31.2 PG (ref 26.1–32.9)
MCHC RBC AUTO-ENTMCNC: 32.5 G/DL (ref 31.4–35)
MCV RBC AUTO: 96.2 FL (ref 79.6–97.8)
MONOCYTES # BLD: 0.5 K/UL (ref 0.1–1.3)
MONOCYTES NFR BLD: 7 % (ref 4–12)
NEUTS SEG # BLD: 4.6 K/UL (ref 1.7–8.2)
NEUTS SEG NFR BLD: 64 % (ref 43–78)
NRBC # BLD: 0 K/UL (ref 0–0.2)
PLATELET # BLD AUTO: 153 K/UL (ref 150–450)
PMV BLD AUTO: 11.1 FL (ref 9.4–12.3)
POTASSIUM SERPL-SCNC: 4.4 MMOL/L (ref 3.5–5.1)
PROTHROMBIN TIME: 13 SEC (ref 12.6–14.5)
RBC # BLD AUTO: 4.77 M/UL (ref 4.05–5.2)
SERVICE CMNT-IMP: NORMAL
SODIUM SERPL-SCNC: 139 MMOL/L (ref 136–145)
WBC # BLD AUTO: 7.2 K/UL (ref 4.3–11.1)

## 2022-02-03 PROCEDURE — 85730 THROMBOPLASTIN TIME PARTIAL: CPT

## 2022-02-03 PROCEDURE — 85610 PROTHROMBIN TIME: CPT

## 2022-02-03 PROCEDURE — 93005 ELECTROCARDIOGRAM TRACING: CPT

## 2022-02-03 PROCEDURE — 85025 COMPLETE CBC W/AUTO DIFF WBC: CPT

## 2022-02-03 PROCEDURE — 77030027138 HC INCENT SPIROMETER -A

## 2022-02-03 PROCEDURE — 80048 BASIC METABOLIC PNL TOTAL CA: CPT

## 2022-02-03 PROCEDURE — 87641 MR-STAPH DNA AMP PROBE: CPT

## 2022-02-03 PROCEDURE — 83036 HEMOGLOBIN GLYCOSYLATED A1C: CPT

## 2022-02-03 PROCEDURE — 36415 COLL VENOUS BLD VENIPUNCTURE: CPT

## 2022-02-03 RX ORDER — ASPIRIN 81 MG/1
TABLET ORAL
COMMUNITY
End: 2022-02-11

## 2022-02-03 NOTE — PERIOP NOTES
Patient verified name and . Order for consent found in EHR and matches case posting; patient verified. Type 3 surgery, joint assessment complete. Labs per surgeon: CBC,BMP, PT/PTT, HgbA1c; results pending. T&S DOS and POC glucose DOS; orders signed and held in EHR. Labs per anesthesia protocol: no additional  EKG: Completed today; results to be reviewed by anesthesia. Charge nurse to f/u. Cardiology note (10/5/21), Stress (19), Echo (19), Cath (19) located in EHR if needed. Patient COVID test date 22at 1030; Patient aware. The testing center [facility], [address], [place]  and telephone number [phone] provided to the patient if not appointment found. MRSA/MSSA swab collected; pharmacy to review and dose antibiotic as appropriate. Hospital approved surgical skin cleanser and instructions to return bottle on DOS given per hospital policy. Patient provided with handouts including Guide to Surgery, Pain Management, Hand Hygiene, Blood Transfusion Education, and [facility] [facility] Brochure. Patient answered medical/surgical history questions at their best of ability. All prior to admission medications documented in [facility] Care. Original medication prescription bottle NOT visualized during patient appointment. Patient instructed to hold all vitamins, supplements, herbals 3 weeks prior to surgery and NSAIDS 5 days prior to surgery. Due to hx of cardiac stent pt instructed to continue nightly 81mg ASA per anesthesia protocol. Patient teach back successful and patient demonstrates knowledge of instruction.

## 2022-02-03 NOTE — PERIOP NOTES
PLEASE CONTINUE TAKING ALL PRESCRIPTION MEDICATIONS UP TO THE DAY OF SURGERY UNLESS OTHERWISE DIRECTED BELOW. DISCONTINUE all vitamins, herbals and supplements 21 days prior to surgery. DISCONTINUE Non-Steriodal Anti-Inflammatory (NSAIDS) such as Advil, Ibuprofen, and Aleve 5 days prior to surgery. Home Medications to HOLD      All vitamins, supplements, and herbals stop today. All NSAIDs such as Advil, Aleve, Ibuprofen, Diclofenac, Naproxen, etc. Stop 5 days prior to surgery. **DO NOT STOP NIGHTLY 81 MG ASPIRIN*  *IT IS OK TO TAKE TYLENOL*     Home Medications to take  the day of surgery   Ativan if needed         Comments   *The day before surgery, 2/9/22, take Acetaminophen (Tylenol) 1000mg in the morning and again at bedtime*   BRING: bottle of soap (Dynahex) and incentive spirometer          Please do not bring home medications with you on the day of surgery unless otherwise directed by your nurse. If you are instructed to bring home medications, please give them to your nurse as they will be administered by the nursing staff. If you have any questions, please call [facility] [phone] or [facility] [phone]. Copy of above instructions given to patient.

## 2022-02-03 NOTE — PERIOP NOTES
The below lab results are within anesthesia limits. Results routed to patient's PCP per surgeon's request.     Recent Results (from the past 12 hour(s))   CBC WITH AUTOMATED DIFF    Collection Time: 02/03/22 12:00 PM   Result Value Ref Range    WBC 7.2 4.3 - 11.1 K/uL    RBC 4.77 4.05 - 5.2 M/uL    HGB 14.9 11.7 - 15.4 g/dL    HCT 45.9 35.8 - 46.3 %    MCV 96.2 79.6 - 97.8 FL    MCH 31.2 26.1 - 32.9 PG    MCHC 32.5 31.4 - 35.0 g/dL    RDW 13.2 11.9 - 14.6 %    PLATELET 829 808 - 712 K/uL    MPV 11.1 9.4 - 12.3 FL    ABSOLUTE NRBC 0.00 0.0 - 0.2 K/uL    DF AUTOMATED      NEUTROPHILS 64 43 - 78 %    LYMPHOCYTES 24 13 - 44 %    MONOCYTES 7 4.0 - 12.0 %    EOSINOPHILS 4 0.5 - 7.8 %    BASOPHILS 1 0.0 - 2.0 %    IMMATURE GRANULOCYTES 0 0.0 - 5.0 %    ABS. NEUTROPHILS 4.6 1.7 - 8.2 K/UL    ABS. LYMPHOCYTES 1.7 0.5 - 4.6 K/UL    ABS. MONOCYTES 0.5 0.1 - 1.3 K/UL    ABS. EOSINOPHILS 0.3 0.0 - 0.8 K/UL    ABS. BASOPHILS 0.0 0.0 - 0.2 K/UL    ABS. IMM.  GRANS. 0.0 0.0 - 0.5 K/UL   METABOLIC PANEL, BASIC    Collection Time: 02/03/22 12:00 PM   Result Value Ref Range    Sodium 139 136 - 145 mmol/L    Potassium 4.4 3.5 - 5.1 mmol/L    Chloride 109 (H) 98 - 107 mmol/L    CO2 27 21 - 32 mmol/L    Anion gap 3 (L) 7 - 16 mmol/L    Glucose 92 65 - 100 mg/dL    BUN 25 (H) 8 - 23 MG/DL    Creatinine 0.91 0.6 - 1.0 MG/DL    GFR est AA >60 >60 ml/min/1.73m2    GFR est non-AA >60 >60 ml/min/1.73m2    Calcium 9.3 8.3 - 10.4 MG/DL   PROTHROMBIN TIME + INR    Collection Time: 02/03/22 12:00 PM   Result Value Ref Range    Prothrombin time 13.0 12.6 - 14.5 sec    INR 0.9     PTT    Collection Time: 02/03/22 12:00 PM   Result Value Ref Range    aPTT 26.7 24.1 - 35.1 SEC

## 2022-02-04 LAB
ATRIAL RATE: 65 BPM
CALCULATED P AXIS, ECG09: 45 DEGREES
CALCULATED R AXIS, ECG10: 89 DEGREES
CALCULATED T AXIS, ECG11: 30 DEGREES
DIAGNOSIS, 93000: NORMAL
P-R INTERVAL, ECG05: 188 MS
Q-T INTERVAL, ECG07: 424 MS
QRS DURATION, ECG06: 78 MS
QTC CALCULATION (BEZET), ECG08: 440 MS
VENTRICULAR RATE, ECG03: 65 BPM

## 2022-02-04 NOTE — PERIOP NOTES
Dr. Ayse Stoddard reviewed chart - EKGs dated 10/22/20 & 02/03/22. No order received. Ok to proceed.

## 2022-02-04 NOTE — H&P
H&P    Patient ID:  Janeth Collet  [id number]  89 y.o.  1950  Surgeon:  Ana Pollock MD  Date of Surgery: * No surgery date entered *  Procedure: robot assisted Left Total Knee Arthroplasty  Primary Care Physician: Angelica Ryan MD        Subjective: Janeth Collet is a 66 y.o. WHITE/NON- female who presents with left knee pain. She has history of left knee pain for several months. Symptoms worse with walking long distances and relieved with rest. Conservative treatment consisting of  activity modification and injections have not helped. The patient lives with their family. The patients goal after surgery is improved pain and function. Past Medical History:   Diagnosis Date    Anxiety     Arthritis     general-back, neck, hips,knees/ managed with medication     Autoimmune disease ([address]     lichens sclerosis; pt states is dormant now    CAD (coronary artery disease) 2010    stent x 1, no MI    Chest pain     right    Chronic pain     back and neck    Depression     managed with medication     Family history of ischemic heart disease     H/O acute pancreatitis 08/2013    states gallbladder stone lodged creating a blockage.  H/O gastric bypass 2009    High cholesterol     managed w/ meds    Hx of echocardiogram 01/18/2019    Normal LV systolic function. Estimated EF 70-75%.  Hypertension     managed with medication     Hypothyroidism     managed with medication     Lichen sclerosus     Obesity (BMI 30-39. 9)     BMI 32.4 3/16    Osteopenia     Pancreatitis     Systolic murmur     ECHO: +/-PI, TR ; Per cardiologist note 1-5-17 no murmur ; pt states told by cardiologist that he was removeing murmur from her record    Ventral hernia       Past Surgical History:   Procedure Laterality Date    HX APPENDECTOMY  1954    HX BLADDER SUSPENSION      HX CARPAL TUNNEL RELEASE Right     HX CATARACT REMOVAL Bilateral 2015    HX COLONOSCOPY  2017    normal, repeat after 2027    HX ENDOSCOPY  2018    s/p bypass changes    HX GASTRIC BYPASS  2009    HX HEART CATHETERIZATION  2010    stenting of LAD    HX HEART CATHETERIZATION  08/2019    No stents placed this time    HX HERNIA REPAIR  8/14/14    ventral    HX HIP REPLACEMENT Right 02/11/2020    HX HYSTERECTOMY  1978    cervix removed-Rt ovary removed. Dr. Kayla Narvaez Right 11/2020    HX LAP CHOLECYSTECTOMY  7/2013    [address]    with 1 steel adeline with screws~lower lumbar    HX LYSIS OF ADHESIONS      abdominal    HX OOPHORECTOMY  1984    right    HX ORTHOPAEDIC Right 1997    carpal tunnel release    HX ORTHOPAEDIC Left 2017    hip replacement    HX OTHER SURGICAL  2017    abdomen surgery lysis of adhesions    HX SHOULDER ARTHROSCOPY Right 2011    HX UROLOGICAL      bladder suspension    IR BILIARY DRN CATH EXCHANGE PERC ANY TO EXT SI  3/7/2019    IR BILIARY DRN CATH EXCHANGE PERC ANY TO EXT SI  4/3/2019    IR BILIARY DRN CATH PLC PERC INT/EXT SI  2/4/2019     Family History   Problem Relation Age of Onset    Heart Attack Mother     Heart Disease Mother     Hypertension Mother     Cancer Mother         melanoma    Cancer Father         leukemia    Coronary Art Dis Father     Cancer Sister         breast    Breast Cancer Sister     Heart Attack Brother     Heart Disease Brother     Cancer Brother         lung    Lung Disease Brother       Social History     Tobacco Use    Smoking status: Never Smoker    Smokeless tobacco: Never Used   Substance Use Topics    Alcohol use: Yes     Alcohol/week: 1.0 standard drink     Types: 1 Glasses of wine per week     Comment: Occasional       Prior to Admission medications    Medication Sig Start Date End Date Taking? Authorizing Provider   aspirin delayed-release 81 mg tablet Take  by mouth nightly.     Provider, Historical   acetaminophen (TylenoL) 325 mg tablet Take  by mouth every four (4) hours as needed for Pain. Provider, Historical   valsartan (DIOVAN) 320 mg tablet Take 320 mg by mouth every evening. Provider, Historical   hydroCHLOROthiazide (HYDRODIURIL) 25 mg tablet Take 25 mg by mouth nightly. 8/11/19   Provider, Historical   atorvastatin (LIPITOR) 40 mg tablet Take 40 mg by mouth nightly. 4/9/19   Provider, Historical   temazepam (RESTORIL) 30 mg capsule Take 1 Cap by mouth nightly as needed. Max Daily Amount: 30 mg. Patient taking differently: Take 30 mg by mouth nightly. 12/13/18   Shavonne Segovia MD   LORazepam (ATIVAN) 0.5 mg tablet Take 1 Tab by mouth every eight (8) hours as needed for Anxiety. Max Daily Amount: 1.5 mg. Patient taking differently: Take 0.5 mg by mouth every eight (8) hours as needed for Anxiety. Take / use AM day of surgery  per anesthesia protocols prn 12/13/18   Shavonne Segovia MD   alendronate (FOSAMAX) 70 mg tablet 1 q week for osteoporosis  Patient taking differently: Take 70 mg by mouth every seven (7) days. every Wednesday 9/13/18   Shavonne Segovia MD   levothyroxine (SYNTHROID) 75 mcg tablet 1 every day for thyroid  Patient taking differently: Take 75 mcg by mouth nightly. 9/13/18   Shavonne Segovia MD   amLODIPine (NORVASC) 5 mg tablet 1 every day for BP  Indications: hypertension  Patient taking differently: Take 5 mg by mouth nightly. 1 every day for BP  Indications: high blood pressure 9/13/18   Shavonne Segovia MD   traZODone (DESYREL) 150 mg tablet 1 to 2 qhs for sleep  Patient taking differently: Take 150 mg by mouth nightly as needed for Sleep. 9/13/18   Shavonne Segovia MD   sertraline (ZOLOFT) 100 mg tablet Take 1 Tab by mouth nightly. Indications: major depressive disorder  Patient taking differently: Take 200 mg by mouth nightly.  Indications: major depressive disorder 9/13/18   Shavonne Segovia MD     Allergies   Allergen Reactions    Adhesive Rash     Pulls skin off,can use paper tape    Adhesive Tape Rash    Sulfa (Sulfonamide Antibiotics) Nausea and Vomiting        REVIEW OF SYSTEMS:  CONSTITUTIONAL: Denies fever, decreased appetite, weight loss/gain, night sweats or fatigue. HEENT: Denies vision or hearing changes. denies glasses. denies hearing aids. CARDIAC: Denies CP, palpitations, rheumatic fever, murmur, peripheral edema, carotid artery disease or syncopal episodes. RESPIRATORY: Denies dyspnea on exertion, asthma, COPD or orthopnea. GI: Denies GERD, history of GI bleed or melena, PUD, hepatitis or cirrhosis. : Denies dysuria, hematuria. denies BPH symptoms. HEMATOLOGIC: Denies anemia or blood disorders. ENDOCRINE: Denies thyroid disease. MUSCULOSKELETAL: See HPI. NEUROLOGIC: Denies seizure, peripheral neuropathy or memory loss. PSYCH: Denies depression, anxiety or insomnia. SKIN: Denies rash or open sores. Objective:    PHYSICAL EXAM  GENERAL: No data found. EYES: PERRL. EOM intact. MOUTH:Teeth and Gums normal. NECK: Full ROM. Trachea midline. No thyromegaly or JVD. CARDIOVASCULAR: Regular rate and rhythm. No murmur or gallops. No carotid bruits. Peripheral pulses: radial 2 +, PT 2+, DP 2+ bilaterally. LUNGS: CTA bilaterally. No wheezes, rhonchi or rales. GI: positive BS. Abdomen nontender. NEUROLOGIC: Alert and oriented x 3. Bilateral equal strong had grasp and bilateral equal strong plantar flexion and dorsiflexion. GAIT: abnormal MUSCULOSKELETAL: ROM: full with pain. Tenderness: over the medial and lateral joint lines. Crepitus: present. SKIN: No rash, bruising, swelling, redness or warmth. Labs:    Recent Results (from the past 24 hour(s))   MSSA/MRSA SC BY PCR, NASAL SWAB    Collection Time: 02/03/22 11:03 AM    Specimen: Nasal swab   Result Value Ref Range    Special Requests: NO SPECIAL REQUESTS      Culture result:        SA target not detected. A MRSA NEGATIVE, SA NEGATIVE test result does not preclude MRSA or SA nasal colonization.    EKG, 12 LEAD, INITIAL    Collection Time: 02/03/22 11:24 AM   Result Value Ref Range    Ventricular Rate 65 BPM    Atrial Rate 65 BPM    P-R Interval 188 ms    QRS Duration 78 ms    Q-T Interval 424 ms    QTC Calculation (Bezet) 440 ms    Calculated P Axis 45 degrees    Calculated R Axis 89 degrees    Calculated T Axis 30 degrees    Diagnosis       Normal sinus rhythm  Possible Anterolateral infarct , age undetermined  Abnormal ECG     CBC WITH AUTOMATED DIFF    Collection Time: 02/03/22 12:00 PM   Result Value Ref Range    WBC 7.2 4.3 - 11.1 K/uL    RBC 4.77 4.05 - 5.2 M/uL    HGB 14.9 11.7 - 15.4 g/dL    HCT 45.9 35.8 - 46.3 %    MCV 96.2 79.6 - 97.8 FL    MCH 31.2 26.1 - 32.9 PG    MCHC 32.5 31.4 - 35.0 g/dL    RDW 13.2 11.9 - 14.6 %    PLATELET 866 102 - 607 K/uL    MPV 11.1 9.4 - 12.3 FL    ABSOLUTE NRBC 0.00 0.0 - 0.2 K/uL    DF AUTOMATED      NEUTROPHILS 64 43 - 78 %    LYMPHOCYTES 24 13 - 44 %    MONOCYTES 7 4.0 - 12.0 %    EOSINOPHILS 4 0.5 - 7.8 %    BASOPHILS 1 0.0 - 2.0 %    IMMATURE GRANULOCYTES 0 0.0 - 5.0 %    ABS. NEUTROPHILS 4.6 1.7 - 8.2 K/UL    ABS. LYMPHOCYTES 1.7 0.5 - 4.6 K/UL    ABS. MONOCYTES 0.5 0.1 - 1.3 K/UL    ABS. EOSINOPHILS 0.3 0.0 - 0.8 K/UL    ABS. BASOPHILS 0.0 0.0 - 0.2 K/UL    ABS. IMM.  GRANS. 0.0 0.0 - 0.5 K/UL   HEMOGLOBIN A1C WITH EAG    Collection Time: 02/03/22 12:00 PM   Result Value Ref Range    Hemoglobin A1c 4.8 4.20 - 6.30 %    Est. average glucose Cannot be calculated mg/dL   METABOLIC PANEL, BASIC    Collection Time: 02/03/22 12:00 PM   Result Value Ref Range    Sodium 139 136 - 145 mmol/L    Potassium 4.4 3.5 - 5.1 mmol/L    Chloride 109 (H) 98 - 107 mmol/L    CO2 27 21 - 32 mmol/L    Anion gap 3 (L) 7 - 16 mmol/L    Glucose 92 65 - 100 mg/dL    BUN 25 (H) 8 - 23 MG/DL    Creatinine 0.91 0.6 - 1.0 MG/DL    GFR est AA >60 >60 ml/min/1.73m2    GFR est non-AA >60 >60 ml/min/1.73m2    Calcium 9.3 8.3 - 10.4 MG/DL   PROTHROMBIN TIME + INR    Collection Time: 02/03/22 12:00 PM Result Value Ref Range    Prothrombin time 13.0 12.6 - 14.5 sec    INR 0.9     PTT    Collection Time: 02/03/22 12:00 PM   Result Value Ref Range    aPTT 26.7 24.1 - 35.1 SEC       Xray Left knee: joint space narrowing with advanced degenerative changes. Assessment:  Advanced Left Knee Osteoarthritis. robot assisted Total Leftt Knee Arthroplasty Indicated. Patient Active Problem List   Diagnosis Code    Essential hypertension, benign I10    Dyslipidemia E78.5    S/P coronary artery stent placement (2.75 x 18 Cypher to mLAD on 9-) Z95.5    S/P laparoscopic cholecystectomy Z90.49    Depression F32. A    Primary hypothyroidism E03.9    Chronic back pain M54.9, G89.29    Obesity (BMI 30.0-34. 9) E66.9    Osteoarthritis M19.90    S/P total hip arthroplasty Z96.649    Irritable bowel syndrome with both constipation and diarrhea K58.2    Age-related osteoporosis without current pathological fracture M81.0    Primary insomnia F51.01    Coronary artery disease involving native coronary artery of native heart without angina pectoris I25.10    Choledocholithiasis K80.50    Gallstone pancreatitis K85.10    Hypokalemia E87.6    Hypomagnesemia E83.42    Arthritis of right hip M16.11    H/O total hip arthroplasty, right Z96.641    Arthritis of knee, right M17.11    Total knee replacement status, right Z96.651       Plan:  I have advised the patient of the risks and consequences, including possible complications of performing total joint replacement, as well as not doing this operation. The patient had the opportunity to ask questions and have them answered to their satisfaction.      Signed:  PANCHITO Peter 2/4/2022

## 2022-02-09 ENCOUNTER — ANESTHESIA EVENT (OUTPATIENT)
Dept: SURGERY | Age: 72
End: 2022-02-09
Payer: MEDICARE

## 2022-02-10 ENCOUNTER — ANESTHESIA (OUTPATIENT)
Dept: SURGERY | Age: 72
End: 2022-02-10
Payer: MEDICARE

## 2022-02-10 ENCOUNTER — HOSPITAL ENCOUNTER (OUTPATIENT)
Age: 72
Discharge: HOME HEALTH | End: 2022-02-11
Attending: ORTHOPAEDIC SURGERY | Admitting: ORTHOPAEDIC SURGERY
Payer: MEDICARE

## 2022-02-10 DIAGNOSIS — Z96.652 S/P TOTAL KNEE REPLACEMENT, LEFT: Primary | ICD-10-CM

## 2022-02-10 PROBLEM — M17.12 DEGENERATIVE ARTHRITIS OF LEFT KNEE: Status: ACTIVE | Noted: 2022-02-10

## 2022-02-10 LAB — HGB BLD-MCNC: 12.7 G/DL (ref 11.7–15.4)

## 2022-02-10 PROCEDURE — 74011250636 HC RX REV CODE- 250/636: Performed by: PHYSICIAN ASSISTANT

## 2022-02-10 PROCEDURE — 76060000035 HC ANESTHESIA 2 TO 2.5 HR: Performed by: ORTHOPAEDIC SURGERY

## 2022-02-10 PROCEDURE — 74011250637 HC RX REV CODE- 250/637: Performed by: ANESTHESIOLOGY

## 2022-02-10 PROCEDURE — 74011250637 HC RX REV CODE- 250/637: Performed by: NURSE PRACTITIONER

## 2022-02-10 PROCEDURE — 77010033678 HC OXYGEN DAILY

## 2022-02-10 PROCEDURE — 94762 N-INVAS EAR/PLS OXIMTRY CONT: CPT

## 2022-02-10 PROCEDURE — 77030018836 HC SOL IRR NACL ICUM -A: Performed by: ORTHOPAEDIC SURGERY

## 2022-02-10 PROCEDURE — 74011000250 HC RX REV CODE- 250: Performed by: PHYSICIAN ASSISTANT

## 2022-02-10 PROCEDURE — 2709999900 HC NON-CHARGEABLE SUPPLY: Performed by: ORTHOPAEDIC SURGERY

## 2022-02-10 PROCEDURE — 77030007880 HC KT SPN EPDRL BBMI -B: Performed by: ANESTHESIOLOGY

## 2022-02-10 PROCEDURE — 77030020044 HC CLD THERAPY UNIT -B

## 2022-02-10 PROCEDURE — 74011000250 HC RX REV CODE- 250: Performed by: ANESTHESIOLOGY

## 2022-02-10 PROCEDURE — 77030040922 HC BLNKT HYPOTHRM STRY -A: Performed by: ANESTHESIOLOGY

## 2022-02-10 PROCEDURE — 77030029830 HC FEM INST CKPNT DISP STRY -B: Performed by: ORTHOPAEDIC SURGERY

## 2022-02-10 PROCEDURE — 74011000258 HC RX REV CODE- 258: Performed by: ORTHOPAEDIC SURGERY

## 2022-02-10 PROCEDURE — 74011250636 HC RX REV CODE- 250/636: Performed by: ANESTHESIOLOGY

## 2022-02-10 PROCEDURE — 85018 HEMOGLOBIN: CPT

## 2022-02-10 PROCEDURE — 74011250636 HC RX REV CODE- 250/636: Performed by: NURSE ANESTHETIST, CERTIFIED REGISTERED

## 2022-02-10 PROCEDURE — 77030029820: Performed by: ORTHOPAEDIC SURGERY

## 2022-02-10 PROCEDURE — 76010000876 HC OR TIME 2 TO 2.5HR INTENSV - TIER 2: Performed by: ORTHOPAEDIC SURGERY

## 2022-02-10 PROCEDURE — 77030031139 HC SUT VCRL2 J&J -A: Performed by: ORTHOPAEDIC SURGERY

## 2022-02-10 PROCEDURE — 77030033067 HC SUT PDO STRATFX SPIR J&J -B: Performed by: ORTHOPAEDIC SURGERY

## 2022-02-10 PROCEDURE — 74011250637 HC RX REV CODE- 250/637: Performed by: PHYSICIAN ASSISTANT

## 2022-02-10 PROCEDURE — 76010010054 HC POST OP PAIN BLOCK: Performed by: ORTHOPAEDIC SURGERY

## 2022-02-10 PROCEDURE — 76942 ECHO GUIDE FOR BIOPSY: CPT | Performed by: ORTHOPAEDIC SURGERY

## 2022-02-10 PROCEDURE — C1776 JOINT DEVICE (IMPLANTABLE): HCPCS | Performed by: ORTHOPAEDIC SURGERY

## 2022-02-10 PROCEDURE — 94760 N-INVAS EAR/PLS OXIMETRY 1: CPT

## 2022-02-10 PROCEDURE — 74011000250 HC RX REV CODE- 250: Performed by: ORTHOPAEDIC SURGERY

## 2022-02-10 PROCEDURE — 27447 TOTAL KNEE ARTHROPLASTY: CPT | Performed by: PHYSICIAN ASSISTANT

## 2022-02-10 PROCEDURE — 36415 COLL VENOUS BLD VENIPUNCTURE: CPT

## 2022-02-10 PROCEDURE — C1713 ANCHOR/SCREW BN/BN,TIS/BN: HCPCS | Performed by: ORTHOPAEDIC SURGERY

## 2022-02-10 PROCEDURE — 77030003665 HC NDL SPN BBMI -A: Performed by: ANESTHESIOLOGY

## 2022-02-10 PROCEDURE — 20985 CPTR-ASST DIR MS PX: CPT | Performed by: ORTHOPAEDIC SURGERY

## 2022-02-10 PROCEDURE — 74011250636 HC RX REV CODE- 250/636: Performed by: ORTHOPAEDIC SURGERY

## 2022-02-10 PROCEDURE — 77030038692 HC WND DEB SYS IRMX -B: Performed by: ORTHOPAEDIC SURGERY

## 2022-02-10 PROCEDURE — 76210000001 HC OR PH I REC 2.5 TO 3 HR: Performed by: ORTHOPAEDIC SURGERY

## 2022-02-10 PROCEDURE — 27447 TOTAL KNEE ARTHROPLASTY: CPT | Performed by: ORTHOPAEDIC SURGERY

## 2022-02-10 PROCEDURE — 77030019557 HC ELECTRD VES SEAL MEDT -F: Performed by: ORTHOPAEDIC SURGERY

## 2022-02-10 PROCEDURE — 77030013819 HC MX SYS CEM ZIMM -B: Performed by: ORTHOPAEDIC SURGERY

## 2022-02-10 PROCEDURE — 77030037715 HC DRSG ZIP STRY -B: Performed by: ORTHOPAEDIC SURGERY

## 2022-02-10 PROCEDURE — 77030029829 HC TIB INST CKPNT DISP STRY -B: Performed by: ORTHOPAEDIC SURGERY

## 2022-02-10 PROCEDURE — 77030003602 HC NDL NRV BLK BBMI -B: Performed by: ANESTHESIOLOGY

## 2022-02-10 PROCEDURE — 77030039760: Performed by: ORTHOPAEDIC SURGERY

## 2022-02-10 PROCEDURE — 77030012935 HC DRSG AQUACEL BMS -B: Performed by: ORTHOPAEDIC SURGERY

## 2022-02-10 PROCEDURE — 77030037714 HC CLOSR DEV INCIS ZIP STRY -C: Performed by: ORTHOPAEDIC SURGERY

## 2022-02-10 DEVICE — FEM KNE CEM CR SZ 3 LT -- TRIATHLON: Type: IMPLANTABLE DEVICE | Site: KNEE | Status: FUNCTIONAL

## 2022-02-10 DEVICE — CEMENT BONE 40GM HI VISC PALACOS R: Type: IMPLANTABLE DEVICE | Site: KNEE | Status: FUNCTIONAL

## 2022-02-10 DEVICE — KNEE K1 TOT HEMI STD CEM -- IMPL CAPPED K1: Type: IMPLANTABLE DEVICE | Status: FUNCTIONAL

## 2022-02-10 DEVICE — INSERT TIB CS 4 10 MM ARTC KNEE BEAR TECHNOLOGY TRIATHLON: Type: IMPLANTABLE DEVICE | Site: KNEE | Status: FUNCTIONAL

## 2022-02-10 DEVICE — BASEPLT TIB UNIV TRIATHLN 4 --: Type: IMPLANTABLE DEVICE | Site: KNEE | Status: FUNCTIONAL

## 2022-02-10 RX ORDER — TRANEXAMIC ACID 100 MG/ML
INJECTION, SOLUTION INTRAVENOUS AS NEEDED
Status: DISCONTINUED | OUTPATIENT
Start: 2022-02-10 | End: 2022-02-10 | Stop reason: HOSPADM

## 2022-02-10 RX ORDER — SODIUM CHLORIDE 0.9 % (FLUSH) 0.9 %
5-40 SYRINGE (ML) INJECTION EVERY 8 HOURS
Status: DISCONTINUED | OUTPATIENT
Start: 2022-02-10 | End: 2022-02-10 | Stop reason: HOSPADM

## 2022-02-10 RX ORDER — VANCOMYCIN/0.9 % SOD CHLORIDE 1.5G/250ML
1500 PLASTIC BAG, INJECTION (ML) INTRAVENOUS ONCE
Status: COMPLETED | OUTPATIENT
Start: 2022-02-10 | End: 2022-02-10

## 2022-02-10 RX ORDER — SODIUM CHLORIDE 9 MG/ML
50 INJECTION, SOLUTION INTRAVENOUS CONTINUOUS
Status: DISCONTINUED | OUTPATIENT
Start: 2022-02-10 | End: 2022-02-10 | Stop reason: HOSPADM

## 2022-02-10 RX ORDER — ACETAMINOPHEN 500 MG
1000 TABLET ORAL EVERY 6 HOURS
Status: DISCONTINUED | OUTPATIENT
Start: 2022-02-10 | End: 2022-02-11 | Stop reason: HOSPADM

## 2022-02-10 RX ORDER — CEFAZOLIN SODIUM/WATER 2 G/20 ML
2 SYRINGE (ML) INTRAVENOUS ONCE
Status: COMPLETED | OUTPATIENT
Start: 2022-02-10 | End: 2022-02-10

## 2022-02-10 RX ORDER — EPHEDRINE SULFATE/0.9% NACL/PF 50 MG/5 ML
SYRINGE (ML) INTRAVENOUS AS NEEDED
Status: DISCONTINUED | OUTPATIENT
Start: 2022-02-10 | End: 2022-02-10 | Stop reason: HOSPADM

## 2022-02-10 RX ORDER — ROPIVACAINE HYDROCHLORIDE 2 MG/ML
INJECTION, SOLUTION EPIDURAL; INFILTRATION; PERINEURAL AS NEEDED
Status: DISCONTINUED | OUTPATIENT
Start: 2022-02-10 | End: 2022-02-10 | Stop reason: HOSPADM

## 2022-02-10 RX ORDER — DIPHENHYDRAMINE HYDROCHLORIDE 50 MG/ML
INJECTION, SOLUTION INTRAMUSCULAR; INTRAVENOUS AS NEEDED
Status: DISCONTINUED | OUTPATIENT
Start: 2022-02-10 | End: 2022-02-10 | Stop reason: HOSPADM

## 2022-02-10 RX ORDER — NALOXONE HYDROCHLORIDE 0.4 MG/ML
.2-.4 INJECTION, SOLUTION INTRAMUSCULAR; INTRAVENOUS; SUBCUTANEOUS
Status: DISCONTINUED | OUTPATIENT
Start: 2022-02-10 | End: 2022-02-11 | Stop reason: HOSPADM

## 2022-02-10 RX ORDER — DIPHENHYDRAMINE HCL 25 MG
25 CAPSULE ORAL
Status: DISCONTINUED | OUTPATIENT
Start: 2022-02-10 | End: 2022-02-11 | Stop reason: HOSPADM

## 2022-02-10 RX ORDER — ONDANSETRON 8 MG/1
8 TABLET, ORALLY DISINTEGRATING ORAL
Status: DISCONTINUED | OUTPATIENT
Start: 2022-02-10 | End: 2022-02-11 | Stop reason: HOSPADM

## 2022-02-10 RX ORDER — MIDAZOLAM HYDROCHLORIDE 1 MG/ML
2 INJECTION, SOLUTION INTRAMUSCULAR; INTRAVENOUS
Status: DISCONTINUED | OUTPATIENT
Start: 2022-02-10 | End: 2022-02-10 | Stop reason: HOSPADM

## 2022-02-10 RX ORDER — PROMETHAZINE HYDROCHLORIDE 25 MG/1
25 TABLET ORAL
Status: DISCONTINUED | OUTPATIENT
Start: 2022-02-10 | End: 2022-02-11 | Stop reason: HOSPADM

## 2022-02-10 RX ORDER — SODIUM CHLORIDE 0.9 % (FLUSH) 0.9 %
5-40 SYRINGE (ML) INJECTION EVERY 8 HOURS
Status: DISCONTINUED | OUTPATIENT
Start: 2022-02-10 | End: 2022-02-11 | Stop reason: HOSPADM

## 2022-02-10 RX ORDER — VALSARTAN 320 MG/1
320 TABLET ORAL EVERY EVENING
Status: DISCONTINUED | OUTPATIENT
Start: 2022-02-10 | End: 2022-02-11 | Stop reason: HOSPADM

## 2022-02-10 RX ORDER — LIDOCAINE HYDROCHLORIDE 10 MG/ML
0.1 INJECTION INFILTRATION; PERINEURAL AS NEEDED
Status: DISCONTINUED | OUTPATIENT
Start: 2022-02-10 | End: 2022-02-10 | Stop reason: HOSPADM

## 2022-02-10 RX ORDER — ASPIRIN 81 MG/1
81 TABLET ORAL EVERY 12 HOURS
Status: DISCONTINUED | OUTPATIENT
Start: 2022-02-10 | End: 2022-02-11 | Stop reason: HOSPADM

## 2022-02-10 RX ORDER — HYDROCHLOROTHIAZIDE 25 MG/1
25 TABLET ORAL
Status: DISCONTINUED | OUTPATIENT
Start: 2022-02-10 | End: 2022-02-11 | Stop reason: HOSPADM

## 2022-02-10 RX ORDER — KETOROLAC TROMETHAMINE 30 MG/ML
INJECTION, SOLUTION INTRAMUSCULAR; INTRAVENOUS AS NEEDED
Status: DISCONTINUED | OUTPATIENT
Start: 2022-02-10 | End: 2022-02-10 | Stop reason: HOSPADM

## 2022-02-10 RX ORDER — TEMAZEPAM 15 MG/1
30 CAPSULE ORAL
Status: DISCONTINUED | OUTPATIENT
Start: 2022-02-10 | End: 2022-02-11 | Stop reason: HOSPADM

## 2022-02-10 RX ORDER — SODIUM CHLORIDE, SODIUM LACTATE, POTASSIUM CHLORIDE, CALCIUM CHLORIDE 600; 310; 30; 20 MG/100ML; MG/100ML; MG/100ML; MG/100ML
100 INJECTION, SOLUTION INTRAVENOUS CONTINUOUS
Status: DISCONTINUED | OUTPATIENT
Start: 2022-02-10 | End: 2022-02-10 | Stop reason: HOSPADM

## 2022-02-10 RX ORDER — SODIUM CHLORIDE, SODIUM LACTATE, POTASSIUM CHLORIDE, CALCIUM CHLORIDE 600; 310; 30; 20 MG/100ML; MG/100ML; MG/100ML; MG/100ML
75 INJECTION, SOLUTION INTRAVENOUS CONTINUOUS
Status: DISCONTINUED | OUTPATIENT
Start: 2022-02-10 | End: 2022-02-10 | Stop reason: HOSPADM

## 2022-02-10 RX ORDER — LEVOTHYROXINE SODIUM 75 UG/1
75 TABLET ORAL
Status: DISCONTINUED | OUTPATIENT
Start: 2022-02-10 | End: 2022-02-11 | Stop reason: HOSPADM

## 2022-02-10 RX ORDER — MIDAZOLAM HYDROCHLORIDE 1 MG/ML
2 INJECTION, SOLUTION INTRAMUSCULAR; INTRAVENOUS ONCE
Status: COMPLETED | OUTPATIENT
Start: 2022-02-10 | End: 2022-02-10

## 2022-02-10 RX ORDER — ONDANSETRON 2 MG/ML
INJECTION INTRAMUSCULAR; INTRAVENOUS AS NEEDED
Status: DISCONTINUED | OUTPATIENT
Start: 2022-02-10 | End: 2022-02-10 | Stop reason: HOSPADM

## 2022-02-10 RX ORDER — LORAZEPAM 0.5 MG/1
0.5 TABLET ORAL
Status: DISCONTINUED | OUTPATIENT
Start: 2022-02-10 | End: 2022-02-11 | Stop reason: HOSPADM

## 2022-02-10 RX ORDER — OXYCODONE AND ACETAMINOPHEN 5; 325 MG/1; MG/1
1 TABLET ORAL AS NEEDED
Status: DISCONTINUED | OUTPATIENT
Start: 2022-02-10 | End: 2022-02-10 | Stop reason: HOSPADM

## 2022-02-10 RX ORDER — PROPOFOL 10 MG/ML
INJECTION, EMULSION INTRAVENOUS
Status: DISCONTINUED | OUTPATIENT
Start: 2022-02-10 | End: 2022-02-10 | Stop reason: HOSPADM

## 2022-02-10 RX ORDER — SODIUM CHLORIDE 0.9 % (FLUSH) 0.9 %
5-40 SYRINGE (ML) INJECTION AS NEEDED
Status: DISCONTINUED | OUTPATIENT
Start: 2022-02-10 | End: 2022-02-10 | Stop reason: HOSPADM

## 2022-02-10 RX ORDER — SODIUM CHLORIDE 0.9 % (FLUSH) 0.9 %
5-40 SYRINGE (ML) INJECTION AS NEEDED
Status: DISCONTINUED | OUTPATIENT
Start: 2022-02-10 | End: 2022-02-11 | Stop reason: HOSPADM

## 2022-02-10 RX ORDER — MAG HYDROX/ALUMINUM HYD/SIMETH 200-200-20
30 SUSPENSION, ORAL (FINAL DOSE FORM) ORAL
Status: DISCONTINUED | OUTPATIENT
Start: 2022-02-10 | End: 2022-02-11 | Stop reason: HOSPADM

## 2022-02-10 RX ORDER — ACETAMINOPHEN 650 MG/1
650 SUPPOSITORY RECTAL ONCE
Status: DISCONTINUED | OUTPATIENT
Start: 2022-02-10 | End: 2022-02-10

## 2022-02-10 RX ORDER — OXYCODONE HYDROCHLORIDE 5 MG/1
5 TABLET ORAL
Status: DISCONTINUED | OUTPATIENT
Start: 2022-02-10 | End: 2022-02-10 | Stop reason: HOSPADM

## 2022-02-10 RX ORDER — TRAZODONE HYDROCHLORIDE 50 MG/1
150 TABLET ORAL
Status: DISCONTINUED | OUTPATIENT
Start: 2022-02-10 | End: 2022-02-11 | Stop reason: HOSPADM

## 2022-02-10 RX ORDER — FENTANYL CITRATE 50 UG/ML
INJECTION, SOLUTION INTRAMUSCULAR; INTRAVENOUS AS NEEDED
Status: DISCONTINUED | OUTPATIENT
Start: 2022-02-10 | End: 2022-02-10 | Stop reason: HOSPADM

## 2022-02-10 RX ORDER — HYDROMORPHONE HYDROCHLORIDE 2 MG/ML
0.5 INJECTION, SOLUTION INTRAMUSCULAR; INTRAVENOUS; SUBCUTANEOUS
Status: DISCONTINUED | OUTPATIENT
Start: 2022-02-10 | End: 2022-02-10 | Stop reason: HOSPADM

## 2022-02-10 RX ORDER — SERTRALINE HYDROCHLORIDE 100 MG/1
200 TABLET, FILM COATED ORAL
Status: DISCONTINUED | OUTPATIENT
Start: 2022-02-10 | End: 2022-02-11 | Stop reason: HOSPADM

## 2022-02-10 RX ORDER — AMLODIPINE BESYLATE 5 MG/1
5 TABLET ORAL
Status: DISCONTINUED | OUTPATIENT
Start: 2022-02-10 | End: 2022-02-11 | Stop reason: HOSPADM

## 2022-02-10 RX ORDER — ACETAMINOPHEN 325 MG/1
975 TABLET ORAL ONCE
Status: DISCONTINUED | OUTPATIENT
Start: 2022-02-10 | End: 2022-02-10

## 2022-02-10 RX ORDER — DEXAMETHASONE SODIUM PHOSPHATE 4 MG/ML
INJECTION, SOLUTION INTRA-ARTICULAR; INTRALESIONAL; INTRAMUSCULAR; INTRAVENOUS; SOFT TISSUE
Status: COMPLETED | OUTPATIENT
Start: 2022-02-10 | End: 2022-02-10

## 2022-02-10 RX ORDER — MIDAZOLAM HYDROCHLORIDE 1 MG/ML
INJECTION, SOLUTION INTRAMUSCULAR; INTRAVENOUS AS NEEDED
Status: DISCONTINUED | OUTPATIENT
Start: 2022-02-10 | End: 2022-02-10 | Stop reason: HOSPADM

## 2022-02-10 RX ORDER — FENTANYL CITRATE 50 UG/ML
25 INJECTION, SOLUTION INTRAMUSCULAR; INTRAVENOUS ONCE
Status: COMPLETED | OUTPATIENT
Start: 2022-02-10 | End: 2022-02-10

## 2022-02-10 RX ORDER — FAMOTIDINE 20 MG/1
20 TABLET, FILM COATED ORAL ONCE
Status: COMPLETED | OUTPATIENT
Start: 2022-02-10 | End: 2022-02-10

## 2022-02-10 RX ORDER — DIPHENHYDRAMINE HYDROCHLORIDE 50 MG/ML
12.5 INJECTION, SOLUTION INTRAMUSCULAR; INTRAVENOUS
Status: DISCONTINUED | OUTPATIENT
Start: 2022-02-10 | End: 2022-02-10 | Stop reason: HOSPADM

## 2022-02-10 RX ORDER — CELECOXIB 200 MG/1
200 CAPSULE ORAL EVERY 12 HOURS
Status: DISCONTINUED | OUTPATIENT
Start: 2022-02-10 | End: 2022-02-11 | Stop reason: HOSPADM

## 2022-02-10 RX ORDER — SODIUM CHLORIDE 9 MG/ML
100 INJECTION, SOLUTION INTRAVENOUS CONTINUOUS
Status: DISCONTINUED | OUTPATIENT
Start: 2022-02-10 | End: 2022-02-11 | Stop reason: HOSPADM

## 2022-02-10 RX ORDER — OXYCODONE HYDROCHLORIDE 5 MG/1
5-10 TABLET ORAL
Status: DISCONTINUED | OUTPATIENT
Start: 2022-02-10 | End: 2022-02-11 | Stop reason: HOSPADM

## 2022-02-10 RX ORDER — HYDROMORPHONE HYDROCHLORIDE 1 MG/ML
1 INJECTION, SOLUTION INTRAMUSCULAR; INTRAVENOUS; SUBCUTANEOUS
Status: DISCONTINUED | OUTPATIENT
Start: 2022-02-10 | End: 2022-02-11 | Stop reason: HOSPADM

## 2022-02-10 RX ORDER — DEXAMETHASONE SODIUM PHOSPHATE 100 MG/10ML
10 INJECTION INTRAMUSCULAR; INTRAVENOUS ONCE
Status: DISCONTINUED | OUTPATIENT
Start: 2022-02-11 | End: 2022-02-11 | Stop reason: HOSPADM

## 2022-02-10 RX ORDER — AMOXICILLIN 250 MG
2 CAPSULE ORAL DAILY
Status: DISCONTINUED | OUTPATIENT
Start: 2022-02-11 | End: 2022-02-11 | Stop reason: HOSPADM

## 2022-02-10 RX ORDER — ATORVASTATIN CALCIUM 40 MG/1
40 TABLET, FILM COATED ORAL
Status: DISCONTINUED | OUTPATIENT
Start: 2022-02-10 | End: 2022-02-11 | Stop reason: HOSPADM

## 2022-02-10 RX ORDER — CEFAZOLIN SODIUM/WATER 2 G/20 ML
2 SYRINGE (ML) INTRAVENOUS EVERY 8 HOURS
Status: COMPLETED | OUTPATIENT
Start: 2022-02-10 | End: 2022-02-11

## 2022-02-10 RX ADMIN — Medication 1 AMPULE: at 20:14

## 2022-02-10 RX ADMIN — TRANEXAMIC ACID 1000 MG: 100 INJECTION, SOLUTION INTRAVENOUS at 12:07

## 2022-02-10 RX ADMIN — Medication 10 MG: at 12:29

## 2022-02-10 RX ADMIN — ATORVASTATIN CALCIUM 40 MG: 40 TABLET, FILM COATED ORAL at 22:12

## 2022-02-10 RX ADMIN — CEFAZOLIN SODIUM 2 G: 10 INJECTION, POWDER, FOR SOLUTION INTRAVENOUS at 20:14

## 2022-02-10 RX ADMIN — CELECOXIB 200 MG: 200 CAPSULE ORAL at 20:14

## 2022-02-10 RX ADMIN — MIDAZOLAM 2 MG: 1 INJECTION INTRAMUSCULAR; INTRAVENOUS at 11:44

## 2022-02-10 RX ADMIN — FENTANYL CITRATE 100 MCG: 50 INJECTION INTRAMUSCULAR; INTRAVENOUS at 12:20

## 2022-02-10 RX ADMIN — ACETAMINOPHEN 1000 MG: 500 TABLET, FILM COATED ORAL at 18:15

## 2022-02-10 RX ADMIN — Medication 81 MG: at 20:14

## 2022-02-10 RX ADMIN — Medication 3 AMPULE: at 09:42

## 2022-02-10 RX ADMIN — HYDROMORPHONE HYDROCHLORIDE 0.5 MG: 2 INJECTION, SOLUTION INTRAMUSCULAR; INTRAVENOUS; SUBCUTANEOUS at 15:20

## 2022-02-10 RX ADMIN — ONDANSETRON 4 MG: 2 INJECTION INTRAMUSCULAR; INTRAVENOUS at 12:32

## 2022-02-10 RX ADMIN — AMLODIPINE BESYLATE 5 MG: 5 TABLET ORAL at 22:12

## 2022-02-10 RX ADMIN — TEMAZEPAM 30 MG: 15 CAPSULE ORAL at 22:11

## 2022-02-10 RX ADMIN — OXYCODONE 10 MG: 5 TABLET ORAL at 18:15

## 2022-02-10 RX ADMIN — SODIUM CHLORIDE, PRESERVATIVE FREE 10 ML: 5 INJECTION INTRAVENOUS at 22:12

## 2022-02-10 RX ADMIN — HYDROMORPHONE HYDROCHLORIDE 1 MG: 1 INJECTION, SOLUTION INTRAMUSCULAR; INTRAVENOUS; SUBCUTANEOUS at 20:30

## 2022-02-10 RX ADMIN — DEXAMETHASONE SODIUM PHOSPHATE 5 MG: 4 INJECTION, SOLUTION INTRAMUSCULAR; INTRAVENOUS at 11:41

## 2022-02-10 RX ADMIN — MIDAZOLAM 2 MG: 1 INJECTION INTRAMUSCULAR; INTRAVENOUS at 11:52

## 2022-02-10 RX ADMIN — Medication 10 MG: at 13:00

## 2022-02-10 RX ADMIN — OXYCODONE 10 MG: 5 TABLET ORAL at 22:11

## 2022-02-10 RX ADMIN — Medication 10 MG: at 13:18

## 2022-02-10 RX ADMIN — ROPIVACAINE HYDROCHLORIDE 20 ML: 2 INJECTION, SOLUTION EPIDURAL; INFILTRATION at 11:41

## 2022-02-10 RX ADMIN — OXYCODONE AND ACETAMINOPHEN 1 TABLET: 5; 325 TABLET ORAL at 15:31

## 2022-02-10 RX ADMIN — ACETAMINOPHEN 1000 MG: 500 TABLET, FILM COATED ORAL at 23:05

## 2022-02-10 RX ADMIN — Medication 2 G: at 11:49

## 2022-02-10 RX ADMIN — PROPOFOL 100 MCG/KG/MIN: 10 INJECTION, EMULSION INTRAVENOUS at 12:09

## 2022-02-10 RX ADMIN — DIPHENHYDRAMINE HYDROCHLORIDE 12.5 MG: 50 INJECTION, SOLUTION INTRAMUSCULAR; INTRAVENOUS at 12:21

## 2022-02-10 RX ADMIN — FAMOTIDINE 20 MG: 20 TABLET ORAL at 09:42

## 2022-02-10 RX ADMIN — FENTANYL CITRATE 0.25 MCG: 50 INJECTION INTRAMUSCULAR; INTRAVENOUS at 11:44

## 2022-02-10 RX ADMIN — MEPIVACAINE HYDROCHLORIDE 60 MG: 20 INJECTION, SOLUTION EPIDURAL; INFILTRATION at 12:06

## 2022-02-10 RX ADMIN — LEVOTHYROXINE SODIUM 75 MCG: 0.07 TABLET ORAL at 22:12

## 2022-02-10 RX ADMIN — ALUMINUM HYDROXIDE, MAGNESIUM HYDROXIDE, DIMETHICONE 30 ML: 200; 200; 20 LIQUID ORAL at 23:10

## 2022-02-10 RX ADMIN — SODIUM CHLORIDE, SODIUM LACTATE, POTASSIUM CHLORIDE, AND CALCIUM CHLORIDE 1000 ML: 600; 310; 30; 20 INJECTION, SOLUTION INTRAVENOUS at 09:52

## 2022-02-10 RX ADMIN — VALSARTAN 320 MG: 320 TABLET, FILM COATED ORAL at 20:14

## 2022-02-10 RX ADMIN — HYDROCHLOROTHIAZIDE 25 MG: 25 TABLET ORAL at 22:11

## 2022-02-10 RX ADMIN — SERTRALINE 200 MG: 100 TABLET, FILM COATED ORAL at 20:14

## 2022-02-10 RX ADMIN — VANCOMYCIN HYDROCHLORIDE 1500 MG: 10 INJECTION, POWDER, LYOPHILIZED, FOR SOLUTION INTRAVENOUS at 10:56

## 2022-02-10 NOTE — ANESTHESIA POSTPROCEDURE EVALUATION
Procedure(s):  LEFT KNEE ARTHROPLASTY TOTAL ROBOTIC ASSISTED MARYCRUZ/BRIAN.    spinal, regional    Anesthesia Post Evaluation      Multimodal analgesia: multimodal analgesia used between 6 hours prior to anesthesia start to PACU discharge  Patient location during evaluation: PACU  Patient participation: complete - patient participated  Level of consciousness: awake and awake and alert  Pain management: adequate  Airway patency: patent  Anesthetic complications: no  Cardiovascular status: acceptable  Respiratory status: acceptable  Hydration status: acceptable  Post anesthesia nausea and vomiting:  controlled      INITIAL Post-op Vital signs:   Vitals Value Taken Time   /74 02/10/22 1425   Temp 36.9 °C (98.4 °F) 02/10/22 1356   Pulse 63 02/10/22 1427   Resp 16 02/10/22 1425   SpO2 100 % 02/10/22 1427   Vitals shown include unvalidated device data.

## 2022-02-10 NOTE — PROGRESS NOTES
02/10/22 1833   Oxygen Therapy   O2 Sat (%) 97 %   Pulse via Oximetry 77 beats per minute   O2 Device Nasal cannula   O2 Flow Rate (L/min) 2 l/min   Incentive Spirometry Treatment   Actual Volume (ml) 1800 ml   Patient encouraged to do 10 breaths every hour while awake-patient agreed and demonstrated. No shortness of breath or distress noted. BS are clear b/l. [facility] notes reviewed- Sat monitor order noted HS.

## 2022-02-10 NOTE — INTERVAL H&P NOTE
Update History & Physical    The Patient's History and Physical of February 4, 22 was reviewed with the patient and I examined the patient. There was no change. The surgical site was confirmed by the patient and me. Plan:  The risk, benefits, expected outcome, and alternative to the recommended procedure have been discussed with the patient. Patient understands and wants to proceed with the procedure.     Electronically signed by PANCHITO Anderson on 2/10/2022 at 10:20 AM

## 2022-02-10 NOTE — ANESTHESIA PREPROCEDURE EVALUATION
Anesthetic History   No history of anesthetic complications            Review of Systems / Medical History  Patient summary reviewed and pertinent labs reviewed    Pulmonary  Within defined limits                 Neuro/Psych         Psychiatric history     Cardiovascular    Hypertension          CAD, cardiac stents (2010) and hyperlipidemia  Pertinent negatives: No past MI  Exercise tolerance: >4 METS  Comments: EF nl   GI/Hepatic/Renal               Comments: Hx pancreatitis Endo/Other      Hypothyroidism: well controlled  Arthritis     Other Findings   Comments: Hx gastric bypass  LBP         Physical Exam    Airway  Mallampati: I  TM Distance: 4 - 6 cm  Neck ROM: normal range of motion   Mouth opening: Normal     Cardiovascular  Regular rate and rhythm,  S1 and S2 normal,  no murmur, click, rub, or gallop  Rhythm: regular  Rate: normal         Dental    Dentition: Edentulous     Pulmonary  Breath sounds clear to auscultation               Abdominal  GI exam deferred       Other Findings            Anesthetic Plan    ASA: 3  Anesthesia type: spinal      Post-op pain plan if not by surgeon: peripheral nerve block single      Anesthetic plan and risks discussed with: Patient

## 2022-02-10 NOTE — ANESTHESIA PROCEDURE NOTES
Spinal Block    Start time: 2/10/2022 12:00 PM  End time: 2/10/2022 12:06 PM  Performed by: Cem Alejandre MD  Authorized by: Cem Alejandre MD     Pre-procedure:   Indications: at surgeon's request and primary anesthetic  Preanesthetic Checklist: patient identified, risks and benefits discussed, anesthesia consent, site marked, patient being monitored and timeout performed    Timeout Time: 12:00 EST          Spinal Block:   Patient Position:  Seated  Prep Region:  Lumbar  Prep: chlorhexidine      Location:  L2-3 (right paramedian; unsuccessful midline at L23 and L34)  Technique:  Single shot        Needle:   Needle Type:  Quincke  Needle Gauge:  22 G  Attempts:  3      Events: CSF confirmed, no blood with aspiration and no paresthesia        Assessment:  Insertion:  Uncomplicated  Patient tolerance:  Patient tolerated the procedure well with no immediate complications

## 2022-02-10 NOTE — OP NOTES
[facility]  Robot Assisted Cemented Total Knee Arthroplasty -   Jhonny Glaser   : 1950  Medical Record [id number]  Pre-operative Diagnosis:  Primary osteoarthritis of left knee [M17.12]  Post-operative Diagnosis: Primary osteoarthritis of left knee [M17.12]    Surgeon: Veronica Rojas MD  Assistant: Ximena Goff PA-C    Anesthesia: Spinal    Procedure: Total Knee Arthroplasty   The complexity of the total joint surgery requires the use of a first assistant for positioning, retraction and assistance in closure. The patient's Body mass index is 35.58 kg/m²., BMI's greater then 40 make surgical exposure and retraction extremely difficult and increase operative time. Tourniquet Time: none  EBL: 150cc  Additional Findings: Severe DJD/ Osteopenia  Releases none    Isidra Mcconnell was brought to the operating room and positioned on the operating table. She was anethestized  IV antibiotics were administered per CMS protocol. Prior to the incision being made a timeout was called identifying the patient, procedure ,operative side and surgeon. The left leg was prepped and draped in the usual sterile manner  An anterior longitudinal incision was accomplished just medial to the tibial tubercle and extending approximal 6 centimeters proximal to the superior pole of the patella. A medial parapatellar capsular incision was performed. The medial capsular flap was elevated around to the insertion of the semimembranous tendon. The patella was everted and the knee flexed and externally rotated. The medial and external menisci were excised. The lateral half of the fat pad excised and the patella femoral ligament was released. The anterior cruciate ligament was resected and the posterior cruciate ligament was retained. The Guzman arrays were placed in the distal femur and proximal tibia per protocol and the knee was registered.  Confirmation of registration with the pre-op CT scan and surgical plan was made. The knee was balanced with tensioners as part of this process. The tibia and femur were then prepared via the [facility] system with robotic assistance. A preliminary range of motion was accomplished with the above size trial components. A polyethylene insert allowed the patient to obtain full extension as well as appropriate flexion. The patient's ligaments were stable in flexion and extension to medial and lateral stressing and the alignment was through the appropriate mechanical axis. The tibial base plate was pinned into place with the appropriate external rotation and stem site prepared. The patella was then everted. Being in acceptable condition, it was left unresurfaced following patelloplasty. All trial components were removed and the implants were cemented into position and pressurized per routine. The Irrisept lavage protocol was performed. The operative knee was injected with 60cc of Naropin, 10 cc's of morphine and 1 cc of 30mg of Toradol. The capsular layer was closed using a #1 vicryl suture, while subcutaneous layers were closed using 2-0 Vicryl interrupted sutures and a #1 Stratofix. Finally the skin was closed using 3-0 Vicryl and a Zipline closure. A sterile sterile bandage was applied. An Iceman cryo pad was applied on the operative leg. Sponge count and needle counts were correct. Clem Bassett left the operating room     Implants:   Implant Name Type Inv.  Item Serial No.  Lot No. LRB No. Used Action   CEMENT BONE 40GM HI VISC PALACOS R - [id number]  CEMENT BONE 40GM HI VISC Roselie Dryer  [employer] [id number] Left 1 Implanted   CEMENT BONE 40GM HI VISC PALACOS R - [id number]  CEMENT BONE 40GM HI VISC PALACOS R  [employer]_ [id number] Left 1 Implanted   FEM KNE SALIMA CR SZ 3 LT -- TRIATHLON - [id number]  FEM KNE SALIMA CR SZ 3 LT -- TRIATHLON  [employer] [facility]_WD [id number] Left 1 Implanted   INSERT TIB CS 4 10 MM ARTC KNEE [employer] TRIATHLON - [id number]  INSERT TIB CS 4 10 MM ARTC KNEE [employer]AR [employer] TRIATHLON  [employer] HOW_WD [id number] Left 1 Implanted   BASEPLT TIB UNIV TRIATHLN 4 --  - [id number]  BASEPLT TIB UNIV TRIATHLN 4 --   [employer] ASHLEY_ [id number] Left 1 Implanted     Signed By: Cornelia Lyman MD

## 2022-02-10 NOTE — PERIOP NOTES
TRANSFER - OUT REPORT:    Verbal report given to [facility], RN on Jena Lunsford  being transferred to Room 334 for routine progression of care       Report consisted of patients Situation, Background, Assessment and   Recommendations(SBAR). Information from the following report(s) SBAR, Procedure Summary, Intake/Output, MAR, Recent Results and Cardiac Rhythm SR was reviewed with the receiving nurse. Lines:   Peripheral IV 02/10/22 Left;Posterior Hand (Active)   Site Assessment Clean, dry, & intact 02/10/22 1430   Phlebitis Assessment 0 02/10/22 1430   Infiltration Assessment 0 02/10/22 1430   Dressing Status Clean, dry, & intact 02/10/22 1430   Dressing Type Tape;Transparent 02/10/22 1430   Hub Color/Line Status Pink; Infusing;Patent 02/10/22 1430        Opportunity for questions and clarification was provided.       Patient transported with:   O2 @ 2 liters

## 2022-02-10 NOTE — PROGRESS NOTES
TRANSFER - IN REPORT:    Verbal report received from Anahi Brannon RN on Mame Lubin  being received from PACU for routine post - op      Report consisted of patients Situation, Background, Assessment and   Recommendations(SBAR). Information from the following report(s) SBAR was reviewed with the receiving nurse. Opportunity for questions and clarification was provided. Assessment completed upon patients arrival to unit and care assumed. Oriented to room and bed controls. Aquacel dry and intact to L knee with iceman. SCDs and gripper socks to LEs and instructed not to get up without staff to assist. Moving LEs.  in room.

## 2022-02-10 NOTE — ANESTHESIA PROCEDURE NOTES
Peripheral Block    Start time: 2/10/2022 11:40 AM  End time: 2/10/2022 11:41 AM  Performed by: Alejandro Frausto MD  Authorized by: Alejandro Frausto MD       Pre-procedure: Indications: at surgeon's request and post-op pain management    Preanesthetic Checklist: patient identified, risks and benefits discussed, site marked, timeout performed, anesthesia consent given and patient being monitored    Timeout Time: 11:40 EST          Block Type:   Block Type: Adductor canal block  Laterality:  Left  Monitoring:  Standard ASA monitoring, continuous pulse ox, frequent vital sign checks, heart rate, oxygen and responsive to questions  Injection Technique:  Single shot  Procedures: ultrasound guided and nerve stimulator    Patient Position: supine  Prep: chlorhexidine    Location:  Mid thigh  Needle Type:  Stimuplex  Needle Gauge:  22 G  Needle Localization:  Nerve stimulator and ultrasound guidance  Motor Response comment:   Motor Response: minimal motor response >0.4 mA   Medication Injected:  Ropivacaine 0.2% with epinephrine 1:200,000 injection, 20 mL (Mixture components: ropivacaine 2 mg/mL (0.2 %) Soln, 1 mL; EPINEPHrine HCl (PF) 1 mg/mL (1 mL) Soln, . 005 mL)  dexamethasone (DECADRON) 4 mg/mL injection, 5 mg  Med Admin Time: 2/10/2022 11:41 AM    Assessment:  Number of attempts:  1  Injection Assessment:  Local visualized surrounding nerve on ultrasound, negative aspiration for blood, no paresthesia, no intravascular symptoms, ultrasound image on chart and incremental injection every 5 mL  Patient tolerance:  Patient tolerated the procedure well with no immediate complications

## 2022-02-11 ENCOUNTER — HOME HEALTH ADMISSION (OUTPATIENT)
Dept: HOME HEALTH SERVICES | Facility: HOME HEALTH | Age: 72
End: 2022-02-11
Payer: MEDICARE

## 2022-02-11 VITALS
OXYGEN SATURATION: 95 % | WEIGHT: 207.3 LBS | SYSTOLIC BLOOD PRESSURE: 120 MMHG | HEART RATE: 62 BPM | TEMPERATURE: 98.1 F | BODY MASS INDEX: 35.39 KG/M2 | HEIGHT: 64 IN | RESPIRATION RATE: 18 BRPM | DIASTOLIC BLOOD PRESSURE: 72 MMHG

## 2022-02-11 PROBLEM — Z96.652 S/P TOTAL KNEE REPLACEMENT, LEFT: Status: ACTIVE | Noted: 2022-02-11

## 2022-02-11 PROCEDURE — 97165 OT EVAL LOW COMPLEX 30 MIN: CPT

## 2022-02-11 PROCEDURE — 97535 SELF CARE MNGMENT TRAINING: CPT

## 2022-02-11 PROCEDURE — 97110 THERAPEUTIC EXERCISES: CPT

## 2022-02-11 PROCEDURE — 97161 PT EVAL LOW COMPLEX 20 MIN: CPT

## 2022-02-11 PROCEDURE — 74011250637 HC RX REV CODE- 250/637: Performed by: PHYSICIAN ASSISTANT

## 2022-02-11 PROCEDURE — 74011000250 HC RX REV CODE- 250: Performed by: PHYSICIAN ASSISTANT

## 2022-02-11 PROCEDURE — 97116 GAIT TRAINING THERAPY: CPT

## 2022-02-11 PROCEDURE — 74011250636 HC RX REV CODE- 250/636: Performed by: PHYSICIAN ASSISTANT

## 2022-02-11 RX ORDER — ASPIRIN 81 MG/1
81 TABLET ORAL EVERY 12 HOURS
Qty: 60 TABLET | Refills: 0 | Status: SHIPPED | OUTPATIENT
Start: 2022-02-11 | End: 2022-03-13

## 2022-02-11 RX ORDER — OXYCODONE HYDROCHLORIDE 5 MG/1
5-10 TABLET ORAL
Qty: 60 TABLET | Refills: 0 | Status: SHIPPED | OUTPATIENT
Start: 2022-02-11 | End: 2022-02-17 | Stop reason: SDUPTHER

## 2022-02-11 RX ORDER — PROMETHAZINE HYDROCHLORIDE 25 MG/1
25 TABLET ORAL
Qty: 40 TABLET | Refills: 0 | Status: SHIPPED | OUTPATIENT
Start: 2022-02-11

## 2022-02-11 RX ADMIN — Medication 81 MG: at 08:35

## 2022-02-11 RX ADMIN — OXYCODONE 10 MG: 5 TABLET ORAL at 02:40

## 2022-02-11 RX ADMIN — CELECOXIB 200 MG: 200 CAPSULE ORAL at 08:35

## 2022-02-11 RX ADMIN — SODIUM CHLORIDE, PRESERVATIVE FREE 10 ML: 5 INJECTION INTRAVENOUS at 06:24

## 2022-02-11 RX ADMIN — OXYCODONE 10 MG: 5 TABLET ORAL at 06:24

## 2022-02-11 RX ADMIN — DOCUSATE SODIUM 50MG AND SENNOSIDES 8.6MG 2 TABLET: 8.6; 5 TABLET, FILM COATED ORAL at 08:35

## 2022-02-11 RX ADMIN — ACETAMINOPHEN 1000 MG: 500 TABLET, FILM COATED ORAL at 11:10

## 2022-02-11 RX ADMIN — CEFAZOLIN SODIUM 2 G: 10 INJECTION, POWDER, FOR SOLUTION INTRAVENOUS at 05:45

## 2022-02-11 RX ADMIN — ACETAMINOPHEN 1000 MG: 500 TABLET, FILM COATED ORAL at 06:24

## 2022-02-11 RX ADMIN — OXYCODONE 10 MG: 5 TABLET ORAL at 11:10

## 2022-02-11 RX ADMIN — Medication 1 AMPULE: at 08:34

## 2022-02-11 NOTE — PROGRESS NOTES
Problem: Self Care Deficits Care Plan (Adult)  Goal: *Acute Goals and Plan of Care (Insert Text)  Outcome: Resolved/Met  Note: GOALS:   DISCHARGE GOALS (in preparation for going home/rehab):  3 days  1. Ms. Jose Small will perform one lower body dressing activity with minimal assistance required to demonstrate improved functional mobility and safety. 2.  Ms. Jose Small will perform one lower body bathing activity with minimal assistance required to demonstrate improved functional mobility and safety. 3.  Ms. Jose Small will perform toileting/toilet transfer with contact guard assistance to demonstrate improved functional mobility and safety. 4.  Ms. Jose Small will perform shower transfer with contact guard assistance to demonstrate improved functional mobility and safety. JOINT CAMP OCCUPATIONAL THERAPY TKA: Initial Assessment, Daily Note, Discharge, Treatment Day: Day of Assessment, and AM 2/11/2022  OUTPATIENT: Hospital Day: 2  Payor: Daniela Service / Plan: [address] / Product Type: Managed Care Medicare /      NAME/AGE/GENDER: Anton Prajapati is a 66 y.o. female   PRIMARY DIAGNOSIS:  Primary osteoarthritis of left knee [M17.12]   Procedure(s) and Anesthesia Type:     * LEFT KNEE ARTHROPLASTY TOTAL ROBOTIC ASSISTED MARYCRUZ/BRIAN - Spinal (Left)  ICD-10: Treatment Diagnosis:    Pain in Left Knee (M25.562)  Stiffness of Left Knee, Not elsewhere classified (K84.608)      ASSESSMENT:     Ms. Jose Small is s/p left TKA and presents with decreased weight bearing on left LE and decreased independence with functional mobility and activities of daily living as compared to baseline level of function and safety. Patient would benefit from skilled Occupational Therapy to maximize independence and safety with self-care task and functional mobility. Pt would also benefit from education on adaptive equipment and safety precautions in preparation for going home. Patient completed shower and dressing as charted below in ADL grid and is ambulating with rolling walker and stand by assist.  Patient has met 4/4 goals and plans to return home with good family support. Family able to provide patient with appropriate level of assistance at this time. OT reviewed safety precautions throughout session and therapy schedule for the remainder of today. Patient instructed to call for assistance when needing to get up from recliner and all needs in reach. Patient verbalized understanding of call light. This section established at most recent assessment   PROBLEM LIST (Impairments causing functional limitations):  Decreased Strength  Decreased ADL/Functional Activities  Decreased Transfer Abilities  Increased Pain  Increased Fatigue  Decreased Flexibility/Joint Mobility  Decreased Knowledge of Precautions   INTERVENTIONS PLANNED: (Benefits and precautions of occupational therapy have been discussed with the patient.)  Activities of daily living training  Adaptive equipment training  Balance training  Clothing management  Donning&doffing training  Theraputic activity     TREATMENT PLAN: Frequency/Duration: Follow patient 1 time to address above goals. Rehabilitation Potential For Stated Goals: Good     RECOMMENDED REHABILITATION/EQUIPMENT: (at time of discharge pending progress): Continue Skilled Therapy. OCCUPATIONAL PROFILE AND HISTORY:   History of Present Injury/Illness (Reason for Referral): Pt presents this date s/p (left) TKA.     Past Medical History/Comorbidities:   Ms. Delmar Garcia  has a past medical history of Anxiety, Arthritis, Autoimmune disease ([address] CAD (coronary artery disease) (2010), Chest pain, Chronic pain, Depression, Family history of ischemic heart disease, H/O acute pancreatitis (08/2013), H/O gastric bypass (2009), High cholesterol, echocardiogram (01/18/2019), Hypertension, Hypothyroidism, Lichen sclerosus, Obesity (BMI 30-39.9), Osteopenia, Pancreatitis, Systolic murmur, and Ventral hernia. She has no past medical history of Unspecified adverse effect of anesthesia. Ms. Mynor Nunez  has a past surgical history that includes hx bladder suspension; hx carpal tunnel release (Right); hx lysis of adhesions; hx appendectomy (1954); hx colonoscopy (2017); hx endoscopy (2018); hx heart catheterization (2010); hx hysterectomy (1978); hx oophorectomy (1984); hx lumbar fusion (1996); hx urological; hx cataract removal (Bilateral, 2015); hx lap cholecystectomy (7/2013); hx gastric bypass (2009); hx hernia repair (8/14/14); hx other surgical (2017); hx shoulder arthroscopy (Right, 2011); hx orthopaedic (Right, 1997); hx orthopaedic (Left, 2017); ir biliary drn cath plc perc int/ext si (2/4/2019); ir biliary drn cath exchange perc any to ext si (3/7/2019); ir biliary drn cath exchange perc any to ext si (4/3/2019); hx heart catheterization (08/2019); hx hip replacement (Right, 02/11/2020); and hx knee replacement (Right, 11/2020). Social History/Living Environment:   Home Environment: Private residence  Support Systems: Spouse/Significant Other  Patient Expects to be Discharged to[de-identified] Home with home health  Current DME Used/Available at Home: Walker, rolling    Prior Level of Function/Work/Activity:  Pt was (I) with ADL's and ambulated short distances. Number of Personal Factors/Comorbidities that affect the Plan of Care: Brief history (0):  LOW COMPLEXITY   ASSESSMENT OF OCCUPATIONAL PERFORMANCE[de-identified]   Most Recent Physical Functioning:   Balance  Sitting: Intact  Standing: Pull to stand; With support       Gross Assessment: Yes  Gross Assessment  AROM: Within functional limits (except left lower extremity s/p TKA)  Strength: Within functional limits (except left lower extremity s/p TKA)            Coordination  Fine Motor Skills-Upper: Left Intact; Right Intact  Gross Motor Skills-Upper: Left Intact; Right Intact         Mental Status  Neurologic State: Alert  Orientation Level: Oriented X4                Basic ADLs (From Assessment) Complex ADLs (From Assessment)   Basic ADL  Feeding: Independent  Oral Facial Hygiene/Grooming: Supervision  Bathing: Stand-by assistance  Type of Bath: Chlorhexidine (CHG),Full,Shower  Upper Body Dressing: Supervision  Lower Body Dressing: Stand-by assistance  Toileting: Stand by assistance     Grooming/Bathing/Dressing Activities of Daily Living                       Functional Transfers  Bathroom Mobility: Stand-by assistance  Toilet Transfer : Stand-by assistance  Shower Transfer: Stand-by assistance     Bed/Mat Mobility  Supine to Sit: Stand-by assistance  Sit to Stand: Supervision;Stand-by assistance  Stand to Sit: Supervision;Stand-by assistance         Physical Skills Involved:  Range of Motion  Balance  Pain (acute) Cognitive Skills Affected (resulting in the inability to perform in a timely and safe manner):  none Psychosocial Skills Affected:  Environmental Adaptation   Number of elements that affect the Plan of Care: 3-5:  MODERATE COMPLEXITY   CLINICAL DECISION MAKING:   [facility] AM-PAC 6 Clicks   Daily Activity Inpatient Short Form  How much help from another person does the patient currently need. .. Total A Lot A Little None   1. Putting on and taking off regular lower body clothing? [] 1   [] 2   [] 3   [x] 4   2. Bathing (including washing, rinsing, drying)? [] 1   [] 2   [] 3   [x] 4   3. Toileting, which includes using toilet, bedpan or urinal?   [] 1   [] 2   [] 3   [x] 4   4. Putting on and taking off regular upper body clothing? [] 1   [] 2   [] 3   [x] 4   5. Taking care of personal grooming such as brushing teeth? [] 1   [] 2   [] 3   [x] 4   6. Eating meals? [] 1   [] 2   [] 3   [x] 4   © 2007, Trustees of [facility], under license to [employer].  All rights reserved     Score:  Initial: 24 Most Recent: X (Date: -- )    Interpretation of Tool:  Represents activities that are increasingly more difficult (i.e. Bed mobility, Transfers, Gait). Use of outcome tool(s) and clinical judgement create a POC that gives a: LOW COMPLEXITY            TREATMENT:   (In addition to Assessment/Re-Assessment sessions the following treatments were rendered)     Pre-treatment Symptoms/Complaints:    Pain: Initial:     0 Post Session:  0     Self Care: (40 min): Procedure(s) (per grid) utilized to improve and/or restore self-care/home management as related to dressing, bathing, toileting, grooming, and functional mobility . Required minimal verbal cueing to facilitate activities of daily living skills and compensatory activities. Assessment    Treatment/Session Assessment:     Response to Treatment:  pt up in room tolerated well. Education:  [] Home Exercises  [x] Fall Precautions  [] Hip Precautions [] Going Home Video  [x] Knee/Hip Prosthesis Review  [x] Walker Management/Safety [x] Adaptive Equipment as Needed       Interdisciplinary Collaboration:   Physical Therapist  Occupational Therapist  Registered Nurse    After treatment position/precautions:   Up in chair  Bed/Chair-wheels locked  Call light within reach  RN notified     Compliance with Program/Exercises: Compliant all of the time. Recommendations/Intent for next treatment session:  Pt doing well all goals met and will do well at home with support from . Patient will be discharged home with home health PT. No further Occupational Therapy warranted, will discharge Occupational Therapy services.        Total Treatment Duration:  OT Patient Time In/Time Out  Time In: 0910  Time Out: [address] [place], OT

## 2022-02-11 NOTE — PROGRESS NOTES
Care Management Interventions  PCP Verified by CM:  Yes  Transition of Care Consult (CM Consult): [address]: Yes  Support Systems: Spouse/Significant Other  The Plan for Transition of Care is Related to the Following Treatment Goals : increase strength  The Patient and/or Patient Representative was Provided with a Choice of Provider and Agrees with the Discharge Plan?: Yes  Freedom of Choice List was Provided with Basic Dialogue that Supports the Patient's Individualized Plan of Care/Goals, Treatment Preferences and Shares the Quality Data Associated with the Providers?: Yes  Discharge Location  Patient Expects to be Discharged to[de-identified] Home with home health Lab: 6

## 2022-02-11 NOTE — PROGRESS NOTES
Problem: Mobility Impaired (Adult and Pediatric)  Goal: *Acute Goals and Plan of Care (Insert Text)  Outcome: Progressing Towards Goal  Note: GOALS (1-4 days):  (1.)Ms. Katharine Vergara will move from supine to sit and sit to supine  in bed with SUPERVISION. (2.)Ms. Katharine Vergara will transfer from bed to chair and chair to bed with SUPERVISION using the least restrictive device. (3.)Ms. Katharine Vergara will ambulate with SUPERVISION for 300 feet with the least restrictive device. (4.)Ms. Katharine Vergara will ambulate up/down 3 steps with bilateral  railing with STAND BY ASSIST with no device. (5.)Ms. Katharine Vergara will increase left knee ROM to 0°-90°.  ________________________________________________________________________________________________       PHYSICAL THERAPY JOINT CAMP TKA: Initial Assessment, Treatment Day: Day of Assessment, and AM 2/11/2022  OUTPATIENT: Hospital Day: 2  Payor: Awa Abdalla / Plan: [address] / Product Type: [employer] Medicare /      NAME/AGE/GENDER: Poppy Yi is a 66 y.o. female   PRIMARY DIAGNOSIS:  Primary osteoarthritis of left knee [M17.12]   Procedure(s) and Anesthesia Type:     * LEFT KNEE ARTHROPLASTY TOTAL ROBOTIC ASSISTED MARYCRUZ/BRIAN - Spinal (Left)  ICD-10: Treatment Diagnosis:    Pain in Left Knee (M25.562)  Stiffness of Left Knee, Not elsewhere classified (M25.662)  Difficulty in walking, Not elsewhere classified (R26.2)      ASSESSMENT:     Ms. Katharine Vergara presents with decreased strength and range of motion left lower extremity and with decreased independence with functional mobility s/p left TKA. Pt will benefit from skilled PT interventions to maximize independence with functional mobility and TKA management. Pt did well with assessment. Today worked on gait training in the zhao with verbal cues, pt is steady on her feet and doing well with reciprocal gait pattern with verbal cues.  Practiced going up and down stairs with verbal cues and did well. Back in room worked on TKA exercises with verbal cues. Reviewed HEP and frequency along with use of ice. Pt has had her other knee replaced and both hips and feels familiar with the exercises. She stayed up in chair with ice on knee and needs in reach. Pt instructed not to get up without assistance. Hope to progress mobility and exercises in the morning. Pt plans to discharge to home with continued therapy for follow up. This section established at most recent assessment   PROBLEM LIST (Impairments causing functional limitations):  Decreased Strength  Decreased ADL/Functional Activities  Decreased Transfer Abilities  Decreased Ambulation Ability/Technique  Decreased Flexibility/Joint Mobility  Edema/Girth  Decreased [place] with Home Exercise Program   INTERVENTIONS PLANNED: (Benefits and precautions of physical therapy have been discussed with the patient.)  Bed Mobility  Cold  Gait Training  Home Exercise Program (HEP)  Range of Motion (ROM)  Therapeutic Activites  Therapeutic Exercise/Strengthening  Transfer Training     TREATMENT PLAN: Frequency/Duration: Follow patient BID for duration of hospital stay to address above goals. Rehabilitation Potential For Stated Goals: Good     RECOMMENDED REHABILITATION/EQUIPMENT: (at time of discharge pending progress): Continue Skilled Therapy, Home Health: Physical Therapy, and Outpatient: Physical Therapy.               HISTORY:   History of Present Injury/Illness (Reason for Referral):  Pt s/p total knee arthroplasty on 2/10/2022  Past Medical History/Comorbidities:   Ms. Rina Dias  has a past medical history of Anxiety, Arthritis, Autoimmune disease ([address] CAD (coronary artery disease) (2010), Chest pain, Chronic pain, Depression, Family history of ischemic heart disease, H/O acute pancreatitis (08/2013), H/O gastric bypass (2009), High cholesterol, echocardiogram (01/18/2019), Hypertension, Hypothyroidism, Lichen sclerosus, Obesity (BMI 30-39.9), Osteopenia, Pancreatitis, Systolic murmur, and Ventral hernia. She has no past medical history of Unspecified adverse effect of anesthesia. Ms. Ryan Leyva  has a past surgical history that includes hx bladder suspension; hx carpal tunnel release (Right); hx lysis of adhesions; hx appendectomy (1954); hx colonoscopy (2017); hx endoscopy (2018); hx heart catheterization (2010); hx hysterectomy (1978); hx oophorectomy (1984); hx lumbar fusion (1996); hx urological; hx cataract removal (Bilateral, 2015); hx lap cholecystectomy (7/2013); hx gastric bypass (2009); hx hernia repair (8/14/14); hx other surgical (2017); hx shoulder arthroscopy (Right, 2011); hx orthopaedic (Right, 1997); hx orthopaedic (Left, 2017); ir biliary drn cath plc perc int/ext si (2/4/2019); ir biliary drn cath exchange perc any to ext si (3/7/2019); ir biliary drn cath exchange perc any to ext si (4/3/2019); hx heart catheterization (08/2019); hx hip replacement (Right, 02/11/2020); and hx knee replacement (Right, 11/2020). Social History/Living Environment:   Home Environment: Private residence  Support Systems: Spouse/Significant Other  Patient Expects to be Discharged to[de-identified] Home with home health  Current DME Used/Available at Home: Walker, rolling  Prior Level of Function/Work/Activity:  Pt living at home, independent with mobility   Number of Personal Factors/Comorbidities that affect the Plan of Care: 0: LOW COMPLEXITY   EXAMINATION:   Most Recent Physical Functioning:   Gross Assessment: Yes  Gross Assessment  AROM: Within functional limits (except left lower extremity s/p TKA)  Strength: Within functional limits (except left lower extremity s/p TKA)                          Transfers  Sit to Stand: Supervision;Stand-by assistance  Stand to Sit: Supervision;Stand-by assistance    Balance  Sitting: Intact  Standing: Pull to stand; With support    Posture  Posture (WDL): Within defined limits         Weight Bearing Status  Left Side Weight Bearing: As tolerated  Distance (ft): 225 Feet (ft)  Ambulation - Level of Assistance: Supervision;Stand-by assistance  Assistive Device: Walker, rolling  Speed/Princess: Pace decreased (<100 feet/min)  Step Length: Right shortened  Stance: Left decreased  Gait Abnormalities: Antalgic;Decreased step clearance  Number of Stairs Trained: 3  Stairs - Level of Assistance: Stand-by assistance;Contact guard assistance  Rail Use: Both  Interventions: Safety awareness training;Verbal cues     Braces/Orthotics: none    Left Knee Cold  Type: Cryocuff  Patient Position: Sitting      Body Structures Involved:  Joints  Muscles Body Functions Affected: Movement Related Activities and Participation Affected: Mobility  Self Care   Number of elements that affect the Plan of Care: 4+: HIGH COMPLEXITY   CLINICAL PRESENTATION:   Presentation: Stable and uncomplicated: LOW COMPLEXITY   CLINICAL DECISION MAKIN[address] [place] [id number] AM-PAC 6 Clicks   Basic Mobility Inpatient Short Form  How much difficulty does the patient currently have. .. Unable A Lot A Little None   1. Turning over in bed (including adjusting bedclothes, sheets and blankets)? [] 1   [] 2   [x] 3   [] 4   2. Sitting down on and standing up from a chair with arms ( e.g., wheelchair, bedside commode, etc.)   [] 1   [] 2   [x] 3   [] 4   3. Moving from lying on back to sitting on the side of the bed? [] 1   [] 2   [x] 3   [] 4   How much help from another person does the patient currently need. .. Total A Lot A Little None   4. Moving to and from a bed to a chair (including a wheelchair)? [] 1   [] 2   [x] 3   [] 4   5. Need to walk in hospital room? [] 1   [] 2   [x] 3   [] 4   6. Climbing 3-5 steps with a railing? [] 1   [] 2   [x] 3   [] 4   © , Trustees of [address] [place] [id number], under license to [employer].  All rights reserved     Score:  Initial: 18 Most Recent: X (Date: -- )    Interpretation of Tool:  Represents activities that are increasingly more difficult (i.e. Bed mobility, Transfers, Gait). Medical Necessity:     Patient is expected to demonstrate progress in   strength, range of motion, and functional technique   to   decrease assistance required with functional mobility and TKA managment  . Reason for Services/Other Comments:  Patient continues to require skilled intervention due to   Inability to complete functional mobility and TKA management independently  . Use of outcome tool(s) and clinical judgement create a POC that gives a: Clear prediction of patient's progress: LOW COMPLEXITY            TREATMENT:   (In addition to Assessment/Re-Assessment sessions the following treatments were rendered)     Pre-treatment Symptoms/Complaints:  none  Pain Initial:   Pain Intensity 1: 3  Post Session:  3/10     Gait Training (13 Minutes):  Gait training to improve and/or restore physical functioning as related to mobility and strength. Ambulated 225 Feet (ft) with Supervision;Stand-by assistance using a Walker, rolling and minimal Safety awareness training;Verbal cues related to their stance phase and stride length to promote proper body alignment and promote proper body posture. Instruction in performance of walker use and gait sequencing to correct stance phase and stride length. Therapeutic Exercise: (13 Minutes):  Exercises per grid below to improve mobility and strength. Required minimal verbal and manual cues to promote proper body alignment and promote proper body posture. Progressed repetitions as indicated.     Assessment   Date:  2/11 Date:   Date:     ACTIVITY/EXERCISE AM PM AM PM AM PM     []  []  []  []  []  []   Ankle Pumps 15        Quad Sets 15        Gluteal Sets 15        Hip ABd/ADduction 15        Straight Leg Raises 15        Knee Slides 15        Short Arc Quads 15        Chair Slides 15                          B = bilateral; AA = active assistive; A = active; P = passive Treatment/Session Assessment:     Response to Treatment:  pt did well, might go home today. Education:  [x] Home Exercises  [x] Fall Precautions  [x] Use of Cold Therapy Unit [x] D/C Instruction Review  [] Knee Prosthesis Review  [x] Walker Management/Safety [] Adaptive Equipment as Needed  [x] No pillow under knee       Interdisciplinary Collaboration:   Registered Nurse    After treatment position/precautions:   Up in chair  Bed/Chair-wheels locked  Call light within reach  Family at bedside    Compliance with Program/Exercises: Compliant all of the time, Will assess as treatment progresses. Recommendations/Intent for next treatment session:  Treatment next visit will focus on increasing Ms. Ciarra Krueger independence with bed mobility, transfers, gait training, strength/ROM exercises, modalities for pain, and patient education.       Total Treatment Duration:  PT Patient Time In/Time Out  Time In: 1018  Time Out: 0026 [address] [place]

## 2022-02-11 NOTE — PROGRESS NOTES
Orthopedic Joint Progress Note    2022  Admit Date: 2/10/2022  Admit Diagnosis: Primary osteoarthritis of left knee [M17.12]; Degenerative arthritis of left knee [M17.12]    1 Day Post-Op    Subjective: Doylene Arlington awake and alert    Review of Systems: Pertinent items are noted in HPI. Objective:     PT/OT:     PATIENT MOBILITY                           Vital Signs:    Blood pressure 132/78, pulse 69, temperature 97.8 °F (36.6 °C), resp. rate 18, height 5' 4\" (1.626 m), weight 207 lb 4.8 oz (94 kg), SpO2 96 %.   Temp (24hrs), Av.4 °F (36.9 °C), Min:97.8 °F (36.6 °C), Max:99.5 °F (37.5 °C)      Pain Control:   Pain Assessment  Pain Scale 1: Numeric (0 - 10)  Pain Intensity 1: 6  Pain Onset 1: post op  Pain Location 1: Knee  Pain Orientation 1: Left  Pain Description 1: Aching,Constant,Heaviness  Pain Intervention(s) 1: Medication (see MAR),Rest,Repositioned,Ice    Meds:  Current Facility-Administered Medications   Medication Dose Route Frequency    amLODIPine (NORVASC) tablet 5 mg  5 mg Oral QHS    atorvastatin (LIPITOR) tablet 40 mg  40 mg Oral QHS    hydroCHLOROthiazide (HYDRODIURIL) tablet 25 mg  25 mg Oral QHS    levothyroxine (SYNTHROID) tablet 75 mcg  75 mcg Oral QHS    LORazepam (ATIVAN) tablet 0.5 mg  0.5 mg Oral Q8H PRN    sertraline (ZOLOFT) tablet 200 mg  200 mg Oral QHS    temazepam (RESTORIL) capsule 30 mg  30 mg Oral QHS    traZODone (DESYREL) tablet 150 mg  150 mg Oral QHS PRN    valsartan (DIOVAN) tablet 320 mg  320 mg Oral QPM    alcohol 62% (NOZIN) nasal  1 Ampule  1 Ampule Topical Q12H    0.9% sodium chloride infusion  100 mL/hr IntraVENous CONTINUOUS    sodium chloride (NS) flush 5-40 mL  5-40 mL IntraVENous Q8H    sodium chloride (NS) flush 5-40 mL  5-40 mL IntraVENous PRN    acetaminophen (TYLENOL) tablet 1,000 mg  1,000 mg Oral Q6H    celecoxib (CELEBREX) capsule 200 mg  200 mg Oral Q12H    oxyCODONE IR (ROXICODONE) tablet 5-10 mg 5-10 mg Oral Q4H PRN    HYDROmorphone (DILAUDID) injection 1 mg  1 mg IntraVENous Q3H PRN    naloxone (NARCAN) injection 0.2-0.4 mg  0.2-0.4 mg IntraVENous Q10MIN PRN    dexamethasone (DECADRON) 10 mg/mL injection 10 mg  10 mg IntraVENous ONCE    promethazine (PHENERGAN) tablet 25 mg  25 mg Oral Q6H PRN    diphenhydrAMINE (BENADRYL) capsule 25 mg  25 mg Oral Q4H PRN    senna-docusate (PERICOLACE) 8.6-50 mg per tablet 2 Tablet  2 Tablet Oral DAILY    aspirin delayed-release tablet 81 mg  81 mg Oral Q12H    ondansetron (ZOFRAN ODT) tablet 8 mg  8 mg Oral Q8H PRN    alum-mag hydroxide-simeth (MYLANTA) oral suspension 30 mL  30 mL Oral Q4H PRN        LAB:    Lab Results   Component Value Date/Time    INR 0.9 02/03/2022 12:00 PM    INR 0.9 10/26/2020 11:03 AM    INR 0.9 01/21/2020 11:35 AM     Lab Results   Component Value Date/Time    HGB 12.7 02/10/2022 07:57 PM    HGB 14.9 02/03/2022 12:00 PM    HGB 12.1 11/12/2020 07:10 PM       Wound Hip Right (Active)   Number of days: 731       Incision 02/10/22 Knee Left (Active)   Dressing Status Clean;Dry; Intact 02/10/22 1750   Dressing/Treatment Foam impregnated with Ag 02/10/22 1430   Drainage Amount None 02/10/22 1430   Number of days: 1       [REMOVED] Wound Hip Left (Removed)   Number of days: 106       [REMOVED] Wound Abdomen (Removed)   Number of days: 181       [REMOVED] Wound Knee Right (Removed)   Number of days: 26         Physical Exam:  Calves soft/ neuro intact      Assessment:      Principal Problem:    S/P total knee replacement, left (2/11/2022)    Active Problems:    Degenerative arthritis of left knee (2/10/2022)         Plan:     Continue PT/OT/Rehab  Consult: Rehab team including PT, OT, recreational therapy, and

## 2022-02-11 NOTE — PROGRESS NOTES
02/10/22 2054   Oxygen Therapy   O2 Sat (%) 97 %   Pulse via Oximetry 74 beats per minute   O2 Device None (Room air)   O2 Flow Rate (L/min) 0 l/min   Pt on continuous monitor for HS. Alarm limits set. Pt working on IS.

## 2022-02-11 NOTE — DISCHARGE INSTRUCTIONS
[address] [facility]   Patient Discharge Instructions    Doreen Prieto / [id number] : 1950    Admitted 2/10/2022 Discharged: 2022     IF YOU HAVE ANY PROBLEMS ONCE YOU ARE AT HOME CALL THE FOLLOWING NUMBERS:   Main office number: [phone]    Take Home Medications         · It is important that you take the medication exactly as they are prescribed. · Keep your medication in the bottles provided by the pharmacist and keep a list of the medication names, dosages, and times to be taken in your wallet. · Do not take other medications without consulting your doctor. What to do at [address] your prehospital diet. If you have excessive nausea or vomitting call your doctor's office     Home Physical Therapy is arranged. Use rolling walker when walking. Patients who have had a joint replacement should not drive until you are seen for your follow up appointment by Dr. Marva Tavares. When to Call    - Call if you have a temperature greater then 101  - Unable to keep food down  - Loose control of your bladder or bowel function  - Are unable to bear any weight   - Need a pain medication refill     Patient Education        Total Knee Replacement: What to Expect at  [address] had a total knee replacement. The doctor replaced the worn ends of the bones that connect to your knee (thighbone and lower leg bone) with plastic and metal parts. When you leave the hospital, you should be able to move around with a walker or crutches. But you will need someone to help you at home until you have more energy and can move around better. You will go home with a bandage and stitches, staples, skin glue, or tape strips. Change the bandage as your doctor tells you to. If you have stitches or staples, your doctor will remove them about 2 weeks after your surgery. Glue or tape strips will fall off on their own over time.  You may still have some mild pain, and the area may be swollen for a few months after surgery. Your knee will continue to improve for up to a year. You will probably use a walker for some time after surgery. When you are ready, you can use a cane. You may be able to walk without support after a couple weeks, or when you are comfortable. You will need to do months of physical rehabilitation (rehab) after a knee replacement. Rehab will help you strengthen the muscles of the knee and help you regain movement. After you recover, your artificial knee will allow you to do normal daily activities with less pain or no pain at all. You may be able to hike, dance, or ride a bike. Talk to your doctor about whether you can do more strenuous activities. Always tell your caregivers that you have an artificial knee. How long it will take to walk on your own, return to normal activities, and go back to work depends on your health and how well your rehabilitation (rehab) program goes. The better you do with your rehab exercises, the quicker you will get your strength and movement back. This care sheet gives you a general idea about how long it will take for you to recover. But each person recovers at a different pace. Follow the steps below to get better as quickly as possible. How can you care for yourself at home? Activity    · Rest when you feel tired. You may take a nap, but don't stay in bed all day. When you sit, use a chair with arms. You can use the arms to help you stand up.     · Work with your physical therapist to find the best way to exercise. What you can do as your knee heals will depend on whether your new knee is cemented or uncemented. You may not be able to do certain things for a while if your new knee is uncemented.     · After your knee has healed enough, you can do more strenuous activities with caution. ? You can golf, but you may want to use a golf cart for some time. And don't wear shoes with spikes.   ? You can bike on a flat road or on a stationary bike. Talk to your doctor before biking uphill. ? Your doctor may suggest that you stay away from activities that put stress on your knee. These include tennis, badminton, contact sports like football, jumping (such as in basketball), jogging, and running. ? Avoid activities where you might fall.     · Do not sit for more than 1 hour at a time. Get up and walk around for a while before you sit again. If you must sit for a long time, prop up your leg with a chair or footstool. This will help you avoid swelling.     · Ask your doctor when you can drive again. It may take several weeks after knee replacement surgery before it's safe for you to drive.     · When you get into a car, sit on the edge of the seat. Then pull in your legs, and turn to face the front.     · You should be able to do many everyday activities 3 to 6 weeks after your surgery. You will probably need to take 4 to 16 weeks off from work. When you can go back to work depends on the type of work you do and how you feel.     · Ask your doctor when it is okay for you to have sex.     · For 12 weeks, do not lift anything heavier than 10 pounds and do not lift weights. Diet    · By the time you leave the hospital, you should be eating your normal diet. If your stomach is upset, try bland, low-fat foods like plain rice, broiled chicken, toast, and yogurt. Your doctor may suggest that you take iron and vitamin supplements.     · Drink plenty of fluids (unless your doctor tells you not to).   · Eat healthy foods, and watch your portion sizes. Try to stay at your ideal weight. Too much weight puts more stress on your new knee.     · You may notice that your bowel movements are not regular right after your surgery. This is common. Try to avoid constipation and straining with bowel movements. Drinking enough fluids, taking a stool softener, and eating foods that are good sources of fiber can help you avoid constipation.  If you have not had a bowel movement after a couple of days, talk to your doctor. Medicines    · Your doctor will tell you if and when you can restart your medicines. You will also get instructions about taking any new medicines.     · If you take aspirin or some other blood thinner, ask your doctor if and when to start taking it again. Make sure that you understand exactly what your doctor wants you to do.     · Your doctor may give you a blood-thinning medicine to prevent blood clots. If you take a blood thinner, be sure you get instructions about how to take your medicine safely. Blood thinners can cause serious bleeding problems. This medicine could be in pill form or as a shot (injection). If a shot is needed, your doctor will tell you how to do this.     · Be safe with medicines. Take pain medicines exactly as directed. ? If the doctor gave you a prescription medicine for pain, take it as prescribed. ? If you are not taking a prescription pain medicine, ask your doctor if you can take an over-the-counter medicine. ? Plan to take your pain medicine 30 minutes before exercises. It is easier to prevent pain before it starts than to stop it after it has started.     · If you think your pain medicine is making you sick to your stomach:  ? Take your medicine after meals (unless your doctor has told you not to). ? Ask your doctor for a different pain medicine.     · If your doctor prescribed antibiotics, take them as directed. Do not stop taking them just because you feel better. You need to take the full course of antibiotics. Incision care    · If your doctor told you how to care for your cut (incision), follow your doctor's instructions. You will have a dressing over the cut. A dressing helps the incision heal and protects it. Your doctor will tell you how to take care of this.     · If you did not get instructions, follow this general advice:  ?  If you have strips of tape on the cut the doctor made, leave the tape on for a week or until it falls off.  ? If you have stitches or staples, your doctor will tell you when to come back to have them removed. ? If you have skin glue on the cut, leave it on until it falls off. Skin glue is also called skin adhesive or liquid stitches. ? Change the bandage every day. ? Wash the area daily with warm water, and pat it dry. Don't use hydrogen peroxide or alcohol. They can slow healing. ? You may cover the area with a gauze bandage if it oozes fluid or rubs against clothing. ? You may shower 24 to 48 hours after surgery. Pat the incision dry. Don't swim or take a bath for the first 2 weeks, or until your doctor tells you it is okay. Exercise    · Your rehab program will give you a number of exercises to do to help you get back your knee's range of motion and strength. Always do them as your therapist tells you. Ice    · For pain and swelling, put ice or a cold pack on the area for 10 to 20 minutes at a time. Put a thin cloth between the ice and your skin. Other instructions    · Wear compression stockings if your doctor told you to. These may help to prevent blood clots. Your doctor will tell you how long you need to keep wearing the compression stockings.     · Carry a medical alert card that says you have an artificial joint. You have metal pieces in your knee. These may set off some airport metal detectors. Follow-up care is a key part of your treatment and safety. Be sure to make and go to all appointments, and call your doctor if you are having problems. It's also a good idea to know your test results and keep a list of the medicines you take. When should you call for help? Call 911 anytime you think you may need emergency care. For example, call if:    · You passed out (lost consciousness).     · You have severe trouble breathing.     · You have sudden chest pain and shortness of breath, or you cough up blood.    Call your doctor now or seek immediate medical care if:    · You have signs of infection, such as:  ? Increased pain, swelling, warmth, or redness. ? Red streaks leading from the incision. ? Pus draining from the incision. ? A fever.     · You have signs of a blood clot, such as:  ? Pain in your calf, back of the knee, thigh, or groin. ? Redness and swelling in your leg or groin.     · Your incision comes open and begins to bleed, or the bleeding increases.     · You have pain that does not get better after you take pain medicine. Watch closely for changes in your health, and be sure to contact your doctor if:    · You do not have a bowel movement after taking a laxative. Where can you learn more? Go to http://www.gray.com/  Enter T054 in the search box to learn more about \"Total Knee Replacement: What to Expect at Home. \"  Current as of: July 1, 2021               Content Version: 13.0  © 2006-2021 [employer]. Care instructions adapted under license by [employer] (which disclaims liability or warranty for this information). If you have questions about a medical condition or this instruction, always ask your healthcare professional. [place] [address] any warranty or liability for your use of this information. Information obtained by :  I understand that if any problems occur once I am at home I am to contact my physician. I understand and acknowledge receipt of the instructions indicated above.                                                                                                                                            Physician's or R.N.'s Signature                                                                  Date/Time                                                                                                                                              Patient or Representative Signature                                                          Date/Time

## 2022-02-11 NOTE — PROGRESS NOTES
Had therapy. Prescriptions were sent to pharmacy. Home health to see pt for therapy. Has follow up appt scheduled. Instructed to call doctor if having fever, excessive drainage or other problems. I have reviewed discharge instructions with the patient and spouse. The patient and spouse verbalized understanding. Going to car via wheelchair.

## 2022-02-11 NOTE — DISCHARGE SUMMARY
[address] [place] [address]  Total Joint Discharge Summary      Patient ID:  John Bagley  [id number]  27 y.o.  1950    Admit date: 2/10/2022  Discharge date and time: 2-11-22  Admitting Physician: Aishwarya Lo MD  Surgeon: Same  Admission Diagnoses: Primary osteoarthritis of left knee [M17.12]  Degenerative arthritis of left knee [M17.12]  Discharge Diagnoses: Principal Problem:    S/P total knee replacement, left (2/11/2022)    Active Problems:    Degenerative arthritis of left knee (2/10/2022)                                Perioperative Antibiotics: Ancef 1 to 3 g was given depending on patient's weight. If allergic to Ancef or due to other indications, patient was given Vancomycin/Gent per protocol      Hospital Medications given:   [unfilled]  [unfilled]  [unfilled]    Discharge Medications given:  Current Discharge Medication List      START taking these medications    Details   oxyCODONE IR (ROXICODONE) 5 mg immediate release tablet Take 1-2 Tablets by mouth every four (4) hours as needed for Pain for up to 7 days. Max Daily Amount: 60 mg.  Qty: 60 Tablet, Refills: 0    Associated Diagnoses: S/P total knee replacement, left      promethazine (PHENERGAN) 25 mg tablet Take 1 Tablet by mouth every six (6) hours as needed for Nausea. Qty: 40 Tablet, Refills: 0         CONTINUE these medications which have CHANGED    Details   aspirin delayed-release 81 mg tablet Take 1 Tablet by mouth every twelve (12) hours every twelve (12) hours for 30 days. Qty: 60 Tablet, Refills: 0         CONTINUE these medications which have NOT CHANGED    Details   acetaminophen (TylenoL) 325 mg tablet Take  by mouth every four (4) hours as needed for Pain.      valsartan (DIOVAN) 320 mg tablet Take 320 mg by mouth every evening. hydroCHLOROthiazide (HYDRODIURIL) 25 mg tablet Take 25 mg by mouth nightly. Refills: 12      atorvastatin (LIPITOR) 40 mg tablet Take 40 mg by mouth nightly.       temazepam (RESTORIL) 30 mg capsule Take 1 Cap by mouth nightly as needed. Max Daily Amount: 30 mg.  Qty: 30 Cap, Refills: 5    Associated Diagnoses: Primary insomnia      LORazepam (ATIVAN) 0.5 mg tablet Take 1 Tab by mouth every eight (8) hours as needed for Anxiety. Max Daily Amount: 1.5 mg.  Qty: 30 Tab, Refills: 5    Associated Diagnoses: Depression, unspecified depression type      alendronate (FOSAMAX) 70 mg tablet 1 q week for osteoporosis  Qty: 12 Tab, Refills: 12    Associated Diagnoses: Age-related osteoporosis without current pathological fracture      levothyroxine (SYNTHROID) 75 mcg tablet 1 every day for thyroid  Qty: 90 Tab, Refills: 12    Associated Diagnoses: Primary hypothyroidism      amLODIPine (NORVASC) 5 mg tablet 1 every day for BP  Indications: hypertension  Qty: 90 Tab, Refills: 12    Associated Diagnoses: Essential hypertension, benign      traZODone (DESYREL) 150 mg tablet 1 to 2 qhs for sleep  Qty: 180 Tab, Refills: 12    Associated Diagnoses: Depression, unspecified depression type      sertraline (ZOLOFT) 100 mg tablet Take 1 Tab by mouth nightly. Indications: major depressive disorder  Qty: 90 Tab, Refills: 5    Associated Diagnoses: Depression, unspecified depression type              Additional DVT Prophylaxis:  VANDA Hose,Plexi-Pulse    Postoperative transfusions:   none  Post Op complications: none    Hemoglobin at discharge:   Lab Results   Component Value Date/Time    HGB 12.7 02/10/2022 07:57 PM       Wound appears to be healing without any evidence of infection. Physical Therapy started on the day following surgery and progressed to independent ambulation with the aid of a walker. At the time of discharge, able to go up and down stairs and had understanding of precautions needed following surgery. PT/OT:         Activity Response:  Tolerated well  Assistive Device: Fall prevention device,Walker (comment)                Discharged to: home    Discharge instructions:  -Rx pain medication given  - Anticoagulate with: Ecotrin 81 mg PO BID x 4 weeks  -Resume pre hospital diet             -Resume home medications per medical continuation form     -Ambulate with walker, appropriate total joint protocol  -Follow up in office as scheduled       Signed:  PANCHITO Mullen  2/11/2022  7:35 AM

## 2022-02-11 NOTE — PROGRESS NOTES
Shift assessment complete. Patient is alert and oriented, in bed. Respirations are even and unlabored. Lung sounds are clear bilaterally. Bowel sounds are present. Pedal pulse and sensation present. Patient is able to Dorsi and Plantar flex. Left extremity is appropriate in color and warm to touch. Left knee is dressed with Aquacel dressing. Dressing is clean, dry and intact. No neurovascular deficit noted. Patient has not voided yet. Bed low and locked, call light within reach. Cyro cuff on knee. No needs expressed at this time.    ____________________________________________________    Problem: Knee Replacement: Day of Surgery/Unit  Goal: Medications  Outcome: Progressing Towards Goal  Goal: Respiratory  Outcome: Progressing Towards Goal  Goal: Psychosocial  Outcome: Progressing Towards Goal  Goal: *Initiate mobility  Outcome: Progressing Towards Goal  Goal: *Optimal pain control at patient's stated goal  Outcome: Progressing Towards Goal  Goal: *Hemodynamically stable  Outcome: Progressing Towards Goal

## 2022-02-12 ENCOUNTER — HOME CARE VISIT (OUTPATIENT)
Dept: SCHEDULING | Facility: HOME HEALTH | Age: 72
End: 2022-02-12
Payer: MEDICARE

## 2022-02-12 VITALS
RESPIRATION RATE: 18 BRPM | SYSTOLIC BLOOD PRESSURE: 126 MMHG | OXYGEN SATURATION: 98 % | DIASTOLIC BLOOD PRESSURE: 78 MMHG | HEART RATE: 77 BPM | TEMPERATURE: 98.3 F

## 2022-02-12 PROCEDURE — G0151 HHCP-SERV OF PT,EA 15 MIN: HCPCS

## 2022-02-12 PROCEDURE — 400013 HH SOC

## 2022-02-15 ENCOUNTER — HOME CARE VISIT (OUTPATIENT)
Dept: SCHEDULING | Facility: HOME HEALTH | Age: 72
End: 2022-02-15
Payer: MEDICARE

## 2022-02-15 PROCEDURE — G0151 HHCP-SERV OF PT,EA 15 MIN: HCPCS

## 2022-02-16 VITALS
HEART RATE: 77 BPM | OXYGEN SATURATION: 98 % | TEMPERATURE: 98 F | SYSTOLIC BLOOD PRESSURE: 126 MMHG | RESPIRATION RATE: 18 BRPM | DIASTOLIC BLOOD PRESSURE: 82 MMHG

## 2022-02-17 ENCOUNTER — HOME CARE VISIT (OUTPATIENT)
Dept: SCHEDULING | Facility: HOME HEALTH | Age: 72
End: 2022-02-17
Payer: MEDICARE

## 2022-02-17 PROCEDURE — G0151 HHCP-SERV OF PT,EA 15 MIN: HCPCS

## 2022-02-19 VITALS
SYSTOLIC BLOOD PRESSURE: 128 MMHG | TEMPERATURE: 98 F | DIASTOLIC BLOOD PRESSURE: 76 MMHG | HEART RATE: 77 BPM | RESPIRATION RATE: 17 BRPM | OXYGEN SATURATION: 98 %

## 2022-02-22 ENCOUNTER — HOME CARE VISIT (OUTPATIENT)
Dept: SCHEDULING | Facility: HOME HEALTH | Age: 72
End: 2022-02-22
Payer: MEDICARE

## 2022-02-22 VITALS
RESPIRATION RATE: 17 BRPM | HEART RATE: 77 BPM | SYSTOLIC BLOOD PRESSURE: 126 MMHG | OXYGEN SATURATION: 98 % | DIASTOLIC BLOOD PRESSURE: 74 MMHG | TEMPERATURE: 98.3 F

## 2022-02-22 PROCEDURE — G0151 HHCP-SERV OF PT,EA 15 MIN: HCPCS

## 2022-02-23 ENCOUNTER — HOME CARE VISIT (OUTPATIENT)
Dept: SCHEDULING | Facility: HOME HEALTH | Age: 72
End: 2022-02-23
Payer: MEDICARE

## 2022-02-23 VITALS
SYSTOLIC BLOOD PRESSURE: 126 MMHG | HEART RATE: 77 BPM | OXYGEN SATURATION: 98 % | TEMPERATURE: 98 F | RESPIRATION RATE: 17 BRPM | DIASTOLIC BLOOD PRESSURE: 78 MMHG

## 2022-02-23 PROCEDURE — G0151 HHCP-SERV OF PT,EA 15 MIN: HCPCS

## 2022-03-18 PROBLEM — Z96.641 H/O TOTAL HIP ARTHROPLASTY, RIGHT: Status: ACTIVE | Noted: 2020-02-12

## 2022-03-18 PROBLEM — Z96.649 S/P TOTAL HIP ARTHROPLASTY: Status: ACTIVE | Noted: 2017-08-25

## 2022-03-18 PROBLEM — K85.10 GALLSTONE PANCREATITIS: Status: ACTIVE | Noted: 2019-02-02

## 2022-03-18 PROBLEM — Z96.651 TOTAL KNEE REPLACEMENT STATUS, RIGHT: Status: ACTIVE | Noted: 2020-11-13

## 2022-03-19 PROBLEM — K80.50 CHOLEDOCHOLITHIASIS: Status: ACTIVE | Noted: 2019-02-01

## 2022-03-19 PROBLEM — E87.6 HYPOKALEMIA: Status: ACTIVE | Noted: 2019-02-02

## 2022-03-19 PROBLEM — Z96.652 S/P TOTAL KNEE REPLACEMENT, LEFT: Status: ACTIVE | Noted: 2022-02-11

## 2022-03-19 PROBLEM — E66.811 OBESITY (BMI 30.0-34.9): Status: ACTIVE | Noted: 2017-08-08

## 2022-03-19 PROBLEM — M17.11 ARTHRITIS OF KNEE, RIGHT: Status: ACTIVE | Noted: 2020-11-12

## 2022-03-19 PROBLEM — M19.90 OSTEOARTHRITIS: Status: ACTIVE | Noted: 2017-08-24

## 2022-03-19 PROBLEM — E66.9 OBESITY (BMI 30.0-34.9): Status: ACTIVE | Noted: 2017-08-08

## 2022-03-19 PROBLEM — I25.10 CORONARY ARTERY DISEASE INVOLVING NATIVE CORONARY ARTERY OF NATIVE HEART WITHOUT ANGINA PECTORIS: Status: ACTIVE | Noted: 2019-01-11

## 2022-03-19 PROBLEM — M81.0 AGE-RELATED OSTEOPOROSIS WITHOUT CURRENT PATHOLOGICAL FRACTURE: Status: ACTIVE | Noted: 2018-09-14

## 2022-03-20 PROBLEM — E83.42 HYPOMAGNESEMIA: Status: ACTIVE | Noted: 2019-02-04

## 2022-03-20 PROBLEM — K58.2 IRRITABLE BOWEL SYNDROME WITH BOTH CONSTIPATION AND DIARRHEA: Status: ACTIVE | Noted: 2018-01-24

## 2022-03-20 PROBLEM — F51.01 PRIMARY INSOMNIA: Status: ACTIVE | Noted: 2018-09-14

## 2022-03-20 PROBLEM — M17.12 DEGENERATIVE ARTHRITIS OF LEFT KNEE: Status: ACTIVE | Noted: 2022-02-10

## 2022-03-20 PROBLEM — M16.11 ARTHRITIS OF RIGHT HIP: Status: ACTIVE | Noted: 2020-02-11

## 2022-07-09 ENCOUNTER — HOSPITAL ENCOUNTER (OUTPATIENT)
Dept: MRI IMAGING | Age: 72
Discharge: HOME | End: 2022-07-12
Payer: MEDICARE

## 2022-07-09 DIAGNOSIS — M19.011 PRIMARY OSTEOARTHRITIS OF RIGHT SHOULDER: ICD-10-CM

## 2022-07-09 DIAGNOSIS — M19.012 PRIMARY OSTEOARTHRITIS OF LEFT SHOULDER: ICD-10-CM

## 2022-07-09 PROCEDURE — 73221 MRI JOINT UPR EXTREM W/O DYE: CPT

## 2022-08-02 ENCOUNTER — OFFICE VISIT (OUTPATIENT)
Dept: CARDIOLOGY CLINIC | Age: 72
End: 2022-08-02
Payer: MEDICARE

## 2022-08-02 VITALS
HEART RATE: 87 BPM | HEIGHT: 64 IN | SYSTOLIC BLOOD PRESSURE: 120 MMHG | WEIGHT: 190 LBS | BODY MASS INDEX: 32.44 KG/M2 | DIASTOLIC BLOOD PRESSURE: 68 MMHG

## 2022-08-02 DIAGNOSIS — E78.5 DYSLIPIDEMIA: ICD-10-CM

## 2022-08-02 DIAGNOSIS — I25.10 CORONARY ARTERY DISEASE INVOLVING NATIVE CORONARY ARTERY OF NATIVE HEART WITHOUT ANGINA PECTORIS: Primary | ICD-10-CM

## 2022-08-02 DIAGNOSIS — I10 ESSENTIAL HYPERTENSION, BENIGN: ICD-10-CM

## 2022-08-02 DIAGNOSIS — Z01.818 PRE-OP EXAM: ICD-10-CM

## 2022-08-02 PROCEDURE — 99214 OFFICE O/P EST MOD 30 MIN: CPT | Performed by: INTERNAL MEDICINE

## 2022-08-02 PROCEDURE — 93000 ELECTROCARDIOGRAM COMPLETE: CPT | Performed by: INTERNAL MEDICINE

## 2022-08-02 PROCEDURE — 1123F ACP DISCUSS/DSCN MKR DOCD: CPT | Performed by: INTERNAL MEDICINE

## 2022-08-02 NOTE — PROGRESS NOTES
[address], [address]  PHONE: [phone]     22    NAME:  Isabella Rose  :   MRN: [id number]       SUBJECTIVE:   Isabella Rose is a 74 y.o. female seen for a follow up visit regarding the following:     Chief Complaint   Patient presents with    Coronary Artery Disease    Pre-op Exam       HPI: Here for CAD, PCI LAD . Echo 2019: normal EF, no valve dz. [facility] 2019:   1. Patent LAD stent. 2.  Mild residual nonobstructive coronary artery disease. 3.  Normal left ventricular systolic function. Feeling well overall, no angina. Feeling well overall. Needs shoulder surgery. No angina, busy with great grandaughter. No CP, PEÑA, SOB, edema. Patient denies recent history of orthopnea, PND, excessive dizziness and/or syncope. Patient denies recent history of orthopnea, PND, excessive dizziness and/or syncope. Past Medical History, Past Surgical History, Family history, Social History, and Medications were all reviewed with the patient today and updated as necessary. Current Outpatient Medications   Medication Sig Dispense Refill    acetaminophen (TYLENOL) 325 MG tablet Take 325 mg by mouth every 4 hours as needed      alendronate (FOSAMAX) 70 MG tablet The details of the medication are not available because there are pending changes by a home health clinician. amLODIPine (NORVASC) 5 MG tablet The details of the medication are not available because there are pending changes by a home health clinician. atorvastatin (LIPITOR) 40 MG tablet Take 40 mg by mouth      hydroCHLOROthiazide (HYDRODIURIL) 25 MG tablet Take 25 mg by mouth      levothyroxine (SYNTHROID) 75 MCG tablet The details of the medication are not available because there are pending changes by a home health clinician. LORazepam (ATIVAN) 0.5 MG tablet Take 0.5 mg by mouth every 8 hours as needed.       sertraline (ZOLOFT) 100 MG tablet Take 100 mg by mouth      temazepam (RESTORIL) 30 MG capsule Take 30 mg by mouth. traZODone (DESYREL) 150 MG tablet The details of the medication are not available because there are pending changes by a home health clinician.      valsartan (DIOVAN) 320 MG tablet Take 320 mg by mouth every evening      promethazine (PHENERGAN) 25 MG tablet Take 25 mg by mouth every 6 hours as needed       No current facility-administered medications for this visit.         Allergies   Allergen Reactions    Adhesive Tape Rash    Sulfa Antibiotics Nausea And Vomiting     Patient Active Problem List    Diagnosis Date Noted    S/P total knee replacement, left 02/11/2022    Degenerative arthritis of left knee 02/10/2022    Total knee replacement status, right 11/13/2020    Arthritis of knee, right 11/12/2020    H/O total hip arthroplasty, right 02/12/2020    Arthritis of right hip 02/11/2020    Hypomagnesemia 02/04/2019    Gallstone pancreatitis 02/02/2019    Hypokalemia 02/02/2019    Choledocholithiasis 02/01/2019    Coronary artery disease involving native coronary artery of native heart without angina pectoris 01/11/2019    Age-related osteoporosis without current pathological fracture 09/14/2018    Primary insomnia 09/14/2018    Irritable bowel syndrome with both constipation and diarrhea 01/24/2018    S/P total hip arthroplasty 08/25/2017    Osteoarthritis 08/24/2017    Obesity (BMI 30.0-34.9) 08/08/2017    Chronic back pain 02/10/2016    Primary hypothyroidism 12/04/2015    Depression 08/06/2015    S/P laparoscopic cholecystectomy 08/15/2014    S/P coronary artery stent placement 09/11/2010    Essential hypertension, benign 09/10/2010    Dyslipidemia 09/10/2010      Past Surgical History:   Procedure Laterality Date    [address]  2010    stenting of LAD    CARDIAC CATHETERIZATION  08/2019    No stents placed this time    CARPAL TUNNEL RELEASE Right     CATARACT REMOVAL Bilateral 2015    CHOLECYSTECTOMY, LAPAROSCOPIC  7/2013    COLONOSCOPY  2017    normal, repeat after 2027    GASTRIC BYPASS SURGERY  2009    HERNIA REPAIR  8/14/14    ventral    HYSTERECTOMY (CERVIX STATUS UNKNOWN)  1978    cervix removed-Rt ovary removed. Dr. Sharyle Geralds  3/7/2019    IR BILIARY DRAIN EXCHANGE  4/3/2019    IR BILIARY DRAIN INT AND EXT  2/4/2019    IR BILIARY DRAIN INT AND EXT  2/4/2019    IR BILIARY DRAIN INT AND EXT 2/4/2019 [facility]    with 1 steel jina with screws    LYSIS OF ADHESIONS      abdominal    ORTHOPEDIC SURGERY Right 1997    carpal tunnel release    ORTHOPEDIC SURGERY Left 2017    hip replacement    OTHER SURGICAL HISTORY  2017    abdomen surgery lysis of adhesions    OVARY REMOVAL  1984    right    SHOULDER ARTHROSCOPY Right 2011    TOTAL HIP ARTHROPLASTY Right 02/11/2020    TOTAL KNEE ARTHROPLASTY Right 11/2020    UPPER GASTROINTESTINAL ENDOSCOPY  2018    s/p bypass changes    UROLOGICAL SURGERY      bladder suspension     Family History   Problem Relation Age of Onset    Lung Disease Brother     Cancer Brother         lung    Heart Disease Brother     Heart Attack Brother     Breast Cancer Sister     Cancer Father         leukemia    Coronary Art Dis Father     Heart Attack Mother     Heart Disease Mother     Hypertension Mother     Cancer Mother         melanoma    Cancer Sister         breast     Social History     Tobacco Use    Smoking status: Never    Smokeless tobacco: Never   Substance Use Topics    Alcohol use: Yes     Alcohol/week: 1.0 standard drink         ROS:    No obvious pertinent positives on review of systems except for what was outlined in the HPI today.       PHYSICAL EXAM:     /68   Pulse 87   Ht 5' 4\" (1.626 m)   Wt 190 lb (86.2 kg)   BMI 32.61 kg/m²    General/Constitutional:   Alert and oriented x 3, no acute distress  HEENT:   normocephalic, atraumatic, moist mucous membranes  Neck:   No JVD or carotid bruits bilaterally  Cardiovascular:   regular rate and rhythm, no murmur/rub/gallop appreciated  Pulmonary:   clear to auscultation bilaterally, no respiratory distress  Abdomen:   soft, non-tender, non-distended  Ext:   No sig LE edema bilaterally  Skin:  warm and dry, no obvious rashes seen  Neuro:   no obvious sensory or motor deficits  Psychiatric:   normal mood and affect    EKG Today and independently reviewed by me: sinus rhythm, normal intervals and non-specific ST/T wave changes. Lab Results   Component Value Date/Time     02/03/2022 12:00 PM    K 4.4 02/03/2022 12:00 PM     02/03/2022 12:00 PM    CO2 27 02/03/2022 12:00 PM    BUN 25 02/03/2022 12:00 PM    CREATININE 0.91 02/03/2022 12:00 PM    GLUCOSE 92 02/03/2022 12:00 PM    CALCIUM 9.3 02/03/2022 12:00 PM        Lab Results   Component Value Date    WBC 7.2 02/03/2022    HGB 12.7 02/10/2022    HCT 45.9 02/03/2022    MCV 96.2 02/03/2022     02/03/2022       No results found for: TSHFT4, TSH    Lab Results   Component Value Date    LABA1C 4.8 02/03/2022     Lab Results   Component Value Date    EAG Cannot be calculated 02/03/2022       No results found for: CHOL  No results found for: TRIG  No results found for: HDL  No results found for: LDLCHOLESTEROL, LDLCALC  No results found for: LABVLDL, VLDL  No results found for: CHOLHDLRATIO        I have Independently reviewed prior care notes, any ER records available, cardiac testing, labs and results with the patient and before seeing the patient today. Also independently reviewed outside records when available.        ASSESSMENT:    Jeanne Gonzalez was seen today for coronary artery disease and pre-op exam.    Diagnoses and all orders for this visit:    Coronary artery disease involving native coronary artery of native heart without angina pectoris  -     EKG 12 Lead    Pre-op exam  -     EKG 12 Lead    Essential hypertension, benign    Dyslipidemia        PLAN:      1. CAD:  Remain on ASA and statin. The patient is average risk for a gorge-operative cardiac event, the patient understands this risk and no cardiac test will lower the risk at this time. The patient has no unstable cardiac symptoms at this time. Fine to proceed with surgery, recommend optimal BP control in the gorge-op period. We will be available and can follow along with you if needed. Please call as needed, thank you. All questions were answered with the patient voicing understanding. Doing well, no angina. 2. HTN:  Remain on diovan, norvasc and HCTZ, follow K and Mg. Better at home. 3. HPL: back on lipitor, follow AST and ALT. Better now. Follow lipids. Patient has been instructed and agrees to call our office with any issues or other concerns related to their cardiac condition(s) and/or complaint(s). Return in about 4 months (around 12/2/2022).        BEV AMBRIZ,   8/2/2022

## 2022-12-07 ENCOUNTER — OFFICE VISIT (OUTPATIENT)
Dept: GYNECOLOGY | Age: 72
End: 2022-12-07
Payer: MEDICARE

## 2022-12-07 VITALS
SYSTOLIC BLOOD PRESSURE: 140 MMHG | WEIGHT: 176 LBS | BODY MASS INDEX: 30.05 KG/M2 | HEIGHT: 64 IN | DIASTOLIC BLOOD PRESSURE: 90 MMHG

## 2022-12-07 DIAGNOSIS — N89.8 VAGINAL DISCHARGE: Primary | ICD-10-CM

## 2022-12-07 DIAGNOSIS — N84.2 VAGINAL POLYP: ICD-10-CM

## 2022-12-07 DIAGNOSIS — N89.8 VAGINAL ODOR: ICD-10-CM

## 2022-12-07 PROCEDURE — 3074F SYST BP LT 130 MM HG: CPT | Performed by: OBSTETRICS & GYNECOLOGY

## 2022-12-07 PROCEDURE — 3078F DIAST BP <80 MM HG: CPT | Performed by: OBSTETRICS & GYNECOLOGY

## 2022-12-07 PROCEDURE — 99214 OFFICE O/P EST MOD 30 MIN: CPT | Performed by: OBSTETRICS & GYNECOLOGY

## 2022-12-07 PROCEDURE — 1123F ACP DISCUSS/DSCN MKR DOCD: CPT | Performed by: OBSTETRICS & GYNECOLOGY

## 2022-12-07 RX ORDER — SPIRONOLACTONE 25 MG/1
25 TABLET ORAL DAILY
COMMUNITY
Start: 2022-09-06

## 2022-12-07 RX ORDER — AZELASTINE 1 MG/ML
1 SPRAY, METERED NASAL 2 TIMES DAILY
COMMUNITY
Start: 2021-12-07

## 2022-12-07 RX ORDER — CLOBETASOL PROPIONATE 0.5 MG/G
1 CREAM TOPICAL 2 TIMES DAILY
COMMUNITY
Start: 2022-06-24

## 2022-12-07 RX ORDER — DULOXETIN HYDROCHLORIDE 60 MG/1
60-120 CAPSULE, DELAYED RELEASE ORAL DAILY
COMMUNITY
Start: 2022-06-02

## 2022-12-07 RX ORDER — ASPIRIN 81 MG/1
81 TABLET ORAL 2 TIMES DAILY
COMMUNITY
Start: 2021-12-12

## 2022-12-07 NOTE — PROGRESS NOTES
\HPI  Compa Blanco is a 74 y.o. female seen for green vaginal discharge. She used Monistat and became concerned when she saw clumps of discharge. Past Medical History, Past Surgical History, Family history, Social History, and Medications were all reviewed with the patient today and updated as necessary. Current Outpatient Medications   Medication Sig    aspirin 81 MG EC tablet Take 81 mg by mouth 2 times daily    azelastine (ASTELIN) 0.1 % nasal spray 1 spray by Nasal route 2 times daily    clobetasol (TEMOVATE) 0.05 % cream Apply 1 application topically 2 times daily    DULoxetine (CYMBALTA) 60 MG extended release capsule Take  mg by mouth daily    spironolactone (ALDACTONE) 25 MG tablet Take 25 mg by mouth daily    acetaminophen (TYLENOL) 325 MG tablet Take 325 mg by mouth every 4 hours as needed    alendronate (FOSAMAX) 70 MG tablet The details of the medication are not available because there are pending changes by a home health clinician. amLODIPine (NORVASC) 5 MG tablet The details of the medication are not available because there are pending changes by a home health clinician. atorvastatin (LIPITOR) 40 MG tablet Take 40 mg by mouth    hydroCHLOROthiazide (HYDRODIURIL) 25 MG tablet Take 25 mg by mouth    levothyroxine (SYNTHROID) 75 MCG tablet The details of the medication are not available because there are pending changes by a home health clinician. LORazepam (ATIVAN) 0.5 MG tablet Take 0.5 mg by mouth every 8 hours as needed. temazepam (RESTORIL) 30 MG capsule Take 30 mg by mouth.     traZODone (DESYREL) 150 MG tablet The details of the medication are not available because there are pending changes by a home health clinician.    valsartan (DIOVAN) 320 MG tablet Take 320 mg by mouth every evening    promethazine (PHENERGAN) 25 MG tablet Take 25 mg by mouth every 6 hours as needed (Patient not taking: Reported on 12/7/2022)    sertraline (ZOLOFT) 100 MG tablet Take 100 mg by mouth (Patient not taking: Reported on 12/7/2022)     No current facility-administered medications for this visit. Allergies   Allergen Reactions    Adhesive Tape Rash    Sulfa Antibiotics Nausea And Vomiting     Past Medical History:   Diagnosis Date    Anxiety     Arthritis     general-back, neck, hips,knees/ managed with medication     Autoimmune disease ([address]     lichens sclerosis; pt states is dormant now    CAD (coronary artery disease) 2010    stent x 1, no MI    Chest pain     right    Chronic pain     back and neck    Depression     managed with medication     Family history of ischemic heart disease     H/O acute pancreatitis 08/2013    states gallbladder stone lodged creating a blockage. H/O gastric bypass 2009    High cholesterol     managed w/ meds    Hx of echocardiogram 01/18/2019    Normal LV systolic function. Estimated EF 70-75%. Hypertension     managed with medication     Hypothyroidism     managed with medication     Lichen sclerosus     Obesity (BMI 30-39. 9)     BMI 32.4 3/16    Osteopenia     Pancreatitis     Systolic murmur     ECHO: +/-PI, TR ; Per cardiologist note 1-5-17 no murmur ; pt states told by cardiologist that he was removeing murmur from her record    Ventral hernia      Past Surgical History:   Procedure Laterality Date    [address]  2010    stenting of LAD    CARDIAC CATHETERIZATION  08/2019    No stents placed this time    CARPAL TUNNEL RELEASE Right     CATARACT REMOVAL Bilateral 2015    CHOLECYSTECTOMY, LAPAROSCOPIC  7/2013    COLONOSCOPY  2017    normal, repeat after 2027    GASTRIC BYPASS SURGERY  2009    HERNIA REPAIR  8/14/14    ventral    HYSTERECTOMY (CERVIX STATUS UNKNOWN)  1978    cervix removed-Rt ovary removed.  Dr. Savannah Beyer  3/7/2019    IR BILIARY DRAIN EXCHANGE  4/3/2019    IR BILIARY DRAIN INT AND EXT  2/4/2019    IR BILIARY DRAIN INT AND EXT  02/04/2019 IR BILIARY DRAIN INT AND EXT 2019 [facility]    with 1 steel jina with screws    LYSIS OF ADHESIONS      abdominal    ORTHOPEDIC SURGERY Right     carpal tunnel release    ORTHOPEDIC SURGERY Left 2017    hip replacement    OTHER SURGICAL HISTORY  2017    abdomen surgery lysis of adhesions    OVARY REMOVAL  1984    right    SHOULDER ARTHROPLASTY Left     SHOULDER ARTHROSCOPY Right 2011    Spurs removed    TOTAL HIP ARTHROPLASTY Right 2020    TOTAL KNEE ARTHROPLASTY Right 2020    UPPER GASTROINTESTINAL ENDOSCOPY  2018    s/p bypass changes    UROLOGICAL SURGERY      bladder suspension     Family History   Problem Relation Age of Onset    Heart Attack Mother     Heart Disease Mother     Hypertension Mother     Cancer Mother         melanoma    Cancer Father         leukemia    Coronary Art Dis Father     Breast Cancer Sister     Lung Disease Brother     Cancer Brother         lung    Heart Disease Brother     Heart Attack Brother       Social History     Tobacco Use    Smoking status: Never    Smokeless tobacco: Never   Substance Use Topics    Alcohol use: Yes     Alcohol/week: 1.0 standard drink     Types: 1 Standard drinks or equivalent per week     Comment: occ       Social History     Substance and Sexual Activity   Sexual Activity Not on file     OB History    Para Term  AB Living   3 3 0 0 0 0   SAB IAB Ectopic Molar Multiple Live Births   0 0 0 0 0 0      # Outcome Date GA Lbr [place]/2nd Weight Sex Delivery Anes PTL Lv   3 Para            2 Para            1 Para               Obstetric Comments   NVD         ROS:    Review of Systems  General: Not Present- Chills, Fever, Fatigue, Insomnia, Hot flashes/Night sweats, Weight gain  Skin: Not Present- Bruising, Change in Wart/Mole, Excessive Sweating, Itching, Nail Changes, New Lesions, Rash, Skin Color Changes and Ulcer.   HEENT: Not Present- Headache, Blurred Vision, Double Vision, Glaucoma, Visual Disturbances, Hearing Loss, Ringing in the Ears, Vertigo, Nose Bleed, Bleeding Gums, Hoarseness and Sore Throat. Neck: Not Present- Neck Pain and Neck Swelling. Respiratory: Not Present- Cough, Difficulty Breathing and Difficulty Breathing on Exertion. Breast: Not Present- Breast Mass, Breast Pain, Breast Swelling, Nipple Discharge, Nipple Pain, Recent Breast Size Changes and Skin Changes. Cardiovascular: Not Present- Abnormal Blood Pressure, Chest Pain, Edema, Fainting / Blacking Out, Palpitations, Shortness of Breath and Swelling of Extremities. Gastrointestinal: Not Present- Abdominal Pain, Abdominal Swelling, Bloating, Change in Bowel Habits, Constipation, Diarrhea, Difficulty Swallowing, Gets full quickly at meals, Nausea, Rectal Bleeding and Vomiting. Female Genitourinary: Not Present- Dysmenorrhea, Dyspareunia, Decreased libido, Excessive Menstrual Bleeding, Menstrual Irregularities, Pelvic Pain, Urinary Complaints, Vaginal Discharge, Vaginal itching/burning, Vaginal odor  Musculoskeletal: Not Present- Joint Pain and Muscle Pain. Neurological: Not Present- Dizziness, Fainting, Headaches and Seizures. Psychiatric: Not Present- Anxiety, Depression, Mood changes and Panic Attacks. Endocrine: Not Present- Appetite Changes, Cold Intolerance, Excessive Thirst, Excessive Urination and Heat Intolerance. Hematology: Not Present- Abnormal Bleeding, Easy Bruising and Enlarged Lymph Nodes. PHYSICAL EXAM:    BP (!) 140/90   Ht 5' 4\" (1.626 m)   Wt 176 lb (79.8 kg)   BMI 30.21 kg/m²       Physical Exam   General   Mental Status - Alert. General Appearance - Cooperative. Abdomen   Inspection: - Inspection Normal.   Palpation/Percussion: Palpation and Percussion of the abdomen reveal - Non Tender, No Rebound tenderness, No Rigidity (guarding), No hepatosplenomegaly, No Palpable abdominal masses and Soft.    Auscultation: Auscultation of the abdomen reveals - Bowel sounds normal.     Female Genitourinary External Genitalia   Vulva: - Normal. Perineum - Normal. Bartholin's Gland - Bilateral - Normal. Clitoris - Normal.   Introitus: Characteristics - Normal.   Urethra: Characteristics - Normal.     Speculum & Bimanual   Vagina: Vaginal Mucosa - Normal.   Vaginal:  Polyp coming from the left vaginal side wall  Vaginal Lesions - None. Cervix: Characteristics -  Surgically absent  Uterus: Characteristics -Surgically absent  Adnexa: - Normal.   Bladder - Normal.       Lymphatics  No cervical, axillary or groin adenopathy            Medical problems and test results were reviewed with the patient today. ASSESSMENT and PLAN    1. Vaginal discharge  -     Nuswab Vaginitis (VG)  2. Vaginal odor  3. Vaginal polyp     Nuswab culture. May need to treat with vaginal estrogen. She is concerned about not removing the polyp. Will discuss further after culture results. Time:  I spent  30 minutes in preparing to see patient (including chart review and preparation), obtaining and/or reviewing additional medical history, performing a physical exam and evaluation, documenting clinical information in the electronic health record, independently interpreting results, communicating results to patient, family or caregiver, and/or coordinating care. No follow-ups on file.        Liyah Ling MD

## 2022-12-10 LAB
A VAGINAE DNA VAG QL NAA+PROBE: NORMAL SCORE
BVAB2 DNA VAG QL NAA+PROBE: NORMAL SCORE
C ALBICANS DNA VAG QL NAA+PROBE: NEGATIVE
C GLABRATA DNA VAG QL NAA+PROBE: NEGATIVE
MEGA1 DNA VAG QL NAA+PROBE: NORMAL SCORE
SPECIMEN SOURCE: NORMAL
T VAGINALIS RRNA SPEC QL NAA+PROBE: NEGATIVE

## 2023-01-05 ENCOUNTER — OFFICE VISIT (OUTPATIENT)
Dept: CARDIOLOGY CLINIC | Age: 73
End: 2023-01-05
Payer: MEDICARE

## 2023-01-05 VITALS
DIASTOLIC BLOOD PRESSURE: 74 MMHG | SYSTOLIC BLOOD PRESSURE: 124 MMHG | HEIGHT: 64 IN | HEART RATE: 82 BPM | BODY MASS INDEX: 30.52 KG/M2 | WEIGHT: 178.8 LBS

## 2023-01-05 DIAGNOSIS — E78.5 DYSLIPIDEMIA: ICD-10-CM

## 2023-01-05 DIAGNOSIS — I25.10 CORONARY ARTERY DISEASE INVOLVING NATIVE CORONARY ARTERY OF NATIVE HEART WITHOUT ANGINA PECTORIS: Primary | ICD-10-CM

## 2023-01-05 DIAGNOSIS — I10 ESSENTIAL HYPERTENSION, BENIGN: ICD-10-CM

## 2023-01-05 PROCEDURE — 3078F DIAST BP <80 MM HG: CPT | Performed by: INTERNAL MEDICINE

## 2023-01-05 PROCEDURE — 99214 OFFICE O/P EST MOD 30 MIN: CPT | Performed by: INTERNAL MEDICINE

## 2023-01-05 PROCEDURE — 3074F SYST BP LT 130 MM HG: CPT | Performed by: INTERNAL MEDICINE

## 2023-01-05 PROCEDURE — 1123F ACP DISCUSS/DSCN MKR DOCD: CPT | Performed by: INTERNAL MEDICINE

## 2023-01-05 NOTE — PROGRESS NOTES
[address], [address] [employer] [address]  PHONE: [phone]     23    NAME:  Harpal Deng  : [birth year]  MRN: [id number]       SUBJECTIVE:   Harpal Deng is a 74 y.o. female seen for a follow up visit regarding the following:     Chief Complaint   Patient presents with    Hypertension     5 month f/u        HPI: Here for CAD, PCI LAD . Echo 2019: normal EF, no valve dz. [facility] 2019:   1. Patent LAD stent. 2.  Mild residual nonobstructive coronary artery disease. 3.  Normal left ventricular systolic function. Feeling well overall, no angina. Did well in PT for shoulder surgery, better now. No angina, busy with great [facility]. No CP, PEÑA, SOB, edema. Patient denies recent history of orthopnea, PND, excessive dizziness and/or syncope. Past Medical History, Past Surgical History, Family history, Social History, and Medications were all reviewed with the patient today and updated as necessary. Current Outpatient Medications   Medication Sig Dispense Refill    aspirin 81 MG EC tablet Take 81 mg by mouth daily      azelastine (ASTELIN) 0.1 % nasal spray 1 spray by Nasal route 2 times daily      clobetasol (TEMOVATE) 0.05 % cream Apply 1 application topically 2 times daily      DULoxetine (CYMBALTA) 60 MG extended release capsule Take  mg by mouth daily      spironolactone (ALDACTONE) 25 MG tablet Take 25 mg by mouth daily      acetaminophen (TYLENOL) 325 MG tablet Take 325 mg by mouth every 4 hours as needed      alendronate (FOSAMAX) 70 MG tablet The details of the medication are not available because there are pending changes by a home health clinician. amLODIPine (NORVASC) 5 MG tablet The details of the medication are not available because there are pending changes by a home health clinician.       atorvastatin (LIPITOR) 40 MG tablet Take 40 mg by mouth      hydroCHLOROthiazide (HYDRODIURIL) 25 MG tablet Take 25 mg by mouth      levothyroxine (SYNTHROID) 75 MCG tablet The details of the medication are not available because there are pending changes by a home health clinician. LORazepam (ATIVAN) 0.5 MG tablet Take 0.5 mg by mouth every 8 hours as needed. temazepam (RESTORIL) 30 MG capsule Take 30 mg by mouth. traZODone (DESYREL) 150 MG tablet The details of the medication are not available because there are pending changes by a home health clinician.      valsartan (DIOVAN) 320 MG tablet Take 320 mg by mouth every evening      promethazine (PHENERGAN) 25 MG tablet Take 25 mg by mouth every 6 hours as needed (Patient not taking: No sig reported)      sertraline (ZOLOFT) 100 MG tablet Take 100 mg by mouth (Patient not taking: No sig reported)       No current facility-administered medications for this visit.         Allergies   Allergen Reactions    Adhesive Tape Rash    Sulfa Antibiotics Nausea And Vomiting     Patient Active Problem List    Diagnosis Date Noted    S/P total knee replacement, left 02/11/2022    Degenerative arthritis of left knee 02/10/2022    Total knee replacement status, right 11/13/2020    Arthritis of knee, right 11/12/2020    H/O total hip arthroplasty, right 02/12/2020    Arthritis of right hip 02/11/2020    Hypomagnesemia 02/04/2019    Gallstone pancreatitis 02/02/2019    Hypokalemia 02/02/2019    Choledocholithiasis 02/01/2019    Coronary artery disease involving native coronary artery of native heart without angina pectoris 01/11/2019    Age-related osteoporosis without current pathological fracture 09/14/2018    Primary insomnia 09/14/2018    Irritable bowel syndrome with both constipation and diarrhea 01/24/2018    S/P total hip arthroplasty 08/25/2017    Osteoarthritis 08/24/2017    Obesity (BMI 30.0-34.9) 08/08/2017    Chronic back pain 02/10/2016    Primary hypothyroidism 12/04/2015    Depression 08/06/2015    S/P laparoscopic cholecystectomy 08/15/2014    S/P coronary artery stent placement 09/11/2010    Essential hypertension, benign 09/10/2010    Dyslipidemia 09/10/2010      Past Surgical History:   Procedure Laterality Date    [address]  2010    stenting of LAD    CARDIAC CATHETERIZATION  08/2019    No stents placed this time    CARPAL TUNNEL RELEASE Right     CATARACT REMOVAL Bilateral 2015    CHOLECYSTECTOMY, LAPAROSCOPIC  7/2013    COLONOSCOPY  2017    normal, repeat after 2027    GASTRIC BYPASS SURGERY  2009    HERNIA REPAIR  8/14/14    ventral    HYSTERECTOMY (CERVIX STATUS UNKNOWN)  1978    cervix removed-Rt ovary removed. Dr. Delmy Oquendo  3/7/2019    IR BILIARY DRAIN EXCHANGE  4/3/2019    IR BILIARY DRAIN INT AND EXT  2/4/2019    IR BILIARY DRAIN INT AND EXT  02/04/2019    IR BILIARY DRAIN INT AND EXT 2/4/2019 [facility]    with 1 steel jina with screws    LYSIS OF ADHESIONS      abdominal    ORTHOPEDIC SURGERY Right 1997    carpal tunnel release    ORTHOPEDIC SURGERY Left 2017    hip replacement    OTHER SURGICAL HISTORY  2017    abdomen surgery lysis of adhesions    OVARY REMOVAL  1984    right    SHOULDER ARTHROPLASTY Left     SHOULDER ARTHROSCOPY Right 2011    Spurs removed    TOTAL HIP ARTHROPLASTY Right 02/11/2020    TOTAL KNEE ARTHROPLASTY Right 11/2020    UPPER GASTROINTESTINAL ENDOSCOPY  2018    s/p bypass changes    UROLOGICAL SURGERY      bladder suspension     Family History   Problem Relation Age of Onset    Heart Attack Mother     Heart Disease Mother     Hypertension Mother     Cancer Mother         melanoma    Cancer Father         leukemia    Coronary Art Dis Father     Breast Cancer Sister     Lung Disease Brother     Cancer Brother         lung    Heart Disease Brother     Heart Attack Brother      Social History     Tobacco Use    Smoking status: Never    Smokeless tobacco: Never   Substance Use Topics    Alcohol use:  Yes     Alcohol/week: 1.0 standard drink     Types: 1 Standard drinks or equivalent per week     Comment: occ         ROS:    No obvious pertinent positives on review of systems except for what was outlined in the HPI today. PHYSICAL EXAM:     /74   Pulse 82   Ht 5' 4\" (1.626 m)   Wt 178 lb 12.8 oz (81.1 kg)   BMI 30.69 kg/m²    General/Constitutional:   Alert and oriented x 3, no acute distress  HEENT:   normocephalic, atraumatic, moist mucous membranes  Neck:   No JVD or carotid bruits bilaterally  Cardiovascular:   regular rate and rhythm, no murmur/rub/gallop appreciated  Pulmonary:   clear to auscultation bilaterally, no respiratory distress  Abdomen:   soft, non-tender, non-distended  Ext:   No sig LE edema bilaterally  Skin:  warm and dry, no obvious rashes seen  Neuro:   no obvious sensory or motor deficits  Psychiatric:   normal mood and affect      Lab Results   Component Value Date/Time     02/03/2022 12:00 PM    K 4.4 02/03/2022 12:00 PM     02/03/2022 12:00 PM    CO2 27 02/03/2022 12:00 PM    BUN 25 02/03/2022 12:00 PM    CREATININE 0.91 02/03/2022 12:00 PM    GLUCOSE 92 02/03/2022 12:00 PM    CALCIUM 9.3 02/03/2022 12:00 PM        Lab Results   Component Value Date    WBC 7.2 02/03/2022    HGB 12.7 02/10/2022    HCT 45.9 02/03/2022    MCV 96.2 02/03/2022     02/03/2022       No results found for: TSHFT4, TSH    Lab Results   Component Value Date    LABA1C 4.8 02/03/2022     Lab Results   Component Value Date    EAG Cannot be calculated 02/03/2022       No results found for: CHOL  No results found for: TRIG  No results found for: HDL  No results found for: LDLCHOLESTEROL, LDLCALC  No results found for: LABVLDL, VLDL  No results found for: CHOLHDLRATIO        I have Independently reviewed prior care notes, any ER records available, cardiac testing, labs and results with the patient and before seeing the patient today. Also independently reviewed outside records when available. ASSESSMENT:    Yordan Head was seen today for hypertension. Diagnoses and all orders for this visit:    Coronary artery disease involving native coronary artery of native heart without angina pectoris    Essential hypertension, benign    Dyslipidemia        PLAN:      1. CAD:  Remain on ASA and statin. Doing well, no angina. The patient has been instructed to call with any angina or equivalent as reviewed today. All questions were answered with the patient voicing complete understanding. Echo in 12 mos. 2. HTN:  Remain on diovan, norvasc and HCTZ, follow K and Mg. Better at home. 3. HPL: back on lipitor. Follow lipids. 12/27 labs: LDL 39, HDL 88    Patient has been instructed and agrees to call our office with any issues or other concerns related to their cardiac condition(s) and/or complaint(s). Return in about 6 months (around 7/5/2023).        BEV AMBRIZ, DO  1/5/2023

## 2023-03-31 ENCOUNTER — HOSPITAL ENCOUNTER (OUTPATIENT)
Dept: MAMMOGRAPHY | Age: 73
End: 2023-03-31
Payer: MEDICARE

## 2023-03-31 DIAGNOSIS — M81.0 PERIMENOPAUSAL BONE LOSS: ICD-10-CM

## 2023-03-31 PROCEDURE — 77080 DXA BONE DENSITY AXIAL: CPT

## 2023-05-31 NOTE — PROGRESS NOTES
"Subjective:      Leatha Barraza is a 54 y.o. female who presents for HRT and weight loss follow up. Has been out of town helping her daughter in [place] but now back. History of type 2 DM and taking Ozempic 1.0mg SC weekly. Gets nauseous for about 10 minutes after and then resolves. Otherwise tolerating well. She has lost about 40 pounds and feeling great. [place] Eating a low glycemic diet with lean protein and vegetables. Has not had A1c done since last year before starting Ozempic. Has appointment with new PCP next month.   Taking estrace 0.5mg daily. Feels great with this. She has had a hyst.  Does have "part of her cervix" left and due for WWE. Denies prior history of abnormal pap . Previous Gyn has retired. Taking vitamin d daily. Getting calcium in diet.    PCP: scheduled next month with new PCP    Routine labs: 2022  WWE: - with outside provider  Pap smear: No result found  Mammogram: 2022  DEXA: about 4-5 years ago per pt    No visits with results within 3 Month(s) from this visit.   Latest known visit with results is:   Clinical Support on 2021   Component Date Value Ref Range Status    SARS-CoV2 (COVID-19) Qualitative P* 2021 Not Detected  Not Detected Final       Past Medical History:   Diagnosis Date    Type 2 diabetes mellitus without complication, without long-term current use of insulin 2022     Past Surgical History:   Procedure Laterality Date    BREAST BIOPSY      HYSTERECTOMY       Social History     Tobacco Use    Smoking status: Never    Smokeless tobacco: Never   Substance Use Topics    Alcohol use: No     Comment: socially    Drug use: No     Family History   Problem Relation Age of Onset    Hypertension Mother     Lymphoma Mother     Colon cancer Father      OB History    Para Term  AB Living   2 2 2         SAB IAB Ectopic Multiple Live Births                  # Outcome Date GA Lbr Yemi/2nd Weight Sex Delivery Anes PTL Lv " 02/11/20 1122   Oxygen Therapy   O2 Sat (%) 100 %   Pulse via Oximetry 56 beats per minute   O2 Device Nasal cannula  (weaned to RA)   Patient achieved   2500   Ml/sec on IS. Patient encouraged to do 10 breaths every hour while awake-patient agreed and demonstrated. No shortness of breath or distress noted. BS are clear b/l. [facility] notes reviewed- Sat monitor placed at bedside. "  2 Term            1 Term                Current Outpatient Medications:     estradioL (ESTRACE) 0.5 MG tablet, Take 1 tablet (0.5 mg total) by mouth once daily., Disp: 90 tablet, Rfl: 3    LORazepam (ATIVAN) 1 MG tablet, , Disp: , Rfl:     semaglutide (OZEMPIC) 1 mg/dose (4 mg/3 mL), Inject 1 mg into the skin every 7 days., Disp: 1 each, Rfl: 5    The ASCVD Risk score (Moshe DK, et al., 2019) failed to calculate for the following reasons:    Cannot find a previous HDL lab    Cannot find a previous total cholesterol lab    Review of Systems:  General: No fever, chills, or weight loss.  Chest: No chest pain, shortness of breath, or palpitations.  Breast: No pain, masses, or nipple discharge.  Vulva: No pain, lesions, or itching.  Vagina: No relaxation, itching, discharge, or lesions.  Abdomen: No pain, nausea, vomiting, diarrhea, or constipation.  Urinary: No incontinence, nocturia, frequency, or dysuria.  Extremities:  No leg cramps, edema, or calf pain.  Neurologic: No headaches, dizziness, or visual changes.    Objective:     Vitals:    05/31/23 0828   BP: 119/68   Pulse: 82   Weight: 92.8 kg (204 lb 9.6 oz)   Height: 5' 6" (1.676 m)   PainSc: 0-No pain     Body mass index is 33.02 kg/m².      Physical Exam: Deferred      Assessment:    Screening mammogram, encounter for  -     Mammo Digital Screening Bilat w/ Wilber; Future; Expected date: 05/31/2023    Menopausal symptoms  -     estradioL (ESTRACE) 0.5 MG tablet; Take 1 tablet (0.5 mg total) by mouth once daily.  Dispense: 90 tablet; Refill: 3  -     Estradiol; Future; Expected date: 05/31/2023  -     CBC Auto Differential; Future; Expected date: 05/31/2023  -     Comprehensive Metabolic Panel; Future; Expected date: 05/31/2023  -     TSH; Future; Expected date: 05/31/2023    Screening for hyperlipidemia  -     Lipid panel; Future; Expected date: 05/31/2023    Type 2 diabetes mellitus without complication, without long-term current use of insulin  -     " Hemoglobin A1C; Future; Expected date: 05/31/2023  -     semaglutide (OZEMPIC) 1 mg/dose (4 mg/3 mL); Inject 1 mg into the skin every 7 days.  Dispense: 1 each; Refill: 5    Screening for osteoporosis  -     DXA Bone Density Axial Skeleton 1 or more sites; Future; Expected date: 05/31/2023        Plan:   Continue Estrace 0.5mg QD.  Continue Ozempic 1mg SC weekly.  Routine screening labs and estrace level today.  Scheduled with new PCP.  Continue low glycemic diet with lean protein at each meal.  Continue walking daily for exercise  Mammogram due 7/2023  Schedule DEXA  Reviewed vitamin D and calcium supplements.  Follow up for WWE in 6 months or sooner PRN.    Instructed patient to call if she experiences any side effects or has any questions.    I spent a total of 30 minutes on the day of the visit.This includes face to face time and non-face to face time preparing to see the patient (eg, review of tests), obtaining and/or reviewing separately obtained history, documenting clinical information in the electronic or other health record, independently interpreting results and communicating results to the patient/family/caregiver, or care coordinator.

## 2023-06-09 ENCOUNTER — TELEPHONE (OUTPATIENT)
Age: 73
End: 2023-06-09

## 2023-06-26 ENCOUNTER — TELEPHONE (OUTPATIENT)
Age: 73
End: 2023-06-26

## 2023-07-11 ENCOUNTER — OFFICE VISIT (OUTPATIENT)
Age: 73
End: 2023-07-11
Payer: MEDICARE

## 2023-07-11 VITALS
HEART RATE: 75 BPM | WEIGHT: 184 LBS | BODY MASS INDEX: 31.41 KG/M2 | HEIGHT: 64 IN | DIASTOLIC BLOOD PRESSURE: 78 MMHG | SYSTOLIC BLOOD PRESSURE: 132 MMHG

## 2023-07-11 DIAGNOSIS — I25.10 CORONARY ARTERY DISEASE INVOLVING NATIVE CORONARY ARTERY OF NATIVE HEART WITHOUT ANGINA PECTORIS: ICD-10-CM

## 2023-07-11 DIAGNOSIS — I10 ESSENTIAL HYPERTENSION, BENIGN: Primary | ICD-10-CM

## 2023-07-11 DIAGNOSIS — E78.5 DYSLIPIDEMIA: ICD-10-CM

## 2023-07-11 PROCEDURE — 3075F SYST BP GE 130 - 139MM HG: CPT | Performed by: INTERNAL MEDICINE

## 2023-07-11 PROCEDURE — 99214 OFFICE O/P EST MOD 30 MIN: CPT | Performed by: INTERNAL MEDICINE

## 2023-07-11 PROCEDURE — 93000 ELECTROCARDIOGRAM COMPLETE: CPT | Performed by: INTERNAL MEDICINE

## 2023-07-11 PROCEDURE — 3078F DIAST BP <80 MM HG: CPT | Performed by: INTERNAL MEDICINE

## 2023-07-11 PROCEDURE — 1123F ACP DISCUSS/DSCN MKR DOCD: CPT | Performed by: INTERNAL MEDICINE

## 2023-07-11 NOTE — PROGRESS NOTES
seeing the patient today. Also independently reviewed outside records when available. ASSESSMENT:    Brandt Xie was seen today for cardiac clearance. Diagnoses and all orders for this visit:    Essential hypertension, benign  -     EKG 12 Lead    Coronary artery disease involving native coronary artery of native heart without angina pectoris  -     EKG 12 Lead    Dyslipidemia          PLAN:         1. CAD:  Remain on ASA and statin. Doing well, no angina. The patient has been instructed to call with any angina or equivalent as reviewed today. All questions were answered with the patient voicing complete understanding. Doing well pre-op, remain on ASA. The patient is average risk for a gorge-operative cardiac event, the patient understands this risk and no cardiac test will lower the risk at this time. The patient has no unstable cardiac symptoms at this time. Fine to proceed with surgery, recommend optimal BP control in the gorge-op period. We will be available and can follow along with you if needed. Please call as needed, thank you. All questions were answered with the patient voicing understanding. 2. HTN:  Remain on diovan, norvasc and HCTZ, follow K and Mg. Better at home. 3. HPL: on lipitor. Follow lipids. Patient has been instructed and agrees to call our office with any issues or other concerns related to their cardiac condition(s) and/or complaint(s). Return in about 6 months (around 1/11/2024).        [employer] Henri[employer], DO  7/11/2023

## 2023-10-31 NOTE — PROGRESS NOTES
capsule by mouth. Automatic Reconciliation, Ar   traZODone (DESYREL) 150 MG tablet The details of the medication are not available because there are pending changes by a home health clinician. Automatic Reconciliation, Ar   valsartan (DIOVAN) 320 MG tablet Take 1 tablet by mouth every evening  Automatic Reconciliation, Ar       ALLERGIES:      Allergies   Allergen Reactions    Adhesive Tape Rash    Sulfa Antibiotics Nausea And Vomiting       SH:    Social History     Tobacco Use    Smoking status: Never    Smokeless tobacco: Never   Substance Use Topics    Alcohol use: Yes     Alcohol/week: 1.0 standard drink of alcohol     Types: 1 Standard drinks or equivalent per week     Comment: occ    Drug use: No       FH:    Family History   Problem Relation Age of Onset    Heart Attack Mother     Heart Disease Mother     Hypertension Mother     Cancer Mother         melanoma    Cancer Father         leukemia    Coronary Art Dis Father     Breast Cancer Sister     Lung Disease Brother     Cancer Brother         lung    Heart Disease Brother     Heart Attack Brother        ROS: The patient has no difficulty with chest pain or shortness of breath. No fever or chills. Comprehensive 13 point review of systems was otherwise unremarkable except as noted above. Physical Exam:     There were no vitals taken for this visit. General: Alert, oriented, cooperative white female in no acute distress. Eyes: Sclera are clear. Conjunctiva and lids within normal limits. No icterus. Ears and Nose: no gross deformities to visual inspection, gross hearing intact  Neck: Supple, trachea midline, no appreciable thyromegaly  Resp: Breathing is  non-labored. Lungs clear to auscultation without wheezing or rhonchi   CV: RRR. No murmurs, rubs or gallops appreciated. Abd: soft, mild RLQ tenderness, active BS'S. Possible small stone retained stone in the subcutaneous tissue near her umbilicus  Psych:  Mood and affect appropriate.

## 2023-11-02 ENCOUNTER — OFFICE VISIT (OUTPATIENT)
Dept: SURGERY | Age: 73
End: 2023-11-02
Payer: MEDICARE

## 2023-11-02 VITALS
DIASTOLIC BLOOD PRESSURE: 74 MMHG | HEART RATE: 80 BPM | BODY MASS INDEX: 31.26 KG/M2 | WEIGHT: 182.1 LBS | SYSTOLIC BLOOD PRESSURE: 131 MMHG

## 2023-11-02 DIAGNOSIS — R10.31 RLQ ABDOMINAL PAIN: Primary | ICD-10-CM

## 2023-11-02 PROCEDURE — 3078F DIAST BP <80 MM HG: CPT | Performed by: SURGERY

## 2023-11-02 PROCEDURE — 99203 OFFICE O/P NEW LOW 30 MIN: CPT | Performed by: SURGERY

## 2023-11-02 PROCEDURE — 1123F ACP DISCUSS/DSCN MKR DOCD: CPT | Performed by: SURGERY

## 2023-11-02 PROCEDURE — 3075F SYST BP GE 130 - 139MM HG: CPT | Performed by: SURGERY

## 2023-11-02 RX ORDER — DENOSUMAB 60 MG/ML
INJECTION SUBCUTANEOUS
COMMUNITY
Start: 2023-05-22

## 2023-11-14 ENCOUNTER — HOSPITAL ENCOUNTER (OUTPATIENT)
Dept: CT IMAGING | Age: 73
Discharge: HOME | End: 2023-11-17
Attending: SURGERY
Payer: MEDICARE

## 2023-11-14 DIAGNOSIS — R10.31 RLQ ABDOMINAL PAIN: ICD-10-CM

## 2023-11-14 LAB — CREAT BLD-MCNC: 0.73 MG/DL (ref 0.8–1.5)

## 2023-11-14 PROCEDURE — 82565 ASSAY OF CREATININE: CPT

## 2023-11-14 PROCEDURE — 74177 CT ABD & PELVIS W/CONTRAST: CPT

## 2023-11-14 PROCEDURE — 6360000004 HC RX CONTRAST MEDICATION: Performed by: SURGERY

## 2023-11-14 RX ADMIN — IOPAMIDOL 100 ML: 755 INJECTION, SOLUTION INTRAVENOUS at 09:57

## 2023-11-14 RX ADMIN — DIATRIZOATE MEGLUMINE AND DIATRIZOATE SODIUM 15 ML: 660; 100 LIQUID ORAL; RECTAL at 09:57

## 2024-01-22 ENCOUNTER — OFFICE VISIT (OUTPATIENT)
Age: 74
End: 2024-01-22

## 2024-01-22 VITALS
WEIGHT: 189 LBS | SYSTOLIC BLOOD PRESSURE: 124 MMHG | BODY MASS INDEX: 32.27 KG/M2 | HEIGHT: 64 IN | DIASTOLIC BLOOD PRESSURE: 7 MMHG | HEART RATE: 60 BPM

## 2024-01-22 DIAGNOSIS — I10 ESSENTIAL HYPERTENSION, BENIGN: ICD-10-CM

## 2024-01-22 DIAGNOSIS — I25.10 CORONARY ARTERY DISEASE INVOLVING NATIVE CORONARY ARTERY OF NATIVE HEART WITHOUT ANGINA PECTORIS: Primary | ICD-10-CM

## 2024-01-22 DIAGNOSIS — E78.5 DYSLIPIDEMIA: ICD-10-CM

## 2024-01-22 RX ORDER — AMITRIPTYLINE HYDROCHLORIDE 25 MG/1
25 TABLET, FILM COATED ORAL NIGHTLY
COMMUNITY

## 2024-01-22 NOTE — PROGRESS NOTES
[address] [place] [address], [place] [address]  PHONE: [phone]     24    NAME:  Aneta Castro  : 1950  MRN: [id number]       SUBJECTIVE:   Aneta Castro is a 73 y.o. female seen for a follow up visit regarding the following:     Chief Complaint   Patient presents with    Coronary Artery Disease       HPI:   Here for CAD, PCI LAD .   Echo 2019: normal EF, no valve dz.   [facility] 2019:   1.  Patent LAD stent.   2.  Mild residual nonobstructive coronary artery disease.     Some home stress.  NO CP.  NO new edema.   No angina, busy with home stress.  No CP, PEÑA, SOB, edema.    Patient denies recent history of orthopnea, PND, excessive dizziness and/or syncope.            Past Medical History, Past Surgical History, Family history, Social History, and Medications were all reviewed with the patient today and updated as necessary.     Current Outpatient Medications   Medication Sig Dispense Refill    amitriptyline (ELAVIL) 25 MG tablet Take 1 tablet by mouth nightly      denosumab (PROLIA) 60 MG/ML SOSY SC injection 60 mg SQ every 6 months through infusion center: [facility]      aspirin 81 MG EC tablet Take 1 tablet by mouth daily      azelastine (ASTELIN) 0.1 % nasal spray 1 spray by Nasal route 2 times daily      clobetasol (TEMOVATE) 0.05 % cream Apply 1 application topically 2 times daily      DULoxetine (CYMBALTA) 60 MG extended release capsule Take 1-2 capsules by mouth daily      spironolactone (ALDACTONE) 25 MG tablet Take 1 tablet by mouth daily      acetaminophen (TYLENOL) 325 MG tablet Take 1 tablet by mouth every 4 hours as needed      amLODIPine (NORVASC) 5 MG tablet The details of the medication are not available because there are pending changes by a home health clinician.      atorvastatin (LIPITOR) 40 MG tablet Take 1 tablet by mouth      hydroCHLOROthiazide (HYDRODIURIL) 25 MG tablet Take 1 tablet by mouth      levothyroxine (SYNTHROID) 75 MCG

## 2024-08-09 ENCOUNTER — OFFICE VISIT (OUTPATIENT)
Age: 74
End: 2024-08-09
Payer: MEDICARE

## 2024-08-09 VITALS
DIASTOLIC BLOOD PRESSURE: 72 MMHG | WEIGHT: 167.2 LBS | BODY MASS INDEX: 28.54 KG/M2 | SYSTOLIC BLOOD PRESSURE: 118 MMHG | HEART RATE: 81 BPM | HEIGHT: 64 IN

## 2024-08-09 DIAGNOSIS — I25.10 CORONARY ARTERY DISEASE INVOLVING NATIVE CORONARY ARTERY OF NATIVE HEART WITHOUT ANGINA PECTORIS: Primary | ICD-10-CM

## 2024-08-09 DIAGNOSIS — I10 ESSENTIAL HYPERTENSION, BENIGN: ICD-10-CM

## 2024-08-09 DIAGNOSIS — R06.02 SHORTNESS OF BREATH: ICD-10-CM

## 2024-08-09 DIAGNOSIS — E78.5 DYSLIPIDEMIA: ICD-10-CM

## 2024-08-09 PROCEDURE — 93000 ELECTROCARDIOGRAM COMPLETE: CPT | Performed by: INTERNAL MEDICINE

## 2024-08-09 PROCEDURE — 3074F SYST BP LT 130 MM HG: CPT | Performed by: INTERNAL MEDICINE

## 2024-08-09 PROCEDURE — 3078F DIAST BP <80 MM HG: CPT | Performed by: INTERNAL MEDICINE

## 2024-08-09 PROCEDURE — 1123F ACP DISCUSS/DSCN MKR DOCD: CPT | Performed by: INTERNAL MEDICINE

## 2024-08-09 PROCEDURE — 99214 OFFICE O/P EST MOD 30 MIN: CPT | Performed by: INTERNAL MEDICINE

## 2024-08-09 NOTE — PROGRESS NOTES
[address] [place] [address], [place] [address]  PHONE: [phone]     24    NAME:  Aneta Castro  : 1950  MRN: [id number]       SUBJECTIVE:   Aneta Castro is a 74 y.o. female seen for a follow up visit regarding the following:     Chief Complaint   Patient presents with    Coronary Artery Disease       HPI: Here for CAD  PCI LAD .   Echo 2019: normal EF, no valve dz.   [facility] 2019:   1.  Patent LAD stent.   2.  Mild residual nonobstructive coronary artery disease.     Some PEÑA, some SOB now.   Struggling with more viral sx, early satiety now.  Spouse ill, more home stress.  no new edema.   No angina, busy with home stress.  No CP.   Patient denies recent history of orthopnea, PND, excessive dizziness and/or syncope.          Past Medical History, Past Surgical History, Family history, Social History, and Medications were all reviewed with the patient today and updated as necessary.     Current Outpatient Medications   Medication Sig Dispense Refill    amitriptyline (ELAVIL) 25 MG tablet Take 1 tablet by mouth nightly      denosumab (PROLIA) 60 MG/ML SOSY SC injection 60 mg SQ every 6 months through infusion center: [facility]      aspirin 81 MG EC tablet Take 1 tablet by mouth daily      azelastine (ASTELIN) 0.1 % nasal spray 1 spray by Nasal route 2 times daily      clobetasol (TEMOVATE) 0.05 % cream Apply 1 application topically 2 times daily      DULoxetine (CYMBALTA) 60 MG extended release capsule Take 1-2 capsules by mouth daily      spironolactone (ALDACTONE) 25 MG tablet Take 1 tablet by mouth daily      acetaminophen (TYLENOL) 325 MG tablet Take 1 tablet by mouth every 4 hours as needed      amLODIPine (NORVASC) 5 MG tablet The details of the medication are not available because there are pending changes by a home health clinician.      atorvastatin (LIPITOR) 40 MG tablet Take 1 tablet by mouth      hydroCHLOROthiazide (HYDRODIURIL) 25 MG tablet

## 2024-09-12 ENCOUNTER — OFFICE VISIT (OUTPATIENT)
Dept: SURGERY | Age: 74
End: 2024-09-12
Payer: MEDICARE

## 2024-09-12 VITALS
HEART RATE: 95 BPM | BODY MASS INDEX: 27.68 KG/M2 | SYSTOLIC BLOOD PRESSURE: 115 MMHG | DIASTOLIC BLOOD PRESSURE: 66 MMHG | WEIGHT: 162.1 LBS | HEIGHT: 64 IN

## 2024-09-12 DIAGNOSIS — N89.8 VAGINAL DISCHARGE: ICD-10-CM

## 2024-09-12 DIAGNOSIS — R82.71 BACTERIURIA: ICD-10-CM

## 2024-09-12 DIAGNOSIS — N32.1 COLOVESICAL FISTULA: Primary | ICD-10-CM

## 2024-09-12 DIAGNOSIS — Z12.11 ENCOUNTER FOR SCREENING COLONOSCOPY: ICD-10-CM

## 2024-09-12 PROCEDURE — 3074F SYST BP LT 130 MM HG: CPT | Performed by: SURGERY

## 2024-09-12 PROCEDURE — 99213 OFFICE O/P EST LOW 20 MIN: CPT | Performed by: SURGERY

## 2024-09-12 PROCEDURE — 1123F ACP DISCUSS/DSCN MKR DOCD: CPT | Performed by: SURGERY

## 2024-09-12 PROCEDURE — 3078F DIAST BP <80 MM HG: CPT | Performed by: SURGERY

## 2024-09-12 RX ORDER — HYDROCODONE BITARTRATE AND ACETAMINOPHEN 5; 325 MG/1; MG/1
1 TABLET ORAL EVERY 6 HOURS PRN
COMMUNITY

## 2024-09-19 ENCOUNTER — TELEPHONE (OUTPATIENT)
Dept: GASTROENTEROLOGY | Age: 74
End: 2024-09-19

## 2024-09-23 ENCOUNTER — TELEPHONE (OUTPATIENT)
Dept: GASTROENTEROLOGY | Age: 74
End: 2024-09-23

## 2024-10-10 ENCOUNTER — TELEPHONE (OUTPATIENT)
Dept: GASTROENTEROLOGY | Age: 74
End: 2024-10-10

## 2024-10-17 ENCOUNTER — TELEPHONE (OUTPATIENT)
Dept: SURGERY | Age: 74
End: 2024-10-17

## 2024-10-17 NOTE — TELEPHONE ENCOUNTER
Patient called stating that she had an apt with urology this morning, but got lost and was late. Office refused to see her. They rescheduled her until November 4th. Patient states that she \"cannot wait\". States that she is weak, dizzy, and has fallen this week. Asked patient if she had seen Dr. Irvin lately to ask him about her symptoms. She saw him \"about a month ago\", and she feels like he dismissed her concerns. I suggested that she may want to call urology and ask about a cancellation list. Also suggested that she see urgent care regarding her concerns about being weak and dizzy.

## 2024-10-22 ENCOUNTER — OFFICE VISIT (OUTPATIENT)
Age: 74
End: 2024-10-22
Payer: MEDICARE

## 2024-10-22 VITALS
OXYGEN SATURATION: 100 % | DIASTOLIC BLOOD PRESSURE: 59 MMHG | HEIGHT: 64 IN | WEIGHT: 159 LBS | BODY MASS INDEX: 27.14 KG/M2 | HEART RATE: 75 BPM | SYSTOLIC BLOOD PRESSURE: 111 MMHG | RESPIRATION RATE: 16 BRPM

## 2024-10-22 DIAGNOSIS — N89.8 VAGINAL DISCHARGE: Primary | ICD-10-CM

## 2024-10-22 DIAGNOSIS — N89.8 VAGINAL ODOR: ICD-10-CM

## 2024-10-22 PROCEDURE — 1123F ACP DISCUSS/DSCN MKR DOCD: CPT | Performed by: PHYSICIAN ASSISTANT

## 2024-10-22 PROCEDURE — 3074F SYST BP LT 130 MM HG: CPT | Performed by: PHYSICIAN ASSISTANT

## 2024-10-22 PROCEDURE — 3078F DIAST BP <80 MM HG: CPT | Performed by: PHYSICIAN ASSISTANT

## 2024-10-22 PROCEDURE — 99203 OFFICE O/P NEW LOW 30 MIN: CPT | Performed by: PHYSICIAN ASSISTANT

## 2024-10-22 NOTE — PROGRESS NOTES
Aneta Castro (:  1950) is a 74 y.o. female new patient referred to our office for evaluation of the following chief complaint(s):  New Patient (Colonoscopy screening)      Assessment & Plan   ASSESSMENT/PLAN:  1. Vaginal discharge  -     [facility] Hart MD, Gynecology, [place]  2. Vaginal odor  -     [facility] Hart MD, Gynecology, [place]       -Will arrange colonoscopy as recommended by Dr. Baxter. If colonoscopy unrevealing could consider CT cystogram versus CT with rectal contrast to identify fistula location if ongoing concern. She is awaiting urology eval and I will refer back to GYN as she is primarily yellow/green malodorous vaginal drainage; was evaluated in  with vaginal polyp but then lost to follow-up. May benefit from repeat pelvic exam. Denies hematuria, pneumaturia, fecaluria or fecal-type vaginal drainage.   -Recommend she reach back out to her PCP to consider for HHPT. 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 Labs yesterday showed normal Hgb and TSH.     Subjective   SUBJECTIVE/OBJECTIVE  Aneta Castro is a 74 y.o. year old female referred for colon cancer screening. Referral note reviewed from surgery OV 2024 with Dr. Baxter at which point patient noted to have vaginal discharge and bacteruria concerning for colovaginal or colovesical fistula. Patient referred to our office for colonoscopy as well as to urology for evaluation. Prior pertinent GI evaluation includes:    -Colonoscopy 2017 (Dr. Hu): Good prep, moderate diverticulosis, otherwise normal colonoscopy with no polyps; recall 10 years, annual hemoccults after 5 years  -EGD 4/10/18 (Dr. Hu): Normal esophagus, 2 cm gastric pouch, patent jejunal anastomosis, normal blind end of the jejunum and efferent limb    Today patient reports her PCP and Dr. Baxter are concerned that she may

## 2024-10-23 ENCOUNTER — TELEPHONE (OUTPATIENT)
Age: 74
End: 2024-10-23

## 2024-10-23 ENCOUNTER — TELEPHONE (OUTPATIENT)
Dept: GASTROENTEROLOGY | Age: 74
End: 2024-10-23

## 2024-10-23 ENCOUNTER — PREP FOR PROCEDURE (OUTPATIENT)
Dept: GASTROENTEROLOGY | Age: 74
End: 2024-10-23

## 2024-10-23 DIAGNOSIS — N89.8 VAGINAL ODOR: ICD-10-CM

## 2024-10-23 DIAGNOSIS — N89.8 VAGINAL DISCHARGE: ICD-10-CM

## 2024-10-23 DIAGNOSIS — Z12.11 ENCOUNTER FOR SCREENING COLONOSCOPY: Primary | ICD-10-CM

## 2024-10-23 RX ORDER — SODIUM CHLORIDE 0.9 % (FLUSH) 0.9 %
5-40 SYRINGE (ML) INJECTION PRN
Status: CANCELLED | OUTPATIENT
Start: 2024-10-23

## 2024-10-23 RX ORDER — SODIUM CHLORIDE 0.9 % (FLUSH) 0.9 %
5-40 SYRINGE (ML) INJECTION EVERY 12 HOURS SCHEDULED
Status: CANCELLED | OUTPATIENT
Start: 2024-10-23

## 2024-10-23 RX ORDER — SODIUM, POTASSIUM,MAG SULFATES 17.5-3.13G
1 SOLUTION, RECONSTITUTED, ORAL ORAL ONCE
Qty: 1 EACH | Refills: 0 | Status: SHIPPED | OUTPATIENT
Start: 2024-10-23 | End: 2024-10-23

## 2024-10-23 RX ORDER — SODIUM CHLORIDE 9 MG/ML
25 INJECTION, SOLUTION INTRAVENOUS PRN
Status: CANCELLED | OUTPATIENT
Start: 2024-10-23

## 2024-10-23 NOTE — TELEPHONE ENCOUNTER
SUPREP Bowel Preparation - Split Dosing  If you are scheduled at our                                          If you are scheduled at our                                 [facility] [place]:                                                         [facility] [place]:    [address], [facility] [place] [address]                                                               [place] [address]  [phone]       Items to purchase 5 days before your procedure:    Suprep -  prescription at your pharmacy.  Purchase one 10oz bottle of Magnesium Citrate  Purchase Dulcolax Laxative tablets (Not stool softener tablets).      Two days before your procedure:      Drink at least eight glasses of water today.  Stop eating high- fiber foods such as vegetables and beans until after your colonoscopy. You can eat all other types of food today.     Drink the 10 oz bottle of magnesium citrate after dinner.      The day before your Colonoscopy:    Clear liquids only the entire day (water, Gatorade, coffee, tea, Sprite, 7-up, Jell-O, popsicles, chicken / beef broth, apple juice).    No milk / No dairy products, NO RED, BLUE or PURPLE color liquids /dyes    4:00 pm Take 2 Dulcolax tablets.     6:00 pm - Pour one 6 oz bottle of Suprep liquid into the mixing container. Add cold water to the 16-oz line and mix. Drink all the solution over 10-15 minutes.   Drink two more 16-ounce glasses of water over the next hour.            The day of your procedure:    6 hours prior to arrival time of your procedure, pour one 6 oz bottle of Suprep liquid into the mixing container. Add cold water to the 16-oz line and mix. Drink all the solution over 10-15 minutes.

## 2024-10-23 NOTE — PERIOP NOTE
Patient verified name, , and procedure.    Type: 1a; abbreviated assessment per anesthesia guidelines    Labs per anesthesia: none    Last cardiology office note dated 24, last stress dated 24, EKG dated 24 and Echo dated 24 found in EHR if needed for anesthesia reference. Pt states prior symptoms of SOB and weakness have resolved.     Instructed pt that they will be notified the day before their procedure by the GI Lab for time of arrival if their procedure is [place] and Pre-op for [place] cases. Arrival times should be called by 5 pm. If no phone is received the patient should contact their respective hospital. The GI lab telephone number is [phone] and ES Pre-op is [phone].     Follow diet and prep instructions per office including NPO status.      Bath or shower the night before and the am of surgery with non-moisturizing soap. No lotions, oils, powders, cologne on skin. No make up, eye make up or jewelry. Wear loose fitting comfortable, clean clothing.     Must have adult present in building the entire time .     Medications for the day of procedure-  levothyroxine, Hydrocodone if needed, ativan if needed, patient to hold none per anesthesia guidelines.     The following discharge instructions reviewed with patient: medication given during procedure may cause drowsiness for several hours, therefore, do not drive or operate machinery for remainder of the day. You may not drink alcohol on the day of your procedure, please resume regular diet and activity unless otherwise directed. You may experience abdominal distention for several hours that is relieved by the passage of gas. Contact your physician if you have any of the following: fever or chills, severe abdominal pain or excessive amount of bleeding or a large amount when having a bowel movement. Occasional specks of blood with bowel movement would not be unusual.

## 2024-10-23 NOTE — TELEPHONE ENCOUNTER
Suprep sent to pharmacy on file as requested.     ---------    \"Blanca Harrison Amanda, RN  Please send Suprep to the pharmacy on file.  Blanca Trejo\"

## 2024-10-27 ENCOUNTER — ANESTHESIA EVENT (OUTPATIENT)
Dept: ENDOSCOPY | Age: 74
End: 2024-10-27
Payer: MEDICARE

## 2024-10-27 RX ORDER — SODIUM CHLORIDE 9 MG/ML
INJECTION, SOLUTION INTRAVENOUS PRN
Status: CANCELLED | OUTPATIENT
Start: 2024-10-27

## 2024-10-27 RX ORDER — SODIUM CHLORIDE 0.9 % (FLUSH) 0.9 %
5-40 SYRINGE (ML) INJECTION EVERY 12 HOURS SCHEDULED
Status: CANCELLED | OUTPATIENT
Start: 2024-10-27

## 2024-10-27 RX ORDER — SODIUM CHLORIDE, SODIUM LACTATE, POTASSIUM CHLORIDE, CALCIUM CHLORIDE 600; 310; 30; 20 MG/100ML; MG/100ML; MG/100ML; MG/100ML
INJECTION, SOLUTION INTRAVENOUS CONTINUOUS
Status: CANCELLED | OUTPATIENT
Start: 2024-10-27

## 2024-10-28 ENCOUNTER — HOSPITAL ENCOUNTER (OUTPATIENT)
Age: 74
Discharge: HOME | End: 2024-10-28
Attending: INTERNAL MEDICINE | Admitting: INTERNAL MEDICINE
Payer: MEDICARE

## 2024-10-28 ENCOUNTER — ANESTHESIA (OUTPATIENT)
Dept: ENDOSCOPY | Age: 74
End: 2024-10-28
Payer: MEDICARE

## 2024-10-28 VITALS
DIASTOLIC BLOOD PRESSURE: 93 MMHG | SYSTOLIC BLOOD PRESSURE: 139 MMHG | OXYGEN SATURATION: 99 % | WEIGHT: 161.6 LBS | HEIGHT: 64 IN | HEART RATE: 60 BPM | RESPIRATION RATE: 12 BRPM | BODY MASS INDEX: 27.59 KG/M2 | TEMPERATURE: 98 F

## 2024-10-28 PROCEDURE — 2709999900 HC NON-CHARGEABLE SUPPLY: Performed by: INTERNAL MEDICINE

## 2024-10-28 PROCEDURE — 2580000003 HC RX 258: Performed by: STUDENT IN AN ORGANIZED HEALTH CARE EDUCATION/TRAINING PROGRAM

## 2024-10-28 PROCEDURE — 7100000011 HC PHASE II RECOVERY - ADDTL 15 MIN: Performed by: INTERNAL MEDICINE

## 2024-10-28 PROCEDURE — 3700000000 HC ANESTHESIA ATTENDED CARE: Performed by: INTERNAL MEDICINE

## 2024-10-28 PROCEDURE — 2500000003 HC RX 250 WO HCPCS: Performed by: REGISTERED NURSE

## 2024-10-28 PROCEDURE — 3609027000 HC COLONOSCOPY: Performed by: INTERNAL MEDICINE

## 2024-10-28 PROCEDURE — G0121 COLON CA SCRN NOT HI RSK IND: HCPCS | Performed by: INTERNAL MEDICINE

## 2024-10-28 PROCEDURE — 6360000002 HC RX W HCPCS: Performed by: REGISTERED NURSE

## 2024-10-28 PROCEDURE — 7100000010 HC PHASE II RECOVERY - FIRST 15 MIN: Performed by: INTERNAL MEDICINE

## 2024-10-28 PROCEDURE — 3700000001 HC ADD 15 MINUTES (ANESTHESIA): Performed by: INTERNAL MEDICINE

## 2024-10-28 PROCEDURE — 2500000003 HC RX 250 WO HCPCS: Performed by: STUDENT IN AN ORGANIZED HEALTH CARE EDUCATION/TRAINING PROGRAM

## 2024-10-28 RX ORDER — PROPOFOL 10 MG/ML
INJECTION, EMULSION INTRAVENOUS
Status: DISCONTINUED | OUTPATIENT
Start: 2024-10-28 | End: 2024-10-28 | Stop reason: SDUPTHER

## 2024-10-28 RX ORDER — LIDOCAINE HYDROCHLORIDE 20 MG/ML
INJECTION, SOLUTION EPIDURAL; INFILTRATION; INTRACAUDAL; PERINEURAL
Status: DISCONTINUED | OUTPATIENT
Start: 2024-10-28 | End: 2024-10-28 | Stop reason: SDUPTHER

## 2024-10-28 RX ORDER — LIDOCAINE HYDROCHLORIDE 10 MG/ML
1 INJECTION, SOLUTION INFILTRATION; PERINEURAL
Status: COMPLETED | OUTPATIENT
Start: 2024-10-28 | End: 2024-10-28

## 2024-10-28 RX ORDER — SODIUM CHLORIDE 0.9 % (FLUSH) 0.9 %
5-40 SYRINGE (ML) INJECTION PRN
Status: DISCONTINUED | OUTPATIENT
Start: 2024-10-28 | End: 2024-10-28 | Stop reason: HOSPADM

## 2024-10-28 RX ORDER — NALOXONE HYDROCHLORIDE 0.4 MG/ML
INJECTION, SOLUTION INTRAMUSCULAR; INTRAVENOUS; SUBCUTANEOUS PRN
Status: DISCONTINUED | OUTPATIENT
Start: 2024-10-28 | End: 2024-10-28 | Stop reason: HOSPADM

## 2024-10-28 RX ADMIN — PROPOFOL 80 MG: 10 INJECTION, EMULSION INTRAVENOUS at 12:55

## 2024-10-28 RX ADMIN — PROPOFOL 180 MCG/KG/MIN: 10 INJECTION, EMULSION INTRAVENOUS at 12:56

## 2024-10-28 RX ADMIN — LIDOCAINE HYDROCHLORIDE 1 ML: 10 INJECTION, SOLUTION INFILTRATION; PERINEURAL at 12:40

## 2024-10-28 RX ADMIN — SODIUM CHLORIDE, PRESERVATIVE FREE 5 ML: 5 INJECTION INTRAVENOUS at 12:40

## 2024-10-28 RX ADMIN — LIDOCAINE HYDROCHLORIDE 30 MG: 20 INJECTION, SOLUTION EPIDURAL; INFILTRATION; INTRACAUDAL; PERINEURAL at 12:55

## 2024-10-28 ASSESSMENT — PAIN DESCRIPTION - DESCRIPTORS: DESCRIPTORS: DISCOMFORT;SORE

## 2024-10-28 ASSESSMENT — PAIN DESCRIPTION - ORIENTATION: ORIENTATION: LOWER;MID

## 2024-10-28 ASSESSMENT — PAIN DESCRIPTION - LOCATION: LOCATION: BACK

## 2024-10-28 ASSESSMENT — PAIN - FUNCTIONAL ASSESSMENT: PAIN_FUNCTIONAL_ASSESSMENT: 0-10

## 2024-10-28 ASSESSMENT — PAIN DESCRIPTION - PAIN TYPE: TYPE: CHRONIC PAIN

## 2024-10-28 ASSESSMENT — PAIN DESCRIPTION - FREQUENCY: FREQUENCY: INTERMITTENT

## 2024-10-28 ASSESSMENT — PAIN DESCRIPTION - ONSET: ONSET: GRADUAL

## 2024-10-28 ASSESSMENT — PAIN SCALES - GENERAL: PAINLEVEL_OUTOF10: 5

## 2024-10-28 NOTE — H&P
HISTORY AND PHYSICAL             Date: 10/28/2024        Patient Name: Aneta Castro     YOB: 1950      Age:  74 y.o.      History of Present Illness   Concern for colovaginal or colovesical fistula     Past Medical History     Past Medical History:   Diagnosis Date    Anxiety     managed with medication    Arthritis     general-back, neck, hips,knees/ managed with medication     Autoimmune disease (HCC)     lichens sclerosis; pt states is dormant now    CAD (coronary artery disease) 2010    stent x 1, no MI    Chest pain     right    Chronic pain     back and neck    Depression     managed with medication     Family history of ischemic heart disease     H/O acute pancreatitis 08/2013    states gallbladder stone lodged creating a blockage.     H/O gastric bypass 2009    High cholesterol     managed w/ meds    Hx of echocardiogram 01/18/2019    Normal LV systolic function. Estimated EF 70-75%.    Hypertension     managed with medication     Hypothyroidism     managed with medication     Lichen sclerosus     Obesity (BMI 30-39.9)     BMI 32.4 3/16    Osteopenia     Pancreatitis     Skin cancer     Systolic murmur     ECHO: +/-PI, TR ; Per cardiologist note 1-5-17 no murmur ; pt states told by cardiologist that he was removeing murmur from her record    Ventral hernia         Past Surgical History     Past Surgical History:   Procedure Laterality Date    APPENDECTOMY  1954    BLADDER SUSPENSION      CARDIAC CATHETERIZATION  2010    stenting of LAD    CARDIAC CATHETERIZATION  08/2019    No stents placed this time    CARPAL TUNNEL RELEASE Right     CATARACT REMOVAL Bilateral 2015    CHOLECYSTECTOMY, LAPAROSCOPIC  7/2013    COLONOSCOPY  2017    normal, repeat after 2027    GASTRIC BYPASS SURGERY  2009    HERNIA REPAIR  8/14/14    ventral    HYSTERECTOMY (CERVIX STATUS UNKNOWN)  1978    cervix removed-Rt ovary removed. Dr. Vargas    IR BILIARY DRAIN EXCHANGE  3/7/2019    IR BILIARY DRAIN

## 2024-10-28 NOTE — DISCHARGE INSTRUCTIONS
Gastrointestinal Colonoscopy/Flexible Sigmoidoscopy - Lower Exam Discharge Instructions  Call Dr. Ragland for any problems or questions.    Contact the doctor’s office for follow up appointment as directed    Medication may cause drowsiness for several hours, therefore:  Do not drive or operate machinery for reminder of the day.  No alcohol today.  Do not make any important or legal decisions for 24 hours.  Do not sign any legal documents for 24 hours.    Ordinarily, you may resume regular diet and activity after exam unless otherwise specified by your physician.    Because of air put into your colon during exam, you may experience some abdominal distension, relieved by the passage of gas, for several hours.    Contact your physician if you have any of the following:  Excessive amount of bleeding - large amount when having a bowel movement.  Occasional specks of blood with bowel movement would not be unusual.  Severe abdominal pain  Fever or Chills

## 2024-10-28 NOTE — ANESTHESIA POSTPROCEDURE EVALUATION
Department of Anesthesiology  Postprocedure Note    Patient: Aneta Castro  MRN: [id number]  YOB: 1950  Date of evaluation: 10/28/2024    Procedure Summary       Date: 10/28/24 Room / Location: [facility] ENDO 01 / [facility] ENDOSCOPY    Anesthesia Start: 1251 Anesthesia Stop: 1313    Procedure: COLORECTAL CANCER SCREENING, NOT HIGH RISK Diagnosis:       Vaginal odor      Vaginal discharge      (Vaginal odor [N89.8])      (Vaginal discharge [N89.8])    Surgeons: Dax Ragland MD Responsible Provider: Avelino Dejesus MD    Anesthesia Type: TIVA ASA Status: 3            Anesthesia Type: No value filed.    Nitin Phase I:      Nitin Phase II: Nitin Score: 10    Anesthesia Post Evaluation    Patient location during evaluation: PACU  Patient participation: complete - patient participated  Level of consciousness: awake and alert  Airway patency: patent  Nausea & Vomiting: no nausea and no vomiting  Cardiovascular status: hemodynamically stable  Respiratory status: acceptable, nonlabored ventilation and spontaneous ventilation  Hydration status: euvolemic  Comments: BP (!) 139/93   Pulse 60   Temp 98 °F (36.7 °C) (Temporal)   Resp 12   Ht 1.626 m (5' 4.02\")   Wt 73.3 kg (161 lb 9.6 oz)   SpO2 99%   BMI 27.72 kg/m²     Multimodal analgesia pain management approach  Pain management: adequate and satisfactory to patient    No notable events documented.

## 2024-10-28 NOTE — ANESTHESIA PRE PROCEDURE
Department of Anesthesiology  Preprocedure Note       Name:  Aneta Castro   Age:  74 y.o.  :  1950                                          MRN:  [id number]         Date:  10/28/2024      Surgeon: Surgeon(s):  Dax Ragland MD    Procedure: Procedure(s):  COLORECTAL CANCER SCREENING, NOT HIGH RISK    Medications prior to admission:   Prior to Admission medications    Medication Sig Start Date End Date Taking? Authorizing Provider   HYDROcodone-acetaminophen (NORCO) 5-325 MG per tablet Take 1 tablet by mouth every 6 hours as needed for Pain.   Yes Rafi Laguna MD   amitriptyline (ELAVIL) 25 MG tablet Take 1 tablet by mouth nightly   Yes Rafi Laguna MD   denosumab (PROLIA) 60 MG/ML SOSY SC injection 60 mg SQ every 6 months through infusion center: [facility] 23  Yes Rafi Laguna MD   aspirin 81 MG EC tablet Take 1 tablet by mouth every evening 21  Yes Rafi Laguna MD   azelastine (ASTELIN) 0.1 % nasal spray 1 spray by Nasal route 2 times daily as needed 21  Yes Rafi Laguna MD   clobetasol (TEMOVATE) 0.05 % cream Apply 1 application  topically 2 times daily as needed 22  Yes Rafi Laguna MD   DULoxetine (CYMBALTA) 60 MG extended release capsule Take 1-2 capsules by mouth every evening 22  Yes Rafi Laguna MD   spironolactone (ALDACTONE) 25 MG tablet Take 1 tablet by mouth every evening 22  Yes Rafi Laguna MD   amLODIPine (NORVASC) 5 MG tablet Take 1 tablet by mouth every evening The details of the medication are not available because there are pending changes by a home health clinician. 18  Yes Automatic Reconciliation, Ar   atorvastatin (LIPITOR) 40 MG tablet Take 1 tablet by mouth every evening 19  Yes Automatic Reconciliation, Ar   hydroCHLOROthiazide (HYDRODIURIL) 25 MG tablet Take 1 tablet by mouth every evening 19  Yes Automatic Reconciliation, Ar   levothyroxine

## 2024-11-04 ENCOUNTER — OFFICE VISIT (OUTPATIENT)
Dept: UROLOGY | Age: 74
End: 2024-11-04
Payer: MEDICARE

## 2024-11-04 DIAGNOSIS — N89.8 VAGINAL DISCHARGE: Primary | ICD-10-CM

## 2024-11-04 LAB
BILIRUBIN, URINE, POC: NEGATIVE
BLOOD URINE, POC: NEGATIVE
GLUCOSE URINE, POC: NEGATIVE MG/DL
KETONES, URINE, POC: NORMAL MG/DL
LEUKOCYTE ESTERASE, URINE, POC: NEGATIVE
NITRITE, URINE, POC: NEGATIVE
PH, URINE, POC: 6 (ref 4.6–8)
PROTEIN,URINE, POC: NORMAL MG/DL
SPECIFIC GRAVITY, URINE, POC: 1.02 (ref 1–1.03)
URINALYSIS CLARITY, POC: NORMAL
URINALYSIS COLOR, POC: NORMAL
UROBILINOGEN, POC: NORMAL MG/DL

## 2024-11-04 PROCEDURE — 1160F RVW MEDS BY RX/DR IN RCRD: CPT | Performed by: UROLOGY

## 2024-11-04 PROCEDURE — 1159F MED LIST DOCD IN RCRD: CPT | Performed by: UROLOGY

## 2024-11-04 PROCEDURE — 81003 URINALYSIS AUTO W/O SCOPE: CPT | Performed by: UROLOGY

## 2024-11-04 PROCEDURE — 1123F ACP DISCUSS/DSCN MKR DOCD: CPT | Performed by: UROLOGY

## 2024-11-04 PROCEDURE — 99203 OFFICE O/P NEW LOW 30 MIN: CPT | Performed by: UROLOGY

## 2024-11-04 ASSESSMENT — ENCOUNTER SYMPTOMS
BACK PAIN: 0
SHORTNESS OF BREATH: 0
EYE PAIN: 0
DIARRHEA: 0
SKIN LESIONS: 0
CONSTIPATION: 0
VOMITING: 0
HEARTBURN: 0
WHEEZING: 0
COUGH: 0
BLOOD IN STOOL: 0
ABDOMINAL PAIN: 0
NAUSEA: 0
EYE DISCHARGE: 0
INDIGESTION: 0

## 2024-11-04 NOTE — PROGRESS NOTES
incomplete emptying. Negative for urinary burning, hematuria, flank pain, recurrent UTIs, history of urolithiasis, nocturia, urgency, leakage w/ urge, frequent urination, erectile dysfunction, testicular pain, sexually transmitted disease, sexually transmitted disease, discharge, urethral stricture, menstrual problem, endometriosis, vaginal pain and hysterectomy.Number of pregnancies: 3.  Number of births: 3.  Musculoskeletal:  Negative for back pain, bone pain, arthralgias, tenderness, muscle weakness and neck pain.  Neurological:  Negative for dizziness, focal weakness, numbness, seizures and tremors.  Psychological:  Negative for depression and psychiatric problem.  Endocrine:  Negative for cold intolerance, thirst, excessive urination, fatigue and heat intolerance.  Hem/Lymphatic:  Negative for easy bleeding, easy bruising and frequent infections.      Urinalysis  UA - Dipstick  Results for orders placed or performed in visit on 11/04/24   AMB POC URINALYSIS DIP STICK AUTO W/O MICRO   Result Value Ref Range    Color (UA POC)      Clarity (UA POC)      Glucose, Urine, POC Negative Negative mg/dL    Bilirubin, Urine, POC Negative Negative    KETONES, Urine, POC Trace Negative mg/dL    Specific Gravity, Urine, POC 1.020 1.001 - 1.035    Blood (UA POC) Negative     pH, Urine, POC 6.0 4.6 - 8.0    Protein, Urine, POC Trace Negative mg/dL    Urobilinogen, POC 2 mg/dL <1.1 mg/dL    Nitrite, Urine, POC Negative Negative    Leukocyte Esterase, Urine, POC Negative Negative       There were no vitals taken for this visit.     GENERAL: NAD, ALERT, A&O x 3, GAIT NORMAL  LUNGS: easy work of breathing  ABDOMEN: soft, non tender  NEUROLOGIC: cranial nerves 2-12 grossly intact           Assessment and Plan    ICD-10-CM    1. Vaginal discharge  N89.8           UA is completely clear today with no bacteria, wbc's, or rbc's.  Recent colonsocopy revealed no fistulous tract.  Considering these 2 facts, her chance of having a

## 2024-12-02 NOTE — PROGRESS NOTES
[facility]  Aneta Castro is a 74 y.o. female seen for vaginal discharge that she can feel run down the vagina.  The discharge is sticky and colored.  The discharge has a foul odor.  She has to change clothes 2 -3 times per day due to the discharge.  She has been seen by a Dr. Baxter, GI and urology.      Past Medical History, Past Surgical History, Family history, Social History, and Medications were all reviewed with the patient today and updated as necessary.     Current Outpatient Medications   Medication Sig    HYDROcodone-acetaminophen (NORCO) 5-325 MG per tablet Take 1 tablet by mouth every 6 hours as needed for Pain.    amitriptyline (ELAVIL) 25 MG tablet Take 1 tablet by mouth nightly    denosumab (PROLIA) 60 MG/ML SOSY SC injection 60 mg SQ every 6 months through infusion center: Osteoporosis    aspirin 81 MG EC tablet Take 1 tablet by mouth every evening    azelastine (ASTELIN) 0.1 % nasal spray 1 spray by Nasal route 2 times daily as needed    clobetasol (TEMOVATE) 0.05 % cream Apply 1 application  topically 2 times daily as needed    DULoxetine (CYMBALTA) 60 MG extended release capsule Take 1-2 capsules by mouth every evening    spironolactone (ALDACTONE) 25 MG tablet Take 1 tablet by mouth every evening    acetaminophen (TYLENOL) 325 MG tablet Take 1 tablet by mouth every 4 hours as needed    amLODIPine (NORVASC) 5 MG tablet Take 1 tablet by mouth every evening The details of the medication are not available because there are pending changes by a home health clinician.    atorvastatin (LIPITOR) 40 MG tablet Take 1 tablet by mouth every evening    hydroCHLOROthiazide (HYDRODIURIL) 25 MG tablet Take 1 tablet by mouth every evening    levothyroxine (SYNTHROID) 75 MCG tablet Take 1 tablet by mouth Daily The details of the medication are not available because there are pending changes by a home health clinician.    LORazepam (ATIVAN) 0.5 MG tablet Take 1 tablet by mouth every 8 hours as needed.

## 2024-12-03 ENCOUNTER — OFFICE VISIT (OUTPATIENT)
Dept: OBGYN CLINIC | Age: 74
End: 2024-12-03
Payer: MEDICARE

## 2024-12-03 VITALS — HEIGHT: 64 IN | WEIGHT: 157 LBS | BODY MASS INDEX: 26.8 KG/M2

## 2024-12-03 DIAGNOSIS — N89.8 VAGINAL DISCHARGE: Primary | ICD-10-CM

## 2024-12-03 DIAGNOSIS — L98.8 FISTULA: ICD-10-CM

## 2024-12-03 DIAGNOSIS — N82.9 FEMALE GENITAL TRACT FISTULA, UNSPECIFIED: ICD-10-CM

## 2024-12-03 PROCEDURE — 1160F RVW MEDS BY RX/DR IN RCRD: CPT | Performed by: OBSTETRICS & GYNECOLOGY

## 2024-12-03 PROCEDURE — 1159F MED LIST DOCD IN RCRD: CPT | Performed by: OBSTETRICS & GYNECOLOGY

## 2024-12-03 PROCEDURE — 1123F ACP DISCUSS/DSCN MKR DOCD: CPT | Performed by: OBSTETRICS & GYNECOLOGY

## 2024-12-03 PROCEDURE — 99214 OFFICE O/P EST MOD 30 MIN: CPT | Performed by: OBSTETRICS & GYNECOLOGY

## 2024-12-27 ENCOUNTER — TELEPHONE (OUTPATIENT)
Dept: ORTHOPEDIC SURGERY | Age: 74
End: 2024-12-27

## 2024-12-27 NOTE — TELEPHONE ENCOUNTER
Recurrent dislocation, left shoulder/ Sx done on both sides with Dr. Sun at [facility] in epic and pt. do not want to go back to that doctor/ Xray done ttb CD       Xray and referral in media    Let me know after review    Thanks.

## 2024-12-30 ENCOUNTER — HOSPITAL ENCOUNTER (OUTPATIENT)
Dept: CT IMAGING | Age: 74
Discharge: HOME | End: 2025-01-02
Attending: OBSTETRICS & GYNECOLOGY
Payer: MEDICARE

## 2024-12-30 DIAGNOSIS — N82.9 FEMALE GENITAL TRACT FISTULA, UNSPECIFIED: ICD-10-CM

## 2024-12-30 LAB — CREAT BLD-MCNC: 0.75 MG/DL (ref 0.8–1.5)

## 2024-12-30 PROCEDURE — 6360000004 HC RX CONTRAST MEDICATION: Performed by: OBSTETRICS & GYNECOLOGY

## 2024-12-30 PROCEDURE — 82565 ASSAY OF CREATININE: CPT

## 2024-12-30 PROCEDURE — 74177 CT ABD & PELVIS W/CONTRAST: CPT

## 2024-12-30 RX ORDER — IOPAMIDOL 755 MG/ML
100 INJECTION, SOLUTION INTRAVASCULAR
Status: COMPLETED | OUTPATIENT
Start: 2024-12-30 | End: 2024-12-30

## 2024-12-30 RX ORDER — DIATRIZOATE MEGLUMINE AND DIATRIZOATE SODIUM 660; 100 MG/ML; MG/ML
15 SOLUTION ORAL; RECTAL
Status: DISCONTINUED | OUTPATIENT
Start: 2024-12-30 | End: 2025-01-03 | Stop reason: HOSPADM

## 2024-12-30 RX ORDER — DIATRIZOATE MEGLUMINE AND DIATRIZOATE SODIUM 660; 100 MG/ML; MG/ML
60 SOLUTION ORAL; RECTAL
Status: DISCONTINUED | OUTPATIENT
Start: 2024-12-30 | End: 2025-01-03 | Stop reason: HOSPADM

## 2024-12-30 RX ADMIN — IOPAMIDOL 100 ML: 755 INJECTION, SOLUTION INTRAVENOUS at 15:57

## 2024-12-30 RX ADMIN — DIATRIZOATE MEGLUMINE AND DIATRIZOATE SODIUM 15 ML: 660; 100 LIQUID ORAL; RECTAL at 15:57

## 2024-12-30 RX ADMIN — DIATRIZOATE MEGLUMINE AND DIATRIZOATE SODIUM 60 ML: 660; 100 LIQUID ORAL; RECTAL at 15:57

## 2025-01-02 DIAGNOSIS — N89.8 VAGINAL DISCHARGE: Primary | ICD-10-CM

## 2025-01-02 RX ORDER — ESTRADIOL 0.1 MG/G
CREAM VAGINAL
Qty: 42.5 G | Refills: 3 | Status: SHIPPED | OUTPATIENT
Start: 2025-01-02

## 2025-01-02 RX ORDER — METRONIDAZOLE 250 MG/1
250 TABLET ORAL 2 TIMES DAILY
Qty: 14 TABLET | Refills: 0 | Status: SHIPPED | OUTPATIENT
Start: 2025-01-02 | End: 2025-01-09

## 2025-01-02 NOTE — TELEPHONE ENCOUNTER
----- Message from Dr. Jim Hart MD sent at 1/2/2025  1:05 PM EST -----  CT scan did not show any issues but they did have some limitation due to her hip prosthesis.  Recommend Flagyl 250 mg BID x 7 days and then Estrace vag cream 1 gram intravaginally 2 x per week.  Lets see if this will help with the discharge.  This might take awhile - she may see no improvement for a couple of weeks

## 2025-01-08 ENCOUNTER — TELEPHONE (OUTPATIENT)
Dept: ORTHOPEDIC SURGERY | Age: 75
End: 2025-01-08

## 2025-01-31 RX ORDER — ASPIRIN 81 MG/1
81 TABLET, COATED ORAL EVERY 12 HOURS
Qty: 60 TABLET | OUTPATIENT
Start: 2025-01-31

## 2025-02-10 ENCOUNTER — TELEPHONE (OUTPATIENT)
Age: 75
End: 2025-02-10

## 2025-02-10 NOTE — TELEPHONE ENCOUNTER
Triage spoke with pt. Pt states she has not had any CP or SOB.  Triage faxed copy of this note to Kaye at Dr. Sun's office at [phone]  .

## 2025-02-10 NOTE — TELEPHONE ENCOUNTER
Cardiac Clearance        Physician or Practice Requesting: Star Rodriguez  : Kaye   Contact Phone Number: [phone]  Fax Number: [phone]  Date of Surgery/Procedure: Next week (date TBD)  Type of Surgery or Procedure: Revision of left total shoulder  Type of Anesthesia: General  Type of Clearance Requested: Cardiac Clearance  Medication to Hold:None known of     Patient is in a lot of pain and they would like to perform the procedure next week, but the date is not known.

## 2025-03-20 DIAGNOSIS — N89.8 VAGINAL DISCHARGE: Primary | ICD-10-CM

## 2025-03-20 RX ORDER — METRONIDAZOLE 250 MG/1
250 TABLET ORAL 2 TIMES DAILY
Qty: 14 TABLET | Refills: 0 | Status: SHIPPED | OUTPATIENT
Start: 2025-03-20 | End: 2025-03-27

## 2025-03-20 NOTE — TELEPHONE ENCOUNTER
Pt called for a refill on the flagyl, she is having the discharge again. She is scheduled for shoulder surgery on 4/4/25 and wants to get this cleared up before then. New prescription to the pharmacy.

## 2025-06-13 ENCOUNTER — HOSPITAL ENCOUNTER (OUTPATIENT)
Dept: MAMMOGRAPHY | Age: 75
Discharge: HOME | End: 2025-06-16
Payer: MEDICARE

## 2025-06-13 DIAGNOSIS — Z12.31 VISIT FOR SCREENING MAMMOGRAM: ICD-10-CM

## 2025-06-13 DIAGNOSIS — M81.0 SENILE OSTEOPOROSIS: ICD-10-CM

## 2025-06-13 PROCEDURE — 77080 DXA BONE DENSITY AXIAL: CPT

## 2025-06-13 PROCEDURE — 77063 BREAST TOMOSYNTHESIS BI: CPT

## (undated) DEVICE — Z DISCONTINUED PER MEDLINE USE 2741944 DRESSING AQUACEL 12 IN SURG W9XL30CM SIL CVR WTRPRF VIR BACT BARR ANTIMIC

## (undated) DEVICE — YANKAUER,BULB TIP,W/O VENT,RIGID,STERILE: Brand: MEDLINE

## (undated) DEVICE — 1840 FOAM BLOCK NEEDLE COUNTER: Brand: DEVON

## (undated) DEVICE — CLOSURE SKIN FLX NONINVASIVE PRELOC TECHNOLOGY FOR 24IN

## (undated) DEVICE — SUTURE VCRL SZ 1 L27IN ABSRB UD L36MM CP-1 1/2 CIR REV CUT J268H

## (undated) DEVICE — SUTURE STRATAFIX SYMMETRIC PDS + SZ 1 L18IN ABSRB VLT L48MM SXPP1A400

## (undated) DEVICE — KENDALL RADIOLUCENT FOAM MONITORING ELECTRODE RECTANGULAR SHAPE: Brand: KENDALL

## (undated) DEVICE — TIBIAL COMPONENT
Type: IMPLANTABLE DEVICE | Site: KNEE | Status: NON-FUNCTIONAL
Brand: TRIATHLON
Removed: 2020-11-12

## (undated) DEVICE — HEWSON SUTURE RETRIEVER: Brand: HEWSON SUTURE RETRIEVER

## (undated) DEVICE — T4 HOOD

## (undated) DEVICE — BIPOLAR SEALER 23-112-1 AQM 6.0: Brand: AQUAMANTYS ®

## (undated) DEVICE — BLADELESS OPTICAL TROCAR WITH FIXATION CANNULA: Brand: VERSAONE

## (undated) DEVICE — 3M™ IOBAN™ 2 ANTIMICROBIAL INCISE DRAPE 6651EZ: Brand: IOBAN™ 2

## (undated) DEVICE — APPLICATOR COT TIP 6IN WOOD --

## (undated) DEVICE — BNDG COHESIVE 4INX5YD COBAN --

## (undated) DEVICE — DRAPE,TOP,102X53,STERILE: Brand: MEDLINE

## (undated) DEVICE — SYR 50ML LR LCK 1ML GRAD NSAF --

## (undated) DEVICE — [HIGH FLOW INSUFFLATOR,  DO NOT USE IF PACKAGE IS DAMAGED,  KEEP DRY,  KEEP AWAY FROM SUNLIGHT,  PROTECT FROM HEAT AND RADIOACTIVE SOURCES.]: Brand: PNEUMOSURE

## (undated) DEVICE — CORD RETRCT SIL

## (undated) DEVICE — BLADELESS OPTICAL TROCAR WITH FIXATION CANNULA: Brand: VERSAPORT

## (undated) DEVICE — MCLASS OSCILLATING SAW BLADE 19 X 1.27 (0.050") X 90 MM: Brand: MCLASS

## (undated) DEVICE — SHEARS ENDOSCP L36CM DIA5MM ULTRASONIC CRV TIP W/ ADV

## (undated) DEVICE — SUTURE VCRL SZ 2-0 L27IN ABSRB UD L36MM CP-1 1/2 CIR REV J266H

## (undated) DEVICE — SINGLE PORT MANIFOLD: Brand: NEPTUNE 2

## (undated) DEVICE — SUTURE MCRYL SZ 4-0 L27IN ABSRB UD L19MM PS-2 1/2 CIR PRIM Y426H

## (undated) DEVICE — CONNECTING ROD: Brand: HOFFMANN

## (undated) DEVICE — 3M™ STERI-DRAPE™ INSTRUMENT POUCH 1018: Brand: STERI-DRAPE™

## (undated) DEVICE — BLADE SAW W12.5XL70MM THK0.8MM CUT THK1.12MM S STL RECIP

## (undated) DEVICE — NEEDLE SPNL 22GA L3.5IN BLK HUB S STL REG WALL FIT STYL W/

## (undated) DEVICE — FAN SPRAY KIT: Brand: PULSAVAC®

## (undated) DEVICE — MARKER RAD KNEE FEM CKPT STEREOTAXIC IMAG LESION LOC

## (undated) DEVICE — SYR LR LCK 1ML GRAD NSAF 30ML --

## (undated) DEVICE — REM POLYHESIVE ADULT PATIENT RETURN ELECTRODE: Brand: VALLEYLAB

## (undated) DEVICE — SUTURE VCRL SZ 1 L36IN ABSRB UD CTX L48MM 1/2 CIR J977H

## (undated) DEVICE — SOLUTION IV 1000ML 0.9% SOD CHL

## (undated) DEVICE — DRAPE SHT 3 QTR PROXIMA 53X77 --

## (undated) DEVICE — TRAY PREP DRY W/ PREM GLV 2 APPL 6 SPNG 2 UNDPD 1 OVERWRAP

## (undated) DEVICE — GUIDEPIN ORTHOPEDIC NAVIGATION 4X110 MM 2P SCREW STRL

## (undated) DEVICE — BUTTON SWITCH PENCIL BLADE ELECTRODE, HOLSTER: Brand: EDGE

## (undated) DEVICE — SOLUTION IRRIG 1000ML H2O STRL BLT

## (undated) DEVICE — SYRINGE MED 10ML LUERLOCK TIP W/O SFTY DISP

## (undated) DEVICE — ZIP 16 SURGICAL SKIN CLOSURE DEVICE: Brand: ZIP 16 SURGICAL SKIN CLOSURE DEVICE

## (undated) DEVICE — SURGICAL PROCEDURE PACK TOT HIP CDS

## (undated) DEVICE — LAP CHOLE: Brand: MEDLINE INDUSTRIES, INC.

## (undated) DEVICE — STERILE SLEEVE: Brand: CONVERTORS

## (undated) DEVICE — HANDPIECE SET WITH COAXIAL HIGH FLOW TIP AND SUCTION TUBE: Brand: INTERPULSE

## (undated) DEVICE — CLOSURE SKIN FACILITATES COMPATIBILITY W/ CERTAIN IS DSG

## (undated) DEVICE — 2000CC GUARDIAN II: Brand: GUARDIAN

## (undated) DEVICE — SUTURE ETHBND EXCEL SZ 5 L30IN NONABSORBABLE GRN L40MM V-37 MB66G

## (undated) DEVICE — AIRLIFE™ OXYGEN TUBING 7 FEET (2.1 M) CRUSH RESISTANT OXYGEN TUBING, VINYL TIPPED: Brand: AIRLIFE™

## (undated) DEVICE — SLIM BODY SKIN STAPLER: Brand: APPOSE ULC

## (undated) DEVICE — SOLUTION IV 500ML 0.9% SOD CHL FLX CONT

## (undated) DEVICE — (D)PREP SKN CHLRAPRP APPL 26ML -- CONVERT TO ITEM 371833

## (undated) DEVICE — SUTURE STRATAFIX SPRL SZ 1 L14IN ABSRB VLT L48CM CTX 1/2 SXPD2B405

## (undated) DEVICE — ENDOSCOPIC KIT 1.1+ OP4 CA DE 2 GWN AAMI LEVEL 3

## (undated) DEVICE — DRAPE,HIP,W/POUCHES,STERILE: Brand: MEDLINE

## (undated) DEVICE — NEEDLE,18GX1.5",REG,BEVEL: Brand: MEDLINE

## (undated) DEVICE — SOLUTION IV 250ML 0.9% SOD CHL CLR INJ FLX BG CONT PRT CLSR

## (undated) DEVICE — Z DISCONTINUED USE 2744636  DRESSING AQUACEL 14 IN ALG W3.5XL14IN POLYUR FLM CVR W/ HYDRCOLL

## (undated) DEVICE — SKIN STAPLER: Brand: SIGNET

## (undated) DEVICE — SOLUTION IRRIG 3000ML 0.9% SOD CHL FLX CONT 0797208] ICU MEDICAL INC]

## (undated) DEVICE — 450 ML BOTTLE OF 0.05% CHLORHEXIDINE GLUCONATE IN 99.95% STERILE WATER FOR IRRIGATION, USP AND APPLICATOR.: Brand: IRRISEPT ANTIMICROBIAL WOUND LAVAGE

## (undated) DEVICE — MARKER RAD KNEE TIB CKPT STEREOTAXIC IMAG LESION LOC

## (undated) DEVICE — SUTURE SZ 0 27IN 5/8 CIR UR-6  TAPER PT VIOLET ABSRB VICRYL J603H

## (undated) DEVICE — Device

## (undated) DEVICE — TOTAL KNEE DR RIDGEWAY: Brand: MEDLINE INDUSTRIES, INC.

## (undated) DEVICE — STERILE PRESSURE PROTECTOR PAD® FOR DE MAYO UNIVERSAL DISTRACTOR® (10/CASE): Brand: DE MAYO UNIVERSAL DISTRACTOR®

## (undated) DEVICE — DERMABOND SKIN ADH 0.7ML -- DERMABOND ADVANCED 12/BX

## (undated) DEVICE — Device: Brand: POWER-FLO®

## (undated) DEVICE — 3000CC GUARDIAN II: Brand: GUARDIAN

## (undated) DEVICE — PIN BNE FIX 4X140MM STRL -- 1EA=2PK MAKO

## (undated) DEVICE — TRAY CATH 16F DRN BG LTX -- CONVERT TO ITEM 363158

## (undated) DEVICE — CANNULA NSL ORAL AD FOR CAPNOFLEX CO2 O2 AIRLFE

## (undated) DEVICE — KIT INT FIX FEM TIB CKPT MAKOPLASTY

## (undated) DEVICE — 3M™ COBAN™ NL STERILE NON-LATEX SELF-ADHERENT WRAP, 2084S, 4 IN X 5 YD (10 CM X 4,5 M), 18 ROLLS/CASE: Brand: 3M™ COBAN™

## (undated) DEVICE — PIN BNE 4X110MM STRL -- 2/PK MAKO

## (undated) DEVICE — SUT ETHLN 3-0 18IN PS2 BLK --

## (undated) DEVICE — NEEDLE SYRINGE 18GA L1.5IN RED PLAS HUB S STL BLNT FILL W/O

## (undated) DEVICE — CRUCIATE RETAINING FEMORAL
Type: IMPLANTABLE DEVICE | Site: KNEE | Status: NON-FUNCTIONAL
Brand: TRIATHLON
Removed: 2020-11-12

## (undated) DEVICE — TOTAL HIP DR RIDGEWAY: Brand: MEDLINE INDUSTRIES, INC.

## (undated) DEVICE — SOL ANTI-FOG 6ML MEDC -- MEDICHOICE - CONVERT TO 358427

## (undated) DEVICE — CARDINAL HEALTH FLEXIBLE LIGHT HANDLE COVER: Brand: CARDINAL HEALTH

## (undated) DEVICE — SUTURE PDS II SZ 1 L54IN ABSRB VLT L65MM TP-1 1/2 CIR Z879G

## (undated) DEVICE — GAUZE,SPONGE,4"X4",12PLY,WOVEN,NS,LF: Brand: MEDLINE

## (undated) DEVICE — SYRINGE MEDICAL 3ML CLEAR PLASTIC STANDARD NON CONTROL LUERLOCK TIP DISPOSABLE

## (undated) DEVICE — KENDALL SCD EXPRESS SLEEVES, KNEE LENGTH, MEDIUM: Brand: KENDALL SCD

## (undated) DEVICE — SYRINGE CATH TIP 50ML

## (undated) DEVICE — KIT PROC KNE TRACKING PK/1 -- VIZADISC MAKO

## (undated) DEVICE — UNIVERSAL FIXATION CANNULA: Brand: VERSAONE

## (undated) DEVICE — BLADE SURG SAW STD S STL OSC W/ SERR EDGE DISP

## (undated) DEVICE — SUTURE PDS II SZ 1 L96IN ABSRB VLT TP-1 L65MM 1/2 CIR Z880G

## (undated) DEVICE — BASIC SINGLE BASIN BTC-LF: Brand: MEDLINE INDUSTRIES, INC.

## (undated) DEVICE — MEDI-VAC YANKAUER SUCTION HANDLE W/BULBOUS TIP: Brand: CARDINAL HEALTH

## (undated) DEVICE — TIBIAL BEARING INSERT
Type: IMPLANTABLE DEVICE | Site: KNEE | Status: NON-FUNCTIONAL
Brand: TRIATHLON
Removed: 2020-11-12

## (undated) DEVICE — KIT DRP FOR RIO ROBOTIC ARM ASST SYS

## (undated) DEVICE — STRYKER PERFORMANCE SERIES SAGITTAL BLADE: Brand: STRYKER PERFORMANCE SERIES

## (undated) DEVICE — 2, DISPOSABLE SUCTION/IRRIGATOR WITHOUT DISPOSABLE TIP: Brand: STRYKEFLOW

## (undated) DEVICE — CONNECTOR TBNG OD5-7MM O2 END DISP

## (undated) DEVICE — NEEDLE HYPO 21GA L1.5IN INTRAMUSCULAR S STL LATCH BVL UP

## (undated) DEVICE — GUIDEPIN ORTHOPEDIC NAVIGATION 4X140 MM 2P SCREW STRL